# Patient Record
Sex: FEMALE | Race: WHITE | NOT HISPANIC OR LATINO | Employment: OTHER | URBAN - METROPOLITAN AREA
[De-identification: names, ages, dates, MRNs, and addresses within clinical notes are randomized per-mention and may not be internally consistent; named-entity substitution may affect disease eponyms.]

---

## 2017-01-16 ENCOUNTER — OFFICE VISIT (OUTPATIENT)
Dept: INTERNAL MEDICINE | Facility: CLINIC | Age: 62
End: 2017-01-16
Payer: COMMERCIAL

## 2017-01-16 VITALS
HEIGHT: 64 IN | OXYGEN SATURATION: 98 % | BODY MASS INDEX: 36.02 KG/M2 | DIASTOLIC BLOOD PRESSURE: 104 MMHG | SYSTOLIC BLOOD PRESSURE: 150 MMHG | HEART RATE: 50 BPM | RESPIRATION RATE: 20 BRPM | WEIGHT: 211 LBS

## 2017-01-16 DIAGNOSIS — E66.01 SEVERE OBESITY (BMI 35.0-35.9 WITH COMORBIDITY): ICD-10-CM

## 2017-01-16 DIAGNOSIS — I10 BENIGN ESSENTIAL HTN: Primary | ICD-10-CM

## 2017-01-16 PROCEDURE — 1159F MED LIST DOCD IN RCRD: CPT | Mod: S$GLB,,, | Performed by: NURSE PRACTITIONER

## 2017-01-16 PROCEDURE — 99204 OFFICE O/P NEW MOD 45 MIN: CPT | Mod: S$GLB,,, | Performed by: NURSE PRACTITIONER

## 2017-01-16 PROCEDURE — 3080F DIAST BP >= 90 MM HG: CPT | Mod: S$GLB,,, | Performed by: NURSE PRACTITIONER

## 2017-01-16 PROCEDURE — 99999 PR PBB SHADOW E&M-NEW PATIENT-LVL III: CPT | Mod: PBBFAC,,, | Performed by: NURSE PRACTITIONER

## 2017-01-16 PROCEDURE — 3077F SYST BP >= 140 MM HG: CPT | Mod: S$GLB,,, | Performed by: NURSE PRACTITIONER

## 2017-01-16 RX ORDER — RAMIPRIL 10 MG/1
10 CAPSULE ORAL 2 TIMES DAILY
Qty: 60 CAPSULE | Refills: 2 | Status: SHIPPED | OUTPATIENT
Start: 2017-01-16 | End: 2017-01-19

## 2017-01-16 RX ORDER — HYDROCHLOROTHIAZIDE 25 MG/1
TABLET ORAL
COMMUNITY
Start: 2017-01-13 | End: 2017-01-19

## 2017-01-16 RX ORDER — RAMIPRIL 10 MG/1
CAPSULE ORAL
COMMUNITY
Start: 2017-01-13 | End: 2017-01-16

## 2017-01-16 RX ORDER — HYDROCODONE BITARTRATE AND ACETAMINOPHEN 5; 325 MG/1; MG/1
TABLET ORAL
COMMUNITY
Start: 2017-01-13 | End: 2017-01-23

## 2017-01-16 NOTE — PROGRESS NOTES
Subjective:       Patient ID: Susan Pinto is a 61 y.o. female.    Chief Complaint: Follow-up (X ER visit on friday due to fall ) and Hypertension    Patient is known, to me and presents with   Chief Complaint   Patient presents with    Follow-up     X ER visit on friday due to fall     Hypertension   .  Denies chest pain and shortness of breath.  Patient presents to be established to clinic. Was placed on ramipril and HCTZ. She is only taking ramipril 10 mg at this time. She fell over the weekend at a retreat and was sent to ER. She had work up done and showed that she had elevated pressure. Patient states that she feels ok but is concerned about her pressure. Had EKG as well. Has some bruising.  She had blood tests too and would like to wait on other tests and wants to get blood pressure stable  HPI  Review of Systems   Constitutional: Negative for activity change, appetite change, fatigue, fever and unexpected weight change.   HENT: Negative for congestion, ear discharge, ear pain, hearing loss, postnasal drip and tinnitus.    Eyes: Negative for photophobia, pain and visual disturbance.   Respiratory: Negative for cough, shortness of breath, wheezing and stridor.    Cardiovascular: Negative for chest pain, palpitations and leg swelling.   Gastrointestinal: Negative for abdominal distention.   Genitourinary: Negative for difficulty urinating, dysuria, frequency, hematuria and urgency.   Musculoskeletal: Negative for arthralgias, back pain, gait problem, joint swelling and neck pain.   Skin: Positive for color change.   Neurological: Negative for dizziness, seizures, syncope, weakness, light-headedness, numbness and headaches.   Hematological: Negative for adenopathy. Does not bruise/bleed easily.   Psychiatric/Behavioral: Negative for behavioral problems, confusion and hallucinations.       Objective:      Physical Exam   Constitutional: She is oriented to person, place, and time. She appears well-developed  "and well-nourished. No distress.   HENT:   Head: Normocephalic and atraumatic.   Right Ear: External ear normal.   Left Ear: External ear normal.   Mouth/Throat: Oropharynx is clear and moist. No oropharyngeal exudate.   Eyes: Conjunctivae and EOM are normal. Pupils are equal, round, and reactive to light. Right eye exhibits no discharge. Left eye exhibits no discharge.   Neck: Normal range of motion. Neck supple. No JVD present. No thyromegaly present.   Cardiovascular: Normal rate, regular rhythm, normal heart sounds and intact distal pulses.  Exam reveals no gallop and no friction rub.    No murmur heard.  Pulmonary/Chest: Effort normal and breath sounds normal. No stridor. No respiratory distress. She has no wheezes. She has no rales. She exhibits no tenderness.   Abdominal: Soft. Bowel sounds are normal. She exhibits no distension and no mass. There is no tenderness. There is no rebound.   Musculoskeletal: Normal range of motion. She exhibits no edema or tenderness.   Lymphadenopathy:     She has no cervical adenopathy.   Neurological: She is alert and oriented to person, place, and time. She has normal reflexes. She displays normal reflexes. No cranial nerve deficit. She exhibits normal muscle tone. Coordination normal.   Skin: Skin is warm and dry. No rash noted. No erythema. No pallor.   Bruising to left forearm and left deltoid region   Psychiatric: She has a normal mood and affect. Her behavior is normal. Judgment and thought content normal.       Assessment:       1. Benign essential HTN    2. Severe obesity (BMI 35.0-35.9 with comorbidity)        Plan:   Susan was seen today for follow-up and hypertension.    Diagnoses and all orders for this visit:    Benign essential HTN  -     ramipril (ALTACE) 10 MG capsule; Take 1 capsule (10 mg total) by mouth 2 (two) times daily.    Severe obesity (BMI 35.0-35.9 with comorbidity)  "This note will not be shared with the patient."  Refills on meds.  Medication " compliance was discussed with the patient.   Medication side effects were discussed.  The patient was instructed on using exercise frequently to reduce blood pressure.  Thirty to forty-five minutes of brisk walking three to four times a week is often helpful to lower your blood pressure.  Monitor blood pressures at home and to record the values in a log.  The patient was instructed to monitor weight closely and to try to keep it as close to ideal body weight as possible.  Reduce salt intake to less than 2 grams per day.  Do not add salt to food at the table.  Reduce or get rid of salt used in cooking.  Limit processed and fast foods.  Read package labels for amount of salt (soduim) in foods.  Losing weight, even just 10 pounds, of can decrease blood pressure.  Place ice on hematomas to left arm  Will take ramipril bid and take hctz 25 mg not half  RTC as scheduled

## 2017-01-16 NOTE — MR AVS SNAPSHOT
[facility]  [address] [place] [id number]  Phone: [phone]  Fax: [phone]                  Susan Pinto   2017 9:30 AM   Office Visit    Description:  Female : 1955   Provider:  Court Champion NP   Department:  [facility]           Reason for Visit     Follow-up     Hypertension           Diagnoses this Visit        Comments    Benign essential HTN    -  Primary     Severe obesity (BMI 35.0-35.9 with comorbidity)                To Do List           Goals (5 Years of Data)     None      Follow-Up and Disposition     Return in about 1 week (around 2017).       These Medications        Disp Refills Start End    ramipril (ALTACE) 10 MG capsule 60 capsule 2 2017    Take 1 capsule (10 mg total) by mouth 2 (two) times daily. - Oral    Pharmacy: [address] #: [phone]         [facility]     [facility] On Call Nurse Care Line -  Assistance  Registered nurses in the [facility] On Call Center provide clinical advisement, health education, appointment booking, and other advisory services.  Call for this free service at [phone].             Medications           Message regarding Medications     Verify the changes and/or additions to your medication regime listed below are the same as discussed with your clinician today.  If any of these changes or additions are incorrect, please notify your healthcare provider.        START taking these NEW medications        Refills    ramipril (ALTACE) 10 MG capsule 2    Sig: Take 1 capsule (10 mg total) by mouth 2 (two) times daily.    Class: Normal    Route: Oral           Verify that the below list of medications is an accurate representation of the medications you are currently taking.  If none reported, the list may be blank. If incorrect, please contact your healthcare provider. Carry this list with you in case of  "emergency.           Current Medications     hydrochlorothiazide (HYDRODIURIL) 25 MG tablet     hydrocodone-acetaminophen 5-325mg (NORCO) 5-325 mg per tablet     ramipril (ALTACE) 10 MG capsule Take 1 capsule (10 mg total) by mouth 2 (two) times daily.           Clinical Reference Information           Vital Signs - Last Recorded  Most recent update: 1/16/2017  9:49 AM by Stephanie Dhaliwal MA    BP Pulse Resp Ht Wt SpO2    (!) 150/104 (!) 50 20 5' 4" (1.626 m) 95.7 kg (211 lb) 98%    BMI                36.22 kg/m2          Blood Pressure          Most Recent Value    BP  (!)  150/104      Allergies as of 1/16/2017     No Known Allergies      Immunizations Administered on Date of Encounter - 1/16/2017     None      Instructions      Risk Factors for Heart Disease  A risk factor is something that increases your chance of having heart disease. Heart disease (also called coronary artery disease) involves damage to the heart arteries. These blood vessels need to work well to provide the oxygen your heart needs to pump blood to the rest of your body. Things like smoking or high cholesterol levels can damage arteries. You cant control some risk factors, such as age and a family history of heart disease. But there are many things you can control to reduce your risk for heart disease.    Unhealthy cholesterol levels  Cholesterol is a fat-like substance in your blood. It can build up along the artery walls. This is called plaque. Over time, plaque narrows the arteries and reduces blood flow to your heart or brain. If a blood clot forms or a piece of the plaque breaks off, it can completely block the artery and cause a heart attack or stroke. Your risk of heart disease goes up if you have high levels of LDL ("bad") cholesterol or triglycerides (another fatty substance that can build up). Youre also at risk if you have low HDL cholesterol ("good") cholesterol. HDL helps clear the bad cholesterol away. You're at risk if you " have:  HDL cholesterol 50 mg/dL or lower; LDL cholesterol 100 mg/dL; or triglycerides of 150 mg/dL or higher.  Smoking  This is the most important risk factor you can change. Smoking causes inflammation and damages the smooth muscle that lines the arteries making them less flexible. It also raises your blood pressure, causing further damage to the artery lining. Smoking also increases your risk that your blood may clot, block an artery, and cause a heart attack or stroke. Smoking also damages your lungs, which affects the delivery of oxygen to the body. If you smoke, you are 2 to 4 times more likely to develop coronary artery disease. If you smoke, it's never too late to help your heart. Ask your doctor about nicotine replacement products and smoking cessation support.  High blood pressure  High blood pressure occurs when blood pushes too hard against artery walls. This causes damage to the artery walls and the formation of scar tissue as it heals. This makes the arteries stiff and weak. Plaque sticks to the scarred tissue narrowing and hardening the arteries. High blood pressure also causes your heart to work harder to get blood out to the body. High blood pressure raises your risk of heart attack, also known as acute myocardial infarction, or AMI, and especially stroke. The brain tissue is especially sensitive to high blood pressure damage. You're at risk if your blood pressure is 120/80 or higher.  Negative emotions  Chronic stress, pent-up anger, and other negative emotions have been linked to heart disease. This occurs because stress increases the levels of a hormone that increase the demand on your heart. Over time, these emotions could raise your heart disease risk.  Metabolic syndrome  This is caused by a combination of certain risk factors. It puts you at extra high risk of heart disease, stroke, and diabetes. You have metabolic syndrome if you have 3 or more of the following: low HDL cholesterol; high  triglycerides; high blood pressure; high blood sugar; extra weight around the waist.  Diabetes  Diabetes occurs when you have high levels of sugar (glucose) in your blood. This can damage arteries if not kept under control. Having diabetes also makes you more likely to have a silent heart attack--one without any symptoms.  Excess weight  Excess weight makes other risk factors, such as diabetes, more likely. Excess weight around the waist or stomach increases your heart disease risk the most.  Lack of physical activity  When youre not active, youre more likely to develop diabetes, high blood pressure, high cholesterol levels, and excess weight.     Most people with heart disease have more than one risk factor.   © 0433-1680 [employer]. [address] [place] [address], [place] [address]. All rights reserved. This information is not intended as a substitute for professional medical care. Always follow your healthcare professional's instructions.        Women and Heart Disease: Understanding the Risks  Risk factors are habits and conditions that make heart disease more likely. The more you have, the higher your chances of heart attack, also known as acute myocardial infarction, or AMI, and other problems. You can manage most risk factors to help make your heart healthier. Below are factors that increase your risk for having heart disease.    Smoking  This is the most important risk factor you can change. Smoking causes inflammation and damages the smooth muscle that lines the arteries making them less flexible. It also raises your blood pressure, causing further damage to the artery lining. Smoking also increased your risk that your blood may clot, block an artery, and lead to a heart attack or stroke. Smoking also damages your lungs, which can affect the delivery of oxygen to the body. Research shows that smoking makes women up to 6 times more likely to have a heart attack. It's also important to avoid  "secondhand smoke (smoke from other peoples tobacco products). If you smoke, it's never too late to improve your heart. Ask your doctor about nicotine replacement products and smoking cessation counseling.  Diabetes  Diabetes causes high blood sugar, which can damage blood vessels if not kept under control. Having diabetes also makes you more likely to have a silent heart attack--one without any symptoms. This occurs because long periods of high sugar levels in your blood eventually degrade nerve conduction which reduces your sensation. This translates to decreased chest pain than would be felt in a person without diabetes during a heart attack. Youre at risk if your blood sugar level is above 100 mg/dL.  High cholesterol  Cholesterol is a fat-like substance in your blood. It can build up along the artery walls. This is called plaque. Over time, plaque narrows the arteries and reduces blood flow to your heart or brain. If a blood clot forms or a piece of the plaque breaks off, it can completely block the artery and cause a heart attack or stroke. Your risk of heart disease goes up if you have high levels of LDL ("bad") cholesterol or triglycerides (another fatty substance that can build up). Youre also at risk if you have low HDL cholesterol ("good") cholesterol. HDL helps clear the bad cholesterol away. Youre at risk if you have:  HDL cholesterol 50 mg/dL or lower; LDL cholesterol 100 mg/dL or higher; triglycerides of 150 mg/dL or higher.  High blood pressure  High blood pressure occurs when blood pushes too hard against artery walls. This causes damage to the artery walls and then the formation of scar tissue as it heals. This makes the arteries stiff and weak. Plaque sticks to the scarred tissue narrowing and hardening the arteries. High blood pressure also causes your heart to work harder to get blood out to the body. High blood pressure raises your risk of heart attack, also known as acute myocardial " infarction, or AMI, and especially stroke. The brain tissue is especially sensitive to high blood pressure damage. Youre at risk if your blood pressure is 120/80 or higher.  Excess weight  Excess weight makes your heart work harder. This raises your risk of a heart attack. Being overweight also puts you at risk of developing diabetes and high blood pressure. Excess weight around the waist or stomach increases your risk the most. Being obese puts you at risk for developing heart disease.  Lack of exercise  Without regular exercise, youre more likely to develop other risk factors, such as being overweight and developing diabetes. High blood pressure and unhealthy lipid levels are also more likely. Exercise promotes good blood flow and ensures your heart is able to meet the demands placed on it.  Negative emotions  Emotions such as stress and pent-up anger have been linked to heart disease. Over time, these emotions could raise your heart disease risk. If you have heart disease, emotion such as anxiety and depression can make it worse. Managing these emotions is important as they have been shown to reduce hormones that increase stress on the heart in the long run.  Metabolic syndrome  This is caused by a combination of certain risk factors. It puts you at extra high risk of heart disease, stroke, and diabetes. You have metabolic syndrome if you have 3 or more of the following: low HDL cholesterol; high triglycerides; high blood pressure; high blood sugar; extra weight around the waist.  Risks you cant control  A few risk factors cant be changed. But they still raise your heart disease risk.  · Family history. If your mother or sister had heart trouble before age 65 or your father or brother before age 55, youre at higher risk of having a heart attack.  · Age. The older you are, the higher your heart disease risk.     For more information  · www.smokefree.gov/aovm-pl-zz-expert  · www.women.smokefree.gov  · National  Cancer [place] Smoking Quitline: 877-44U-QUIT (482-315-4549)      © 3173-2793 [employer]. [address] [place] [address], [place] [address]. All rights reserved. This information is not intended as a substitute for professional medical care. Always follow your healthcare professional's instructions.

## 2017-01-16 NOTE — PATIENT INSTRUCTIONS
"  Risk Factors for Heart Disease  A risk factor is something that increases your chance of having heart disease. Heart disease (also called coronary artery disease) involves damage to the heart arteries. These blood vessels need to work well to provide the oxygen your heart needs to pump blood to the rest of your body. Things like smoking or high cholesterol levels can damage arteries. You cant control some risk factors, such as age and a family history of heart disease. But there are many things you can control to reduce your risk for heart disease.    Unhealthy cholesterol levels  Cholesterol is a fat-like substance in your blood. It can build up along the artery walls. This is called plaque. Over time, plaque narrows the arteries and reduces blood flow to your heart or brain. If a blood clot forms or a piece of the plaque breaks off, it can completely block the artery and cause a heart attack or stroke. Your risk of heart disease goes up if you have high levels of LDL ("bad") cholesterol or triglycerides (another fatty substance that can build up). Youre also at risk if you have low HDL cholesterol ("good") cholesterol. HDL helps clear the bad cholesterol away. You're at risk if you have:  HDL cholesterol 50 mg/dL or lower; LDL cholesterol 100 mg/dL; or triglycerides of 150 mg/dL or higher.  Smoking  This is the most important risk factor you can change. Smoking causes inflammation and damages the smooth muscle that lines the arteries making them less flexible. It also raises your blood pressure, causing further damage to the artery lining. Smoking also increases your risk that your blood may clot, block an artery, and cause a heart attack or stroke. Smoking also damages your lungs, which affects the delivery of oxygen to the body. If you smoke, you are 2 to 4 times more likely to develop coronary artery disease. If you smoke, it's never too late to help your heart. Ask your doctor about nicotine replacement " products and smoking cessation support.  High blood pressure  High blood pressure occurs when blood pushes too hard against artery walls. This causes damage to the artery walls and the formation of scar tissue as it heals. This makes the arteries stiff and weak. Plaque sticks to the scarred tissue narrowing and hardening the arteries. High blood pressure also causes your heart to work harder to get blood out to the body. High blood pressure raises your risk of heart attack, also known as acute myocardial infarction, or AMI, and especially stroke. The brain tissue is especially sensitive to high blood pressure damage. You're at risk if your blood pressure is 120/80 or higher.  Negative emotions  Chronic stress, pent-up anger, and other negative emotions have been linked to heart disease. This occurs because stress increases the levels of a hormone that increase the demand on your heart. Over time, these emotions could raise your heart disease risk.  Metabolic syndrome  This is caused by a combination of certain risk factors. It puts you at extra high risk of heart disease, stroke, and diabetes. You have metabolic syndrome if you have 3 or more of the following: low HDL cholesterol; high triglycerides; high blood pressure; high blood sugar; extra weight around the waist.  Diabetes  Diabetes occurs when you have high levels of sugar (glucose) in your blood. This can damage arteries if not kept under control. Having diabetes also makes you more likely to have a silent heart attack--one without any symptoms.  Excess weight  Excess weight makes other risk factors, such as diabetes, more likely. Excess weight around the waist or stomach increases your heart disease risk the most.  Lack of physical activity  When youre not active, youre more likely to develop diabetes, high blood pressure, high cholesterol levels, and excess weight.     Most people with heart disease have more than one risk factor.   © 9099-3183 The  "[employer]. [address] [place] [address], [place] [address]. All rights reserved. This information is not intended as a substitute for professional medical care. Always follow your healthcare professional's instructions.        Women and Heart Disease: Understanding the Risks  Risk factors are habits and conditions that make heart disease more likely. The more you have, the higher your chances of heart attack, also known as acute myocardial infarction, or AMI, and other problems. You can manage most risk factors to help make your heart healthier. Below are factors that increase your risk for having heart disease.    Smoking  This is the most important risk factor you can change. Smoking causes inflammation and damages the smooth muscle that lines the arteries making them less flexible. It also raises your blood pressure, causing further damage to the artery lining. Smoking also increased your risk that your blood may clot, block an artery, and lead to a heart attack or stroke. Smoking also damages your lungs, which can affect the delivery of oxygen to the body. Research shows that smoking makes women up to 6 times more likely to have a heart attack. It's also important to avoid secondhand smoke (smoke from other peoples tobacco products). If you smoke, it's never too late to improve your heart. Ask your doctor about nicotine replacement products and smoking cessation counseling.  Diabetes  Diabetes causes high blood sugar, which can damage blood vessels if not kept under control. Having diabetes also makes you more likely to have a silent heart attack--one without any symptoms. This occurs because long periods of high sugar levels in your blood eventually degrade nerve conduction which reduces your sensation. This translates to decreased chest pain than would be felt in a person without diabetes during a heart attack. Youre at risk if your blood sugar level is above 100 mg/dL.  High cholesterol  Cholesterol  "is a fat-like substance in your blood. It can build up along the artery walls. This is called plaque. Over time, plaque narrows the arteries and reduces blood flow to your heart or brain. If a blood clot forms or a piece of the plaque breaks off, it can completely block the artery and cause a heart attack or stroke. Your risk of heart disease goes up if you have high levels of LDL ("bad") cholesterol or triglycerides (another fatty substance that can build up). Youre also at risk if you have low HDL cholesterol ("good") cholesterol. HDL helps clear the bad cholesterol away. Youre at risk if you have:  HDL cholesterol 50 mg/dL or lower; LDL cholesterol 100 mg/dL or higher; triglycerides of 150 mg/dL or higher.  High blood pressure  High blood pressure occurs when blood pushes too hard against artery walls. This causes damage to the artery walls and then the formation of scar tissue as it heals. This makes the arteries stiff and weak. Plaque sticks to the scarred tissue narrowing and hardening the arteries. High blood pressure also causes your heart to work harder to get blood out to the body. High blood pressure raises your risk of heart attack, also known as acute myocardial infarction, or AMI, and especially stroke. The brain tissue is especially sensitive to high blood pressure damage. Youre at risk if your blood pressure is 120/80 or higher.  Excess weight  Excess weight makes your heart work harder. This raises your risk of a heart attack. Being overweight also puts you at risk of developing diabetes and high blood pressure. Excess weight around the waist or stomach increases your risk the most. Being obese puts you at risk for developing heart disease.  Lack of exercise  Without regular exercise, youre more likely to develop other risk factors, such as being overweight and developing diabetes. High blood pressure and unhealthy lipid levels are also more likely. Exercise promotes good blood flow and ensures " your heart is able to meet the demands placed on it.  Negative emotions  Emotions such as stress and pent-up anger have been linked to heart disease. Over time, these emotions could raise your heart disease risk. If you have heart disease, emotion such as anxiety and depression can make it worse. Managing these emotions is important as they have been shown to reduce hormones that increase stress on the heart in the long run.  Metabolic syndrome  This is caused by a combination of certain risk factors. It puts you at extra high risk of heart disease, stroke, and diabetes. You have metabolic syndrome if you have 3 or more of the following: low HDL cholesterol; high triglycerides; high blood pressure; high blood sugar; extra weight around the waist.  Risks you cant control  A few risk factors cant be changed. But they still raise your heart disease risk.  · Family history. If your mother or sister had heart trouble before age 65 or your father or brother before age 55, youre at higher risk of having a heart attack.  · Age. The older you are, the higher your heart disease risk.     For more information  · www.smokefree.gov/zoem-ih-db-expert  · www.women.smokefree.gov  · National [employer] [place] Smoking Quitline: 877-44U-QUIT (488-691-6798)      © 2152-5156 [employer]. [address] [place] [address], [place] [address]. All rights reserved. This information is not intended as a substitute for professional medical care. Always follow your healthcare professional's instructions.

## 2017-01-19 ENCOUNTER — TELEPHONE (OUTPATIENT)
Dept: FAMILY MEDICINE | Facility: CLINIC | Age: 62
End: 2017-01-19

## 2017-01-19 DIAGNOSIS — I10 BENIGN ESSENTIAL HTN: Primary | ICD-10-CM

## 2017-01-19 RX ORDER — CLONIDINE HYDROCHLORIDE 0.1 MG/1
TABLET ORAL
Qty: 60 TABLET | Refills: 2 | Status: SHIPPED | OUTPATIENT
Start: 2017-01-19 | End: 2018-02-15 | Stop reason: SDUPTHER

## 2017-01-19 RX ORDER — LISINOPRIL 40 MG/1
40 TABLET ORAL DAILY
Qty: 30 TABLET | Refills: 2 | Status: SHIPPED | OUTPATIENT
Start: 2017-01-19 | End: 2017-04-13 | Stop reason: SDUPTHER

## 2017-01-19 NOTE — TELEPHONE ENCOUNTER
Patient was seen for high blood pressure and put on medication. She had a fall on Friday and was given pain medication but does not want to take. States she is having left arm pain and would like to know what she can take over the counter for pain. States pain kept her up last night but would like to take over the counter meds instead of narcotic. States her blood pressure is still elevated. Last night blood pressure reading was 183/112. States it is usually lower in the morning.  Advise    Phone: [phone]

## 2017-01-23 ENCOUNTER — HOSPITAL ENCOUNTER (OUTPATIENT)
Dept: RADIOLOGY | Facility: HOSPITAL | Age: 62
Discharge: HOME | End: 2017-01-23
Attending: NURSE PRACTITIONER
Payer: COMMERCIAL

## 2017-01-23 ENCOUNTER — OFFICE VISIT (OUTPATIENT)
Dept: INTERNAL MEDICINE | Facility: CLINIC | Age: 62
End: 2017-01-23
Payer: COMMERCIAL

## 2017-01-23 VITALS
HEART RATE: 82 BPM | RESPIRATION RATE: 20 BRPM | HEIGHT: 64 IN | DIASTOLIC BLOOD PRESSURE: 88 MMHG | SYSTOLIC BLOOD PRESSURE: 132 MMHG | WEIGHT: 211 LBS | BODY MASS INDEX: 36.02 KG/M2 | OXYGEN SATURATION: 98 %

## 2017-01-23 DIAGNOSIS — T14.90XA INJURY: ICD-10-CM

## 2017-01-23 DIAGNOSIS — T14.8XXA HEMATOMA: ICD-10-CM

## 2017-01-23 DIAGNOSIS — E66.01 SEVERE OBESITY (BMI 35.0-39.9): ICD-10-CM
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
## 2017-01-23 DIAGNOSIS — I10 BENIGN ESSENTIAL HTN: ICD-10-CM

## 2017-01-23 PROCEDURE — 73090 X-RAY EXAM OF FOREARM: CPT | Mod: TC,LT

## 2017-01-23 PROCEDURE — 73090 X-RAY EXAM OF FOREARM: CPT | Mod: 26,LT,, | Performed by: RADIOLOGY

## 2017-01-23 PROCEDURE — 99999 PR PBB SHADOW E&M-EST. PATIENT-LVL III: CPT | Mod: PBBFAC,,, | Performed by: NURSE PRACTITIONER

## 2017-01-23 PROCEDURE — 93971 EXTREMITY STUDY: CPT | Mod: TC

## 2017-01-23 PROCEDURE — 93971 EXTREMITY STUDY: CPT | Mod: 26,,, | Performed by: RADIOLOGY

## 2017-01-23 PROCEDURE — 99214 OFFICE O/P EST MOD 30 MIN: CPT | Mod: S$GLB,,, | Performed by: NURSE PRACTITIONER

## 2017-01-23 PROCEDURE — 1159F MED LIST DOCD IN RCRD: CPT | Mod: S$GLB,,, | Performed by: NURSE PRACTITIONER

## 2017-01-23 PROCEDURE — 3079F DIAST BP 80-89 MM HG: CPT | Mod: S$GLB,,, | Performed by: NURSE PRACTITIONER

## 2017-01-23 PROCEDURE — 3075F SYST BP GE 130 - 139MM HG: CPT | Mod: S$GLB,,, | Performed by: NURSE PRACTITIONER

## 2017-01-23 NOTE — MR AVS SNAPSHOT
[facility]  [address] [place] [address]  Phone: [phone]  Fax: [phone]                  Susan Pinto   2017 9:15 AM   Office Visit    Description:  Female : 1955   Provider:  Court Champion NP   Department:  [facility]           Reason for Visit     Follow-up           Diagnoses this Visit        Comments    Fall, subsequent encounter    -  Primary     Injury         Hematoma         Benign essential HTN         Severe obesity (BMI 35.0-39.9)                To Do List           Future Appointments        Provider Department Dept Phone    2017 1:30 PM [facility] [phone]      Goals (5 Years of Data)     None      Follow-Up and Disposition     Return in about 4 weeks (around 2017).      [facility]     [facility] -  Assistance  Registered nurses in the [facility] provide clinical advisement, health education, appointment booking, and other advisory services.  Call for this free service at [phone].             Medications           Message regarding Medications     Verify the changes and/or additions to your medication regime listed below are the same as discussed with your clinician today.  If any of these changes or additions are incorrect, please notify your healthcare provider.        STOP taking these medications     hydrocodone-acetaminophen 5-325mg (NORCO) 5-325 mg per tablet            Verify that the below list of medications is an accurate representation of the medications you are currently taking.  If none reported, the list may be blank. If incorrect, please contact your healthcare provider. Carry this list with you in case of emergency.           Current Medications     cloNIDine (CATAPRES) 0.1 MG tablet Take one as needed every 12 hours if blood pressure exceeds 150/100    lisinopril (PRINIVIL,ZESTRIL) 40 MG tablet Take 1 tablet  "(40 mg total) by mouth once daily.           Clinical Reference Information           Vital Signs - Last Recorded  Most recent update: 1/23/2017  9:29 AM by Stephanie Dhaliwal MA    BP Pulse Resp Ht Wt SpO2    132/88 82 20 5' 4" (1.626 m) 95.7 kg (211 lb) 98%    BMI                36.22 kg/m2          Blood Pressure          Most Recent Value    BP  132/88      Allergies as of 1/23/2017     No Known Allergies      Immunizations Administered on Date of Encounter - 1/23/2017     None      Orders Placed During Today's Visit     Future Labs/Procedures Expected by Expires    US Upper Extremity Veins Left  1/23/2017 1/23/2018    X-Ray Radius Ulna Bilateral AP And Lateral  1/23/2017 1/23/2018      Instructions      Facts About Dietary Fat     Olive oil is a good source of unsaturated fat.     Eating less saturated and trans fat is one of the best things you can do for your heart. Start by finding out which fats are better to use. Then always try to use as little "bad" fat as you can.  Why eat less fat?  · Cutting down on the fat you eat can lower your blood cholesterol levels. This may help prevent clogged arteries from buildup of plaque.  · A low-fat diet can help you lose excess weight. Doing so can lower your blood pressure and reduce your chances of getting diabetes.  · A low-fat diet reduces your risk for stroke and for some cancers.  Unsaturated fat is most healthy  · When you must add fat, use unsaturated fat.  · Unsaturated fats come from plants. They include olive, canola, peanut, corn, avocado, safflower, and sunflower oils.  · Liquid (squeezable) margarine is also mostly unsaturated fat.  · In moderate amounts, unsaturated fat can even be good for your heart.  Saturated fat is less healthy  · Avoid eating saturated fat because it raises your blood cholesterol levels.  · Most saturated fat comes from animals. Foods such as butter, lard, cheese, cream, whole milk, and fatty cuts of meat are high in saturated " fat.  · Some oils, such as palm and coconut oils, are also saturated fats.  Trans fat is least healthy  · Also avoid trans fat whenever possible. Even if it's not listed on the food label, look for it in the ingredients in the form of hydrogenated or partially hydrogenated oils.  · This is found in snack foods, shortening, french fries, and stick margarines.  Add flavor without fat  · Sprinkle herbs on fish, chicken, and meat, and in soups.  · Try herbs, lemon juice, or flavored vinegar on vegetables.  · Add chopped onions, garlic, and peppers to flavor beans and rice.   © 2565-2991 [employer]. [address] [place] [address], [place] [address]. All rights reserved. This information is not intended as a substitute for professional medical care. Always follow your healthcare professional's instructions.        Eating Heart-Healthy Foods  Eating has a big impact on your heart health. In fact, eating healthier can improve several of your heart risks at once. For instance, it helps you manage weight, cholesterol, and blood pressure. Here are ideas to help you make heart-healthy changes without giving up all the foods and flavors you love.  Getting started  · Talk with your health care provider about eating plans, such as the DASH or Mediterranean diet. You may also be referred to a dietitian.  · Change a few things at a time. Give yourself time to get used to a few eating changes before adding more.  · Work to create a tasty, healthy eating plan that you can stick to for the rest of your life.    Goals for healthy eating  Below are some tips to improve your eating habits:  · Limit saturated fats and trans fats. Saturated fats raise your levels of cholesterol, so keep these fats to a minimum. They are found in foods such as fatty meats, whole milk, cheese, and palm and coconut oils. Avoid trans fats because they lower good cholesterol as well as raise bad cholesterol. Trans fats are most often found in processed  foods.  · Reduce sodium (salt) intake. Eating too much salt may increase your blood pressure. Limit your sodium intake to 2,300 milligrams (mg) per day, or less if your health care provider recommends it. Dining out less often and eating fewer processed foods are two great ways to decrease the amount of salt you consume.  · Managing calories. A calorie is a unit of energy. Your body burns calories for fuel, but if you eat more calories than your body burns, the extras are stored as fat. Your health care provider can help you create a diet plan to manage your calories. This will likely include eating healthier foods as well as exercising regularly. To help you track your progress, keep a diary to record what you eat and how often you exercise.  Choose the right foods  Aim to make these foods staples of your diet. If you have diabetes, you may have different recommendations than what is listed here:  · Fruits and vegetable provide plenty of nutrients without a lot of calories. At meals, fill half your plate with these foods. Split the other half of your plate between whole grains and lean protein.  · Whole grains are high in fiber and rich in vitamins and nutrients. Good choices include whole-wheat bread, pasta, and brown rice.  · Lean proteins give you nutrition with less fat. Good choices include fish, skinless chicken, and beans.  · Low-fat or nonfat dairy provides nutrients without a lot of fat. Try low-fat or nonfat milk, cheese, or yogurt.  · Healthy fats can be good for you in small amounts. These are unsaturated fats, such as olive oil, nuts, and fish. Try to have at least 2 servings per week of fatty fish such as salmon, sardines, mackerel, rainbow trout, and albacore tuna. These contain omega-3 fatty acids, which are good for your heart. Flaxseed is another source of a heart-healthy fat.  More on heart healthy eating    Read food labels  Healthy eating starts at the grocery store. Be sure to pay attention to  food labels on packaged foods. Look for products that are high in fiber and protein, and low in saturated fat, cholesterol, and sodium. Avoid products that contain trans fat. And pay close attention to serving size. For instance, if you plan to eat two servings, double all the numbers on the label.  Prepare food right  A key part of healthy cooking is cutting down on added fat and salt. Look on the internet for lower-fat, lower-sodium recipes. Also, try these tips:  · Remove fat from meat and skin from poultry before cooking.  · Skim fat from the surface of soups and sauces.  · Broil, boil, bake, steam, grill, and microwave food without added fats.  · Choose ingredients that spice up your food without adding calories, fat, or sodium. Try these items: horseradish, hot sauce, lemon, mustard, nonfat salad dressings, and vinegar. For salt-free herbs and spices, try basil, cilantro, cinnamon, pepper, and rosemary.  © 4721-4426 [employer]. [address] [place] [address], [place] [address]. All rights reserved. This information is not intended as a substitute for professional medical care. Always follow your healthcare professional's instructions.

## 2017-01-23 NOTE — PROGRESS NOTES
Subjective:       Patient ID: Susan Pinto is a 61 y.o. female.    Chief Complaint: Follow-up (x 1 week; BP check )    Patient is known, to me and presents with   Chief Complaint   Patient presents with    Follow-up     x 1 week; BP check    .  Denies chest pain and shortness of breath.  Patient presents with blood pressure check up and is much better. Her left arm has more swelling and pain especially at night.    HPI  Review of Systems   Constitutional: Negative for activity change, appetite change, fatigue, fever and unexpected weight change.   HENT: Negative for congestion, ear discharge, ear pain, hearing loss, postnasal drip and tinnitus.    Eyes: Negative for photophobia, pain and visual disturbance.   Respiratory: Negative for cough, shortness of breath, wheezing and stridor.    Cardiovascular: Negative for chest pain, palpitations and leg swelling.   Gastrointestinal: Negative for abdominal distention.   Genitourinary: Negative for difficulty urinating, dysuria, frequency, hematuria and urgency.   Musculoskeletal: Negative for arthralgias, back pain, gait problem, joint swelling and neck pain.   Skin: Positive for color change.   Neurological: Negative for dizziness, seizures, syncope, weakness, light-headedness, numbness and headaches.   Hematological: Negative for adenopathy. Does not bruise/bleed easily.   Psychiatric/Behavioral: Negative for behavioral problems, confusion, hallucinations, sleep disturbance and suicidal ideas. The patient is not nervous/anxious.        Objective:      Physical Exam   Constitutional: She is oriented to person, place, and time. She appears well-developed and well-nourished. No distress.   HENT:   Head: Normocephalic and atraumatic.   Right Ear: External ear normal.   Left Ear: External ear normal.   Mouth/Throat: Oropharynx is clear and moist. No oropharyngeal exudate.   Eyes: Conjunctivae and EOM are normal. Pupils are equal, round, and reactive to light. Right eye  "exhibits no discharge. Left eye exhibits no discharge.   Neck: Normal range of motion. Neck supple. No JVD present. No thyromegaly present.   Cardiovascular: Normal rate, regular rhythm, normal heart sounds and intact distal pulses.  Exam reveals no gallop and no friction rub.    No murmur heard.  Pulmonary/Chest: Effort normal and breath sounds normal. No stridor. No respiratory distress. She has no wheezes. She has no rales. She exhibits no tenderness.   Abdominal: Soft. Bowel sounds are normal. She exhibits no distension and no mass. There is no tenderness. There is no rebound.   Musculoskeletal: Normal range of motion. She exhibits no edema or tenderness.   Lymphadenopathy:     She has no cervical adenopathy.   Neurological: She is alert and oriented to person, place, and time. She has normal reflexes. She displays normal reflexes. No cranial nerve deficit. She exhibits normal muscle tone. Coordination normal.   Skin: Skin is warm and dry. Bruising noted. No rash noted. No erythema. No pallor.        Orange size hematoma to left forearm with large amount of bruising and tenderness noted No redness     Psychiatric: She has a normal mood and affect. Her behavior is normal. Judgment and thought content normal.       Assessment:       1. Fall, subsequent encounter    2. Injury    3. Hematoma    4. Benign essential HTN    5. Severe obesity (BMI 35.0-39.9)        Plan:   Susan was seen today for follow-up.    Diagnoses and all orders for this visit:    Fall, subsequent encounter  -     X-Ray Radius Ulna Bilateral AP And Lateral; Future    Injury  -     X-Ray Radius Ulna Bilateral AP And Lateral; Future    Hematoma  -     X-Ray Radius Ulna Bilateral AP And Lateral; Future  -     US Upper Extremity Veins Left; Future    Benign essential HTN    Severe obesity (BMI 35.0-39.9)    "This note will not be shared with the patient."  Will do ultrasound and x-ray to rule out fracture and blood clot  Continue with HTN " medications  Refills on meds.  Medication compliance was discussed with the patient.   Medication side effects were discussed.  The patient was instructed on using exercise frequently to reduce blood pressure.  Thirty to forty-five minutes of brisk walking three to four times a week is often helpful to lower your blood pressure.  Monitor blood pressures at home and to record the values in a log.  The patient was instructed to monitor weight closely and to try to keep it as close to ideal body weight as possible.  Reduce salt intake to less than 2 grams per day.  Do not add salt to food at the table.  Reduce or get rid of salt used in cooking.  Limit processed and fast foods.  Read package labels for amount of salt (soduim) in foods.  Losing weight, even just 10 pounds, of can decrease blood pressure.  RTC as scheduled

## 2017-01-23 NOTE — PATIENT INSTRUCTIONS
"  Facts About Dietary Fat     Olive oil is a good source of unsaturated fat.     Eating less saturated and trans fat is one of the best things you can do for your heart. Start by finding out which fats are better to use. Then always try to use as little "bad" fat as you can.  Why eat less fat?  · Cutting down on the fat you eat can lower your blood cholesterol levels. This may help prevent clogged arteries from buildup of plaque.  · A low-fat diet can help you lose excess weight. Doing so can lower your blood pressure and reduce your chances of getting diabetes.  · A low-fat diet reduces your risk for stroke and for some cancers.  Unsaturated fat is most healthy  · When you must add fat, use unsaturated fat.  · Unsaturated fats come from plants. They include olive, canola, peanut, corn, avocado, safflower, and sunflower oils.  · Liquid (squeezable) margarine is also mostly unsaturated fat.  · In moderate amounts, unsaturated fat can even be good for your heart.  Saturated fat is less healthy  · Avoid eating saturated fat because it raises your blood cholesterol levels.  · Most saturated fat comes from animals. Foods such as butter, lard, cheese, cream, whole milk, and fatty cuts of meat are high in saturated fat.  · Some oils, such as palm and coconut oils, are also saturated fats.  Trans fat is least healthy  · Also avoid trans fat whenever possible. Even if it's not listed on the food label, look for it in the ingredients in the form of hydrogenated or partially hydrogenated oils.  · This is found in snack foods, shortening, french fries, and stick margarines.  Add flavor without fat  · Sprinkle herbs on fish, chicken, and meat, and in soups.  · Try herbs, lemon juice, or flavored vinegar on vegetables.  · Add chopped onions, garlic, and peppers to flavor beans and rice.   © 9811-9881 [employer]. [address] [place] [address], [place] [address]. All rights reserved. This information is not intended as a " substitute for professional medical care. Always follow your healthcare professional's instructions.        Eating Heart-Healthy Foods  Eating has a big impact on your heart health. In fact, eating healthier can improve several of your heart risks at once. For instance, it helps you manage weight, cholesterol, and blood pressure. Here are ideas to help you make heart-healthy changes without giving up all the foods and flavors you love.  Getting started  · Talk with your health care provider about eating plans, such as the DASH or Mediterranean diet. You may also be referred to a dietitian.  · Change a few things at a time. Give yourself time to get used to a few eating changes before adding more.  · Work to create a tasty, healthy eating plan that you can stick to for the rest of your life.    Goals for healthy eating  Below are some tips to improve your eating habits:  · Limit saturated fats and trans fats. Saturated fats raise your levels of cholesterol, so keep these fats to a minimum. They are found in foods such as fatty meats, whole milk, cheese, and palm and coconut oils. Avoid trans fats because they lower good cholesterol as well as raise bad cholesterol. Trans fats are most often found in processed foods.  · Reduce sodium (salt) intake. Eating too much salt may increase your blood pressure. Limit your sodium intake to 2,300 milligrams (mg) per day, or less if your health care provider recommends it. Dining out less often and eating fewer processed foods are two great ways to decrease the amount of salt you consume.  · Managing calories. A calorie is a unit of energy. Your body burns calories for fuel, but if you eat more calories than your body burns, the extras are stored as fat. Your health care provider can help you create a diet plan to manage your calories. This will likely include eating healthier foods as well as exercising regularly. To help you track your progress, keep a diary to record what you  eat and how often you exercise.  Choose the right foods  Aim to make these foods staples of your diet. If you have diabetes, you may have different recommendations than what is listed here:  · Fruits and vegetable provide plenty of nutrients without a lot of calories. At meals, fill half your plate with these foods. Split the other half of your plate between whole grains and lean protein.  · Whole grains are high in fiber and rich in vitamins and nutrients. Good choices include whole-wheat bread, pasta, and brown rice.  · Lean proteins give you nutrition with less fat. Good choices include fish, skinless chicken, and beans.  · Low-fat or nonfat dairy provides nutrients without a lot of fat. Try low-fat or nonfat milk, cheese, or yogurt.  · Healthy fats can be good for you in small amounts. These are unsaturated fats, such as olive oil, nuts, and fish. Try to have at least 2 servings per week of fatty fish such as salmon, sardines, mackerel, rainbow trout, and albacore tuna. These contain omega-3 fatty acids, which are good for your heart. Flaxseed is another source of a heart-healthy fat.  More on heart healthy eating    Read food labels  Healthy eating starts at the grocery store. Be sure to pay attention to food labels on packaged foods. Look for products that are high in fiber and protein, and low in saturated fat, cholesterol, and sodium. Avoid products that contain trans fat. And pay close attention to serving size. For instance, if you plan to eat two servings, double all the numbers on the label.  Prepare food right  A key part of healthy cooking is cutting down on added fat and salt. Look on the internet for lower-fat, lower-sodium recipes. Also, try these tips:  · Remove fat from meat and skin from poultry before cooking.  · Skim fat from the surface of soups and sauces.  · Broil, boil, bake, steam, grill, and microwave food without added fats.  · Choose ingredients that spice up your food without adding  calories, fat, or sodium. Try these items: horseradish, hot sauce, lemon, mustard, nonfat salad dressings, and vinegar. For salt-free herbs and spices, try basil, cilantro, cinnamon, pepper, and rosemary.  © 1226-5240 [employer]. [address] [place] [address], [place] [address]. All rights reserved. This information is not intended as a substitute for professional medical care. Always follow your healthcare professional's instructions.

## 2017-02-23 ENCOUNTER — OFFICE VISIT (OUTPATIENT)
Dept: INTERNAL MEDICINE | Facility: CLINIC | Age: 62
End: 2017-02-23
Payer: COMMERCIAL

## 2017-02-23 VITALS
HEIGHT: 64 IN | RESPIRATION RATE: 20 BRPM | OXYGEN SATURATION: 97 % | WEIGHT: 210 LBS | SYSTOLIC BLOOD PRESSURE: 160 MMHG | BODY MASS INDEX: 35.85 KG/M2 | HEART RATE: 77 BPM | DIASTOLIC BLOOD PRESSURE: 98 MMHG

## 2017-02-23 DIAGNOSIS — I10 BENIGN ESSENTIAL HTN: Primary | ICD-10-CM

## 2017-02-23 DIAGNOSIS — Z13.220 SCREENING FOR LIPOID DISORDERS: ICD-10-CM

## 2017-02-23 DIAGNOSIS — E66.01 SEVERE OBESITY (BMI 35.0-35.9 WITH COMORBIDITY): ICD-10-CM

## 2017-02-23 DIAGNOSIS — Z13.29 SCREENING FOR THYROID DISORDER: ICD-10-CM

## 2017-02-23 DIAGNOSIS — G47.00 INSOMNIA, UNSPECIFIED TYPE: ICD-10-CM

## 2017-02-23 PROCEDURE — 1160F RVW MEDS BY RX/DR IN RCRD: CPT | Mod: S$GLB,,, | Performed by: NURSE PRACTITIONER

## 2017-02-23 PROCEDURE — 3077F SYST BP >= 140 MM HG: CPT | Mod: S$GLB,,, | Performed by: NURSE PRACTITIONER

## 2017-02-23 PROCEDURE — 3080F DIAST BP >= 90 MM HG: CPT | Mod: S$GLB,,, | Performed by: NURSE PRACTITIONER

## 2017-02-23 PROCEDURE — 99214 OFFICE O/P EST MOD 30 MIN: CPT | Mod: S$GLB,,, | Performed by: NURSE PRACTITIONER

## 2017-02-23 PROCEDURE — 99999 PR PBB SHADOW E&M-EST. PATIENT-LVL III: CPT | Mod: PBBFAC,,, | Performed by: NURSE PRACTITIONER

## 2017-02-23 RX ORDER — AMLODIPINE BESYLATE 5 MG/1
5 TABLET ORAL DAILY
Qty: 30 TABLET | Refills: 5 | Status: SHIPPED | OUTPATIENT
Start: 2017-02-23 | End: 2017-08-14 | Stop reason: SDUPTHER

## 2017-02-23 NOTE — PROGRESS NOTES
Subjective:       Patient ID: Susan Pinto is a 61 y.o. female.    Chief Complaint: Follow-up (x 1 month )    Patient is known, to me and presents with   Chief Complaint   Patient presents with    Follow-up     x 1 month    .  Denies chest pain and shortness of breath.  Patient presents with one month follow up for HTN. Her blood pressure has been elevated and has to take clonidine prn. Feeling ok but having issues with insomnia   HPI  Review of Systems   Constitutional: Negative for activity change, appetite change, fatigue, fever and unexpected weight change.   HENT: Negative for congestion, ear discharge, ear pain, hearing loss, postnasal drip and tinnitus.    Eyes: Negative for photophobia, pain and visual disturbance.   Respiratory: Negative for cough, shortness of breath, wheezing and stridor.    Cardiovascular: Negative for chest pain, palpitations and leg swelling.   Gastrointestinal: Negative for abdominal distention.   Genitourinary: Negative for difficulty urinating, dysuria, frequency, hematuria and urgency.   Musculoskeletal: Negative for arthralgias, back pain, gait problem, joint swelling and neck pain.   Skin: Negative.    Neurological: Negative for dizziness, seizures, syncope, weakness, light-headedness, numbness and headaches.   Hematological: Negative for adenopathy. Does not bruise/bleed easily.   Psychiatric/Behavioral: Positive for sleep disturbance. Negative for behavioral problems, confusion, hallucinations and suicidal ideas. The patient is not nervous/anxious.        Objective:      Physical Exam   Constitutional: She is oriented to person, place, and time. She appears well-developed and well-nourished. No distress.   HENT:   Head: Normocephalic and atraumatic.   Right Ear: External ear normal.   Left Ear: External ear normal.   Mouth/Throat: Oropharynx is clear and moist. No oropharyngeal exudate.   Eyes: Conjunctivae and EOM are normal. Pupils are equal, round, and reactive to  "light. Right eye exhibits no discharge. Left eye exhibits no discharge.   Neck: Normal range of motion. Neck supple. No JVD present. No thyromegaly present.   Cardiovascular: Normal rate, regular rhythm, normal heart sounds and intact distal pulses.  Exam reveals no gallop and no friction rub.    No murmur heard.  Pulmonary/Chest: Effort normal and breath sounds normal. No stridor. No respiratory distress. She has no wheezes. She has no rales. She exhibits no tenderness.   Abdominal: Soft. Bowel sounds are normal. She exhibits no distension and no mass. There is no tenderness. There is no rebound.   Musculoskeletal: Normal range of motion. She exhibits no edema or tenderness.   Lymphadenopathy:     She has no cervical adenopathy.   Neurological: She is alert and oriented to person, place, and time. She has normal reflexes. She displays normal reflexes. No cranial nerve deficit. She exhibits normal muscle tone. Coordination normal.   Skin: Skin is warm and dry. No rash noted. No erythema. No pallor.   Psychiatric: She has a normal mood and affect. Her behavior is normal. Judgment and thought content normal.       Assessment:       1. Benign essential HTN    2. Insomnia, unspecified type    3. Severe obesity (BMI 35.0-35.9 with comorbidity)    4. Screening for thyroid disorder    5. Screening for lipoid disorders        Plan:   Susan was seen today for follow-up.    Diagnoses and all orders for this visit:    Benign essential HTN  -     CBC auto differential; Future  -     Comprehensive metabolic panel; Future  -     amlodipine (NORVASC) 5 MG tablet; Take 1 tablet (5 mg total) by mouth once daily.    Insomnia, unspecified type    Severe obesity (BMI 35.0-35.9 with comorbidity)  -     CBC auto differential; Future  -     Comprehensive metabolic panel; Future    Screening for thyroid disorder  -     TSH; Future    Screening for lipoid disorders  -     Lipid panel; Future    "This note will not be shared with the " "patient."  Refills on meds.  Medication compliance was discussed with the patient.   Medication side effects were discussed.  The patient was instructed on using exercise frequently to reduce blood pressure.  Thirty to forty-five minutes of brisk walking three to four times a week is often helpful to lower your blood pressure.  Monitor blood pressures at home and to record the values in a log.  The patient was instructed to monitor weight closely and to try to keep it as close to ideal body weight as possible.  Reduce salt intake to less than 2 grams per day.  Do not add salt to food at the table.  Reduce or get rid of salt used in cooking.  Limit processed and fast foods.  Read package labels for amount of salt (soduim) in foods.  Losing weight, even just 10 pounds, of can decrease blood pressure.  Will add norvasc  Call me next week on blood pressure  Take asa 81 mg nightly   RTC as scheduled    "

## 2017-02-23 NOTE — PATIENT INSTRUCTIONS
"  Understanding Body Mass Index (BMI)  Body mass index (BMI) is a method of screening for a weight category using the ratio of your height to your weight. The BMI is a measure of overweight that is corrected for height. Knowing your BMI is a way to tell if you are at a healthy weight. The higher your BMI, the greater your risk for weight-related health problems.  What BMI means  · BMI below 18.5: Underweight  · BMI 18.5 to 24.9: Healthy weight or "ideal body weight"   · BMI 25 to 29.9: Overweight  · BMI 30 and over: Obese  · BMI 40 and over: Severe obesity   Online BMI Calculators  Find your BMI with an online BMI calculator tool, such as these from the CDC:  · BMI calculator for adults  · BMI calculator for children and teens   Using the BMI chart  To figure out your BMI, find your height and weight (or the numbers closest to them) on the table below. Follow each column of numbers to where your height and weight meet on the table. That is your BMI.    Date Last Reviewed: 7/1/2016 © 2000-2016 [employer]. [address] [place] [address]. All rights reserved. This information is not intended as a substitute for professional medical care. Always follow your healthcare professional's instructions.        Facts About Dietary Fat     Olive oil is a good source of unsaturated fat.     Eating less saturated and trans fat is one of the best things you can do for your heart. Start by finding out which fats are better to use. Then always try to use as little "bad" fat as you can.  Why eat less fat?  · Cutting down on the fat you eat can lower your blood cholesterol levels. This may help prevent clogged arteries from buildup of plaque.  · A low-fat diet can help you lose excess weight. Doing so can lower your blood pressure and reduce your chances of getting diabetes.  · A low-fat diet reduces your risk for stroke and for some cancers.  Unsaturated fat is most healthy  · When you must add fat, use " unsaturated fat.  · Unsaturated fats come from plants. They include olive, canola, peanut, corn, avocado, safflower, and sunflower oils.  · Liquid (squeezable) margarine is also mostly unsaturated fat.  · In moderate amounts, unsaturated fat can even be good for your heart.  Saturated fat is less healthy  · Avoid eating saturated fat because it raises your blood cholesterol levels.  · Most saturated fat comes from animals. Foods such as butter, lard, cheese, cream, whole milk, and fatty cuts of meat are high in saturated fat.  · Some oils, such as palm and coconut oils, are also saturated fats.  Trans fat is least healthy  · Also avoid trans fat whenever possible. Even if it's not listed on the food label, look for it in the ingredients in the form of hydrogenated or partially hydrogenated oils.  · This is found in snack foods, shortening, french fries, and stick margarines.  Add flavor without fat  · Sprinkle herbs on fish, chicken, and meat, and in soups.  · Try herbs, lemon juice, or flavored vinegar on vegetables.  · Add chopped onions, garlic, and peppers to flavor beans and rice.   Date Last Reviewed: 5/11/2015  © 9986-0262 [employer]. [address] [place] [address], [place] [address]. All rights reserved. This information is not intended as a substitute for professional medical care. Always follow your healthcare professional's instructions.        Aerobic Exercise for a Healthy Heart  Exercise is a lot more than an energy booster and a stress reliever. It also strengthens your heart muscle, lowers your blood pressure and cholesterol, and burns calories. It can also improve your resting muscle tone, and your mood.     Remember, some activity is better than none.    Choose an aerobic activity  Choose an activity that makes your heart and lungs work harder than they do when you rest or walk normally. This aerobic exercise can improve the way your heart and other muscles use oxygen. Make it fun by  exercising with a friend and choosing an activity you enjoy. Here are some ideas:  · Walking  · Swimming  · Bicycling  · Stair climbing  · Dancing  · Jogging  · Gardening  Exercise regularly  If you havent been exercising regularly,  get your doctors OK first. Then start slowly.  Here are some tips:  · Begin exercising 3 times a week for 5 to 10 minutes at a time.  · When you feel comfortable, add a few minutes each session.  · Slowly build up to exercising 3 to 4 times each week. Each session should last for 40 minutes, on average, and involve moderate- to vigorous-intensity physical activity.  · If you have been given nitroglycerin, be sure to carry it when you exercise.  · If you get chest pain (angina) when youre exercising, stop what youre doing, take your nitroglycerin, and call your doctor.  Date Last Reviewed: 6/2/2016 © 2000-2016 [employer]. [address] [place] [address]. All rights reserved. This information is not intended as a substitute for professional medical care. Always follow your healthcare professional's instructions.        What is Insomnia?  Do you have trouble falling asleep? Do you wake up often during the night? Or, maybe you wake up too early in the morning. You may be suffering from insomnia. Talk to your health care provider if it lasts longer than a few weeks and you feel tired most of the time.    What causes insomnia?  Some common causes of insomnia are:  · Medical problems such as pain, depression, medication side effects, or trouble breathing  · Circadian rhythm disorder, a shift in the bodys normal 24-hour activity cycle  · Lifestyle factors such as a changing sleep schedule, lack of exercise, or too much caffeine  · Sleep settings such as a poor mattress, noise, or a room thats too hot or too cold  · Stress such as problems at work, money worries, or family events  Talk to your health care provider  Describe your sleeping problems to your health  care provider. Try to keep a daily sleep diary for a couple weeks. Write down the time you go to bed, the time you wake up, and anything that seems to affect your sleep. Your health care provider can work with you to develop a treatment plan. You may need to learn good sleeping habits and make some lifestyle changes. If you have any medical problems, these may need to be treated first.  Date Last Reviewed: 7/18/2015  © 5354-5005 [employer]. [address] [place] [address], [place] [address]. All rights reserved. This information is not intended as a substitute for professional medical care. Always follow your healthcare professional's instructions.

## 2017-02-23 NOTE — MR AVS SNAPSHOT
[facility]  [address] [address]  Phone: [phone]  Fax: [phone]                  Susan Pinto   2017 8:30 AM   Office Visit    Description:  Female : 1955   Provider:  Court Champion NP   Department:  [facility]           Reason for Visit     Follow-up           Diagnoses this Visit        Comments    Benign essential HTN    -  Primary     Insomnia, unspecified type         Severe obesity (BMI 35.0-35.9 with comorbidity)         Screening for thyroid disorder         Screening for lipoid disorders                To Do List           Goals (5 Years of Data)     None      Follow-Up and Disposition     Return in about 3 months (around 2017).       These Medications        Disp Refills Start End    amlodipine (NORVASC) 5 MG tablet 30 tablet 5 2017    Take 1 tablet (5 mg total) by mouth once daily. - Oral    Pharmacy: [address] #: [phone]         [facility]     [facility] On Call Nurse Care Line -  Assistance  Registered nurses [facility] provide clinical advisement, health education, appointment booking, and other advisory services.  Call for this free service at [phone].             Medications           Message regarding Medications     Verify the changes and/or additions to your medication regime listed below are the same as discussed with your clinician today.  If any of these changes or additions are incorrect, please notify your healthcare provider.        START taking these NEW medications        Refills    amlodipine (NORVASC) 5 MG tablet 5    Sig: Take 1 tablet (5 mg total) by mouth once daily.    Class: Normal    Route: Oral           Verify that the below list of medications is an accurate representation of the medications you are currently taking.  If none reported, the list may be blank. If incorrect,  "please contact your healthcare provider. Carry this list with you in case of emergency.           Current Medications     cloNIDine (CATAPRES) 0.1 MG tablet Take one as needed every 12 hours if blood pressure exceeds 150/100    lisinopril (PRINIVIL,ZESTRIL) 40 MG tablet Take 1 tablet (40 mg total) by mouth once daily.    amlodipine (NORVASC) 5 MG tablet Take 1 tablet (5 mg total) by mouth once daily.           Clinical Reference Information           Your Vitals Were     BP Pulse Resp Height Weight SpO2    160/98 77 20 5' 4" (1.626 m) 95.3 kg (210 lb) 97%    BMI                36.05 kg/m2          Blood Pressure          Most Recent Value    BP  (!)  160/98      Allergies as of 2/23/2017     No Known Allergies      Immunizations Administered on Date of Encounter - 2/23/2017     None      Orders Placed During Today's Visit     Future Labs/Procedures Expected by Expires    CBC auto differential  2/27/2017 4/24/2018    Comprehensive metabolic panel  2/27/2017 4/24/2018    Lipid panel  2/27/2017 4/24/2018    TSH  2/27/2017 4/24/2018      Instructions      Understanding Body Mass Index (BMI)  Body mass index (BMI) is a method of screening for a weight category using the ratio of your height to your weight. The BMI is a measure of overweight that is corrected for height. Knowing your BMI is a way to tell if you are at a healthy weight. The higher your BMI, the greater your risk for weight-related health problems.  What BMI means  · BMI below 18.5: Underweight  · BMI 18.5 to 24.9: Healthy weight or "ideal body weight"   · BMI 25 to 29.9: Overweight  · BMI 30 and over: Obese  · BMI 40 and over: Severe obesity   Online BMI Calculators  Find your BMI with an online BMI calculator tool, such as these from the CDC:  · BMI calculator for adults  · BMI calculator for children and teens   Using the BMI chart  To figure out your BMI, find your height and weight (or the numbers closest to them) on the table below. Follow each column " "of numbers to where your height and weight meet on the table. That is your BMI.    Date Last Reviewed: 7/1/2016 © 2000-2016 [employer]. [address] [place] [address], [place] [address]. All rights reserved. This information is not intended as a substitute for professional medical care. Always follow your healthcare professional's instructions.        Facts About Dietary Fat     Olive oil is a good source of unsaturated fat.     Eating less saturated and trans fat is one of the best things you can do for your heart. Start by finding out which fats are better to use. Then always try to use as little "bad" fat as you can.  Why eat less fat?  · Cutting down on the fat you eat can lower your blood cholesterol levels. This may help prevent clogged arteries from buildup of plaque.  · A low-fat diet can help you lose excess weight. Doing so can lower your blood pressure and reduce your chances of getting diabetes.  · A low-fat diet reduces your risk for stroke and for some cancers.  Unsaturated fat is most healthy  · When you must add fat, use unsaturated fat.  · Unsaturated fats come from plants. They include olive, canola, peanut, corn, avocado, safflower, and sunflower oils.  · Liquid (squeezable) margarine is also mostly unsaturated fat.  · In moderate amounts, unsaturated fat can even be good for your heart.  Saturated fat is less healthy  · Avoid eating saturated fat because it raises your blood cholesterol levels.  · Most saturated fat comes from animals. Foods such as butter, lard, cheese, cream, whole milk, and fatty cuts of meat are high in saturated fat.  · Some oils, such as palm and coconut oils, are also saturated fats.  Trans fat is least healthy  · Also avoid trans fat whenever possible. Even if it's not listed on the food label, look for it in the ingredients in the form of hydrogenated or partially hydrogenated oils.  · This is found in snack foods, shortening, french fries, and stick " margarines.  Add flavor without fat  · Sprinkle herbs on fish, chicken, and meat, and in soups.  · Try herbs, lemon juice, or flavored vinegar on vegetables.  · Add chopped onions, garlic, and peppers to flavor beans and rice.   Date Last Reviewed: 5/11/2015  © 6684-4676 [employer]. [address] [place] [address], [place] [address]. All rights reserved. This information is not intended as a substitute for professional medical care. Always follow your healthcare professional's instructions.        Aerobic Exercise for a Healthy Heart  Exercise is a lot more than an energy booster and a stress reliever. It also strengthens your heart muscle, lowers your blood pressure and cholesterol, and burns calories. It can also improve your resting muscle tone, and your mood.     Remember, some activity is better than none.    Choose an aerobic activity  Choose an activity that makes your heart and lungs work harder than they do when you rest or walk normally. This aerobic exercise can improve the way your heart and other muscles use oxygen. Make it fun by exercising with a friend and choosing an activity you enjoy. Here are some ideas:  · Walking  · Swimming  · Bicycling  · Stair climbing  · Dancing  · Jogging  · Gardening  Exercise regularly  If you havent been exercising regularly,  get your doctors OK first. Then start slowly.  Here are some tips:  · Begin exercising 3 times a week for 5 to 10 minutes at a time.  · When you feel comfortable, add a few minutes each session.  · Slowly build up to exercising 3 to 4 times each week. Each session should last for 40 minutes, on average, and involve moderate- to vigorous-intensity physical activity.  · If you have been given nitroglycerin, be sure to carry it when you exercise.  · If you get chest pain (angina) when youre exercising, stop what youre doing, take your nitroglycerin, and call your doctor.  Date Last Reviewed: 6/2/2016  © 0799-5946 [employer]  [address] [place] [address]. All rights reserved. This information is not intended as a substitute for professional medical care. Always follow your healthcare professional's instructions.        What is Insomnia?  Do you have trouble falling asleep? Do you wake up often during the night? Or, maybe you wake up too early in the morning. You may be suffering from insomnia. Talk to your health care provider if it lasts longer than a few weeks and you feel tired most of the time.    What causes insomnia?  Some common causes of insomnia are:  · Medical problems such as pain, depression, medication side effects, or trouble breathing  · Circadian rhythm disorder, a shift in the bodys normal 24-hour activity cycle  · Lifestyle factors such as a changing sleep schedule, lack of exercise, or too much caffeine  · Sleep settings such as a poor mattress, noise, or a room thats too hot or too cold  · Stress such as problems at work, money worries, or family events  Talk to your health care provider  Describe your sleeping problems to your health care provider. Try to keep a daily sleep diary for a couple weeks. Write down the time you go to bed, the time you wake up, and anything that seems to affect your sleep. Your health care provider can work with you to develop a treatment plan. You may need to learn good sleeping habits and make some lifestyle changes. If you have any medical problems, these may need to be treated first.  Date Last Reviewed: 7/18/2015  © 7561-0988 [employer]. [address] [place] [address]. All rights reserved. This information is not intended as a substitute for professional medical care. Always follow your healthcare professional's instructions.             Language Assistance Services     ATTENTION: Language assistance services are available, free of charge. Please call [phone].      ATENCIÓN: Si habla español, tiene a botello disposición servicios gratuitos de  asistencia lingüística. Rosa rod [phone].     HOLLY Ý: N?u b?n nói Ti?ng Vi?t, có các d?ch v? h? tr? ngôn ng? mi?n phí dành cho b?n. G?i s? [phone].         [facility] complies with applicable Federal civil rights laws and does not discriminate on the basis of race, color, national origin, age, disability, or sex.

## 2017-02-27 ENCOUNTER — CLINICAL SUPPORT (OUTPATIENT)
Dept: INTERNAL MEDICINE | Facility: CLINIC | Age: 62
End: 2017-02-27
Payer: COMMERCIAL

## 2017-02-27 DIAGNOSIS — Z13.220 SCREENING FOR LIPOID DISORDERS: ICD-10-CM

## 2017-02-27 DIAGNOSIS — I10 BENIGN ESSENTIAL HTN: ICD-10-CM

## 2017-02-27 DIAGNOSIS — E66.01 SEVERE OBESITY (BMI 35.0-35.9 WITH COMORBIDITY): ICD-10-CM

## 2017-02-27 DIAGNOSIS — Z13.29 SCREENING FOR THYROID DISORDER: ICD-10-CM

## 2017-02-27 LAB
ALBUMIN SERPL BCP-MCNC: 4.3 G/DL
ALP SERPL-CCNC: 57 U/L
ALT SERPL W/O P-5'-P-CCNC: 17 U/L
ANION GAP SERPL CALC-SCNC: 8 MMOL/L
AST SERPL-CCNC: 19 U/L
BASOPHILS # BLD AUTO: 0.04 K/UL
BASOPHILS NFR BLD: 0.5 %
BILIRUB SERPL-MCNC: 0.6 MG/DL
BUN SERPL-MCNC: 15 MG/DL
CALCIUM SERPL-MCNC: 10.4 MG/DL
CHLORIDE SERPL-SCNC: 106 MMOL/L
CHOLEST/HDLC SERPL: 3.9 {RATIO}
CO2 SERPL-SCNC: 24 MMOL/L
CREAT SERPL-MCNC: 1 MG/DL
DIFFERENTIAL METHOD: NORMAL
EOSINOPHIL # BLD AUTO: 0.2 K/UL
EOSINOPHIL NFR BLD: 2.4 %
ERYTHROCYTE [DISTWIDTH] IN BLOOD BY AUTOMATED COUNT: 13.8 %
EST. GFR  (AFRICAN AMERICAN): >60 ML/MIN/1.73 M^2
EST. GFR  (NON AFRICAN AMERICAN): >60 ML/MIN/1.73 M^2
GLUCOSE SERPL-MCNC: 94 MG/DL
HCT VFR BLD AUTO: 47.5 %
HDL/CHOLESTEROL RATIO: 25.4 %
HDLC SERPL-MCNC: 248 MG/DL
HDLC SERPL-MCNC: 63 MG/DL
HGB BLD-MCNC: 15.7 G/DL
LDLC SERPL CALC-MCNC: 159.4 MG/DL
LYMPHOCYTES # BLD AUTO: 3.5 K/UL
LYMPHOCYTES NFR BLD: 45.9 %
MCH RBC QN AUTO: 29.7 PG
MCHC RBC AUTO-ENTMCNC: 33.1 %
MCV RBC AUTO: 90 FL
MONOCYTES # BLD AUTO: 0.6 K/UL
MONOCYTES NFR BLD: 7.3 %
NEUTROPHILS # BLD AUTO: 3.3 K/UL
NEUTROPHILS NFR BLD: 43.8 %
NONHDLC SERPL-MCNC: 185 MG/DL
PLATELET # BLD AUTO: 160 K/UL
PMV BLD AUTO: 12.9 FL
POTASSIUM SERPL-SCNC: 4.2 MMOL/L
PROT SERPL-MCNC: 8.5 G/DL
RBC # BLD AUTO: 5.28 M/UL
SODIUM SERPL-SCNC: 138 MMOL/L
TRIGL SERPL-MCNC: 128 MG/DL
TSH SERPL DL<=0.005 MIU/L-ACNC: 2.3 UIU/ML
WBC # BLD AUTO: 7.56 K/UL

## 2017-02-27 PROCEDURE — 36415 COLL VENOUS BLD VENIPUNCTURE: CPT | Mod: S$GLB,,, | Performed by: NURSE PRACTITIONER

## 2017-02-27 PROCEDURE — 84443 ASSAY THYROID STIM HORMONE: CPT

## 2017-02-27 PROCEDURE — 80061 LIPID PANEL: CPT

## 2017-02-27 PROCEDURE — 85025 COMPLETE CBC W/AUTO DIFF WBC: CPT

## 2017-02-27 PROCEDURE — 80053 COMPREHEN METABOLIC PANEL: CPT

## 2017-04-13 DIAGNOSIS — I10 BENIGN ESSENTIAL HTN: ICD-10-CM

## 2017-04-13 RX ORDER — LISINOPRIL 40 MG/1
40 TABLET ORAL DAILY
Qty: 30 TABLET | Refills: 2 | Status: SHIPPED | OUTPATIENT
Start: 2017-04-13 | End: 2017-07-12 | Stop reason: SDUPTHER

## 2017-04-13 NOTE — TELEPHONE ENCOUNTER
----- Message from [place] sent at 2017  2:56 PM CDT -----  Contact: self  Susan Pinto  MRN: [id number]  : 1955  PCP: Court Champion  Home Phone      [phone]  Work Phone      Not on file.  Mobile          [phone]      MESSAGE: needs refill on lisinopril    [facility]    Phone: [phone]

## 2017-05-23 ENCOUNTER — OFFICE VISIT (OUTPATIENT)
Dept: INTERNAL MEDICINE | Facility: CLINIC | Age: 62
End: 2017-05-23
Payer: COMMERCIAL

## 2017-05-23 VITALS
HEART RATE: 89 BPM | OXYGEN SATURATION: 96 % | WEIGHT: 206 LBS | BODY MASS INDEX: 35.17 KG/M2 | HEIGHT: 64 IN | SYSTOLIC BLOOD PRESSURE: 120 MMHG | RESPIRATION RATE: 20 BRPM | DIASTOLIC BLOOD PRESSURE: 88 MMHG

## 2017-05-23 DIAGNOSIS — G47.00 INSOMNIA, UNSPECIFIED TYPE: ICD-10-CM

## 2017-05-23 DIAGNOSIS — E66.01 SEVERE OBESITY (BMI 35.0-35.9 WITH COMORBIDITY): ICD-10-CM

## 2017-05-23 DIAGNOSIS — E78.5 HYPERLIPIDEMIA LDL GOAL <70: ICD-10-CM

## 2017-05-23 DIAGNOSIS — I10 BENIGN ESSENTIAL HTN: Primary | ICD-10-CM

## 2017-05-23 PROCEDURE — 3074F SYST BP LT 130 MM HG: CPT | Mod: S$GLB,,, | Performed by: NURSE PRACTITIONER

## 2017-05-23 PROCEDURE — 1160F RVW MEDS BY RX/DR IN RCRD: CPT | Mod: S$GLB,,, | Performed by: NURSE PRACTITIONER

## 2017-05-23 PROCEDURE — 99999 PR PBB SHADOW E&M-EST. PATIENT-LVL III: CPT | Mod: PBBFAC,,, | Performed by: NURSE PRACTITIONER

## 2017-05-23 PROCEDURE — 3079F DIAST BP 80-89 MM HG: CPT | Mod: S$GLB,,, | Performed by: NURSE PRACTITIONER

## 2017-05-23 PROCEDURE — 99214 OFFICE O/P EST MOD 30 MIN: CPT | Mod: S$GLB,,, | Performed by: NURSE PRACTITIONER

## 2017-05-23 NOTE — PROGRESS NOTES
Subjective:       Patient ID: Susan Pinto is a 61 y.o. female.    Chief Complaint: Follow-up (x 3 month check up)    Patient is known, to me and presents with   Chief Complaint   Patient presents with    Follow-up     x 3 month check up   .  Denies chest pain and shortness of breath.  Patient presents with three month follow up for HTN. She reports that her pressure is improved and feels well. Patient declines screenings.   HPI  Review of Systems   Constitutional: Negative for activity change, appetite change, fatigue, fever and unexpected weight change.   HENT: Negative for congestion, ear discharge, ear pain, hearing loss, postnasal drip and tinnitus.    Eyes: Negative for photophobia, pain and visual disturbance.   Respiratory: Negative for cough, shortness of breath, wheezing and stridor.    Cardiovascular: Negative for chest pain, palpitations and leg swelling.   Gastrointestinal: Negative for abdominal distention.   Genitourinary: Negative for difficulty urinating, dysuria, frequency, hematuria and urgency.   Musculoskeletal: Negative for arthralgias, back pain, gait problem, joint swelling and neck pain.   Skin: Negative.    Neurological: Negative for dizziness, seizures, syncope, weakness, light-headedness, numbness and headaches.   Hematological: Negative for adenopathy. Does not bruise/bleed easily.   Psychiatric/Behavioral: Negative for behavioral problems, confusion, hallucinations, sleep disturbance and suicidal ideas. The patient is not nervous/anxious.        Objective:      Physical Exam   Constitutional: She is oriented to person, place, and time. She appears well-developed and well-nourished. No distress.   HENT:   Head: Normocephalic and atraumatic.   Right Ear: External ear normal.   Left Ear: External ear normal.   Mouth/Throat: Oropharynx is clear and moist. No oropharyngeal exudate.   Eyes: Conjunctivae and EOM are normal. Pupils are equal, round, and reactive to light. Right eye  "exhibits no discharge. Left eye exhibits no discharge.   Neck: Normal range of motion. Neck supple. No JVD present. No thyromegaly present.   Cardiovascular: Normal rate, regular rhythm, normal heart sounds and intact distal pulses.  Exam reveals no gallop and no friction rub.    No murmur heard.  Pulmonary/Chest: Effort normal and breath sounds normal. No stridor. No respiratory distress. She has no wheezes. She has no rales. She exhibits no tenderness.   Abdominal: Soft. Bowel sounds are normal. She exhibits no distension and no mass. There is no tenderness. There is no rebound.   Musculoskeletal: Normal range of motion. She exhibits no edema or tenderness.   Lymphadenopathy:     She has no cervical adenopathy.   Neurological: She is alert and oriented to person, place, and time. She has normal reflexes. She displays normal reflexes. No cranial nerve deficit. She exhibits normal muscle tone. Coordination normal.   Skin: Skin is warm and dry. No rash noted. No erythema. No pallor.   Psychiatric: She has a normal mood and affect. Her behavior is normal. Judgment and thought content normal.       Assessment:       1. Benign essential HTN    2. Hyperlipidemia LDL goal <70    3. Insomnia, unspecified type    4. Severe obesity (BMI 35.0-35.9 with comorbidity)        Plan:   Susan was seen today for follow-up.    Diagnoses and all orders for this visit:    Benign essential HTN  -     CBC auto differential; Future  -     Comprehensive metabolic panel; Future    Hyperlipidemia LDL goal <70  -     CBC auto differential; Future  -     Comprehensive metabolic panel; Future  -     TSH; Future  -     Lipid panel; Future    Insomnia, unspecified type  -     CBC auto differential; Future  -     Comprehensive metabolic panel; Future    Severe obesity (BMI 35.0-35.9 with comorbidity)  -     CBC auto differential; Future  -     Comprehensive metabolic panel; Future    "This note will not be shared with the patient."  Refills on " meds.  Medication compliance was discussed with the patient.   Medication side effects were discussed.  The patient was instructed on using exercise frequently to reduce blood pressure.  Thirty to forty-five minutes of brisk walking three to four times a week is often helpful to lower your blood pressure.  Monitor blood pressures at home and to record the values in a log.  The patient was instructed to monitor weight closely and to try to keep it as close to ideal body weight as possible.  Reduce salt intake to less than 2 grams per day.  Do not add salt to food at the table.  Reduce or get rid of salt used in cooking.  Limit processed and fast foods.  Read package labels for amount of salt (soduim) in foods.  Losing weight, even just 10 pounds, of can decrease blood pressure.  RTC as scheduled

## 2017-05-23 NOTE — PATIENT INSTRUCTIONS

## 2017-07-12 DIAGNOSIS — I10 BENIGN ESSENTIAL HTN: ICD-10-CM

## 2017-07-12 RX ORDER — LISINOPRIL 40 MG/1
TABLET ORAL
Qty: 30 TABLET | Refills: 1 | Status: SHIPPED | OUTPATIENT
Start: 2017-07-12 | End: 2017-08-14 | Stop reason: SDUPTHER

## 2017-08-14 DIAGNOSIS — I10 BENIGN ESSENTIAL HTN: ICD-10-CM

## 2017-08-14 RX ORDER — LISINOPRIL 40 MG/1
40 TABLET ORAL DAILY
Qty: 30 TABLET | Refills: 5 | Status: SHIPPED | OUTPATIENT
Start: 2017-08-14 | End: 2018-02-15 | Stop reason: SDUPTHER

## 2017-08-14 RX ORDER — AMLODIPINE BESYLATE 5 MG/1
5 TABLET ORAL DAILY
Qty: 30 TABLET | Refills: 5 | Status: SHIPPED | OUTPATIENT
Start: 2017-08-14 | End: 2018-02-15 | Stop reason: SDUPTHER

## 2017-08-14 NOTE — TELEPHONE ENCOUNTER
----- Message from Alysha Fragoso MA sent at 2017  2:32 PM CDT -----  Contact: Patient  Susan Pinto  MRN: [id number]  : 1955  PCP: Court Champion  Home Phone      [phone]  Work Phone      Not on file.  Mobile          [phone]      MESSAGE: Patient needs refills on Lisinopril and Amlodipine.  Send to [facility]

## 2017-09-29 DIAGNOSIS — Z12.11 COLON CANCER SCREENING: ICD-10-CM

## 2017-11-20 ENCOUNTER — CLINICAL SUPPORT (OUTPATIENT)
Dept: INTERNAL MEDICINE | Facility: CLINIC | Age: 62
End: 2017-11-20
Payer: COMMERCIAL

## 2017-11-20 DIAGNOSIS — E78.5 HYPERLIPIDEMIA LDL GOAL <70: ICD-10-CM

## 2017-11-20 DIAGNOSIS — G47.00 INSOMNIA, UNSPECIFIED TYPE: ICD-10-CM

## 2017-11-20 DIAGNOSIS — I10 BENIGN ESSENTIAL HTN: ICD-10-CM

## 2017-11-20 DIAGNOSIS — E66.01 SEVERE OBESITY (BMI 35.0-35.9 WITH COMORBIDITY): ICD-10-CM

## 2017-11-20 LAB
ALBUMIN SERPL BCP-MCNC: 3.8 G/DL
ALP SERPL-CCNC: 54 U/L
ALT SERPL W/O P-5'-P-CCNC: 13 U/L
ANION GAP SERPL CALC-SCNC: 9 MMOL/L
AST SERPL-CCNC: 20 U/L
BASOPHILS # BLD AUTO: 0.08 K/UL
BASOPHILS NFR BLD: 0.9 %
BILIRUB SERPL-MCNC: 0.6 MG/DL
BUN SERPL-MCNC: 13 MG/DL
CALCIUM SERPL-MCNC: 10.1 MG/DL
CHLORIDE SERPL-SCNC: 107 MMOL/L
CHOLEST SERPL-MCNC: 203 MG/DL
CHOLEST/HDLC SERPL: 3.4 {RATIO}
CO2 SERPL-SCNC: 23 MMOL/L
CREAT SERPL-MCNC: 0.8 MG/DL
DIFFERENTIAL METHOD: NORMAL
EOSINOPHIL # BLD AUTO: 0.2 K/UL
EOSINOPHIL NFR BLD: 2 %
ERYTHROCYTE [DISTWIDTH] IN BLOOD BY AUTOMATED COUNT: 13 %
EST. GFR  (AFRICAN AMERICAN): >60 ML/MIN/1.73 M^2
EST. GFR  (NON AFRICAN AMERICAN): >60 ML/MIN/1.73 M^2
GLUCOSE SERPL-MCNC: 93 MG/DL
HCT VFR BLD AUTO: 41.6 %
HDLC SERPL-MCNC: 59 MG/DL
HDLC SERPL: 29.1 %
HGB BLD-MCNC: 13.6 G/DL
IMM GRANULOCYTES # BLD AUTO: 0.02 K/UL
IMM GRANULOCYTES NFR BLD AUTO: 0.2 %
LDLC SERPL CALC-MCNC: 126.8 MG/DL
LYMPHOCYTES # BLD AUTO: 3.6 K/UL
LYMPHOCYTES NFR BLD: 39.1 %
MCH RBC QN AUTO: 28.6 PG
MCHC RBC AUTO-ENTMCNC: 32.7 G/DL
MCV RBC AUTO: 88 FL
MONOCYTES # BLD AUTO: 0.7 K/UL
MONOCYTES NFR BLD: 7.9 %
NEUTROPHILS # BLD AUTO: 4.6 K/UL
NEUTROPHILS NFR BLD: 49.9 %
NONHDLC SERPL-MCNC: 144 MG/DL
NRBC BLD-RTO: 0 /100 WBC
PLATELET # BLD AUTO: 174 K/UL
PMV BLD AUTO: 12.3 FL
POTASSIUM SERPL-SCNC: 3.9 MMOL/L
PROT SERPL-MCNC: 7.7 G/DL
RBC # BLD AUTO: 4.75 M/UL
SODIUM SERPL-SCNC: 139 MMOL/L
TRIGL SERPL-MCNC: 86 MG/DL
TSH SERPL DL<=0.005 MIU/L-ACNC: 3.26 UIU/ML
WBC # BLD AUTO: 9.19 K/UL

## 2017-11-20 PROCEDURE — 85025 COMPLETE CBC W/AUTO DIFF WBC: CPT

## 2017-11-20 PROCEDURE — 99999 PR PBB SHADOW E&M-EST. PATIENT-LVL I: CPT | Mod: PBBFAC,,,

## 2017-11-20 PROCEDURE — 80061 LIPID PANEL: CPT

## 2017-11-20 PROCEDURE — 80053 COMPREHEN METABOLIC PANEL: CPT

## 2017-11-20 PROCEDURE — 36415 COLL VENOUS BLD VENIPUNCTURE: CPT | Mod: S$GLB,,, | Performed by: NURSE PRACTITIONER

## 2017-11-20 PROCEDURE — 84443 ASSAY THYROID STIM HORMONE: CPT

## 2017-12-01 ENCOUNTER — OFFICE VISIT (OUTPATIENT)
Dept: INTERNAL MEDICINE | Facility: CLINIC | Age: 62
End: 2017-12-01
Payer: COMMERCIAL

## 2017-12-01 VITALS
DIASTOLIC BLOOD PRESSURE: 84 MMHG | RESPIRATION RATE: 18 BRPM | WEIGHT: 207 LBS | BODY MASS INDEX: 36.68 KG/M2 | OXYGEN SATURATION: 98 % | HEIGHT: 63 IN | SYSTOLIC BLOOD PRESSURE: 122 MMHG | HEART RATE: 74 BPM

## 2017-12-01 DIAGNOSIS — E78.5 HYPERLIPIDEMIA LDL GOAL <70: ICD-10-CM

## 2017-12-01 DIAGNOSIS — G47.00 INSOMNIA, UNSPECIFIED TYPE: ICD-10-CM

## 2017-12-01 DIAGNOSIS — E66.01 SEVERE OBESITY (BMI 35.0-35.9 WITH COMORBIDITY): ICD-10-CM

## 2017-12-01 DIAGNOSIS — Z23 NEED FOR VACCINATION: ICD-10-CM

## 2017-12-01 DIAGNOSIS — I10 BENIGN ESSENTIAL HTN: Primary | ICD-10-CM

## 2017-12-01 PROCEDURE — 99999 PR PBB SHADOW E&M-EST. PATIENT-LVL III: CPT | Mod: PBBFAC,,, | Performed by: NURSE PRACTITIONER

## 2017-12-01 PROCEDURE — 99214 OFFICE O/P EST MOD 30 MIN: CPT | Mod: S$GLB,,, | Performed by: NURSE PRACTITIONER

## 2017-12-01 NOTE — PROGRESS NOTES
Subjective:       Patient ID: Susan Pinto is a 62 y.o. female.    Chief Complaint: Follow-up (x 6 months - results)    Patient is known, to me and presents with   Chief Complaint   Patient presents with    Follow-up     x 6 months - results   .  Denies chest pain and shortness of breath.  Patient presents with check up and results. Still declines all screenings  HPI  Review of Systems   Constitutional: Negative for activity change, appetite change, fatigue, fever and unexpected weight change.   HENT: Negative for congestion, ear discharge, ear pain, hearing loss, postnasal drip and tinnitus.    Eyes: Negative for photophobia, pain and visual disturbance.   Respiratory: Negative for cough, shortness of breath, wheezing and stridor.    Cardiovascular: Negative for chest pain, palpitations and leg swelling.   Gastrointestinal: Negative for abdominal distention.   Genitourinary: Negative for difficulty urinating, dysuria, frequency, hematuria and urgency.   Musculoskeletal: Negative for arthralgias, back pain, gait problem, joint swelling and neck pain.   Skin: Negative.    Neurological: Negative for dizziness, seizures, syncope, weakness, light-headedness, numbness and headaches.   Hematological: Negative for adenopathy. Does not bruise/bleed easily.   Psychiatric/Behavioral: Negative for behavioral problems, confusion, hallucinations, sleep disturbance and suicidal ideas. The patient is not nervous/anxious.        Objective:      Physical Exam   Constitutional: She is oriented to person, place, and time. She appears well-developed and well-nourished. No distress.   HENT:   Head: Normocephalic and atraumatic.   Right Ear: External ear normal.   Left Ear: External ear normal.   Mouth/Throat: Oropharynx is clear and moist. No oropharyngeal exudate.   Eyes: Conjunctivae and EOM are normal. Pupils are equal, round, and reactive to light. Right eye exhibits no discharge. Left eye exhibits no discharge.   Neck: Normal  range of motion. Neck supple. No JVD present. No thyromegaly present.   Cardiovascular: Normal rate, regular rhythm, normal heart sounds and intact distal pulses.  Exam reveals no gallop and no friction rub.    No murmur heard.  Pulmonary/Chest: Effort normal and breath sounds normal. No stridor. No respiratory distress. She has no wheezes. She has no rales. She exhibits no tenderness.   Abdominal: Soft. Bowel sounds are normal. She exhibits no distension and no mass. There is no tenderness. There is no rebound.   Musculoskeletal: Normal range of motion. She exhibits no edema or tenderness.   Lymphadenopathy:     She has no cervical adenopathy.   Neurological: She is alert and oriented to person, place, and time. She has normal reflexes. She displays normal reflexes. No cranial nerve deficit. She exhibits normal muscle tone. Coordination normal.   Skin: Skin is warm and dry. Capillary refill takes less than 2 seconds. No rash noted. No erythema. No pallor.   Psychiatric: She has a normal mood and affect. Her behavior is normal. Judgment and thought content normal.       Assessment:       1. Benign essential HTN    2. Hyperlipidemia LDL goal <70    3. Insomnia, unspecified type    4. Severe obesity (BMI 35.0-35.9 with comorbidity)    5. Need for vaccination        Plan:   Susan was seen today for follow-up.    Diagnoses and all orders for this visit:    Benign essential HTN  -     CBC auto differential; Future  -     Comprehensive metabolic panel; Future  -     Microalbumin/creatinine urine ratio; Future    Hyperlipidemia LDL goal <70  -     CBC auto differential; Future  -     Comprehensive metabolic panel; Future  -     TSH; Future  -     Lipid panel; Future    Insomnia, unspecified type  -     CBC auto differential; Future  -     Comprehensive metabolic panel; Future    Severe obesity (BMI 35.0-35.9 with comorbidity)  -     CBC auto differential; Future  -     Comprehensive metabolic panel; Future    Need for  "vaccination  -     zoster vaccine live, PF, (ZOSTAVAX, PF,) 19,400 unit/0.65 mL injection; Inject 19,400 Units into the skin once.    "This note will not be shared with the patient."  Lab drawn today CBC, CMP, TSH, FLP  Limit the cholesterol in your diet to less than 300 mg per day.  Fats should contribute no more than 20 to 35% of your daily calories.  Less than 7 to 10% of your calories should come from saturated fat.  Avoid saturated fat products e.g., butter, some oils, meat, and poultry fat contain a lot of saturated fat.  Check food labels for fat and cholesterol content. Choose the foods with less fat per serving.  Limit the amount of butter and margarine you eat.  Use salad dressings and margarine made with polyunsaturated and monunsaturated fats.  Use egg whites or egg substitutes rather than whole eggs.  Replace whole-milk dairy products with nonfat or low-fat mild, cheese, spreads, and yogurt.  Eat skinless chicken, turkey, fish, and meatless entrees more often than red meat.  Choose lean cuts of meat and trim off all visible fat. Keep portion sizes moderate.  Avoid fatty desserts such as ice cream, cream-filled cakes, and cheesecakes. Choose fresh fruits, nonfat frozen yogurt, Popsicles, etc.  Reduce the amount of fried foods, vending machine food, and fast food you eat.  Eat fruits and vegetables (especially fresh fruits and leafy vegetables), beans, and whole grains daily. The fiber in these foods helps lower cholesterol.  Look for low-fat or nonfat varieties of the foods you like to eat or look for substitutes.  You may need to exercise 60 minutes a day to prevent weight gain and 90 minutes a day to lose weight.  RTC in six months.  "

## 2017-12-01 NOTE — PATIENT INSTRUCTIONS
"  Understanding Body Mass Index (BMI)  Body mass index (BMI) is a method of screening for a weight category using the ratio of your height to your weight. The BMI is a measure of overweight that is corrected for height. Knowing your BMI is a way to tell if you are at a healthy weight. The higher your BMI, the greater your risk for weight-related health problems.  What BMI means  · BMI below 18.5: Underweight  · BMI 18.5 to 24.9: Healthy weight or "ideal body weight"   · BMI 25 to 29.9: Overweight  · BMI 30 and over: Obese  · BMI 40 and over: Severe obesity   Online BMI Calculators  Find your BMI with an online BMI calculator tool, such as these from the CDC:  · BMI calculator for adults  · BMI calculator for children and teens   Using the BMI chart  To figure out your BMI, find your height and weight (or the numbers closest to them) on the table below. Follow each column of numbers to where your height and weight meet on the table. That is your BMI.    Date Last Reviewed: 7/1/2016  © 4184-1707 [employer]. [address] [place] [address], [place] [address]. All rights reserved. This information is not intended as a substitute for professional medical care. Always follow your healthcare professional's instructions.        "

## 2018-02-15 DIAGNOSIS — I10 BENIGN ESSENTIAL HTN: ICD-10-CM

## 2018-02-15 RX ORDER — AMLODIPINE BESYLATE 5 MG/1
5 TABLET ORAL DAILY
Qty: 30 TABLET | Refills: 5 | Status: SHIPPED | OUTPATIENT
Start: 2018-02-15 | End: 2018-08-27 | Stop reason: SDUPTHER

## 2018-02-15 RX ORDER — LISINOPRIL 40 MG/1
40 TABLET ORAL DAILY
Qty: 30 TABLET | Refills: 5 | Status: SHIPPED | OUTPATIENT
Start: 2018-02-15 | End: 2018-08-27 | Stop reason: SDUPTHER

## 2018-02-15 RX ORDER — CLONIDINE HYDROCHLORIDE 0.1 MG/1
TABLET ORAL
Qty: 60 TABLET | Refills: 2 | Status: SHIPPED | OUTPATIENT
Start: 2018-02-15 | End: 2021-04-15 | Stop reason: SDUPTHER

## 2018-05-25 ENCOUNTER — TELEPHONE (OUTPATIENT)
Dept: INTERNAL MEDICINE | Facility: CLINIC | Age: 63
End: 2018-05-25

## 2018-05-25 DIAGNOSIS — Z00.00 ROUTINE HEALTH MAINTENANCE: Primary | ICD-10-CM

## 2018-05-25 NOTE — TELEPHONE ENCOUNTER
----- Message from Alysha Fragoso MA sent at 5/25/2018  2:44 PM CDT -----  Contact: Patient  Patient is scheduled for bloodwork next week, She says every time she has bloodwork she gets charged $300. Please have Court cancel these orders and put them back in as screening

## 2018-06-01 ENCOUNTER — CLINICAL SUPPORT (OUTPATIENT)
Dept: INTERNAL MEDICINE | Facility: CLINIC | Age: 63
End: 2018-06-01
Payer: COMMERCIAL

## 2018-06-01 DIAGNOSIS — Z00.00 ROUTINE HEALTH MAINTENANCE: ICD-10-CM

## 2018-06-01 LAB
ALBUMIN SERPL BCP-MCNC: 4.1 G/DL
ALP SERPL-CCNC: 54 U/L
ALT SERPL W/O P-5'-P-CCNC: 15 U/L
ANION GAP SERPL CALC-SCNC: 10 MMOL/L
AST SERPL-CCNC: 15 U/L
BASOPHILS # BLD AUTO: 0.06 K/UL
BASOPHILS NFR BLD: 0.7 %
BILIRUB SERPL-MCNC: 0.4 MG/DL
BUN SERPL-MCNC: 15 MG/DL
CALCIUM SERPL-MCNC: 10.6 MG/DL
CHLORIDE SERPL-SCNC: 105 MMOL/L
CHOLEST SERPL-MCNC: 228 MG/DL
CHOLEST/HDLC SERPL: 3.7 {RATIO}
CO2 SERPL-SCNC: 24 MMOL/L
CREAT SERPL-MCNC: 1 MG/DL
CREAT UR-MCNC: 104 MG/DL
DIFFERENTIAL METHOD: NORMAL
EOSINOPHIL # BLD AUTO: 0.1 K/UL
EOSINOPHIL NFR BLD: 1.7 %
ERYTHROCYTE [DISTWIDTH] IN BLOOD BY AUTOMATED COUNT: 13.2 %
EST. GFR  (AFRICAN AMERICAN): >60 ML/MIN/1.73 M^2
EST. GFR  (NON AFRICAN AMERICAN): 59.7 ML/MIN/1.73 M^2
GLUCOSE SERPL-MCNC: 93 MG/DL
HCT VFR BLD AUTO: 46.1 %
HDLC SERPL-MCNC: 62 MG/DL
HDLC SERPL: 27.2 %
HGB BLD-MCNC: 14.8 G/DL
IMM GRANULOCYTES # BLD AUTO: 0.04 K/UL
IMM GRANULOCYTES NFR BLD AUTO: 0.5 %
LDLC SERPL CALC-MCNC: 148.4 MG/DL
LYMPHOCYTES # BLD AUTO: 3.4 K/UL
LYMPHOCYTES NFR BLD: 42 %
MCH RBC QN AUTO: 29.4 PG
MCHC RBC AUTO-ENTMCNC: 32.1 G/DL
MCV RBC AUTO: 92 FL
MICROALBUMIN UR DL<=1MG/L-MCNC: 5 UG/ML
MICROALBUMIN/CREATININE RATIO: 4.8 UG/MG
MONOCYTES # BLD AUTO: 0.7 K/UL
MONOCYTES NFR BLD: 8.2 %
NEUTROPHILS # BLD AUTO: 3.9 K/UL
NEUTROPHILS NFR BLD: 46.9 %
NONHDLC SERPL-MCNC: 166 MG/DL
NRBC BLD-RTO: 0 /100 WBC
PLATELET # BLD AUTO: 171 K/UL
PMV BLD AUTO: 12.7 FL
POTASSIUM SERPL-SCNC: 4.3 MMOL/L
PROT SERPL-MCNC: 8.3 G/DL
RBC # BLD AUTO: 5.04 M/UL
SODIUM SERPL-SCNC: 139 MMOL/L
TRIGL SERPL-MCNC: 88 MG/DL
TSH SERPL DL<=0.005 MIU/L-ACNC: 3.04 UIU/ML
WBC # BLD AUTO: 8.2 K/UL

## 2018-06-01 PROCEDURE — 80061 LIPID PANEL: CPT

## 2018-06-01 PROCEDURE — 82043 UR ALBUMIN QUANTITATIVE: CPT

## 2018-06-01 PROCEDURE — 80053 COMPREHEN METABOLIC PANEL: CPT

## 2018-06-01 PROCEDURE — 85025 COMPLETE CBC W/AUTO DIFF WBC: CPT

## 2018-06-01 PROCEDURE — 84443 ASSAY THYROID STIM HORMONE: CPT

## 2018-06-15 ENCOUNTER — OFFICE VISIT (OUTPATIENT)
Dept: INTERNAL MEDICINE | Facility: CLINIC | Age: 63
End: 2018-06-15
Payer: COMMERCIAL

## 2018-06-15 VITALS
DIASTOLIC BLOOD PRESSURE: 84 MMHG | RESPIRATION RATE: 18 BRPM | HEIGHT: 64 IN | SYSTOLIC BLOOD PRESSURE: 126 MMHG | OXYGEN SATURATION: 97 % | HEART RATE: 81 BPM | WEIGHT: 209 LBS | BODY MASS INDEX: 35.68 KG/M2

## 2018-06-15 DIAGNOSIS — E66.01 SEVERE OBESITY (BMI 35.0-35.9 WITH COMORBIDITY): ICD-10-CM

## 2018-06-15 DIAGNOSIS — G47.00 INSOMNIA, UNSPECIFIED TYPE: ICD-10-CM

## 2018-06-15 DIAGNOSIS — E78.5 HYPERLIPIDEMIA LDL GOAL <70: ICD-10-CM

## 2018-06-15 DIAGNOSIS — I10 BENIGN ESSENTIAL HTN: Primary | ICD-10-CM

## 2018-06-15 PROCEDURE — 99999 PR PBB SHADOW E&M-EST. PATIENT-LVL IV: CPT | Mod: PBBFAC,,, | Performed by: NURSE PRACTITIONER

## 2018-06-15 PROCEDURE — 99214 OFFICE O/P EST MOD 30 MIN: CPT | Mod: S$GLB,,, | Performed by: NURSE PRACTITIONER

## 2018-06-15 RX ORDER — AMOXICILLIN 500 MG
CAPSULE ORAL DAILY
COMMUNITY
End: 2023-07-25

## 2018-06-15 RX ORDER — ASPIRIN 81 MG/1
81 TABLET ORAL DAILY
Status: ON HOLD | COMMUNITY
End: 2023-05-17 | Stop reason: HOSPADM

## 2018-06-15 RX ORDER — ATORVASTATIN CALCIUM 20 MG/1
20 TABLET, FILM COATED ORAL NIGHTLY
Qty: 30 TABLET | Refills: 5 | Status: SHIPPED | OUTPATIENT
Start: 2018-06-15 | End: 2018-12-10 | Stop reason: SDUPTHER

## 2018-06-15 NOTE — PATIENT INSTRUCTIONS
High Cholesterol: Assessing Your Risk    Have you been told that your cholesterol is too high? If so, you could be heading for a heart attack, also known as acute myocardial infarction (AMI), or stroke. This is especially true if you have other risk factors for heart disease. Get smart about cholesterol and your heart disease risk. This sheet can help you understand your heart disease risk and how your cholesterol level affects it. Talk to your healthcare provider about how to get started controlling your cholesterol.  Why is high cholesterol a problem?  Blood cholesterol is a fatty substance. The body uses it to make membranes in cells and for hormone production. It travels through the bloodstream and is used by the tissues for normal function. When blood cholesterol is high, it forms plaque and causes inflammation. The plaque builds up in the walls of arteries (blood vessels that carry blood from the heart to the body). This narrows the opening for blood flow. Over time, the heart may not get enough oxygen. This can lead to coronary artery disease, heart attack, or stroke.  3 steps to assessing your risk  Step 1. Find your risk factors for heart disease and stroke  How your cholesterol numbers affect your heart health depends on other risk factors for heart attack and stroke. Check off each risk factor below that applies to you:  · Are you a man 45 years old or older or a woman 55 years old or older?  · Does your family have a history of heart problems before the age of 55 in male relatives or age 65 in female relatives? This includes heart attack, coronary heart disease, or atherosclerosis.  · Do you have high blood pressure? Do you take medicine to treat high blood pressure?  · Do you smoke?  · Do you have diabetes?  · Do you exercise very little or not very often? Recommendations are for 30 minutes of exercise at least 5 days a week. If you are not doing cardiovascular exercise as often as these  recommendations, it may not be enough and you may be at higher risk for elevated cholesterol and heart disease.  · Do you eat a diet that is high in saturated or trans fats, cholesterol, sugar, or alcohol? You may be at increased risk for heart disease if you do not eat enough fruits, vegetables, lean meats and eat sugars or drink alcohol sparingly.  · Have you been told you have high cholesterol? Do you take medicine to control your cholesterol?  Step 2. Test your cholesterol  Have your cholesterol tested every 5 years after the age of 20 and more often if you have risk factors. Cholesterol testing most often needs no preparation. Sometimes you may be asked to fast (not eat) before your test. A blood sample is taken and sent to a lab. There, the amount of cholesterol and triglyceride in your blood is measured. There are 2 types of cholesterol in the sample. The first is HDL (good cholesterol). The second is LDL (bad cholesterol). Cholesterol test results are most often shown as the total of HDL and LDL cholesterol numbers. You may also be told the separate HDL and LDL cholesterol results.  Fill in your numbers below.  HDL cholesterol:                           LDL cholesterol:                         Total cholesterol:                         Triglyceride:                           Step 3. Discuss the results with your healthcare provider   If your cholesterol levels are higher than normal, your healthcare provider will help you with steps to take to lower your levels. Steps may include lifestyle changes like diet, physical activity, and quitting smoking, and medicine to lower bad cholesterol levels.  If you have high cholesterol, you may need your cholesterol level tested more often to make sure your medicine and lifestyle changes are working to reduce your risks of having a heart attack or stroke.   Date Last Reviewed: 6/1/2016  © 1659-3077 [employer]. [address] [place] [address], [place] [address].  All rights reserved. This information is not intended as a substitute for professional medical care. Always follow your healthcare professional's instructions.

## 2018-06-15 NOTE — PROGRESS NOTES
Subjective:       Patient ID: Susan Pinto is a 62 y.o. female.    Chief Complaint: Follow-up (x 6 months- results)    Patient is known, to me and presents with   Chief Complaint   Patient presents with    Follow-up     x 6 months- results   .  Denies chest pain and shortness of breath.  Patient presents with check up and labs. She is doing well. Still declines screenings  HPI  Review of Systems   Constitutional: Negative for activity change, appetite change, fatigue, fever and unexpected weight change.   HENT: Negative for congestion, ear discharge, ear pain, hearing loss, postnasal drip and tinnitus.    Eyes: Negative for photophobia, pain and visual disturbance.   Respiratory: Negative for cough, shortness of breath, wheezing and stridor.    Cardiovascular: Negative for chest pain, palpitations and leg swelling.   Gastrointestinal: Negative for abdominal distention.   Genitourinary: Negative for difficulty urinating, dysuria, frequency, hematuria and urgency.   Musculoskeletal: Negative for arthralgias, back pain, gait problem, joint swelling and neck pain.   Skin: Negative.    Neurological: Negative for dizziness, seizures, syncope, weakness, light-headedness, numbness and headaches.   Hematological: Negative for adenopathy. Does not bruise/bleed easily.   Psychiatric/Behavioral: Negative for behavioral problems, confusion, hallucinations, sleep disturbance and suicidal ideas. The patient is not nervous/anxious.        Objective:      Physical Exam   Constitutional: She is oriented to person, place, and time. She appears well-developed and well-nourished. No distress.   HENT:   Head: Normocephalic and atraumatic.   Right Ear: External ear normal.   Left Ear: External ear normal.   Mouth/Throat: Oropharynx is clear and moist. No oropharyngeal exudate.   Eyes: Conjunctivae and EOM are normal. Pupils are equal, round, and reactive to light. Right eye exhibits no discharge. Left eye exhibits no discharge.    Neck: Normal range of motion. Neck supple. No JVD present. No thyromegaly present.   Cardiovascular: Normal rate, regular rhythm, normal heart sounds and intact distal pulses.  Exam reveals no gallop and no friction rub.    No murmur heard.  Pulmonary/Chest: Effort normal and breath sounds normal. No stridor. No respiratory distress. She has no wheezes. She has no rales. She exhibits no tenderness.   Abdominal: Soft. Bowel sounds are normal. She exhibits no distension and no mass. There is no tenderness. There is no rebound.   Musculoskeletal: Normal range of motion. She exhibits no edema or tenderness.   Lymphadenopathy:     She has no cervical adenopathy.   Neurological: She is alert and oriented to person, place, and time. She has normal reflexes. She displays normal reflexes. No cranial nerve deficit. She exhibits normal muscle tone. Coordination normal.   Skin: Skin is warm and dry. Capillary refill takes less than 2 seconds. No rash noted. No erythema. No pallor.   Psychiatric: She has a normal mood and affect. Her behavior is normal. Judgment and thought content normal.       Assessment:       1. Benign essential HTN    2. Hyperlipidemia LDL goal <70    3. Insomnia, unspecified type    4. Severe obesity (BMI 35.0-35.9 with comorbidity)        Plan:   Susan was seen today for follow-up.    Diagnoses and all orders for this visit:    Benign essential HTN  -     CBC auto differential; Future  -     Comprehensive metabolic panel; Future  -     Microalbumin/creatinine urine ratio; Future    Hyperlipidemia LDL goal <70  -     CBC auto differential; Future  -     Comprehensive metabolic panel; Future  -     TSH; Future  -     Lipid panel; Future  -     atorvastatin (LIPITOR) 20 MG tablet; Take 1 tablet (20 mg total) by mouth every evening.  -     Hepatic function panel; Future    Insomnia, unspecified type  -     CBC auto differential; Future  -     Comprehensive metabolic panel; Future    Severe obesity (BMI  "35.0-35.9 with comorbidity)  -     CBC auto differential; Future  -     Comprehensive metabolic panel; Future    "This note will not be shared with the patient."  Lab drawn today CBC, CMP, TSH, FLP  Limit the cholesterol in your diet to less than 300 mg per day.  Fats should contribute no more than 20 to 35% of your daily calories.  Less than 7 to 10% of your calories should come from saturated fat.  Avoid saturated fat products e.g., butter, some oils, meat, and poultry fat contain a lot of saturated fat.  Check food labels for fat and cholesterol content. Choose the foods with less fat per serving.  Limit the amount of butter and margarine you eat.  Use salad dressings and margarine made with polyunsaturated and monunsaturated fats.  Use egg whites or egg substitutes rather than whole eggs.  Replace whole-milk dairy products with nonfat or low-fat mild, cheese, spreads, and yogurt.  Eat skinless chicken, turkey, fish, and meatless entrees more often than red meat.  Choose lean cuts of meat and trim off all visible fat. Keep portion sizes moderate.  Avoid fatty desserts such as ice cream, cream-filled cakes, and cheesecakes. Choose fresh fruits, nonfat frozen yogurt, Popsicles, etc.  Reduce the amount of fried foods, vending machine food, and fast food you eat.  Eat fruits and vegetables (especially fresh fruits and leafy vegetables), beans, and whole grains daily. The fiber in these foods helps lower cholesterol.  Look for low-fat or nonfat varieties of the foods you like to eat or look for substitutes.  You may need to exercise 60 minutes a day to prevent weight gain and 90 minutes a day to lose weight.  RTC in six months.  "

## 2018-07-27 ENCOUNTER — CLINICAL SUPPORT (OUTPATIENT)
Dept: INTERNAL MEDICINE | Facility: CLINIC | Age: 63
End: 2018-07-27
Payer: COMMERCIAL

## 2018-07-27 DIAGNOSIS — E78.5 HYPERLIPIDEMIA LDL GOAL <70: ICD-10-CM

## 2018-07-27 LAB
ALBUMIN SERPL BCP-MCNC: 3.9 G/DL
ALP SERPL-CCNC: 63 U/L
ALT SERPL W/O P-5'-P-CCNC: 25 U/L
AST SERPL-CCNC: 23 U/L
BILIRUB DIRECT SERPL-MCNC: 0.3 MG/DL
BILIRUB SERPL-MCNC: 0.4 MG/DL
PROT SERPL-MCNC: 7.9 G/DL

## 2018-07-27 PROCEDURE — 80076 HEPATIC FUNCTION PANEL: CPT

## 2018-07-27 PROCEDURE — 36415 COLL VENOUS BLD VENIPUNCTURE: CPT | Mod: S$GLB,,, | Performed by: NURSE PRACTITIONER

## 2018-07-27 PROCEDURE — 99999 PR PBB SHADOW E&M-EST. PATIENT-LVL I: CPT | Mod: PBBFAC,,,

## 2018-08-27 ENCOUNTER — TELEPHONE (OUTPATIENT)
Dept: INTERNAL MEDICINE | Facility: CLINIC | Age: 63
End: 2018-08-27

## 2018-08-27 DIAGNOSIS — I10 BENIGN ESSENTIAL HTN: ICD-10-CM

## 2018-08-27 RX ORDER — LISINOPRIL 40 MG/1
40 TABLET ORAL DAILY
Qty: 30 TABLET | Refills: 5 | Status: SHIPPED | OUTPATIENT
Start: 2018-08-27 | End: 2019-02-25 | Stop reason: SDUPTHER

## 2018-08-27 RX ORDER — AMLODIPINE BESYLATE 5 MG/1
5 TABLET ORAL DAILY
Qty: 30 TABLET | Refills: 5 | Status: SHIPPED | OUTPATIENT
Start: 2018-08-27 | End: 2019-02-25 | Stop reason: SDUPTHER

## 2018-08-27 RX ORDER — AMLODIPINE BESYLATE 5 MG/1
TABLET ORAL
Qty: 30 TABLET | Refills: 5 | Status: CANCELLED | OUTPATIENT
Start: 2018-08-27

## 2018-10-12 DIAGNOSIS — Z12.11 COLON CANCER SCREENING: ICD-10-CM

## 2018-12-10 ENCOUNTER — TELEPHONE (OUTPATIENT)
Dept: INTERNAL MEDICINE | Facility: CLINIC | Age: 63
End: 2018-12-10

## 2018-12-10 DIAGNOSIS — E78.5 HYPERLIPIDEMIA LDL GOAL <70: ICD-10-CM

## 2018-12-10 RX ORDER — ATORVASTATIN CALCIUM 20 MG/1
20 TABLET, FILM COATED ORAL NIGHTLY
Qty: 30 TABLET | Refills: 5 | Status: SHIPPED | OUTPATIENT
Start: 2018-12-10 | End: 2019-04-04 | Stop reason: SDUPTHER

## 2018-12-10 NOTE — TELEPHONE ENCOUNTER
----- Message from Alysha Fragoso MA sent at 12/10/2018  2:32 PM CST -----  Contact: Patient  Patient needs new script for Atorvastatin.  Out of refills,.     Send to  [facility]

## 2019-02-01 DIAGNOSIS — Z12.39 BREAST CANCER SCREENING: ICD-10-CM

## 2019-02-22 ENCOUNTER — TELEPHONE (OUTPATIENT)
Dept: INTERNAL MEDICINE | Facility: CLINIC | Age: 64
End: 2019-02-22

## 2019-02-22 DIAGNOSIS — I10 BENIGN ESSENTIAL HTN: ICD-10-CM

## 2019-02-25 RX ORDER — LISINOPRIL 40 MG/1
40 TABLET ORAL DAILY
Qty: 30 TABLET | Refills: 5 | Status: SHIPPED | OUTPATIENT
Start: 2019-02-25 | End: 2019-04-04 | Stop reason: SDUPTHER

## 2019-02-25 RX ORDER — AMLODIPINE BESYLATE 5 MG/1
5 TABLET ORAL DAILY
Qty: 30 TABLET | Refills: 5 | Status: SHIPPED | OUTPATIENT
Start: 2019-02-25 | End: 2019-10-11 | Stop reason: SDUPTHER

## 2019-03-18 ENCOUNTER — CLINICAL SUPPORT (OUTPATIENT)
Dept: INTERNAL MEDICINE | Facility: CLINIC | Age: 64
End: 2019-03-18
Payer: MEDICARE

## 2019-03-18 DIAGNOSIS — I10 BENIGN ESSENTIAL HTN: ICD-10-CM

## 2019-03-18 DIAGNOSIS — E78.5 HYPERLIPIDEMIA LDL GOAL <70: ICD-10-CM

## 2019-03-18 DIAGNOSIS — E66.01 SEVERE OBESITY (BMI 35.0-35.9 WITH COMORBIDITY): ICD-10-CM

## 2019-03-18 DIAGNOSIS — G47.00 INSOMNIA, UNSPECIFIED TYPE: ICD-10-CM

## 2019-03-18 LAB
ALBUMIN SERPL BCP-MCNC: 4.1 G/DL
ALP SERPL-CCNC: 64 U/L
ALT SERPL W/O P-5'-P-CCNC: 23 U/L
ANION GAP SERPL CALC-SCNC: 11 MMOL/L
AST SERPL-CCNC: 22 U/L
BASOPHILS # BLD AUTO: 0.07 K/UL
BASOPHILS NFR BLD: 0.9 %
BILIRUB SERPL-MCNC: 0.7 MG/DL
BUN SERPL-MCNC: 13 MG/DL
CALCIUM SERPL-MCNC: 10.6 MG/DL
CHLORIDE SERPL-SCNC: 101 MMOL/L
CHOLEST SERPL-MCNC: 166 MG/DL
CHOLEST/HDLC SERPL: 2.7 {RATIO}
CO2 SERPL-SCNC: 23 MMOL/L
CREAT SERPL-MCNC: 1 MG/DL
DIFFERENTIAL METHOD: NORMAL
EOSINOPHIL # BLD AUTO: 0.2 K/UL
EOSINOPHIL NFR BLD: 2.7 %
ERYTHROCYTE [DISTWIDTH] IN BLOOD BY AUTOMATED COUNT: 13 %
EST. GFR  (AFRICAN AMERICAN): >60 ML/MIN/1.73 M^2
EST. GFR  (NON AFRICAN AMERICAN): 59.3 ML/MIN/1.73 M^2
GLUCOSE SERPL-MCNC: 95 MG/DL
HCT VFR BLD AUTO: 47.8 %
HDLC SERPL-MCNC: 61 MG/DL
HDLC SERPL: 36.7 %
HGB BLD-MCNC: 15.3 G/DL
IMM GRANULOCYTES # BLD AUTO: 0.03 K/UL
IMM GRANULOCYTES NFR BLD AUTO: 0.4 %
LDLC SERPL CALC-MCNC: 82.4 MG/DL
LYMPHOCYTES # BLD AUTO: 3.2 K/UL
LYMPHOCYTES NFR BLD: 39.8 %
MCH RBC QN AUTO: 29.3 PG
MCHC RBC AUTO-ENTMCNC: 32 G/DL
MCV RBC AUTO: 91 FL
MONOCYTES # BLD AUTO: 0.6 K/UL
MONOCYTES NFR BLD: 7.1 %
NEUTROPHILS # BLD AUTO: 3.9 K/UL
NEUTROPHILS NFR BLD: 49.1 %
NONHDLC SERPL-MCNC: 105 MG/DL
NRBC BLD-RTO: 0 /100 WBC
PLATELET # BLD AUTO: 211 K/UL
PMV BLD AUTO: 11.7 FL
POTASSIUM SERPL-SCNC: 4.2 MMOL/L
PROT SERPL-MCNC: 8.2 G/DL
RBC # BLD AUTO: 5.23 M/UL
SODIUM SERPL-SCNC: 135 MMOL/L
TRIGL SERPL-MCNC: 113 MG/DL
TSH SERPL DL<=0.005 MIU/L-ACNC: 3.12 UIU/ML
WBC # BLD AUTO: 7.91 K/UL

## 2019-03-18 PROCEDURE — 80053 COMPREHEN METABOLIC PANEL: CPT

## 2019-03-18 PROCEDURE — 80061 LIPID PANEL: CPT

## 2019-03-18 PROCEDURE — 36415 COLL VENOUS BLD VENIPUNCTURE: CPT | Mod: PBBFAC,PN

## 2019-03-18 PROCEDURE — 84443 ASSAY THYROID STIM HORMONE: CPT

## 2019-03-18 PROCEDURE — 36415 COLL VENOUS BLD VENIPUNCTURE: CPT

## 2019-03-18 PROCEDURE — 85025 COMPLETE CBC W/AUTO DIFF WBC: CPT

## 2019-03-25 ENCOUNTER — OFFICE VISIT (OUTPATIENT)
Dept: INTERNAL MEDICINE | Facility: CLINIC | Age: 64
End: 2019-03-25
Payer: MEDICARE

## 2019-03-25 VITALS
OXYGEN SATURATION: 98 % | SYSTOLIC BLOOD PRESSURE: 148 MMHG | HEART RATE: 78 BPM | BODY MASS INDEX: 36.74 KG/M2 | WEIGHT: 215.19 LBS | RESPIRATION RATE: 16 BRPM | DIASTOLIC BLOOD PRESSURE: 98 MMHG | HEIGHT: 64 IN

## 2019-03-25 DIAGNOSIS — G47.00 INSOMNIA, UNSPECIFIED TYPE: ICD-10-CM

## 2019-03-25 DIAGNOSIS — I10 BENIGN ESSENTIAL HTN: Primary | ICD-10-CM

## 2019-03-25 DIAGNOSIS — E78.5 HYPERLIPIDEMIA LDL GOAL <70: ICD-10-CM

## 2019-03-25 DIAGNOSIS — E66.01 SEVERE OBESITY (BMI 35.0-35.9 WITH COMORBIDITY): ICD-10-CM

## 2019-03-25 PROCEDURE — 99999 PR PBB SHADOW E&M-EST. PATIENT-LVL IV: CPT | Mod: PBBFAC,,, | Performed by: NURSE PRACTITIONER

## 2019-03-25 PROCEDURE — 99214 OFFICE O/P EST MOD 30 MIN: CPT | Mod: PBBFAC,PN | Performed by: NURSE PRACTITIONER

## 2019-03-25 PROCEDURE — 99214 PR OFFICE/OUTPT VISIT, EST, LEVL IV, 30-39 MIN: ICD-10-PCS | Mod: S$PBB,,, | Performed by: NURSE PRACTITIONER

## 2019-03-25 PROCEDURE — 99999 PR PBB SHADOW E&M-EST. PATIENT-LVL IV: ICD-10-PCS | Mod: PBBFAC,,, | Performed by: NURSE PRACTITIONER

## 2019-03-25 PROCEDURE — 99214 OFFICE O/P EST MOD 30 MIN: CPT | Mod: S$PBB,,, | Performed by: NURSE PRACTITIONER

## 2019-03-25 NOTE — PATIENT INSTRUCTIONS
"  Understanding Body Mass Index (BMI)  Body mass index (BMI) is a method of screening for a weight category using the ratio of your height to your weight. The BMI is a measure of overweight that is corrected for height. Knowing your BMI is a way to tell if you are at a healthy weight. The higher your BMI, the greater your risk for weight-related health problems.  What BMI means  · BMI below 18.5: Underweight  · BMI 18.5 to 24.9: Healthy weight or "ideal body weight"   · BMI 25 to 29.9: Overweight  · BMI 30 and over: Obese  · BMI 40 and over: Severe obesity   Online BMI Calculators  Find your BMI with an online BMI calculator tool, such as these from the CDC:  · BMI calculator for adults  · BMI calculator for children and teens   Using the BMI chart  To figure out your BMI, find your height and weight (or the numbers closest to them) on the table below. Follow each column of numbers to where your height and weight meet on the table. That is your BMI.    Date Last Reviewed: 7/1/2016  © 7461-7638 [employer]. [address] [place] [address], [place] [address]. All rights reserved. This information is not intended as a substitute for professional medical care. Always follow your healthcare professional's instructions.        "

## 2019-03-25 NOTE — PROGRESS NOTES
Subjective:       Patient ID: Susan Pinto is a 63 y.o. female.    Chief Complaint: Follow-up    Patient is known, to me and presents with   Chief Complaint   Patient presents with    Follow-up   .  Denies chest pain and shortness of breath.  Patient presents with check up and labs. Her pressure is normally normotensive but today she is a little anxious. Declines all screenings  HPI  Review of Systems   Constitutional: Negative for activity change, appetite change, fatigue, fever and unexpected weight change.   HENT: Negative for congestion, ear discharge, ear pain, hearing loss, postnasal drip and tinnitus.    Eyes: Negative for photophobia, pain and visual disturbance.   Respiratory: Negative for cough, shortness of breath, wheezing and stridor.    Cardiovascular: Negative for chest pain, palpitations and leg swelling.   Gastrointestinal: Negative for abdominal distention.   Genitourinary: Negative for difficulty urinating, dysuria, frequency, hematuria and urgency.   Musculoskeletal: Negative for arthralgias, back pain, gait problem, joint swelling and neck pain.   Skin: Negative.    Neurological: Negative for dizziness, seizures, syncope, weakness, light-headedness, numbness and headaches.   Hematological: Negative for adenopathy. Does not bruise/bleed easily.   Psychiatric/Behavioral: Negative for behavioral problems, confusion, dysphoric mood, hallucinations, sleep disturbance and suicidal ideas. The patient is not nervous/anxious.        Objective:      Physical Exam   Constitutional: She is oriented to person, place, and time. She appears well-developed and well-nourished. No distress.   HENT:   Head: Normocephalic and atraumatic.   Right Ear: External ear normal.   Left Ear: External ear normal.   Mouth/Throat: Oropharynx is clear and moist. No oropharyngeal exudate.   Eyes: Pupils are equal, round, and reactive to light. Conjunctivae and EOM are normal. Right eye exhibits no discharge. Left eye  exhibits no discharge.   Neck: Normal range of motion. Neck supple. No JVD present. No thyromegaly present.   Cardiovascular: Normal rate, regular rhythm, normal heart sounds and intact distal pulses. Exam reveals no gallop and no friction rub.   No murmur heard.  Pulmonary/Chest: Effort normal and breath sounds normal. No stridor. No respiratory distress. She has no wheezes. She has no rales. She exhibits no tenderness.   Abdominal: Soft. Bowel sounds are normal. She exhibits no distension and no mass. There is no tenderness. There is no rebound.   Musculoskeletal: Normal range of motion. She exhibits no edema or tenderness.   Lymphadenopathy:     She has no cervical adenopathy.   Neurological: She is alert and oriented to person, place, and time. She has normal reflexes. She displays normal reflexes. No cranial nerve deficit. She exhibits normal muscle tone. Coordination normal.   Skin: Skin is warm and dry. Capillary refill takes less than 2 seconds. No rash noted. No erythema. No pallor.   Psychiatric: She has a normal mood and affect. Her behavior is normal. Judgment and thought content normal.       Assessment:       1. Benign essential HTN    2. Hyperlipidemia LDL goal <70    3. Insomnia, unspecified type    4. Severe obesity (BMI 35.0-35.9 with comorbidity)        Plan:   Susan was seen today for follow-up.    Diagnoses and all orders for this visit:    Benign essential HTN  -     CBC auto differential; Future  -     Comprehensive metabolic panel; Future  -     Microalbumin/creatinine urine ratio; Future    Hyperlipidemia LDL goal <70  -     CBC auto differential; Future  -     Comprehensive metabolic panel; Future  -     TSH; Future  -     Lipid panel; Future    Insomnia, unspecified type  -     CBC auto differential; Future  -     Comprehensive metabolic panel; Future    Severe obesity (BMI 35.0-35.9 with comorbidity)  -     CBC auto differential; Future  -     Comprehensive metabolic panel;  "Future    "This note will not be shared with the patient."  Lab drawn today CBC, CMP, TSH, FLP  Limit the cholesterol in your diet to less than 300 mg per day.  Fats should contribute no more than 20 to 35% of your daily calories.  Less than 7 to 10% of your calories should come from saturated fat.  Avoid saturated fat products e.g., butter, some oils, meat, and poultry fat contain a lot of saturated fat.  Check food labels for fat and cholesterol content. Choose the foods with less fat per serving.  Limit the amount of butter and margarine you eat.  Use salad dressings and margarine made with polyunsaturated and monunsaturated fats.  Use egg whites or egg substitutes rather than whole eggs.  Replace whole-milk dairy products with nonfat or low-fat mild, cheese, spreads, and yogurt.  Eat skinless chicken, turkey, fish, and meatless entrees more often than red meat.  Choose lean cuts of meat and trim off all visible fat. Keep portion sizes moderate.  Avoid fatty desserts such as ice cream, cream-filled cakes, and cheesecakes. Choose fresh fruits, nonfat frozen yogurt, Popsicles, etc.  Reduce the amount of fried foods, vending machine food, and fast food you eat.  Eat fruits and vegetables (especially fresh fruits and leafy vegetables), beans, and whole grains daily. The fiber in these foods helps lower cholesterol.  Look for low-fat or nonfat varieties of the foods you like to eat or look for substitutes.  You may need to exercise 60 minutes a day to prevent weight gain and 90 minutes a day to lose weight.  RTC in six months.  "

## 2019-04-04 DIAGNOSIS — E78.5 HYPERLIPIDEMIA LDL GOAL <70: ICD-10-CM

## 2019-04-04 DIAGNOSIS — I10 BENIGN ESSENTIAL HTN: ICD-10-CM

## 2019-04-05 RX ORDER — ATORVASTATIN CALCIUM 20 MG/1
20 TABLET, FILM COATED ORAL NIGHTLY
Qty: 90 TABLET | Refills: 1 | Status: SHIPPED | OUTPATIENT
Start: 2019-04-05 | End: 2019-07-22 | Stop reason: SDUPTHER

## 2019-04-05 RX ORDER — LISINOPRIL 40 MG/1
40 TABLET ORAL DAILY
Qty: 90 TABLET | Refills: 1 | Status: SHIPPED | OUTPATIENT
Start: 2019-04-05 | End: 2019-08-01 | Stop reason: SDUPTHER

## 2019-05-21 ENCOUNTER — PATIENT OUTREACH (OUTPATIENT)
Dept: ADMINISTRATIVE | Facility: HOSPITAL | Age: 64
End: 2019-05-21

## 2019-07-22 DIAGNOSIS — E78.5 HYPERLIPIDEMIA LDL GOAL <70: ICD-10-CM

## 2019-07-22 RX ORDER — ATORVASTATIN CALCIUM 20 MG/1
20 TABLET, FILM COATED ORAL NIGHTLY
Qty: 90 TABLET | Refills: 1 | Status: SHIPPED | OUTPATIENT
Start: 2019-07-22 | End: 2019-11-14 | Stop reason: SDUPTHER

## 2019-07-22 NOTE — TELEPHONE ENCOUNTER
----- Message from Alysha Fragoso MA sent at 7/22/2019  9:48 AM CDT -----  Contact: Patient  Patient needs a new script for Atorvastatin.     Send to [facility]

## 2019-08-01 DIAGNOSIS — I10 BENIGN ESSENTIAL HTN: ICD-10-CM

## 2019-08-01 RX ORDER — LISINOPRIL 40 MG/1
40 TABLET ORAL DAILY
Qty: 90 TABLET | Refills: 1 | Status: SHIPPED | OUTPATIENT
Start: 2019-08-01 | End: 2020-02-03

## 2019-09-19 ENCOUNTER — PATIENT OUTREACH (OUTPATIENT)
Dept: ADMINISTRATIVE | Facility: HOSPITAL | Age: 64
End: 2019-09-19

## 2019-09-19 DIAGNOSIS — Z11.59 NEED FOR HEPATITIS C SCREENING TEST: Primary | ICD-10-CM

## 2019-09-19 DIAGNOSIS — M94.9 DISORDER OF BONE AND ARTICULAR CARTILAGE: ICD-10-CM

## 2019-09-19 DIAGNOSIS — M89.9 DISORDER OF BONE AND ARTICULAR CARTILAGE: ICD-10-CM

## 2019-09-26 ENCOUNTER — CLINICAL SUPPORT (OUTPATIENT)
Dept: INTERNAL MEDICINE | Facility: CLINIC | Age: 64
End: 2019-09-26
Payer: MEDICARE

## 2019-09-26 DIAGNOSIS — E66.01 SEVERE OBESITY (BMI 35.0-35.9 WITH COMORBIDITY): ICD-10-CM

## 2019-09-26 DIAGNOSIS — I10 BENIGN ESSENTIAL HTN: ICD-10-CM

## 2019-09-26 DIAGNOSIS — G47.00 INSOMNIA, UNSPECIFIED TYPE: ICD-10-CM

## 2019-09-26 DIAGNOSIS — E78.5 HYPERLIPIDEMIA LDL GOAL <70: ICD-10-CM

## 2019-09-26 LAB
ALBUMIN SERPL BCP-MCNC: 4 G/DL (ref 3.5–5.2)
ALBUMIN/CREAT UR: 12.6 UG/MG (ref 0–30)
ALP SERPL-CCNC: 68 U/L (ref 55–135)
ALT SERPL W/O P-5'-P-CCNC: 20 U/L (ref 10–44)
ANION GAP SERPL CALC-SCNC: 10 MMOL/L (ref 8–16)
AST SERPL-CCNC: 20 U/L (ref 10–40)
BASOPHILS # BLD AUTO: 0.07 K/UL (ref 0–0.2)
BASOPHILS NFR BLD: 0.8 % (ref 0–1.9)
BILIRUB SERPL-MCNC: 0.6 MG/DL (ref 0.1–1)
BUN SERPL-MCNC: 14 MG/DL (ref 8–23)
CALCIUM SERPL-MCNC: 10.5 MG/DL (ref 8.7–10.5)
CHLORIDE SERPL-SCNC: 103 MMOL/L (ref 95–110)
CHOLEST SERPL-MCNC: 166 MG/DL (ref 120–199)
CHOLEST/HDLC SERPL: 2.7 {RATIO} (ref 2–5)
CO2 SERPL-SCNC: 24 MMOL/L (ref 23–29)
CREAT SERPL-MCNC: 1.1 MG/DL (ref 0.5–1.4)
CREAT UR-MCNC: 198 MG/DL (ref 15–325)
DIFFERENTIAL METHOD: ABNORMAL
EOSINOPHIL # BLD AUTO: 0.2 K/UL (ref 0–0.5)
EOSINOPHIL NFR BLD: 2.4 % (ref 0–8)
ERYTHROCYTE [DISTWIDTH] IN BLOOD BY AUTOMATED COUNT: 13.2 % (ref 11.5–14.5)
EST. GFR  (AFRICAN AMERICAN): >60 ML/MIN/1.73 M^2
EST. GFR  (NON AFRICAN AMERICAN): 52.8 ML/MIN/1.73 M^2
GLUCOSE SERPL-MCNC: 96 MG/DL (ref 70–110)
HCT VFR BLD AUTO: 47.7 % (ref 37–48.5)
HDLC SERPL-MCNC: 62 MG/DL (ref 40–75)
HDLC SERPL: 37.3 % (ref 20–50)
HGB BLD-MCNC: 14.7 G/DL (ref 12–16)
IMM GRANULOCYTES # BLD AUTO: 0.02 K/UL (ref 0–0.04)
IMM GRANULOCYTES NFR BLD AUTO: 0.2 % (ref 0–0.5)
LDLC SERPL CALC-MCNC: 87 MG/DL (ref 63–159)
LYMPHOCYTES # BLD AUTO: 3.6 K/UL (ref 1–4.8)
LYMPHOCYTES NFR BLD: 42.6 % (ref 18–48)
MCH RBC QN AUTO: 28.9 PG (ref 27–31)
MCHC RBC AUTO-ENTMCNC: 30.8 G/DL (ref 32–36)
MCV RBC AUTO: 94 FL (ref 82–98)
MICROALBUMIN UR DL<=1MG/L-MCNC: 25 UG/ML
MONOCYTES # BLD AUTO: 0.6 K/UL (ref 0.3–1)
MONOCYTES NFR BLD: 6.9 % (ref 4–15)
NEUTROPHILS # BLD AUTO: 3.9 K/UL (ref 1.8–7.7)
NEUTROPHILS NFR BLD: 47.1 % (ref 38–73)
NONHDLC SERPL-MCNC: 104 MG/DL
NRBC BLD-RTO: 0 /100 WBC
PLATELET # BLD AUTO: 197 K/UL (ref 150–350)
PMV BLD AUTO: 12 FL (ref 9.2–12.9)
POTASSIUM SERPL-SCNC: 3.8 MMOL/L (ref 3.5–5.1)
PROT SERPL-MCNC: 8.3 G/DL (ref 6–8.4)
RBC # BLD AUTO: 5.08 M/UL (ref 4–5.4)
SODIUM SERPL-SCNC: 137 MMOL/L (ref 136–145)
TRIGL SERPL-MCNC: 85 MG/DL (ref 30–150)
TSH SERPL DL<=0.005 MIU/L-ACNC: 3.83 UIU/ML (ref 0.4–4)
WBC # BLD AUTO: 8.36 K/UL (ref 3.9–12.7)

## 2019-09-26 PROCEDURE — 82043 UR ALBUMIN QUANTITATIVE: CPT

## 2019-09-26 PROCEDURE — 84443 ASSAY THYROID STIM HORMONE: CPT

## 2019-09-26 PROCEDURE — 36415 COLL VENOUS BLD VENIPUNCTURE: CPT | Mod: PBBFAC,PN

## 2019-09-26 PROCEDURE — 36415 COLL VENOUS BLD VENIPUNCTURE: CPT

## 2019-09-26 PROCEDURE — 85025 COMPLETE CBC W/AUTO DIFF WBC: CPT

## 2019-09-26 PROCEDURE — 80053 COMPREHEN METABOLIC PANEL: CPT

## 2019-09-26 PROCEDURE — 80061 LIPID PANEL: CPT

## 2019-10-10 ENCOUNTER — OFFICE VISIT (OUTPATIENT)
Dept: INTERNAL MEDICINE | Facility: CLINIC | Age: 64
End: 2019-10-10
Payer: MEDICARE

## 2019-10-10 VITALS
BODY MASS INDEX: 36.02 KG/M2 | SYSTOLIC BLOOD PRESSURE: 136 MMHG | RESPIRATION RATE: 20 BRPM | HEIGHT: 64 IN | OXYGEN SATURATION: 97 % | WEIGHT: 211 LBS | HEART RATE: 74 BPM | DIASTOLIC BLOOD PRESSURE: 80 MMHG

## 2019-10-10 DIAGNOSIS — E66.01 SEVERE OBESITY (BMI 35.0-35.9 WITH COMORBIDITY): ICD-10-CM

## 2019-10-10 DIAGNOSIS — I10 BENIGN ESSENTIAL HTN: Primary | ICD-10-CM

## 2019-10-10 DIAGNOSIS — E78.5 HYPERLIPIDEMIA LDL GOAL <70: ICD-10-CM

## 2019-10-10 DIAGNOSIS — Z23 NEEDS FLU SHOT: ICD-10-CM

## 2019-10-10 DIAGNOSIS — G47.00 INSOMNIA, UNSPECIFIED TYPE: ICD-10-CM

## 2019-10-10 PROCEDURE — 99999 PR PBB SHADOW E&M-EST. PATIENT-LVL III: CPT | Mod: PBBFAC,,, | Performed by: NURSE PRACTITIONER

## 2019-10-10 PROCEDURE — 99213 OFFICE O/P EST LOW 20 MIN: CPT | Mod: PBBFAC,PN,25 | Performed by: NURSE PRACTITIONER

## 2019-10-10 PROCEDURE — 90662 IIV NO PRSV INCREASED AG IM: CPT | Mod: PBBFAC,PN

## 2019-10-10 PROCEDURE — 99214 OFFICE O/P EST MOD 30 MIN: CPT | Mod: S$PBB,,, | Performed by: NURSE PRACTITIONER

## 2019-10-10 PROCEDURE — 99214 PR OFFICE/OUTPT VISIT, EST, LEVL IV, 30-39 MIN: ICD-10-PCS | Mod: S$PBB,,, | Performed by: NURSE PRACTITIONER

## 2019-10-10 PROCEDURE — 99999 PR PBB SHADOW E&M-EST. PATIENT-LVL III: ICD-10-PCS | Mod: PBBFAC,,, | Performed by: NURSE PRACTITIONER

## 2019-10-10 NOTE — PATIENT INSTRUCTIONS

## 2019-10-10 NOTE — PROGRESS NOTES
Subjective:       Patient ID: Susan Pinto is a 63 y.o. female.    Chief Complaint: Follow-up (6 month check up)    Patient is known, to me and presents with   Chief Complaint   Patient presents with    Follow-up     6 month check up   .  Denies chest pain and shortness of breath.  Patient presents with check up and labs. Still declines all screenings. Will do flu shot  HPI  Review of Systems   Constitutional: Negative for activity change, appetite change, fatigue, fever and unexpected weight change.   HENT: Negative for congestion, ear discharge, ear pain, hearing loss, postnasal drip and tinnitus.    Eyes: Negative for photophobia, pain and visual disturbance.   Respiratory: Negative for cough, shortness of breath, wheezing and stridor.    Cardiovascular: Negative for chest pain, palpitations and leg swelling.   Gastrointestinal: Negative for abdominal distention.   Genitourinary: Negative for difficulty urinating, dysuria, frequency, hematuria and urgency.   Musculoskeletal: Negative for arthralgias, back pain, gait problem, joint swelling and neck pain.   Skin: Negative.    Neurological: Negative for dizziness, seizures, syncope, weakness, light-headedness, numbness and headaches.   Hematological: Negative for adenopathy. Does not bruise/bleed easily.   Psychiatric/Behavioral: Negative for behavioral problems, confusion, dysphoric mood, hallucinations, sleep disturbance and suicidal ideas. The patient is not nervous/anxious.        Objective:      Physical Exam   Constitutional: She is oriented to person, place, and time. She appears well-developed and well-nourished. No distress.   HENT:   Head: Normocephalic and atraumatic.   Right Ear: External ear normal.   Left Ear: External ear normal.   Mouth/Throat: Oropharynx is clear and moist. No oropharyngeal exudate.   Eyes: Pupils are equal, round, and reactive to light. Conjunctivae and EOM are normal. Right eye exhibits no discharge. Left eye exhibits no  discharge.   Neck: Normal range of motion. Neck supple. No JVD present. No thyromegaly present.   Cardiovascular: Normal rate, regular rhythm, normal heart sounds and intact distal pulses. Exam reveals no gallop and no friction rub.   No murmur heard.  Pulmonary/Chest: Effort normal and breath sounds normal. No stridor. No respiratory distress. She has no wheezes. She has no rales. She exhibits no tenderness.   Abdominal: Soft. Bowel sounds are normal. She exhibits no distension and no mass. There is no tenderness. There is no rebound.   Musculoskeletal: Normal range of motion. She exhibits no edema or tenderness.   Lymphadenopathy:     She has no cervical adenopathy.   Neurological: She is alert and oriented to person, place, and time. She has normal reflexes. She displays normal reflexes. No cranial nerve deficit. She exhibits normal muscle tone. Coordination normal.   Skin: Skin is warm and dry. Capillary refill takes less than 2 seconds. No rash noted. No erythema. No pallor.   Psychiatric: She has a normal mood and affect. Her behavior is normal. Judgment and thought content normal.       Assessment:       1. Benign essential HTN    2. Hyperlipidemia LDL goal <70    3. Insomnia, unspecified type    4. Severe obesity (BMI 35.0-35.9 with comorbidity)    5. Needs flu shot        Plan:   Susan was seen today for follow-up.    Diagnoses and all orders for this visit:    Benign essential HTN  -     CBC auto differential; Future  -     Comprehensive metabolic panel; Future  -     Microalbumin/creatinine urine ratio; Future    Hyperlipidemia LDL goal <70  -     CBC auto differential; Future  -     Comprehensive metabolic panel; Future  -     TSH; Future  -     Lipid panel; Future    Insomnia, unspecified type  -     CBC auto differential; Future  -     Comprehensive metabolic panel; Future    Severe obesity (BMI 35.0-35.9 with comorbidity)  -     CBC auto differential; Future  -     Comprehensive metabolic panel;  "Future    Needs flu shot  -     Influenza - High Dose (65+) (PF) (IM)    "This note will not be shared with the patient."  Lab drawn today CBC, CMP, TSH, FLP  Limit the cholesterol in your diet to less than 300 mg per day.  Fats should contribute no more than 20 to 35% of your daily calories.  Less than 7 to 10% of your calories should come from saturated fat.  Avoid saturated fat products e.g., butter, some oils, meat, and poultry fat contain a lot of saturated fat.  Check food labels for fat and cholesterol content. Choose the foods with less fat per serving.  Limit the amount of butter and margarine you eat.  Use salad dressings and margarine made with polyunsaturated and monunsaturated fats.  Use egg whites or egg substitutes rather than whole eggs.  Replace whole-milk dairy products with nonfat or low-fat mild, cheese, spreads, and yogurt.  Eat skinless chicken, turkey, fish, and meatless entrees more often than red meat.  Choose lean cuts of meat and trim off all visible fat. Keep portion sizes moderate.  Avoid fatty desserts such as ice cream, cream-filled cakes, and cheesecakes. Choose fresh fruits, nonfat frozen yogurt, Popsicles, etc.  Reduce the amount of fried foods, vending machine food, and fast food you eat.  Eat fruits and vegetables (especially fresh fruits and leafy vegetables), beans, and whole grains daily. The fiber in these foods helps lower cholesterol.  Look for low-fat or nonfat varieties of the foods you like to eat or look for substitutes.  You may need to exercise 60 minutes a day to prevent weight gain and 90 minutes a day to lose weight.  RTC in six months.  "

## 2019-10-11 DIAGNOSIS — I10 BENIGN ESSENTIAL HTN: ICD-10-CM

## 2019-10-11 RX ORDER — AMLODIPINE BESYLATE 5 MG/1
5 TABLET ORAL DAILY
Qty: 90 TABLET | Refills: 1 | Status: SHIPPED | OUTPATIENT
Start: 2019-10-11 | End: 2020-05-04

## 2019-10-11 NOTE — TELEPHONE ENCOUNTER
----- Message from Alysha Fragoso MA sent at 10/11/2019 10:35 AM CDT -----  Contact: Patient  Patient is out of refills for Amlodipine,  Send 90 day script to [facility]

## 2019-10-30 ENCOUNTER — PATIENT OUTREACH (OUTPATIENT)
Dept: ADMINISTRATIVE | Facility: HOSPITAL | Age: 64
End: 2019-10-30

## 2019-11-14 DIAGNOSIS — E78.5 HYPERLIPIDEMIA LDL GOAL <70: ICD-10-CM

## 2019-11-14 RX ORDER — ATORVASTATIN CALCIUM 20 MG/1
20 TABLET, FILM COATED ORAL NIGHTLY
Qty: 90 TABLET | Refills: 0 | Status: SHIPPED | OUTPATIENT
Start: 2019-11-14 | End: 2020-08-03

## 2020-02-03 DIAGNOSIS — I10 BENIGN ESSENTIAL HTN: ICD-10-CM

## 2020-02-03 RX ORDER — LISINOPRIL 40 MG/1
40 TABLET ORAL DAILY
Qty: 90 TABLET | Refills: 0 | Status: SHIPPED | OUTPATIENT
Start: 2020-02-03 | End: 2020-02-27 | Stop reason: SDUPTHER

## 2020-02-20 ENCOUNTER — PATIENT OUTREACH (OUTPATIENT)
Dept: ADMINISTRATIVE | Facility: HOSPITAL | Age: 65
End: 2020-02-20

## 2020-02-27 DIAGNOSIS — I10 BENIGN ESSENTIAL HTN: ICD-10-CM

## 2020-02-27 NOTE — TELEPHONE ENCOUNTER
----- Message from Idalmis Mccabe sent at 2020 10:38 AM CST -----  Contact: Self  Susan Pinto  MRN: [id number]  : 1955  PCP: Court Champion  Home Phone      [phone]  Work Phone      Not on file.  Mobile          [phone]      MESSAGE:   Refill  lisinopril (PRINIVIL,ZESTRIL) 40 MG tablet    Pharmacy: [facility] - [place] [address]    Phone: [phone]

## 2020-02-28 RX ORDER — LISINOPRIL 40 MG/1
40 TABLET ORAL DAILY
Qty: 90 TABLET | Refills: 0 | Status: SHIPPED | OUTPATIENT
Start: 2020-02-28 | End: 2020-06-01

## 2020-05-04 DIAGNOSIS — I10 BENIGN ESSENTIAL HTN: ICD-10-CM

## 2020-05-04 RX ORDER — AMLODIPINE BESYLATE 5 MG/1
5 TABLET ORAL DAILY
Qty: 90 TABLET | Refills: 0 | Status: SHIPPED | OUTPATIENT
Start: 2020-05-04 | End: 2020-08-03

## 2020-06-01 DIAGNOSIS — I10 BENIGN ESSENTIAL HTN: ICD-10-CM

## 2020-06-01 RX ORDER — LISINOPRIL 40 MG/1
40 TABLET ORAL DAILY
Qty: 90 TABLET | Refills: 0 | Status: SHIPPED | OUTPATIENT
Start: 2020-06-01 | End: 2020-08-28

## 2020-08-03 DIAGNOSIS — I10 BENIGN ESSENTIAL HTN: ICD-10-CM

## 2020-08-03 DIAGNOSIS — E78.5 HYPERLIPIDEMIA LDL GOAL <70: ICD-10-CM

## 2020-08-03 RX ORDER — ATORVASTATIN CALCIUM 20 MG/1
20 TABLET, FILM COATED ORAL NIGHTLY
Qty: 90 TABLET | Refills: 0 | Status: SHIPPED | OUTPATIENT
Start: 2020-08-03 | End: 2021-04-15 | Stop reason: SDUPTHER

## 2020-08-03 RX ORDER — AMLODIPINE BESYLATE 5 MG/1
5 TABLET ORAL DAILY
Qty: 90 TABLET | Refills: 0 | Status: SHIPPED | OUTPATIENT
Start: 2020-08-03 | End: 2020-11-16

## 2020-08-28 DIAGNOSIS — I10 BENIGN ESSENTIAL HTN: ICD-10-CM

## 2020-08-28 RX ORDER — LISINOPRIL 40 MG/1
40 TABLET ORAL DAILY
Qty: 90 TABLET | Refills: 0 | Status: SHIPPED | OUTPATIENT
Start: 2020-08-28 | End: 2021-02-22

## 2020-10-02 DIAGNOSIS — Z12.11 COLON CANCER SCREENING: ICD-10-CM

## 2020-11-03 ENCOUNTER — TELEPHONE (OUTPATIENT)
Dept: ADMINISTRATIVE | Facility: HOSPITAL | Age: 65
End: 2020-11-03

## 2020-11-16 DIAGNOSIS — I10 BENIGN ESSENTIAL HTN: ICD-10-CM

## 2020-11-16 RX ORDER — AMLODIPINE BESYLATE 5 MG/1
5 TABLET ORAL DAILY
Qty: 90 TABLET | Refills: 0 | Status: SHIPPED | OUTPATIENT
Start: 2020-11-16 | End: 2021-04-15 | Stop reason: SDUPTHER

## 2021-02-22 DIAGNOSIS — I10 BENIGN ESSENTIAL HTN: ICD-10-CM

## 2021-02-22 RX ORDER — LISINOPRIL 40 MG/1
40 TABLET ORAL DAILY
Qty: 90 TABLET | Refills: 0 | Status: SHIPPED | OUTPATIENT
Start: 2021-02-22 | End: 2021-04-15 | Stop reason: SDUPTHER

## 2021-04-01 ENCOUNTER — PATIENT OUTREACH (OUTPATIENT)
Dept: ADMINISTRATIVE | Facility: HOSPITAL | Age: 66
End: 2021-04-01

## 2021-04-09 ENCOUNTER — CLINICAL SUPPORT (OUTPATIENT)
Dept: INTERNAL MEDICINE | Facility: CLINIC | Age: 66
End: 2021-04-09
Payer: MEDICARE

## 2021-04-09 DIAGNOSIS — I10 BENIGN ESSENTIAL HTN: Primary | ICD-10-CM

## 2021-04-09 DIAGNOSIS — E78.5 HYPERLIPIDEMIA LDL GOAL <70: ICD-10-CM

## 2021-04-09 LAB
ALBUMIN SERPL BCP-MCNC: 4.1 G/DL (ref 3.5–5.2)
ALBUMIN/CREAT UR: 16 UG/MG (ref 0–30)
ALP SERPL-CCNC: 67 U/L (ref 55–135)
ALT SERPL W/O P-5'-P-CCNC: 16 U/L (ref 10–44)
ANION GAP SERPL CALC-SCNC: 11 MMOL/L (ref 8–16)
AST SERPL-CCNC: 20 U/L (ref 10–40)
BASOPHILS # BLD AUTO: 0.06 K/UL (ref 0–0.2)
BASOPHILS NFR BLD: 0.7 % (ref 0–1.9)
BILIRUB SERPL-MCNC: 0.7 MG/DL (ref 0.1–1)
BUN SERPL-MCNC: 21 MG/DL (ref 8–23)
CALCIUM SERPL-MCNC: 10.5 MG/DL (ref 8.7–10.5)
CHLORIDE SERPL-SCNC: 103 MMOL/L (ref 95–110)
CHOLEST SERPL-MCNC: 166 MG/DL (ref 120–199)
CHOLEST/HDLC SERPL: 2.6 {RATIO} (ref 2–5)
CO2 SERPL-SCNC: 23 MMOL/L (ref 23–29)
CREAT SERPL-MCNC: 1 MG/DL (ref 0.5–1.4)
CREAT UR-MCNC: 131 MG/DL (ref 15–325)
DIFFERENTIAL METHOD: NORMAL
EOSINOPHIL # BLD AUTO: 0.1 K/UL (ref 0–0.5)
EOSINOPHIL NFR BLD: 1.6 % (ref 0–8)
ERYTHROCYTE [DISTWIDTH] IN BLOOD BY AUTOMATED COUNT: 13.1 % (ref 11.5–14.5)
EST. GFR  (AFRICAN AMERICAN): >60 ML/MIN/1.73 M^2
EST. GFR  (NON AFRICAN AMERICAN): 58.4 ML/MIN/1.73 M^2
GLUCOSE SERPL-MCNC: 94 MG/DL (ref 70–110)
HCT VFR BLD AUTO: 44.9 % (ref 37–48.5)
HDLC SERPL-MCNC: 64 MG/DL (ref 40–75)
HDLC SERPL: 38.6 % (ref 20–50)
HGB BLD-MCNC: 14.4 G/DL (ref 12–16)
IMM GRANULOCYTES # BLD AUTO: 0.03 K/UL (ref 0–0.04)
IMM GRANULOCYTES NFR BLD AUTO: 0.4 % (ref 0–0.5)
LDLC SERPL CALC-MCNC: 88.8 MG/DL (ref 63–159)
LYMPHOCYTES # BLD AUTO: 3.2 K/UL (ref 1–4.8)
LYMPHOCYTES NFR BLD: 38.5 % (ref 18–48)
MCH RBC QN AUTO: 29.5 PG (ref 27–31)
MCHC RBC AUTO-ENTMCNC: 32.1 G/DL (ref 32–36)
MCV RBC AUTO: 92 FL (ref 82–98)
MICROALBUMIN UR DL<=1MG/L-MCNC: 21 UG/ML
MONOCYTES # BLD AUTO: 0.8 K/UL (ref 0.3–1)
MONOCYTES NFR BLD: 9.1 % (ref 4–15)
NEUTROPHILS # BLD AUTO: 4.1 K/UL (ref 1.8–7.7)
NEUTROPHILS NFR BLD: 49.7 % (ref 38–73)
NONHDLC SERPL-MCNC: 102 MG/DL
NRBC BLD-RTO: 0 /100 WBC
PLATELET # BLD AUTO: 213 K/UL (ref 150–450)
PMV BLD AUTO: 11.5 FL (ref 9.2–12.9)
POTASSIUM SERPL-SCNC: 4.3 MMOL/L (ref 3.5–5.1)
PROT SERPL-MCNC: 8.4 G/DL (ref 6–8.4)
RBC # BLD AUTO: 4.88 M/UL (ref 4–5.4)
SODIUM SERPL-SCNC: 137 MMOL/L (ref 136–145)
TRIGL SERPL-MCNC: 66 MG/DL (ref 30–150)
TSH SERPL DL<=0.005 MIU/L-ACNC: 1.81 UIU/ML (ref 0.4–4)
WBC # BLD AUTO: 8.23 K/UL (ref 3.9–12.7)

## 2021-04-09 PROCEDURE — 80053 COMPREHEN METABOLIC PANEL: CPT | Performed by: NURSE PRACTITIONER

## 2021-04-09 PROCEDURE — 80061 LIPID PANEL: CPT | Performed by: NURSE PRACTITIONER

## 2021-04-09 PROCEDURE — 84443 ASSAY THYROID STIM HORMONE: CPT | Performed by: NURSE PRACTITIONER

## 2021-04-09 PROCEDURE — 85025 COMPLETE CBC W/AUTO DIFF WBC: CPT | Performed by: NURSE PRACTITIONER

## 2021-04-09 PROCEDURE — 36415 COLL VENOUS BLD VENIPUNCTURE: CPT | Performed by: NURSE PRACTITIONER

## 2021-04-09 PROCEDURE — 82043 UR ALBUMIN QUANTITATIVE: CPT | Performed by: NURSE PRACTITIONER

## 2021-04-09 PROCEDURE — 82570 ASSAY OF URINE CREATININE: CPT | Performed by: NURSE PRACTITIONER

## 2021-04-15 ENCOUNTER — OFFICE VISIT (OUTPATIENT)
Dept: INTERNAL MEDICINE | Facility: CLINIC | Age: 66
End: 2021-04-15
Payer: MEDICARE

## 2021-04-15 VITALS
SYSTOLIC BLOOD PRESSURE: 114 MMHG | DIASTOLIC BLOOD PRESSURE: 86 MMHG | HEIGHT: 64 IN | RESPIRATION RATE: 18 BRPM | BODY MASS INDEX: 32.78 KG/M2 | WEIGHT: 192 LBS | OXYGEN SATURATION: 97 % | HEART RATE: 78 BPM

## 2021-04-15 DIAGNOSIS — E78.5 HYPERLIPIDEMIA LDL GOAL <70: ICD-10-CM

## 2021-04-15 DIAGNOSIS — E66.9 OBESITY (BMI 30.0-34.9): ICD-10-CM

## 2021-04-15 DIAGNOSIS — N28.9 RENAL INSUFFICIENCY: ICD-10-CM

## 2021-04-15 DIAGNOSIS — I10 BENIGN ESSENTIAL HTN: Primary | ICD-10-CM

## 2021-04-15 DIAGNOSIS — G47.00 INSOMNIA, UNSPECIFIED TYPE: ICD-10-CM

## 2021-04-15 PROBLEM — E66.811 OBESITY (BMI 30.0-34.9): Status: ACTIVE | Noted: 2021-04-15

## 2021-04-15 PROCEDURE — 99999 PR PBB SHADOW E&M-EST. PATIENT-LVL III: ICD-10-PCS | Mod: PBBFAC,,, | Performed by: NURSE PRACTITIONER

## 2021-04-15 PROCEDURE — 99213 OFFICE O/P EST LOW 20 MIN: CPT | Mod: PBBFAC,PN | Performed by: NURSE PRACTITIONER

## 2021-04-15 PROCEDURE — 99214 PR OFFICE/OUTPT VISIT, EST, LEVL IV, 30-39 MIN: ICD-10-PCS | Mod: S$PBB,,, | Performed by: NURSE PRACTITIONER

## 2021-04-15 PROCEDURE — 99214 OFFICE O/P EST MOD 30 MIN: CPT | Mod: S$PBB,,, | Performed by: NURSE PRACTITIONER

## 2021-04-15 PROCEDURE — 99999 PR PBB SHADOW E&M-EST. PATIENT-LVL III: CPT | Mod: PBBFAC,,, | Performed by: NURSE PRACTITIONER

## 2021-04-15 RX ORDER — ATORVASTATIN CALCIUM 20 MG/1
20 TABLET, FILM COATED ORAL NIGHTLY
Qty: 90 TABLET | Refills: 3 | Status: ON HOLD | OUTPATIENT
Start: 2021-04-15 | End: 2023-05-17 | Stop reason: HOSPADM

## 2021-04-15 RX ORDER — LISINOPRIL 40 MG/1
40 TABLET ORAL DAILY
Qty: 90 TABLET | Refills: 3 | Status: ON HOLD | OUTPATIENT
Start: 2021-04-15 | End: 2023-05-17 | Stop reason: HOSPADM

## 2021-04-15 RX ORDER — CLONIDINE HYDROCHLORIDE 0.1 MG/1
TABLET ORAL
Qty: 60 TABLET | Refills: 2 | Status: ON HOLD | OUTPATIENT
Start: 2021-04-15 | End: 2023-05-17 | Stop reason: HOSPADM

## 2021-04-15 RX ORDER — AMLODIPINE BESYLATE 5 MG/1
5 TABLET ORAL DAILY
Qty: 90 TABLET | Refills: 3 | Status: ON HOLD | OUTPATIENT
Start: 2021-04-15 | End: 2023-05-17 | Stop reason: HOSPADM

## 2021-05-02 ENCOUNTER — PATIENT MESSAGE (OUTPATIENT)
Dept: ADMINISTRATIVE | Facility: HOSPITAL | Age: 66
End: 2021-05-02

## 2021-07-07 ENCOUNTER — PATIENT MESSAGE (OUTPATIENT)
Dept: ADMINISTRATIVE | Facility: HOSPITAL | Age: 66
End: 2021-07-07

## 2021-09-27 ENCOUNTER — TELEPHONE (OUTPATIENT)
Dept: INTERNAL MEDICINE | Facility: CLINIC | Age: 66
End: 2021-09-27

## 2021-09-28 RX ORDER — LORAZEPAM 0.5 MG/1
0.5 TABLET ORAL NIGHTLY PRN
Qty: 30 TABLET | Refills: 2 | Status: SHIPPED | OUTPATIENT
Start: 2021-09-28 | End: 2021-09-30 | Stop reason: SDUPTHER

## 2021-09-30 ENCOUNTER — TELEPHONE (OUTPATIENT)
Dept: INTERNAL MEDICINE | Facility: CLINIC | Age: 66
End: 2021-09-30

## 2021-09-30 RX ORDER — LORAZEPAM 0.5 MG/1
0.5 TABLET ORAL NIGHTLY PRN
Qty: 30 TABLET | Refills: 2 | Status: SHIPPED | OUTPATIENT
Start: 2021-09-30 | End: 2022-06-15 | Stop reason: SDUPTHER

## 2021-12-09 DIAGNOSIS — Z12.11 COLON CANCER SCREENING: ICD-10-CM

## 2022-03-30 DIAGNOSIS — Z12.31 ENCOUNTER FOR SCREENING MAMMOGRAM FOR MALIGNANT NEOPLASM OF BREAST: Primary | ICD-10-CM

## 2022-04-04 ENCOUNTER — PATIENT MESSAGE (OUTPATIENT)
Dept: ADMINISTRATIVE | Facility: HOSPITAL | Age: 67
End: 2022-04-04
Payer: MEDICARE

## 2022-06-24 ENCOUNTER — PATIENT OUTREACH (OUTPATIENT)
Dept: ADMINISTRATIVE | Facility: HOSPITAL | Age: 67
End: 2022-06-24
Payer: MEDICARE

## 2022-07-11 ENCOUNTER — PATIENT MESSAGE (OUTPATIENT)
Dept: ADMINISTRATIVE | Facility: HOSPITAL | Age: 67
End: 2022-07-11
Payer: MEDICARE

## 2022-07-13 DIAGNOSIS — I10 BENIGN ESSENTIAL HTN: ICD-10-CM

## 2022-08-03 ENCOUNTER — PATIENT OUTREACH (OUTPATIENT)
Dept: ADMINISTRATIVE | Facility: HOSPITAL | Age: 67
End: 2022-08-03
Payer: MEDICARE

## 2022-08-03 DIAGNOSIS — Z78.0 POST-MENOPAUSAL: Primary | ICD-10-CM

## 2022-08-03 NOTE — PROGRESS NOTES
Chart reviewed, immunization record updated.  No new results noted on [facility] or [facility] web site.  Care Everywhere unavailable.  Patient care coordination note updated.  LOV with PCP 4/15/2021.  DEXA order placed.  Left detailed message for patient to return call to discuss Colorectal Cancer Screening and scheduling routine PCP visit and MMG/DEXA.

## 2022-09-15 ENCOUNTER — PATIENT OUTREACH (OUTPATIENT)
Dept: ADMINISTRATIVE | Facility: HOSPITAL | Age: 67
End: 2022-09-15
Payer: MEDICARE

## 2022-11-07 ENCOUNTER — PATIENT OUTREACH (OUTPATIENT)
Dept: ADMINISTRATIVE | Facility: HOSPITAL | Age: 67
End: 2022-11-07
Payer: MEDICARE

## 2022-11-28 ENCOUNTER — PATIENT OUTREACH (OUTPATIENT)
Dept: ADMINISTRATIVE | Facility: HOSPITAL | Age: 67
End: 2022-11-28
Payer: MEDICARE

## 2022-11-28 DIAGNOSIS — Z12.11 COLON CANCER SCREENING: Primary | ICD-10-CM

## 2022-11-28 NOTE — PROGRESS NOTES
Chart reviewed, immunization record updated.  No new results noted on [facility] or [facility] web site.  Care Everywhere unavailable.  Patient care coordination note updated.  LOV with PCP 4/15/2021.  Fit Kit order placed.  Left detailed message for patient to return call to discuss Colorectal Cancer Screening and scheduling routine PCP visit, MMG and DEXA.

## 2022-12-06 ENCOUNTER — PATIENT OUTREACH (OUTPATIENT)
Dept: ADMINISTRATIVE | Facility: HOSPITAL | Age: 67
End: 2022-12-06
Payer: MEDICARE

## 2022-12-14 ENCOUNTER — PATIENT OUTREACH (OUTPATIENT)
Dept: ADMINISTRATIVE | Facility: HOSPITAL | Age: 67
End: 2022-12-14
Payer: MEDICARE

## 2023-01-09 ENCOUNTER — PATIENT MESSAGE (OUTPATIENT)
Dept: ADMINISTRATIVE | Facility: HOSPITAL | Age: 68
End: 2023-01-09
Payer: MEDICARE

## 2023-03-15 ENCOUNTER — PATIENT OUTREACH (OUTPATIENT)
Dept: ADMINISTRATIVE | Facility: HOSPITAL | Age: 68
End: 2023-03-15
Payer: MEDICARE

## 2023-03-15 NOTE — PROGRESS NOTES
Chart reviewed, immunization record updated.  No new results noted on [facility] or [facility] web site.  Care Everywhere updated.   Patient care coordination note updated.   LOV with PCP 04/15/2021.  Attempted to contact patient for Health screenings and PCP follow up appointment.  No answer, left voicemail for patient to return call to clinic.

## 2023-03-23 ENCOUNTER — PATIENT OUTREACH (OUTPATIENT)
Dept: ADMINISTRATIVE | Facility: HOSPITAL | Age: 68
End: 2023-03-23
Payer: MEDICARE

## 2023-03-23 NOTE — PROGRESS NOTES
Attempted to contact patient to obtain Remote Blood pressure reading.   No answer, left voicemail for patient to return call to clinic.

## 2023-04-03 ENCOUNTER — PATIENT MESSAGE (OUTPATIENT)
Dept: ADMINISTRATIVE | Facility: HOSPITAL | Age: 68
End: 2023-04-03
Payer: MEDICARE

## 2023-05-04 ENCOUNTER — PATIENT MESSAGE (OUTPATIENT)
Dept: ADMINISTRATIVE | Facility: HOSPITAL | Age: 68
End: 2023-05-04
Payer: MEDICARE

## 2023-05-05 ENCOUNTER — HOSPITAL ENCOUNTER (INPATIENT)
Facility: HOSPITAL | Age: 68
LOS: 12 days | Discharge: NURSING, REHAB OR LONG-TERM CARE | DRG: 004 | End: 2023-05-17
Attending: PSYCHIATRY & NEUROLOGY | Admitting: PSYCHIATRY & NEUROLOGY
Payer: MEDICARE

## 2023-05-05 ENCOUNTER — HOSPITAL ENCOUNTER (EMERGENCY)
Facility: HOSPITAL | Age: 68
Discharge: ANOTHER HOSPITAL | End: 2023-05-05
Attending: STUDENT IN AN ORGANIZED HEALTH CARE EDUCATION/TRAINING PROGRAM
Payer: MEDICARE

## 2023-05-05 ENCOUNTER — TELEMEDICINE (OUTPATIENT)
Dept: NEUROLOGY | Facility: HOSPITAL | Age: 68
End: 2023-05-05
Payer: MEDICARE

## 2023-05-05 VITALS
TEMPERATURE: 98 F | WEIGHT: 235.88 LBS | HEART RATE: 101 BPM | BODY MASS INDEX: 40.27 KG/M2 | RESPIRATION RATE: 16 BRPM | HEIGHT: 64 IN | OXYGEN SATURATION: 97 % | SYSTOLIC BLOOD PRESSURE: 169 MMHG | DIASTOLIC BLOOD PRESSURE: 89 MMHG

## 2023-05-05 DIAGNOSIS — R07.9 CHEST PAIN: ICD-10-CM

## 2023-05-05 DIAGNOSIS — I61.3 LEFT-SIDED NONTRAUMATIC INTRACEREBRAL HEMORRHAGE OF BRAINSTEM: Primary | ICD-10-CM

## 2023-05-05 DIAGNOSIS — R79.89 ELEVATED TROPONIN: ICD-10-CM

## 2023-05-05 DIAGNOSIS — R00.0 TACHYCARDIA: ICD-10-CM

## 2023-05-05 DIAGNOSIS — I61.9 ICH (INTRACEREBRAL HEMORRHAGE): ICD-10-CM

## 2023-05-05 PROBLEM — I10 HYPERTENSION: Status: ACTIVE | Noted: 2023-05-05

## 2023-05-05 PROBLEM — G93.6 VASOGENIC CEREBRAL EDEMA: Status: ACTIVE | Noted: 2023-05-05

## 2023-05-05 PROBLEM — I48.91 A-FIB: Status: ACTIVE | Noted: 2023-05-05

## 2023-05-05 LAB
ABO + RH BLD: NORMAL
ALBUMIN SERPL BCP-MCNC: 4.5 G/DL (ref 3.5–5.2)
ALP SERPL-CCNC: 66 U/L (ref 55–135)
ALT SERPL W/O P-5'-P-CCNC: 15 U/L (ref 10–44)
AMPHET+METHAMPHET UR QL: NEGATIVE
ANION GAP SERPL CALC-SCNC: 17 MMOL/L (ref 8–16)
APAP SERPL-MCNC: <3 UG/ML (ref 10–20)
APTT PPP: 29.3 SEC (ref 21–32)
APTT PPP: 34 SEC (ref 21–32)
AST SERPL-CCNC: 17 U/L (ref 10–40)
BACTERIA #/AREA URNS HPF: ABNORMAL /HPF
BARBITURATES UR QL SCN>200 NG/ML: NEGATIVE
BASOPHILS # BLD AUTO: 0.03 K/UL (ref 0–0.2)
BASOPHILS NFR BLD: 0.2 % (ref 0–1.9)
BENZODIAZ UR QL SCN>200 NG/ML: NEGATIVE
BILIRUB SERPL-MCNC: 0.5 MG/DL (ref 0.1–1)
BILIRUB UR QL STRIP: NEGATIVE
BLD GP AB SCN CELLS X3 SERPL QL: NORMAL
BNP SERPL-MCNC: 312 PG/ML (ref 0–99)
BUN SERPL-MCNC: 11 MG/DL (ref 8–23)
BZE UR QL SCN: NEGATIVE
CALCIUM SERPL-MCNC: 10.1 MG/DL (ref 8.7–10.5)
CANNABINOIDS UR QL SCN: NEGATIVE
CHLORIDE SERPL-SCNC: 99 MMOL/L (ref 95–110)
CHOLEST SERPL-MCNC: 257 MG/DL (ref 120–199)
CHOLEST SERPL-MCNC: 257 MG/DL (ref 120–199)
CHOLEST/HDLC SERPL: 3.5 {RATIO} (ref 2–5)
CHOLEST/HDLC SERPL: 3.5 {RATIO} (ref 2–5)
CLARITY UR: ABNORMAL
CO2 SERPL-SCNC: 19 MMOL/L (ref 23–29)
COLOR UR: YELLOW
CREAT SERPL-MCNC: 0.8 MG/DL (ref 0.5–1.4)
CREAT UR-MCNC: 53.2 MG/DL (ref 15–325)
D DIMER PPP IA.FEU-MCNC: 0.59 MG/L FEU
DIFFERENTIAL METHOD: ABNORMAL
EOSINOPHIL # BLD AUTO: 0 K/UL (ref 0–0.5)
EOSINOPHIL NFR BLD: 0 % (ref 0–8)
ERYTHROCYTE [DISTWIDTH] IN BLOOD BY AUTOMATED COUNT: 13 % (ref 11.5–14.5)
EST. GFR  (NO RACE VARIABLE): >60 ML/MIN/1.73 M^2
ESTIMATED AVG GLUCOSE: 103 MG/DL (ref 68–131)
ESTIMATED AVG GLUCOSE: 103 MG/DL (ref 68–131)
GLUCOSE SERPL-MCNC: 180 MG/DL (ref 70–110)
GLUCOSE UR QL STRIP: ABNORMAL
HBA1C MFR BLD: 5.2 % (ref 4–5.6)
HBA1C MFR BLD: 5.2 % (ref 4–5.6)
HCT VFR BLD AUTO: 50 % (ref 37–48.5)
HDLC SERPL-MCNC: 74 MG/DL (ref 40–75)
HDLC SERPL-MCNC: 74 MG/DL (ref 40–75)
HDLC SERPL: 28.8 % (ref 20–50)
HDLC SERPL: 28.8 % (ref 20–50)
HGB BLD-MCNC: 16.6 G/DL (ref 12–16)
HGB UR QL STRIP: ABNORMAL
HYALINE CASTS #/AREA URNS LPF: 0 /LPF
IMM GRANULOCYTES # BLD AUTO: 0.16 K/UL (ref 0–0.04)
IMM GRANULOCYTES NFR BLD AUTO: 1 % (ref 0–0.5)
INR PPP: 1 (ref 0.8–1.2)
KETONES UR QL STRIP: ABNORMAL
LDLC SERPL CALC-MCNC: ABNORMAL MG/DL (ref 63–159)
LDLC SERPL CALC-MCNC: ABNORMAL MG/DL (ref 63–159)
LEUKOCYTE ESTERASE UR QL STRIP: NEGATIVE
LYMPHOCYTES # BLD AUTO: 1.2 K/UL (ref 1–4.8)
LYMPHOCYTES NFR BLD: 7.4 % (ref 18–48)
MCH RBC QN AUTO: 29.3 PG (ref 27–31)
MCHC RBC AUTO-ENTMCNC: 33.2 G/DL (ref 32–36)
MCV RBC AUTO: 88 FL (ref 82–98)
METHADONE UR QL SCN>300 NG/ML: NEGATIVE
MICROSCOPIC COMMENT: ABNORMAL
MONOCYTES # BLD AUTO: 0.8 K/UL (ref 0.3–1)
MONOCYTES NFR BLD: 4.7 % (ref 4–15)
NEUTROPHILS # BLD AUTO: 14.6 K/UL (ref 1.8–7.7)
NEUTROPHILS NFR BLD: 86.7 % (ref 38–73)
NITRITE UR QL STRIP: NEGATIVE
NONHDLC SERPL-MCNC: 183 MG/DL
NONHDLC SERPL-MCNC: 183 MG/DL
NRBC BLD-RTO: 0 /100 WBC
OPIATES UR QL SCN: NEGATIVE
PCP UR QL SCN>25 NG/ML: NEGATIVE
PH UR STRIP: 6 [PH] (ref 5–8)
PLATELET # BLD AUTO: 230 K/UL (ref 150–450)
PMV BLD AUTO: 11.4 FL (ref 9.2–12.9)
POCT GLUCOSE: 162 MG/DL (ref 70–110)
POCT GLUCOSE: 163 MG/DL (ref 70–110)
POTASSIUM SERPL-SCNC: 4.1 MMOL/L (ref 3.5–5.1)
PROT SERPL-MCNC: 9 G/DL (ref 6–8.4)
PROT UR QL STRIP: ABNORMAL
PROTHROMBIN TIME: 11 SEC (ref 9–12.5)
RBC # BLD AUTO: 5.67 M/UL (ref 4–5.4)
RBC #/AREA URNS HPF: 35 /HPF (ref 0–4)
SALICYLATES SERPL-MCNC: <5 MG/DL (ref 15–30)
SODIUM SERPL-SCNC: 135 MMOL/L (ref 136–145)
SP GR UR STRIP: >=1.03 (ref 1–1.03)
SPECIMEN OUTDATE: NORMAL
TOXICOLOGY INFORMATION: NORMAL
TRIGL SERPL-MCNC: 929 MG/DL (ref 30–150)
TRIGL SERPL-MCNC: 929 MG/DL (ref 30–150)
TROPONIN I SERPL DL<=0.01 NG/ML-MCNC: 0.01 NG/ML (ref 0–0.03)
TSH SERPL DL<=0.005 MIU/L-ACNC: 0.87 UIU/ML (ref 0.4–4)
TSH SERPL DL<=0.005 MIU/L-ACNC: 0.95 UIU/ML (ref 0.4–4)
URN SPEC COLLECT METH UR: ABNORMAL
UROBILINOGEN UR STRIP-ACNC: NEGATIVE EU/DL
WBC # BLD AUTO: 16.77 K/UL (ref 3.9–12.7)
WBC #/AREA URNS HPF: 22 /HPF (ref 0–5)

## 2023-05-05 PROCEDURE — 80179 DRUG ASSAY SALICYLATE: CPT | Performed by: STUDENT IN AN ORGANIZED HEALTH CARE EDUCATION/TRAINING PROGRAM

## 2023-05-05 PROCEDURE — 87077 CULTURE AEROBIC IDENTIFY: CPT | Performed by: STUDENT IN AN ORGANIZED HEALTH CARE EDUCATION/TRAINING PROGRAM

## 2023-05-05 PROCEDURE — 87086 URINE CULTURE/COLONY COUNT: CPT | Performed by: STUDENT IN AN ORGANIZED HEALTH CARE EDUCATION/TRAINING PROGRAM

## 2023-05-05 PROCEDURE — 27200966 HC CLOSED SUCTION SYSTEM

## 2023-05-05 PROCEDURE — 85379 FIBRIN DEGRADATION QUANT: CPT | Performed by: STUDENT IN AN ORGANIZED HEALTH CARE EDUCATION/TRAINING PROGRAM

## 2023-05-05 PROCEDURE — 99291 PR CRITICAL CARE, E/M 30-74 MINUTES: ICD-10-PCS | Mod: GC,,, | Performed by: PSYCHIATRY & NEUROLOGY

## 2023-05-05 PROCEDURE — 93010 EKG 12-LEAD: ICD-10-PCS | Mod: ,,, | Performed by: INTERNAL MEDICINE

## 2023-05-05 PROCEDURE — 94002 VENT MGMT INPAT INIT DAY: CPT

## 2023-05-05 PROCEDURE — 96365 THER/PROPH/DIAG IV INF INIT: CPT

## 2023-05-05 PROCEDURE — 85025 COMPLETE CBC W/AUTO DIFF WBC: CPT | Performed by: STUDENT IN AN ORGANIZED HEALTH CARE EDUCATION/TRAINING PROGRAM

## 2023-05-05 PROCEDURE — 99223 PR INITIAL HOSPITAL CARE,LEVL III: ICD-10-PCS | Mod: ,,, | Performed by: PSYCHIATRY & NEUROLOGY

## 2023-05-05 PROCEDURE — 25000003 PHARM REV CODE 250: Performed by: PSYCHIATRY & NEUROLOGY

## 2023-05-05 PROCEDURE — 31500 INSERT EMERGENCY AIRWAY: CPT

## 2023-05-05 PROCEDURE — 96367 TX/PROPH/DG ADDL SEQ IV INF: CPT | Mod: 59

## 2023-05-05 PROCEDURE — 63600175 PHARM REV CODE 636 W HCPCS: Performed by: PSYCHIATRY & NEUROLOGY

## 2023-05-05 PROCEDURE — 86900 BLOOD TYPING SEROLOGIC ABO: CPT | Performed by: PSYCHIATRY & NEUROLOGY

## 2023-05-05 PROCEDURE — 84443 ASSAY THYROID STIM HORMONE: CPT | Performed by: STUDENT IN AN ORGANIZED HEALTH CARE EDUCATION/TRAINING PROGRAM

## 2023-05-05 PROCEDURE — 20000000 HC ICU ROOM

## 2023-05-05 PROCEDURE — 83036 HEMOGLOBIN GLYCOSYLATED A1C: CPT | Performed by: PSYCHIATRY & NEUROLOGY

## 2023-05-05 PROCEDURE — 80307 DRUG TEST PRSMV CHEM ANLYZR: CPT | Performed by: STUDENT IN AN ORGANIZED HEALTH CARE EDUCATION/TRAINING PROGRAM

## 2023-05-05 PROCEDURE — 99291 CRITICAL CARE FIRST HOUR: CPT | Mod: GC,,, | Performed by: PSYCHIATRY & NEUROLOGY

## 2023-05-05 PROCEDURE — 99900026 HC AIRWAY MAINTENANCE (STAT)

## 2023-05-05 PROCEDURE — 25000003 PHARM REV CODE 250: Performed by: STUDENT IN AN ORGANIZED HEALTH CARE EDUCATION/TRAINING PROGRAM

## 2023-05-05 PROCEDURE — 84443 ASSAY THYROID STIM HORMONE: CPT | Mod: 91 | Performed by: PSYCHIATRY & NEUROLOGY

## 2023-05-05 PROCEDURE — 81000 URINALYSIS NONAUTO W/SCOPE: CPT | Mod: 59 | Performed by: STUDENT IN AN ORGANIZED HEALTH CARE EDUCATION/TRAINING PROGRAM

## 2023-05-05 PROCEDURE — 87186 SC STD MICRODIL/AGAR DIL: CPT | Performed by: STUDENT IN AN ORGANIZED HEALTH CARE EDUCATION/TRAINING PROGRAM

## 2023-05-05 PROCEDURE — 94761 N-INVAS EAR/PLS OXIMETRY MLT: CPT

## 2023-05-05 PROCEDURE — 36415 COLL VENOUS BLD VENIPUNCTURE: CPT | Performed by: PSYCHIATRY & NEUROLOGY

## 2023-05-05 PROCEDURE — 80143 DRUG ASSAY ACETAMINOPHEN: CPT | Performed by: STUDENT IN AN ORGANIZED HEALTH CARE EDUCATION/TRAINING PROGRAM

## 2023-05-05 PROCEDURE — 25500020 PHARM REV CODE 255: Performed by: PSYCHIATRY & NEUROLOGY

## 2023-05-05 PROCEDURE — 27000221 HC OXYGEN, UP TO 24 HOURS

## 2023-05-05 PROCEDURE — 84484 ASSAY OF TROPONIN QUANT: CPT | Performed by: STUDENT IN AN ORGANIZED HEALTH CARE EDUCATION/TRAINING PROGRAM

## 2023-05-05 PROCEDURE — 96368 THER/DIAG CONCURRENT INF: CPT | Mod: 59

## 2023-05-05 PROCEDURE — 80053 COMPREHEN METABOLIC PANEL: CPT | Performed by: STUDENT IN AN ORGANIZED HEALTH CARE EDUCATION/TRAINING PROGRAM

## 2023-05-05 PROCEDURE — 85730 THROMBOPLASTIN TIME PARTIAL: CPT | Performed by: STUDENT IN AN ORGANIZED HEALTH CARE EDUCATION/TRAINING PROGRAM

## 2023-05-05 PROCEDURE — 80061 LIPID PANEL: CPT | Performed by: PSYCHIATRY & NEUROLOGY

## 2023-05-05 PROCEDURE — 99900035 HC TECH TIME PER 15 MIN (STAT)

## 2023-05-05 PROCEDURE — 85610 PROTHROMBIN TIME: CPT | Performed by: STUDENT IN AN ORGANIZED HEALTH CARE EDUCATION/TRAINING PROGRAM

## 2023-05-05 PROCEDURE — 51702 INSERT TEMP BLADDER CATH: CPT

## 2023-05-05 PROCEDURE — 83880 ASSAY OF NATRIURETIC PEPTIDE: CPT | Performed by: STUDENT IN AN ORGANIZED HEALTH CARE EDUCATION/TRAINING PROGRAM

## 2023-05-05 PROCEDURE — 87040 BLOOD CULTURE FOR BACTERIA: CPT | Performed by: STUDENT IN AN ORGANIZED HEALTH CARE EDUCATION/TRAINING PROGRAM

## 2023-05-05 PROCEDURE — 85730 THROMBOPLASTIN TIME PARTIAL: CPT | Mod: 91 | Performed by: PSYCHIATRY & NEUROLOGY

## 2023-05-05 PROCEDURE — 87088 URINE BACTERIA CULTURE: CPT | Performed by: STUDENT IN AN ORGANIZED HEALTH CARE EDUCATION/TRAINING PROGRAM

## 2023-05-05 PROCEDURE — 99285 EMERGENCY DEPT VISIT HI MDM: CPT | Mod: 25

## 2023-05-05 PROCEDURE — 99223 1ST HOSP IP/OBS HIGH 75: CPT | Mod: ,,, | Performed by: PSYCHIATRY & NEUROLOGY

## 2023-05-05 PROCEDURE — 82962 GLUCOSE BLOOD TEST: CPT

## 2023-05-05 PROCEDURE — 93005 ELECTROCARDIOGRAM TRACING: CPT | Mod: 59

## 2023-05-05 PROCEDURE — 93010 ELECTROCARDIOGRAM REPORT: CPT | Mod: ,,, | Performed by: INTERNAL MEDICINE

## 2023-05-05 PROCEDURE — 63600175 PHARM REV CODE 636 W HCPCS: Performed by: STUDENT IN AN ORGANIZED HEALTH CARE EDUCATION/TRAINING PROGRAM

## 2023-05-05 PROCEDURE — 96375 TX/PRO/DX INJ NEW DRUG ADDON: CPT | Mod: 59

## 2023-05-05 RX ORDER — ETOMIDATE 2 MG/ML
INJECTION INTRAVENOUS
Status: DISCONTINUED
Start: 2023-05-05 | End: 2023-05-05 | Stop reason: HOSPADM

## 2023-05-05 RX ORDER — NICARDIPINE HYDROCHLORIDE 0.2 MG/ML
0-15 INJECTION INTRAVENOUS CONTINUOUS
Status: DISCONTINUED | OUTPATIENT
Start: 2023-05-05 | End: 2023-05-07

## 2023-05-05 RX ORDER — SODIUM,POTASSIUM PHOSPHATES 280-250MG
2 POWDER IN PACKET (EA) ORAL
Status: DISCONTINUED | OUTPATIENT
Start: 2023-05-05 | End: 2023-05-17 | Stop reason: HOSPADM

## 2023-05-05 RX ORDER — ONDANSETRON 2 MG/ML
4 INJECTION INTRAMUSCULAR; INTRAVENOUS EVERY 8 HOURS PRN
Status: DISCONTINUED | OUTPATIENT
Start: 2023-05-05 | End: 2023-05-17 | Stop reason: HOSPADM

## 2023-05-05 RX ORDER — PROPOFOL 10 MG/ML
0-50 INJECTION, EMULSION INTRAVENOUS CONTINUOUS
Status: DISCONTINUED | OUTPATIENT
Start: 2023-05-05 | End: 2023-05-05

## 2023-05-05 RX ORDER — LABETALOL HCL 20 MG/4 ML
10 SYRINGE (ML) INTRAVENOUS EVERY 4 HOURS PRN
Status: DISCONTINUED | OUTPATIENT
Start: 2023-05-05 | End: 2023-05-17 | Stop reason: HOSPADM

## 2023-05-05 RX ORDER — FAMOTIDINE 20 MG/1
20 TABLET, FILM COATED ORAL 2 TIMES DAILY
Status: DISCONTINUED | OUTPATIENT
Start: 2023-05-05 | End: 2023-05-06

## 2023-05-05 RX ORDER — SODIUM CHLORIDE 0.9 % (FLUSH) 0.9 %
10 SYRINGE (ML) INJECTION
Status: DISCONTINUED | OUTPATIENT
Start: 2023-05-05 | End: 2023-05-17 | Stop reason: HOSPADM

## 2023-05-05 RX ORDER — ROCURONIUM BROMIDE 10 MG/ML
INJECTION, SOLUTION INTRAVENOUS
Status: DISCONTINUED
Start: 2023-05-05 | End: 2023-05-05 | Stop reason: HOSPADM

## 2023-05-05 RX ORDER — ETOMIDATE 2 MG/ML
30 INJECTION INTRAVENOUS
Status: COMPLETED | OUTPATIENT
Start: 2023-05-05 | End: 2023-05-05

## 2023-05-05 RX ORDER — FENTANYL CITRATE-0.9 % NACL/PF 10 MCG/ML
0-200 PLASTIC BAG, INJECTION (ML) INTRAVENOUS CONTINUOUS
Status: DISCONTINUED | OUTPATIENT
Start: 2023-05-05 | End: 2023-05-07

## 2023-05-05 RX ORDER — NICARDIPINE HYDROCHLORIDE 0.2 MG/ML
INJECTION INTRAVENOUS
Status: DISCONTINUED
Start: 2023-05-05 | End: 2023-05-05 | Stop reason: HOSPADM

## 2023-05-05 RX ORDER — PROPOFOL 10 MG/ML
INJECTION, EMULSION INTRAVENOUS
Status: DISCONTINUED
Start: 2023-05-05 | End: 2023-05-05 | Stop reason: HOSPADM

## 2023-05-05 RX ORDER — NICARDIPINE HYDROCHLORIDE 0.2 MG/ML
5 INJECTION INTRAVENOUS CONTINUOUS
Status: DISCONTINUED | OUTPATIENT
Start: 2023-05-05 | End: 2023-05-05 | Stop reason: HOSPADM

## 2023-05-05 RX ORDER — PROPOFOL 10 MG/ML
0-50 INJECTION, EMULSION INTRAVENOUS CONTINUOUS
Status: DISCONTINUED | OUTPATIENT
Start: 2023-05-05 | End: 2023-05-06

## 2023-05-05 RX ORDER — AMOXICILLIN 250 MG
1 CAPSULE ORAL 2 TIMES DAILY
Status: DISCONTINUED | OUTPATIENT
Start: 2023-05-05 | End: 2023-05-06

## 2023-05-05 RX ORDER — ROCURONIUM BROMIDE 10 MG/ML
100 INJECTION, SOLUTION INTRAVENOUS ONCE
Status: COMPLETED | OUTPATIENT
Start: 2023-05-05 | End: 2023-05-05

## 2023-05-05 RX ORDER — PROPOFOL 10 MG/ML
0-50 INJECTION, EMULSION INTRAVENOUS CONTINUOUS
Status: DISCONTINUED | OUTPATIENT
Start: 2023-05-05 | End: 2023-05-05 | Stop reason: HOSPADM

## 2023-05-05 RX ADMIN — LABETALOL HYDROCHLORIDE 10 MG: 5 INJECTION, SOLUTION INTRAVENOUS at 08:05

## 2023-05-05 RX ADMIN — SENNOSIDES AND DOCUSATE SODIUM 1 TABLET: 50; 8.6 TABLET ORAL at 09:05

## 2023-05-05 RX ADMIN — ETOMIDATE 30 MG: 2 INJECTION INTRAVENOUS at 02:05

## 2023-05-05 RX ADMIN — FAMOTIDINE 20 MG: 20 TABLET, FILM COATED ORAL at 09:05

## 2023-05-05 RX ADMIN — NICARDIPINE HYDROCHLORIDE 2.5 MG/HR: 0.2 INJECTION, SOLUTION INTRAVENOUS at 09:05

## 2023-05-05 RX ADMIN — PROPOFOL 5 MCG/KG/MIN: 10 INJECTION, EMULSION INTRAVENOUS at 02:05

## 2023-05-05 RX ADMIN — NICARDIPINE HYDROCHLORIDE 2.5 MG/HR: 0.2 INJECTION, SOLUTION INTRAVENOUS at 04:05

## 2023-05-05 RX ADMIN — PROPOFOL 10 MCG/KG/MIN: 10 INJECTION, EMULSION INTRAVENOUS at 04:05

## 2023-05-05 RX ADMIN — PROPOFOL 20 MCG/KG/MIN: 10 INJECTION, EMULSION INTRAVENOUS at 06:05

## 2023-05-05 RX ADMIN — IOHEXOL 100 ML: 350 INJECTION, SOLUTION INTRAVENOUS at 06:05

## 2023-05-05 RX ADMIN — Medication 50 MCG/HR: at 05:05

## 2023-05-05 RX ADMIN — ROCURONIUM BROMIDE 100 MG: 50 INJECTION, SOLUTION INTRAVENOUS at 02:05

## 2023-05-05 NOTE — ASSESSMENT & PLAN NOTE
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     - Admit to NCC  - Neuro checks Q1  - SBP< 160  - Hold AC/AP  - CTA H&N  - Coags & CBC  - VN following  - PT/OT/SLP

## 2023-05-05 NOTE — H&P
[facility] Critical [facility]  Neurocritical Care  History & Physical    Admit Date: 5/5/2023  Service Date: 05/05/2023  Length of Stay: 0    Subjective:     Chief Complaint: ICH (intracerebral hemorrhage)    History of Present Illness: Susan Pinto is a 67-year-old female with Hx of HTN presenting with altered mental status, tachycardia, left-sided facial droop, admitted for medullary ICH.   Patient started having vomiting that began around 7:00 p.m last night. On EMS arrival was noted to be hypoxic sat's in 80's with AMS, was intubated for airway protection. In ED  on presentation at OSH, CTH was done and was found to have a cervicomedullary junction ICH measuring approximately about 1.3 x 1.3 x 1.2 cm, also had a.fib with RVR Hr in the 150's that responded to BB. Was transferred to [facility] for further care         No past medical history on file.  No past surgical history on file.   Current Facility-Administered Medications on File Prior to Encounter   Medication Dose Route Frequency Provider Last Rate Last Admin    [COMPLETED] etomidate injection 30 mg  30 mg Intravenous ED 1 Time Yared Luong MD   30 mg at 05/05/23 1425    [COMPLETED] rocuronium injection 100 mg  100 mg Intravenous Once Yared Luong MD   100 mg at 05/05/23 1428    [DISCONTINUED] niCARdipine 40 mg/200 mL (0.2 mg/mL) infusion  5 mg/hr Intravenous Continuous Yared Luong MD 50 mL/hr at 05/05/23 1451 10 mg/hr at 05/05/23 1451    [DISCONTINUED] propofol (DIPRIVAN) 10 mg/mL infusion  0-50 mcg/kg/min Intravenous Continuous Yared Luong MD 3.2 mL/hr at 05/05/23 1429 5 mcg/kg/min at 05/05/23 1429    [DISCONTINUED] propofol (DIPRIVAN) 10 mg/mL infusion  0-50 mcg/kg/min Intravenous Continuous Yared Luong MD         Current Outpatient Medications on File Prior to Encounter   Medication Sig Dispense Refill    amLODIPine (NORVASC) 5 MG tablet Take 1 tablet (5 mg total) by mouth once daily. 90 tablet 3    aspirin  (ECOTRIN) 81 MG EC tablet Take 81 mg by mouth once daily.      atorvastatin (LIPITOR) 20 MG tablet Take 1 tablet (20 mg total) by mouth every evening. 90 tablet 3    cloNIDine (CATAPRES) 0.1 MG tablet Take one as needed every 12 hours if blood pressure exceeds 150/100 60 tablet 2    fish oil-omega-3 fatty acids 300-1,000 mg capsule Take by mouth once daily.      lisinopriL (PRINIVIL,ZESTRIL) 40 MG tablet Take 1 tablet (40 mg total) by mouth once daily. 90 tablet 3    LORazepam (ATIVAN) 0.5 MG tablet TAKE 1 TABLET (=0.5MG) BY MOUTH  NIGHTLY AS NEEDED FOR ANXIETY 30 tablet 2      Allergies: Patient has no allergy information on record.    Family History   Problem Relation Age of Onset    Hypertension Mother      Social History     Tobacco Use    Smoking status: Never    Smokeless tobacco: Never   Substance Use Topics    Alcohol use: No    Drug use: No     Review of Systems   Unable to perform ROS: Intubated   Objective:     Vitals:    Temp: 97.8 °F (36.6 °C)  Pulse: 88  BP: 129/85  MAP (mmHg): 101  Resp: (!) 23  SpO2: (!) 94 %    Temp  Min: 97.8 °F (36.6 °C)  Max: 97.8 °F (36.6 °C)  Pulse  Min: 88  Max: 142  BP  Min: 129/85  Max: 250/128  MAP (mmHg)  Min: 101  Max: 182  Resp  Min: 16  Max: 40  SpO2  Min: 94 %  Max: 100 %  Oxygen Concentration (%)  Min: 100  Max: 100    No intake/output data recorded.            Physical Exam  --sedation: fentanyl and propofol  --GCS: [id number]  --Mental Status: sleepy, opens eyes briefly to loud voice then closes again, follows commands briskly   --CN II-XII grossly intact.   --Pupils 3-->2mm, PERRL.   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 Likely HTNive base on location and elevated BP () on presentation     - Admit to NCC  - Neuro checks Q1  - SBP< 160  - Hold AC/AP  - CTA H&N  - Coags & CBC  - VN following  - PT/OT/SLP    Cardiac/Vascular  A-fib  New onset at OSH, responded to BB    EKG  Monitor for now  Hold A/C          The patient is being Prophylaxed for:  Venous Thromboembolism with: Mechanical  Stress Ulcer with: H2B  Ventilator Pneumonia with: chlorhexidine oral care    Activity Orders            Turn patient starting at 05/05 1800    Elevate HOB starting at 05/05 1626    Diet NPO: NPO starting at 05/05 1626          Full Code    Tom Mcmullen MD  Neurocritical Care  [facility] - [facility] Critical [facility]

## 2023-05-05 NOTE — CONSULTS
[facility] - Neuro Critical Care  Vascular Neurology  Comprehensive Stroke Center  Consult Note    Inpatient consult to Vascular (Stroke) Neurology  Consult performed by: Cassi Green NP  Consult ordered by: Tom Mcmullen MD        Assessment/Plan:     Patient is a 67 y.o. year old female with:    * Nontraumatic intracerebral hemorrhage  67-year-old female with PMH of HTN  who is a transfer from OSH for an ICH. She presented to OSH ED  with AMS, tachycardia, left-sided facial droop. History obtained from  as patient is intubated. He reports patient started vomiting at 7:00 p.m last night with multiple episodes throughout the night and this morning. She was laying on the floor when she was vomiting and he got her up this morning and put her in the sofa and noticed her eyes rolling to the back. He called 911 and they assisted him through a neuro exam and that's when he noticed facial droop and slurred speech. On EMS arrival she was noted to be hypoxic sat's in 80's with AMS, was intubated for airway protection.  on presentation at OSH.  reports she stop taking all her medications few months ago because she doesn't believe in doctors. CTH was done and was found to have a cervicomedullary junction ICH measuring approximately about 1.3 x 1.3 x 1.2 cm. She was also noted to be on a.fib with RVR Hr in the 150's that responded to BB. Was transferred to [facility] [place] for further care. NIHSS 3, she is intubated but able to follow commands appropriately. CTA head and neck pending.       Antithrombotics for secondary stroke prevention: Antiplatelets: None: Intracerebral Hemorrhage      Aggressive risk factor modification: HTN     Rehab efforts: The patient has been evaluated by a stroke team provider and the therapy needs have been fully considered based off the presenting complaints and exam findings. The following therapy evaluations are needed: PT evaluate and treat, OT evaluate and treat, SLP evaluate and  treat, PM&R evaluate for appropriate placement    Diagnostics ordered/pending: CTA Head to assess vasculature , CTA Neck/Arch to assess vasculature, MRI head without contrast to assess brain parenchyma, TTE to assess cardiac function/status , Other: mri w wo    VTE prophylaxis: Mechanical prophylaxis: Place SCDs  None: Reason for No Pharmacological VTE Prophylaxis: History of systemic or intracranial bleeding    BP parameters: ICH: SBP <140        Vasogenic cerebral edema  Area of cerebral edema identified when reviewing brain imaging. We will continue to monitor with q4h neuro checks while on floor or more frequently while in neuro critical care, as any change in the patients clinical exam may signify expansion of the insult and/or the area of the edema. Such changes may require acute interventions to prevent loss of function and/or death.            Hypertension  Risk factor  <140    A-fib  New onset   No on AC  Hold AC in the setting of ICH        STROKE DOCUMENTATION          NIH Scale:  1a. Level of Consciousness: 1-->Not alert, but arousable by minor stimulation to obey, answer, or respond  1b. LOC Questions: 2-->Answers neither question correctly  1c. LOC Commands: 0-->Performs both tasks correctly  2. Best Gaze: 0-->Normal  3. Visual: 0-->No visual loss  4. Facial Palsy: 0-->Normal symmetrical movements  5a. Motor Arm, Left: 0-->No drift, limb holds 90 (or 45) degrees for full 10 secs  5b. Motor Arm, Right: 0-->No drift, limb holds 90 (or 45) degrees for full 10 secs  6a. Motor Leg, Left: 0-->No drift, leg holds 30 degree position for full 5 secs  6b. Motor Leg, Right: 0-->No drift, leg holds 30 degree position for full 5 secs  7. Limb Ataxia: 0-->Absent  8. Sensory: 0-->Normal, no sensory loss  9. Best Language: 0-->No aphasia, normal  10. Dysarthria: (UN) Intubated or other physical barrier  11. Extinction and Inattention (formerly Neglect): 0-->No abnormality  Total (NIH Stroke Scale): 3    Modified  [place] Score: 0  [place] Coma Scale:10   ABCD2 Score:    [id number] Score:   HAS -BLED Score:   ICH Score:2  Hunt & Trujillo Classification:       Thrombolysis Candidate? No, ICH    Delays to Thrombolysis?  Not Applicable    Interventional Revascularization Candidate?   Is the patient eligible for mechanical endovascular reperfusion (ORTIZ)?  No; at this time symptoms not suggestive of large vessel occlusion    Delays to Thrombectomy? No    Hemorrhagic change of an Ischemic Stroke: Does this patient have an ischemic stroke with hemorrhagic changes? No     Subjective:     History of Present Illness:  67-year-old female with PMH of HTN  who is a transfer from OSH for an ICH. She presented to OSH ED  with AMS, tachycardia, left-sided facial droop. History obtained from  as patient is intubated. He reports patient started vomiting at 7:00 p.m last night with multiple episodes throughout the night and this morning. She was laying on the floor when she was vomiting and he got her up this morning and put her in the sofa and noticed her eyes rolling to the back. He called 911 and they assisted him through a neuro exam and that's when he noticed facial droop and slurred speech. On EMS arrival she was noted to be hypoxic sat's in 80's with AMS, was intubated for airway protection.  on presentation at OSH.  reports she stop taking all her medications few months ago because she doesn't believe in doctors. CTH was done and was found to have a cervicomedullary junction ICH measuring approximately about 1.3 x 1.3 x 1.2 cm. She was also noted to be on a.fib with RVR Hr in the 150's that responded to BB. [place] [facility] [place]. NIHSS 3, she is intubated but able to follow commands appropriately. CTA head and neck pending.            past medical history HTN  No known past surgical history.  Family History   Problem Relation Age of Onset    Hypertension Mother      Social History     Tobacco Use     Smoking status: Never    Smokeless tobacco: Never   Substance Use Topics    Alcohol use: No    Drug use: No     Review of patient's allergies indicates:  Not on File    Medications: I have reviewed the current medication administration record.    Medications Prior to Admission   Medication Sig Dispense Refill Last Dose    amLODIPine (NORVASC) 5 MG tablet Take 1 tablet (5 mg total) by mouth once daily. 90 tablet 3     aspirin (ECOTRIN) 81 MG EC tablet Take 81 mg by mouth once daily.       atorvastatin (LIPITOR) 20 MG tablet Take 1 tablet (20 mg total) by mouth every evening. 90 tablet 3     cloNIDine (CATAPRES) 0.1 MG tablet Take one as needed every 12 hours if blood pressure exceeds 150/100 60 tablet 2     fish oil-omega-3 fatty acids 300-1,000 mg capsule Take by mouth once daily.       lisinopriL (PRINIVIL,ZESTRIL) 40 MG tablet Take 1 tablet (40 mg total) by mouth once daily. 90 tablet 3     LORazepam (ATIVAN) 0.5 MG tablet TAKE 1 TABLET (=0.5MG) BY MOUTH  NIGHTLY AS NEEDED FOR ANXIETY 30 tablet 2        Review of Systems   Unable to perform ROS: Acuity of condition   Objective:     Vital Signs (Most Recent):  Temp: 97.8 °F (36.6 °C) (05/05/23 1620)  Pulse: 75 (05/05/23 1821)  Resp: 18 (05/05/23 1821)  BP: (!) 148/78 (05/05/23 1821)  SpO2: 98 % (05/05/23 1821)    Vital Signs Range (Last 24H):  Temp:  [97.8 °F (36.6 °C)]   Pulse:  []   Resp:  [16-40]   BP: (129-250)/()   SpO2:  [94 %-100 %]        Physical Exam  Constitutional:       Interventions: She is intubated and restrained.   Eyes:      Extraocular Movements: Extraocular movements intact.   Cardiovascular:      Rate and Rhythm: Normal rate.   Pulmonary:      Effort: She is intubated.   Abdominal:      General: Abdomen is flat.   Musculoskeletal:         General: Normal range of motion.   Skin:     General: Skin is warm and dry.   Neurological:      Mental Status: She is lethargic.      Motor: No weakness.      Comments: intubated             Neurological Exam:   LOC: drowsy  Attention Span: Good   Language: Intubated  Articulation: Untestable due to intubation  Orientation: Untestable due to intubation  EOM (CN III, IV, VI): Full/intact  Motor: Arm left  Normal 5/5  Leg left  Normal 5/5  Arm right  Normal 5/5  Leg right Normal 5/5      Laboratory:  CMP:   Recent Labs   Lab 05/05/23  1403   CALCIUM 10.1   ALBUMIN 4.5   PROT 9.0*   *   K 4.1   CO2 19*   CL 99   BUN 11   CREATININE 0.8   ALKPHOS 66   ALT 15   AST 17   BILITOT 0.5     CBC:   Recent Labs   Lab 05/05/23  1403   WBC 16.77*   RBC 5.67*   HGB 16.6*   HCT 50.0*      MCV 88   MCH 29.3   MCHC 33.2     Lipid Panel:   Recent Labs   Lab 05/05/23  1640   CHOL 257*  257*   LDLCALC Invalid, Trig>400.0  Invalid, Trig>400.0   HDL 74  74   TRIG 929*  929*     Coagulation:   Recent Labs   Lab 05/05/23  1403   INR 1.0   APTT 34.0*     Hgb A1C:   Recent Labs   Lab 05/05/23  1640   HGBA1C 5.2  5.2     TSH:   Recent Labs   Lab 05/05/23  1640   TSH 0.866       Diagnostic Results:      Brain imaging:  [facility] 5/5/2023  Intra-axial hemorrhage at the cervicomedullary junction measuring approximately 1.3 x 1.3 x 1.2 cm in dimension.  A component of extra-axial hemorrhage is difficult to exclude.  There is surrounding edema.            Cassi Green NP  [facility]  Department of Vascular Neurology   [facility] - Neuro Critical Care

## 2023-05-05 NOTE — ED TRIAGE NOTES
To ED per [facility] with reports of vomiting since 7 pm last night, respiratory distress, AMS, breathing 50 x per minute, left facial droop, oxygen saturations 84% per EMS. Patient found to be in Atrial fibrillation on EMS arrival at a rate of 150 bpm per EMS. Zofran and Metoprolol 5 mg given.  mg/dl. Patient arrived on a non rebreather at 12 liters oxygen.

## 2023-05-05 NOTE — HPI
Susan Pinto is a 67-year-old female with Hx of HTN presenting with altered mental status, tachycardia, left-sided facial droop, admitted for medullary ICH.   Patient started having vomiting that began around 7:00 p.m last night. On EMS arrival was noted to be hypoxic sat's in 80's with AMS, was intubated for airway protection. In ED at OSH CTh was done and was found to have a cervicomedullary junction ICH measuring approximately about 1.3 x 1.3 x 1.2 cm, also had a.fib with RVR Hr in the 150's that responded to BB. Was transferred to [facility] for further care

## 2023-05-05 NOTE — HPI
67-year-old female with PMH of HTN  who is a transfer from OSH for an ICH. She presented to OSH ED  with AMS, tachycardia, left-sided facial droop. History obtained from  as patient is intubated. He reports patient started vomiting at 7:00 p.m last night with multiple episodes throughout the night and this morning. She was laying on the floor when she was vomiting and he got her up this morning and put her in the sofa and noticed her eyes rolling to the back. He called 911 and they assisted him through a neuro exam and that's when he noticed facial droop and slurred speech. On EMS arrival she was noted to be hypoxic sat's in 80's with AMS, was intubated for airway protection.  on presentation at OSH.  reports she stop taking all her medications few months ago because she doesn't believe in doctors. CTH was done and was found to have a cervicomedullary junction ICH measuring approximately about 1.3 x 1.3 x 1.2 cm. She was also noted to be on a.fib with RVR Hr in the 150's that responded to BB. [place] [facility] [place]. NIHSS 3, she is intubated but able to follow commands appropriately. CTA head and neck pending.

## 2023-05-05 NOTE — PLAN OF CARE
[facility] Care Plan    POC reviewed with Susan Pinto and her  at 1700. Pt's  verbalized understanding. Questions and concerns addressed. No acute events today. Pt progressing toward goals. Will continue to monitor. See below and flowsheets for full assessment and VS info.     -CT head neck completed  -ETT  -propofol gtt, fent gtt, cardene gtt        Is this a stroke patient? yes- Stroke booklet reviewed with patient and family, risk factors identified for patient and stroke booklet remains at bedside for ongoing education.     Neuro:  Surrency Coma Scale  Best Eye Response: 3-->(E3) to speech  Best Motor Response: 6-->(M6) obeys commands  Best Verbal Response: 1-->(V1) none  Naldo Coma Scale Score: 10  Assessment Qualifiers: patient intubated, no eye obstruction present        24 hr Temp:  [97.8 °F (36.6 °C)]     CV:   Rhythm: normal sinus rhythm  BP goals:   SBP < 160  MAP > 65    Resp:      Vent Mode: A/C  Set Rate: 16 BPM  Oxygen Concentration (%): 50  Vt Set: 450 mL  PEEP/CPAP: 10 cmH20    Plan: wean to extubate    GI/:     Diet/Nutrition Received: NPO  Last Bowel Movement:  (unknown)  Voiding Characteristics: external catheter    Intake/Output Summary (Last 24 hours) at 5/5/2023 1838  Last data filed at 5/5/2023 1822  Gross per 24 hour   Intake 68.46 ml   Output --   Net 68.46 ml          Labs/Accuchecks:  Recent Labs   Lab 05/05/23  1403   WBC 16.77*   RBC 5.67*   HGB 16.6*   HCT 50.0*         Recent Labs   Lab 05/05/23  1403   *   K 4.1   CO2 19*   CL 99   BUN 11   CREATININE 0.8   ALKPHOS 66   ALT 15   AST 17   BILITOT 0.5      Recent Labs   Lab 05/05/23  1403   INR 1.0   APTT 34.0*      Recent Labs   Lab 05/05/23  1403   TROPONINI 0.014       Electrolytes: N/A - electrolytes WDL  Accuchecks: none    Gtts:   fentanyl 150 mcg/hr (05/05/23 1822)    niCARdipine Stopped (05/05/23 1751)    propofoL 20 mcg/kg/min (05/05/23 1822)       LDA/Wounds:  Lines/Drains/Airways       Drain   Duration                  NG/OG Tube 05/05/23 1445 orogastric 18 Fr. Center mouth <1 day    Female External Urinary Catheter 05/05/23 1718 <1 day              Airway  Duration                  Airway - Non-Surgical 05/05/23 1430 Endotracheal Tube <1 day              Peripheral Intravenous Line  Duration                  Peripheral IV - Single Lumen 05/05/23 1716 18 G Left Upper Arm <1 day         Peripheral IV - Single Lumen 05/05/23 18 G Left Antecubital <1 day                  Wounds: No  Wound care consulted: No

## 2023-05-05 NOTE — CARE UPDATE
Pt arrived to unit via flight care intubated and on ventilator. Placed pt on bedside vent at documented settings. ET tube is secured and patent. Will continue to monitor.

## 2023-05-05 NOTE — ASSESSMENT & PLAN NOTE
67-year-old female with PMH of HTN  who is a transfer from OSH for an ICH. She presented to OSH ED  with AMS, tachycardia, left-sided facial droop. History obtained from  as patient is intubated. He reports patient started vomiting at 7:00 p.m last night with multiple episodes throughout the night and this morning. She was laying on the floor when she was vomiting and he got her up this morning and put her in the sofa and noticed her eyes rolling to the back. He called 911 and they assisted him through a neuro exam and that's when he noticed facial droop and slurred speech. On EMS arrival she was noted to be hypoxic sat's in 80's with AMS, was intubated for airway protection.  on presentation at OSH.  reports she stop taking all her medications few months ago because she doesn't believe in doctors. CTH was done and was found to have a cervicomedullary junction ICH measuring approximately about 1.3 x 1.3 x 1.2 cm. She was also noted to be on a.fib with RVR Hr in the 150's that responded to BB. [place] [facility] [place]. NIHSS 3, she is intubated but able to follow commands appropriately. CTA head and neck pending.       Antithrombotics for secondary stroke prevention: Antiplatelets: None: Intracerebral Hemorrhage      Aggressive risk factor modification: HTN     Rehab efforts: The patient has been evaluated by a stroke team provider and the therapy needs have been fully considered based off the presenting complaints and exam findings. The following therapy evaluations are needed: PT evaluate and treat, OT evaluate and treat, SLP evaluate and treat, PM&R evaluate for appropriate placement    Diagnostics ordered/pending: CTA Head to assess vasculature , CTA Neck/Arch to assess vasculature, MRI head without contrast to assess brain parenchyma, TTE to assess cardiac function/status , Other: mri w wo    VTE prophylaxis: Mechanical prophylaxis: Place SCDs  None: Reason for No  Pharmacological VTE Prophylaxis: History of systemic or intracranial bleeding    BP parameters: ICH: SBP <140

## 2023-05-05 NOTE — CARE UPDATE
Patient arrived to [facility] from [facility] by [facility]    Report received from: OSAlysha MENDEZ RN    Type of stroke/diagnosis: ICH    Current symptoms: intubated, w/d    Skin assessment done: Y  Wounds noted: none    *If wounds noted, was Wound Care consulted? Y/N    Tish Completed? N - pt intubated    Patient Belongings on Admit: none    NCC notified: MD Kemi

## 2023-05-05 NOTE — PROVIDER PROGRESS NOTES - EMERGENCY DEPT.
Encounter Date: 5/5/2023    ED Physician Progress Notes        Intubation    Date/Time: 5/5/2023 2:32 PM  Location procedure was performed: [facility] EMERGENCY DEPARTMENT  Performed by: Yared Luong MD  Authorized by: Yared Luong MD   Indications: airway protection  Intubation method: fiberoptic oral  Patient status: paralyzed (RSI)  Preoxygenation: nonrebreather mask and mask  Sedatives: etomidate  Paralytic: rocuronium  Laryngoscope size: Glide 3  Tube size: 7.5 mm  Tube type: cuffed  Number of attempts: 2  Ventilation between attempts: BVM  Cricoid pressure: yes  Post-procedure assessment: chest rise and CO2 detector  Breath sounds: clear  Cuff inflated: yes  ETT to lip: 23 cm  Tube secured with: ETT heath  Chest x-ray interpreted by me.  Chest x-ray findings: endotracheal tube in appropriate position  Patient tolerance: Patient tolerated the procedure well with no immediate complications

## 2023-05-05 NOTE — SUBJECTIVE & OBJECTIVE
past medical history HTN  No known past surgical history.  Family History   Problem Relation Age of Onset    Hypertension Mother      Social History     Tobacco Use    Smoking status: Never    Smokeless tobacco: Never   Substance Use Topics    Alcohol use: No    Drug use: No     Review of patient's allergies indicates:  Not on File    Medications: I have reviewed the current medication administration record.    Medications Prior to Admission   Medication Sig Dispense Refill Last Dose    amLODIPine (NORVASC) 5 MG tablet Take 1 tablet (5 mg total) by mouth once daily. 90 tablet 3     aspirin (ECOTRIN) 81 MG EC tablet Take 81 mg by mouth once daily.       atorvastatin (LIPITOR) 20 MG tablet Take 1 tablet (20 mg total) by mouth every evening. 90 tablet 3     cloNIDine (CATAPRES) 0.1 MG tablet Take one as needed every 12 hours if blood pressure exceeds 150/100 60 tablet 2     fish oil-omega-3 fatty acids 300-1,000 mg capsule Take by mouth once daily.       lisinopriL (PRINIVIL,ZESTRIL) 40 MG tablet Take 1 tablet (40 mg total) by mouth once daily. 90 tablet 3     LORazepam (ATIVAN) 0.5 MG tablet TAKE 1 TABLET (=0.5MG) BY MOUTH  NIGHTLY AS NEEDED FOR ANXIETY 30 tablet 2        Review of Systems   Unable to perform ROS: Acuity of condition   Objective:     Vital Signs (Most Recent):  Temp: 97.8 °F (36.6 °C) (05/05/23 1620)  Pulse: 75 (05/05/23 1821)  Resp: 18 (05/05/23 1821)  BP: (!) 148/78 (05/05/23 1821)  SpO2: 98 % (05/05/23 1821)    Vital Signs Range (Last 24H):  Temp:  [97.8 °F (36.6 °C)]   Pulse:  []   Resp:  [16-40]   BP: (129-250)/()   SpO2:  [94 %-100 %]        Physical Exam  Constitutional:       Interventions: She is intubated and restrained.   Eyes:      Extraocular Movements: Extraocular movements intact.   Cardiovascular:      Rate and Rhythm: Normal rate.   Pulmonary:      Effort: She is intubated.   Abdominal:      General: Abdomen is flat.   Musculoskeletal:         General: Normal range of  motion.   Skin:     General: Skin is warm and dry.   Neurological:      Mental Status: She is lethargic.      Motor: No weakness.      Comments: intubated            Neurological Exam:   LOC: drowsy  Attention Span: Good   Language: Intubated  Articulation: Untestable due to intubation  Orientation: Untestable due to intubation  EOM (CN III, IV, VI): Full/intact  Motor: Arm left  Normal 5/5  Leg left  Normal 5/5  Arm right  Normal 5/5  Leg right Normal 5/5      Laboratory:  CMP:   Recent Labs   Lab 05/05/23  1403   CALCIUM 10.1   ALBUMIN 4.5   PROT 9.0*   *   K 4.1   CO2 19*   CL 99   BUN 11   CREATININE 0.8   ALKPHOS 66   ALT 15   AST 17   BILITOT 0.5     CBC:   Recent Labs   Lab 05/05/23  1403   WBC 16.77*   RBC 5.67*   HGB 16.6*   HCT 50.0*      MCV 88   MCH 29.3   MCHC 33.2     Lipid Panel:   Recent Labs   Lab 05/05/23  1640   CHOL 257*  257*   LDLCALC Invalid, Trig>400.0  Invalid, Trig>400.0   HDL 74  74   TRIG 929*  929*     Coagulation:   Recent Labs   Lab 05/05/23  1403   INR 1.0   APTT 34.0*     Hgb A1C:   Recent Labs   Lab 05/05/23  1640   HGBA1C 5.2  5.2     TSH:   Recent Labs   Lab 05/05/23  1640   TSH 0.866       Diagnostic Results:      Brain imaging:  [facility] 5/5/2023  Intra-axial hemorrhage at the cervicomedullary junction measuring approximately 1.3 x 1.3 x 1.2 cm in dimension.  A component of extra-axial hemorrhage is difficult to exclude.  There is surrounding edema.

## 2023-05-05 NOTE — ED PROVIDER NOTES
Encounter Date: 5/5/2023       History     Chief Complaint   Patient presents with    Altered Mental Status    Emesis    Respiratory Distress     67-year-old female with history of hypertension presenting with altered mental status, tachycardia, left-sided facial droop that began 7:00 p.m. last night.  Patient unable to give any clear history.  EMS state the patient was hypoxic on arrival to low 80s, improved to mid 90s on non-rebreather.  Patient unable to give further information.  Blood glucose normal by EMS.  Patient was given 5 mg of metoprolol by EMS, rate improved from 150-120.  No history of AFib.    Review of patient's allergies indicates:  No Known Allergies  History reviewed. No pertinent past medical history.  History reviewed. No pertinent surgical history.  Family History   Problem Relation Age of Onset    Hypertension Mother      Social History     Tobacco Use    Smoking status: Never    Smokeless tobacco: Never   Substance Use Topics    Alcohol use: No    Drug use: No     Review of Systems    Physical Exam     Initial Vitals   BP Pulse Resp Temp SpO2   05/05/23 1402 05/05/23 1338 05/05/23 1338 05/05/23 1338 05/05/23 1338   (!) 215/150 (!) 112 (!) 32 97.8 °F (36.6 °C) 95 %      MAP       --                Physical Exam    Nursing note and vitals reviewed.  Constitutional: She appears well-developed. She is not diaphoretic. No distress.   HENT:   Head: Normocephalic.   Eyes: Pupils are equal, round, and reactive to light.   Neck:   Normal range of motion.  Cardiovascular:            No murmur heard.  Tachycardic to 120   Pulmonary/Chest: No respiratory distress. She has no wheezes. She has no rales.   Abdominal: Abdomen is soft. She exhibits no distension. There is no abdominal tenderness. There is no rebound.   Musculoskeletal:         General: No edema.      Cervical back: Normal range of motion.     Neurological: She is alert.   5/5 strength bilateral upper and lower extremities.  Patient following  commands.  Left-sided facial droop is present.  Negative pronator drift   Skin: Skin is warm.   Psychiatric: She has a normal mood and affect.       ED Course   Procedures  Labs Reviewed   CULTURE, URINE - Abnormal; Notable for the following components:       Result Value    Urine Culture, Routine   (*)     Value: GRAM NEGATIVE JOHANNE  >100,000 cfu/ml  Identification and susceptibility pending      All other components within normal limits    Narrative:     Specimen Source->Urine   CBC W/ AUTO DIFFERENTIAL - Abnormal; Notable for the following components:    WBC 16.77 (*)     RBC 5.67 (*)     Hemoglobin 16.6 (*)     Hematocrit 50.0 (*)     Immature Granulocytes 1.0 (*)     Gran # (ANC) 14.6 (*)     Immature Grans (Abs) 0.16 (*)     Gran % 86.7 (*)     Lymph % 7.4 (*)     All other components within normal limits   COMPREHENSIVE METABOLIC PANEL - Abnormal; Notable for the following components:    Sodium 135 (*)     CO2 19 (*)     Glucose 180 (*)     Total Protein 9.0 (*)     Anion Gap 17 (*)     All other components within normal limits   URINALYSIS, REFLEX TO URINE CULTURE - Abnormal; Notable for the following components:    Appearance, UA Cloudy (*)     Specific Gravity, UA >=1.030 (*)     Protein, UA 3+ (*)     Glucose, UA Trace (*)     Ketones, UA 1+ (*)     Occult Blood UA 2+ (*)     All other components within normal limits    Narrative:     Specimen Source->Urine   APTT - Abnormal; Notable for the following components:    aPTT 34.0 (*)     All other components within normal limits   B-TYPE NATRIURETIC PEPTIDE - Abnormal; Notable for the following components:     (*)     All other components within normal limits   ACETAMINOPHEN LEVEL - Abnormal; Notable for the following components:    Acetaminophen (Tylenol), Serum <3.0 (*)     All other components within normal limits   SALICYLATE LEVEL - Abnormal; Notable for the following components:    Salicylate Lvl <5.0 (*)     All other components within normal  limits   D DIMER, QUANTITATIVE - Abnormal; Notable for the following components:    D-Dimer 0.59 (*)     All other components within normal limits   URINALYSIS MICROSCOPIC - Abnormal; Notable for the following components:    RBC, UA 35 (*)     WBC, UA 22 (*)     Bacteria Many (*)     All other components within normal limits    Narrative:     Specimen Source->Urine   POCT GLUCOSE - Abnormal; Notable for the following components:    POCT Glucose 162 (*)     All other components within normal limits   CULTURE, BLOOD   CULTURE, BLOOD   TROPONIN I   PROTIME-INR   TSH   DRUG SCREEN PANEL, URINE EMERGENCY    Narrative:     Specimen Source->Urine     EKG Readings: (Independently Interpreted)   Initial Reading: No STEMI. Rhythm: Atrial Fibrillation. Heart Rate: 108. Ectopy: No Ectopy. Conduction: Normal.     Imaging Results              X-Ray Chest AP Portable (Final result)  Result time 05/05/23 15:00:37      Final result by Nandini Martins MD (05/05/23 15:00:37)                   Impression:      As above.      Electronically signed by: Nandini Martins MD  Date:    05/05/2023  Time:    15:00               Narrative:    EXAMINATION:  XR CHEST AP PORTABLE    CLINICAL HISTORY:  post-intub;    TECHNIQUE:  Single frontal view of the chest was performed.    COMPARISON:  05/05/2023    FINDINGS:  Endotracheal tube is appropriately position and terminates at the level of the clavicles.  Enteric tube terminates in the gastric body.  Chronic lung changes are seen.  There is left basilar subsegmental atelectasis.  Possible early consolidation in the left lung base.  Heart is normal in size.  Calcified atheromatous disease affects the aorta.  Skeletal structures are intact.                                       X-Ray Chest AP Portable (Final result)  Result time 05/05/23 14:20:13      Final result by Nandini Martins MD (05/05/23 14:20:13)                   Impression:      As above.      Electronically signed by: Nandini Martins  MD  Date:    05/05/2023  Time:    14:20               Narrative:    EXAMINATION:  XR CHEST AP PORTABLE    CLINICAL HISTORY:  ams;    TECHNIQUE:  Single frontal view of the chest was performed.    COMPARISON:  None    FINDINGS:  The lungs are under expanded with crowding of the pulmonary vascularity.  There is pulmonary vascular congestion with mild pulmonary edema.  Patchy opacity in the left lung base could reflect asymmetric edema, aspiration or pneumonia.  Heart is mildly enlarged.  Skeletal structures are intact.                                       CT Head Without Contrast (Final result)  Result time 05/05/23 14:10:03      Final result by Nandini Martins MD (05/05/23 14:10:03)                   Impression:      Intra-axial hemorrhage at the cervicomedullary junction measuring approximately 1.3 x 1.3 x 1.2 cm in dimension.  A component of extra-axial hemorrhage is difficult to exclude.  There is surrounding edema.    COMMUNICATION  This critical result was discovered/received at 14:00.  The critical information above was relayed directly by me by telephone to Dr. Rushing on 05/05/2023 at 14:00.    Rapid AI was used to detect hemorrhage. Disagree with rapid AI findings.      Electronically signed by: Nandini Martins MD  Date:    05/05/2023  Time:    14:10               Narrative:    EXAMINATION:  CT HEAD WITHOUT CONTRAST    CLINICAL HISTORY:  Mental status change, unknown cause;    TECHNIQUE:  Low dose axial images were obtained through the head.  Coronal and sagittal reformations were also performed. Contrast was not administered.    COMPARISON:  None.    FINDINGS:  There is hemorrhage and the cervicomedullary junction within the medulla, measuring approximately 1.3 x 1.3 cm in transverse dimension and approximately 1.2 cm in craniocaudal dimension.  Is difficult to determine if there is a component of extra-axial hemorrhage.  There is surrounding edema within the medulla.  Focal region of hypoattenuation in  the right parietooccipital region felt to be artifactual related to beam hardening artifact.  No definite supratentorial hemorrhage is seen.  No evidence for active hydrocephalus.  Paranasal sinuses are clear.                                       Medications   rocuronium injection 100 mg (100 mg Intravenous Given 5/5/23 1428)   etomidate injection 30 mg (30 mg Intravenous Given 5/5/23 1425)     Medical Decision Making:   Differential Diagnosis:   DDX:  Many complaints.  Concern for stroke given left-sided facial droop, will expedite head CT and possible neurology evaluation.  Patient tachycardic on arrival, concern for possible AFib RVR, will treat as required to manage rate.  DX:  CBC, CMP, troponin, EKG, chest x-ray, UA, blood cultures, urine cultures, CT head  TX:  Neurology consult.  Rate control prn.  Oxygen.  Dispo:  Admit.  Likely ICU consult.             ED Course as of 05/07/23 0604   Fri May 05, 2023   1434 Patient denies eliquis, xarelto, plavix, brilinta [NB]      ED Course User Index  [NB] Yared Luong MD                 Clinical Impression:   Final diagnoses:  [R00.0] Tachycardia        ED Disposition Condition    Transfer to Another Facility Stable                Yared Luong MD  05/05/23 1346       Yared Luong MD  05/05/23 1357       Yared Luong MD  05/07/23 0604

## 2023-05-05 NOTE — TELEMEDICINE CONSULT
Received a call from [facility].    66 y/o female with a history of Afib w/ RVR (not on AC - confirmed with ) who presented with AMS and vomiting since 7pm yesterday. Today morning  noted a left facial droop. CT head showed an acute hemorrhage in the medulla along with a hypodensity in the right parieto-occipital region (possibly artifactual).  SBP in the 200s.  O2 sats in the 80s.      Recommend transfer to OM and admission to [facility]. Intubate prior to transfer.  SBP<160. Cardene as needed.  CTA head and neck on arrival      Vascular Neurology and NSGY made aware.      Henok Liu MD  Vascular Neurology  [facility].

## 2023-05-05 NOTE — SUBJECTIVE & OBJECTIVE
No past medical history on file.  No past surgical history on file.   Current Facility-Administered Medications on File Prior to Encounter   Medication Dose Route Frequency Provider Last Rate Last Admin    [COMPLETED] etomidate injection 30 mg  30 mg Intravenous ED 1 Time Yared Luong MD   30 mg at 05/05/23 1425    [COMPLETED] rocuronium injection 100 mg  100 mg Intravenous Once Yared Luong MD   100 mg at 05/05/23 1428    [DISCONTINUED] niCARdipine 40 mg/200 mL (0.2 mg/mL) infusion  5 mg/hr Intravenous Continuous Yared Luong MD 50 mL/hr at 05/05/23 1451 10 mg/hr at 05/05/23 1451    [DISCONTINUED] propofol (DIPRIVAN) 10 mg/mL infusion  0-50 mcg/kg/min Intravenous Continuous Yared Luong MD 3.2 mL/hr at 05/05/23 1429 5 mcg/kg/min at 05/05/23 1429    [DISCONTINUED] propofol (DIPRIVAN) 10 mg/mL infusion  0-50 mcg/kg/min Intravenous Continuous Yared Luong MD         Current Outpatient Medications on File Prior to Encounter   Medication Sig Dispense Refill    amLODIPine (NORVASC) 5 MG tablet Take 1 tablet (5 mg total) by mouth once daily. 90 tablet 3    aspirin (ECOTRIN) 81 MG EC tablet Take 81 mg by mouth once daily.      atorvastatin (LIPITOR) 20 MG tablet Take 1 tablet (20 mg total) by mouth every evening. 90 tablet 3    cloNIDine (CATAPRES) 0.1 MG tablet Take one as needed every 12 hours if blood pressure exceeds 150/100 60 tablet 2    fish oil-omega-3 fatty acids 300-1,000 mg capsule Take by mouth once daily.      lisinopriL (PRINIVIL,ZESTRIL) 40 MG tablet Take 1 tablet (40 mg total) by mouth once daily. 90 tablet 3    LORazepam (ATIVAN) 0.5 MG tablet TAKE 1 TABLET (=0.5MG) BY MOUTH  NIGHTLY AS NEEDED FOR ANXIETY 30 tablet 2      Allergies: Patient has no allergy information on record.    Family History   Problem Relation Age of Onset    Hypertension Mother      Social History     Tobacco Use    Smoking status: Never    Smokeless tobacco: Never   Substance Use Topics    Alcohol  use: No    Drug use: No     Review of Systems   Unable to perform ROS: Intubated   Objective:     Vitals:    Temp: 97.8 °F (36.6 °C)  Pulse: 88  BP: 129/85  MAP (mmHg): 101  Resp: (!) 23  SpO2: (!) 94 %    Temp  Min: 97.8 °F (36.6 °C)  Max: 97.8 °F (36.6 °C)  Pulse  Min: 88  Max: 142  BP  Min: 129/85  Max: 250/128  MAP (mmHg)  Min: 101  Max: 182  Resp  Min: 16  Max: 40  SpO2  Min: 94 %  Max: 100 %  Oxygen Concentration (%)  Min: 100  Max: 100    No intake/output data recorded.            Physical Exam  --sedation: fentanyl and propofol  --GCS: [id number]  --Mental Status: sleepy, opens eyes briefly to loud voice then closes again, follows commands briskly   --CN II-XII grossly intact.   --Pupils 3-->2mm, PERRL.   --brainstem: intact  --Motor: moves all extremities symmetrically, not overcoming gravity   --sensory: regards to touch throughout  --Reflexes: not tested  --Gait: deferred         Today I personally reviewed pertinent medications, lines/drains/airways, imaging, cardiology results, laboratory results, microbiology results,

## 2023-05-06 LAB
ALBUMIN SERPL BCP-MCNC: 4 G/DL (ref 3.5–5.2)
ALLENS TEST: ABNORMAL
ALLENS TEST: ABNORMAL
ALP SERPL-CCNC: 61 U/L (ref 55–135)
ALT SERPL W/O P-5'-P-CCNC: 13 U/L (ref 10–44)
ANION GAP SERPL CALC-SCNC: 10 MMOL/L (ref 8–16)
APTT PPP: 33.1 SEC (ref 21–32)
AST SERPL-CCNC: 18 U/L (ref 10–40)
BACTERIA #/AREA URNS AUTO: ABNORMAL /HPF
BASOPHILS # BLD AUTO: 0.04 K/UL (ref 0–0.2)
BASOPHILS NFR BLD: 0.2 % (ref 0–1.9)
BILIRUB SERPL-MCNC: 0.7 MG/DL (ref 0.1–1)
BILIRUB UR QL STRIP: NEGATIVE
BUN SERPL-MCNC: 16 MG/DL (ref 8–23)
CALCIUM SERPL-MCNC: 9.6 MG/DL (ref 8.7–10.5)
CHLORIDE SERPL-SCNC: 100 MMOL/L (ref 95–110)
CLARITY UR REFRACT.AUTO: ABNORMAL
CO2 SERPL-SCNC: 25 MMOL/L (ref 23–29)
COLOR UR AUTO: YELLOW
CREAT SERPL-MCNC: 0.9 MG/DL (ref 0.5–1.4)
DELSYS: ABNORMAL
DELSYS: ABNORMAL
DIFFERENTIAL METHOD: ABNORMAL
EOSINOPHIL # BLD AUTO: 0 K/UL (ref 0–0.5)
EOSINOPHIL NFR BLD: 0.1 % (ref 0–8)
ERYTHROCYTE [DISTWIDTH] IN BLOOD BY AUTOMATED COUNT: 13.1 % (ref 11.5–14.5)
EST. GFR  (NO RACE VARIABLE): >60 ML/MIN/1.73 M^2
FIO2: 50
FIO2: 50
GLUCOSE SERPL-MCNC: 127 MG/DL (ref 70–110)
GLUCOSE UR QL STRIP: NEGATIVE
HCO3 UR-SCNC: 24.8 MMOL/L (ref 24–28)
HCO3 UR-SCNC: 28.6 MMOL/L (ref 24–28)
HCT VFR BLD AUTO: 45.9 % (ref 37–48.5)
HGB BLD-MCNC: 15.2 G/DL (ref 12–16)
HGB UR QL STRIP: ABNORMAL
HYALINE CASTS UR QL AUTO: 3 /LPF
IMM GRANULOCYTES # BLD AUTO: 0.09 K/UL (ref 0–0.04)
IMM GRANULOCYTES NFR BLD AUTO: 0.5 % (ref 0–0.5)
INR PPP: 1 (ref 0.8–1.2)
KETONES UR QL STRIP: NEGATIVE
LEUKOCYTE ESTERASE UR QL STRIP: ABNORMAL
LYMPHOCYTES # BLD AUTO: 1.7 K/UL (ref 1–4.8)
LYMPHOCYTES NFR BLD: 8.9 % (ref 18–48)
MAGNESIUM SERPL-MCNC: 1.9 MG/DL (ref 1.6–2.6)
MCH RBC QN AUTO: 29.2 PG (ref 27–31)
MCHC RBC AUTO-ENTMCNC: 33.1 G/DL (ref 32–36)
MCV RBC AUTO: 88 FL (ref 82–98)
MICROSCOPIC COMMENT: ABNORMAL
MODE: ABNORMAL
MODE: ABNORMAL
MONOCYTES # BLD AUTO: 1.7 K/UL (ref 0.3–1)
MONOCYTES NFR BLD: 8.7 % (ref 4–15)
NEUTROPHILS # BLD AUTO: 15.7 K/UL (ref 1.8–7.7)
NEUTROPHILS NFR BLD: 81.6 % (ref 38–73)
NITRITE UR QL STRIP: NEGATIVE
NRBC BLD-RTO: 0 /100 WBC
PCO2 BLDA: 49.6 MMHG (ref 35–45)
PCO2 BLDA: 60.2 MMHG (ref 35–45)
PEEP: 10
PEEP: 5
PH SMN: 7.29 [PH] (ref 7.35–7.45)
PH SMN: 7.31 [PH] (ref 7.35–7.45)
PH UR STRIP: 6 [PH] (ref 5–8)
PHOSPHATE SERPL-MCNC: 3.3 MG/DL (ref 2.7–4.5)
PLATELET # BLD AUTO: 214 K/UL (ref 150–450)
PMV BLD AUTO: 11 FL (ref 9.2–12.9)
PO2 BLDA: 69 MMHG (ref 80–100)
PO2 BLDA: 70 MMHG (ref 80–100)
POC BE: -2 MMOL/L
POC BE: 2 MMOL/L
POC SATURATED O2: 91 % (ref 95–100)
POC SATURATED O2: 91 % (ref 95–100)
POC TCO2: 26 MMOL/L (ref 23–27)
POC TCO2: 30 MMOL/L (ref 23–27)
POTASSIUM SERPL-SCNC: 4 MMOL/L (ref 3.5–5.1)
PROT SERPL-MCNC: 7.8 G/DL (ref 6–8.4)
PROT UR QL STRIP: ABNORMAL
PROTHROMBIN TIME: 10.8 SEC (ref 9–12.5)
PS: 12
PS: 5
RBC # BLD AUTO: 5.2 M/UL (ref 4–5.4)
RBC #/AREA URNS AUTO: 53 /HPF (ref 0–4)
SAMPLE: ABNORMAL
SAMPLE: ABNORMAL
SITE: ABNORMAL
SITE: ABNORMAL
SODIUM SERPL-SCNC: 135 MMOL/L (ref 136–145)
SP GR UR STRIP: 1.01 (ref 1–1.03)
SPONT RATE: 21
SQUAMOUS #/AREA URNS AUTO: 0 /HPF
TRIGL SERPL-MCNC: 109 MG/DL (ref 30–150)
TRIGL SERPL-MCNC: 133 MG/DL (ref 30–150)
TRIGL SERPL-MCNC: 133 MG/DL (ref 30–150)
TROPONIN I SERPL DL<=0.01 NG/ML-MCNC: 0.03 NG/ML (ref 0–0.03)
TROPONIN I SERPL DL<=0.01 NG/ML-MCNC: 0.04 NG/ML (ref 0–0.03)
URN SPEC COLLECT METH UR: ABNORMAL
WBC # BLD AUTO: 19.25 K/UL (ref 3.9–12.7)
WBC #/AREA URNS AUTO: 38 /HPF (ref 0–5)

## 2023-05-06 PROCEDURE — 83735 ASSAY OF MAGNESIUM: CPT | Performed by: PSYCHIATRY & NEUROLOGY

## 2023-05-06 PROCEDURE — 81001 URINALYSIS AUTO W/SCOPE: CPT | Performed by: PSYCHIATRY & NEUROLOGY

## 2023-05-06 PROCEDURE — 99291 PR CRITICAL CARE, E/M 30-74 MINUTES: ICD-10-PCS | Mod: GC,,, | Performed by: PSYCHIATRY & NEUROLOGY

## 2023-05-06 PROCEDURE — 25000003 PHARM REV CODE 250: Performed by: PSYCHIATRY & NEUROLOGY

## 2023-05-06 PROCEDURE — 87077 CULTURE AEROBIC IDENTIFY: CPT | Performed by: PSYCHIATRY & NEUROLOGY

## 2023-05-06 PROCEDURE — 25000003 PHARM REV CODE 250

## 2023-05-06 PROCEDURE — 20000000 HC ICU ROOM

## 2023-05-06 PROCEDURE — A4217 STERILE WATER/SALINE, 500 ML: HCPCS | Performed by: PSYCHIATRY & NEUROLOGY

## 2023-05-06 PROCEDURE — 25500020 PHARM REV CODE 255: Performed by: PSYCHIATRY & NEUROLOGY

## 2023-05-06 PROCEDURE — 27200966 HC CLOSED SUCTION SYSTEM

## 2023-05-06 PROCEDURE — 84484 ASSAY OF TROPONIN QUANT: CPT | Performed by: PSYCHIATRY & NEUROLOGY

## 2023-05-06 PROCEDURE — 87086 URINE CULTURE/COLONY COUNT: CPT | Performed by: PSYCHIATRY & NEUROLOGY

## 2023-05-06 PROCEDURE — 85025 COMPLETE CBC W/AUTO DIFF WBC: CPT | Performed by: PSYCHIATRY & NEUROLOGY

## 2023-05-06 PROCEDURE — 94003 VENT MGMT INPAT SUBQ DAY: CPT

## 2023-05-06 PROCEDURE — 82803 BLOOD GASES ANY COMBINATION: CPT

## 2023-05-06 PROCEDURE — 99900035 HC TECH TIME PER 15 MIN (STAT)

## 2023-05-06 PROCEDURE — 36600 WITHDRAWAL OF ARTERIAL BLOOD: CPT

## 2023-05-06 PROCEDURE — 87088 URINE BACTERIA CULTURE: CPT | Performed by: PSYCHIATRY & NEUROLOGY

## 2023-05-06 PROCEDURE — 51701 INSERT BLADDER CATHETER: CPT

## 2023-05-06 PROCEDURE — 51798 US URINE CAPACITY MEASURE: CPT

## 2023-05-06 PROCEDURE — 27000221 HC OXYGEN, UP TO 24 HOURS

## 2023-05-06 PROCEDURE — A9585 GADOBUTROL INJECTION: HCPCS | Performed by: PSYCHIATRY & NEUROLOGY

## 2023-05-06 PROCEDURE — 87186 SC STD MICRODIL/AGAR DIL: CPT | Performed by: PSYCHIATRY & NEUROLOGY

## 2023-05-06 PROCEDURE — 97112 NEUROMUSCULAR REEDUCATION: CPT

## 2023-05-06 PROCEDURE — 80053 COMPREHEN METABOLIC PANEL: CPT | Performed by: PSYCHIATRY & NEUROLOGY

## 2023-05-06 PROCEDURE — 84478 ASSAY OF TRIGLYCERIDES: CPT | Mod: 91

## 2023-05-06 PROCEDURE — 84484 ASSAY OF TROPONIN QUANT: CPT | Mod: 91

## 2023-05-06 PROCEDURE — 99900026 HC AIRWAY MAINTENANCE (STAT)

## 2023-05-06 PROCEDURE — 82800 BLOOD PH: CPT

## 2023-05-06 PROCEDURE — 85610 PROTHROMBIN TIME: CPT | Performed by: PSYCHIATRY & NEUROLOGY

## 2023-05-06 PROCEDURE — 94761 N-INVAS EAR/PLS OXIMETRY MLT: CPT

## 2023-05-06 PROCEDURE — 84100 ASSAY OF PHOSPHORUS: CPT | Performed by: PSYCHIATRY & NEUROLOGY

## 2023-05-06 PROCEDURE — 63600175 PHARM REV CODE 636 W HCPCS

## 2023-05-06 PROCEDURE — 85730 THROMBOPLASTIN TIME PARTIAL: CPT | Performed by: PSYCHIATRY & NEUROLOGY

## 2023-05-06 PROCEDURE — 97167 OT EVAL HIGH COMPLEX 60 MIN: CPT

## 2023-05-06 PROCEDURE — 99291 CRITICAL CARE FIRST HOUR: CPT | Mod: GC,,, | Performed by: PSYCHIATRY & NEUROLOGY

## 2023-05-06 PROCEDURE — 63600175 PHARM REV CODE 636 W HCPCS: Performed by: PSYCHIATRY & NEUROLOGY

## 2023-05-06 RX ORDER — FAMOTIDINE 20 MG/1
20 TABLET, FILM COATED ORAL 2 TIMES DAILY
Status: DISCONTINUED | OUTPATIENT
Start: 2023-05-06 | End: 2023-05-17 | Stop reason: HOSPADM

## 2023-05-06 RX ORDER — AMOXICILLIN 250 MG
1 CAPSULE ORAL 2 TIMES DAILY
Status: DISCONTINUED | OUTPATIENT
Start: 2023-05-06 | End: 2023-05-17 | Stop reason: HOSPADM

## 2023-05-06 RX ORDER — GADOBUTROL 604.72 MG/ML
10 INJECTION INTRAVENOUS
Status: COMPLETED | OUTPATIENT
Start: 2023-05-06 | End: 2023-05-06

## 2023-05-06 RX ORDER — FENOFIBRATE 48 MG/1
48 TABLET, FILM COATED ORAL DAILY
Status: DISCONTINUED | OUTPATIENT
Start: 2023-05-06 | End: 2023-05-09

## 2023-05-06 RX ORDER — MULTIVIT WITH IRON,MINERALS
200 TABLET ORAL DAILY
Status: DISCONTINUED | OUTPATIENT
Start: 2023-05-06 | End: 2023-05-07

## 2023-05-06 RX ORDER — QUETIAPINE FUMARATE 25 MG/1
25 TABLET, FILM COATED ORAL NIGHTLY
Status: DISCONTINUED | OUTPATIENT
Start: 2023-05-06 | End: 2023-05-17 | Stop reason: HOSPADM

## 2023-05-06 RX ORDER — PHENYLEPHRINE HCL IN 0.9% NACL 1 MG/10 ML
SYRINGE (ML) INTRAVENOUS
Status: DISCONTINUED
Start: 2023-05-06 | End: 2023-05-06 | Stop reason: WASHOUT

## 2023-05-06 RX ORDER — ATORVASTATIN CALCIUM 40 MG/1
40 TABLET, FILM COATED ORAL DAILY
Status: DISCONTINUED | OUTPATIENT
Start: 2023-05-06 | End: 2023-05-17 | Stop reason: HOSPADM

## 2023-05-06 RX ADMIN — CEFTRIAXONE 1 G: 1 INJECTION, POWDER, FOR SOLUTION INTRAMUSCULAR; INTRAVENOUS at 11:05

## 2023-05-06 RX ADMIN — FAMOTIDINE 20 MG: 20 TABLET ORAL at 08:05

## 2023-05-06 RX ADMIN — FAMOTIDINE 20 MG: 20 TABLET ORAL at 09:05

## 2023-05-06 RX ADMIN — QUETIAPINE FUMARATE 25 MG: 25 TABLET ORAL at 12:05

## 2023-05-06 RX ADMIN — SODIUM CHLORIDE 500 ML: 9 INJECTION, SOLUTION INTRAVENOUS at 07:05

## 2023-05-06 RX ADMIN — SENNOSIDES AND DOCUSATE SODIUM 1 TABLET: 50; 8.6 TABLET ORAL at 09:05

## 2023-05-06 RX ADMIN — GADOBUTROL 10 ML: 604.72 INJECTION INTRAVENOUS at 06:05

## 2023-05-06 RX ADMIN — ATORVASTATIN CALCIUM 40 MG: 40 TABLET, FILM COATED ORAL at 09:05

## 2023-05-06 RX ADMIN — SENNOSIDES AND DOCUSATE SODIUM 1 TABLET: 50; 8.6 TABLET ORAL at 08:05

## 2023-05-06 RX ADMIN — QUETIAPINE FUMARATE 25 MG: 25 TABLET ORAL at 08:05

## 2023-05-06 RX ADMIN — FENOFIBRATE 48 MG: 48 TABLET ORAL at 09:05

## 2023-05-06 RX ADMIN — VASOPRESSIN: 20 INJECTION, SOLUTION INTRAVENOUS at 11:05

## 2023-05-06 RX ADMIN — Medication 200 MG: at 09:05

## 2023-05-06 RX ADMIN — NICARDIPINE HYDROCHLORIDE 2.5 MG/HR: 0.2 INJECTION, SOLUTION INTRAVENOUS at 02:05

## 2023-05-06 RX ADMIN — Medication 200 MCG/HR: at 04:05

## 2023-05-06 NOTE — PT/OT/SLP PROGRESS
Physical Therapy      Patient Name:  Susan Pinto   MRN:  [id number]    Patient not seen today secondary to Therapist assessment (Pt intubated, OT following, PT will hold until pt appropriate for OOB mobility). Will follow-up as appropriate.  Shital Hallman, PT

## 2023-05-06 NOTE — PLAN OF CARE
[facility]  Initial Discharge Assessment       Primary Care Provider: Court Champion NP    Admission Diagnosis: ICH (intracerebral hemorrhage) [I61.9]    Admission Date: 5/5/2023  Expected Discharge Date:     Discharge Barriers Identified: None    Payor: MEDICARE / Plan: MEDICARE PART A & B / Product Type: Government /     Extended Emergency Contact Information  Primary Emergency Contact: Ivan Pinto  Address: [address] [place] [address]  Mobile Phone: [phone]  Relation: Spouse    Discharge Plan A: Home with family, Home Health  Discharge Plan B: Rehab      [facility] [address]  [address]  [facility] [address]  Phone: [phone] Fax: [phone]    CM met with patient and spouse to obtain discharge planning assessment.  Patient is intubated but does respond by nodding to questions asked.    Initial Assessment (most recent)       Adult Discharge Assessment - 05/06/23 1529          Discharge Assessment    Assessment Type Discharge Planning Assessment     Confirmed/corrected address, phone number and insurance Yes     Confirmed Demographics Correct on Facesheet     Source of Information family     Communicated ANITHA with patient/caregiver Date not available/Unable to determine     Reason For Admission ICH     People in Home spouse     Facility Arrived From: [facility]     Do you expect to return to your current living situation? Yes     Do you have help at home or someone to help you manage your care at home? Yes     Who are your caregiver(s) and their phone number(s)? Ivan Pinto - spouse [phone]     Prior to hospitilization cognitive status: Alert/Oriented     Current cognitive status: Coma/Sedated/Intubated   patient is intubated but responds to questions with nodding    Equipment Currently Used at Home none     Readmission within 30 days? No     Patient currently being followed by outpatient case  management? No     Do you currently have service(s) that help you manage your care at home? No     Do you take prescription medications? Yes     Do you have prescription coverage? Yes     Coverage MEDICARE - MEDICARE PART A & B     Do you have any problems affording any of your prescribed medications? No     Is the patient taking medications as prescribed? yes     Who is going to help you get home at discharge? family     Are you on dialysis? No     Do you take coumadin? No     Discharge Plan A Home with family;Home Health     Discharge Plan B Rehab     DME Needed Upon Discharge  other (see comments)   tbd    Discharge Plan discussed with: Spouse/sig other     Name(s) and Number(s) Ivan Pinto - spouse [phone]     Discharge Barriers Identified None        Financial Resource Strain    How hard is it for you to pay for the very basics like food, housing, medical care, and heating? Not hard at all        Housing Stability    In the last 12 months, was there a time when you were not able to pay the mortgage or rent on time? No     In the last 12 months, was there a time when you did not have a steady place to sleep or slept in a shelter (including now)? No        Transportation Needs    In the past 12 months, has lack of transportation kept you from medical appointments or from getting medications? No     In the past 12 months, has lack of transportation kept you from meetings, work, or from getting things needed for daily living? No        Food Insecurity    Within the past 12 months, you worried that your food would run out before you got the money to buy more. Never true        Stress    Do you feel stress - tense, restless, nervous, or anxious, or unable to sleep at night because your mind is troubled all the time - these days? Not at all        Social Connections    In a typical week, how many times do you talk on the phone with family, friends, or neighbors? More than three times a week     How often do  you get together with friends or relatives? More than three times a week     How often do you attend Pentecostalism or Denominational services? More than 4 times per year     Do you belong to any clubs or organizations such as Pentecostalism groups, unions, fraternal or athletic groups, or school groups? Yes     How often do you attend meetings of the clubs or organizations you belong to? 1 to 4 times per year     Are you , , , , never , or living with a partner?         Alcohol Use    Q1: How often do you have a drink containing alcohol? Never     Q3: How often do you have six or more drinks on one occasion? Never        OTHER    Name(s) of People in Home Ivan - spouse

## 2023-05-06 NOTE — CONSULTS
"[facility]  Adult Nutrition  Consult Note    SUMMARY     Recommendations    If able to extubate & advance diet, rec'd Cardiac diet (texture per SLP).  If unable to advance diet, initiate TFs. Rec'd Impact Peptide @ 45 mL/hr - 1620 kcals, 101 g of protein, 832 mL fluid.  RD to monitor & follow-up.    Goals: Meet % EEN, EPN by RD f/u date  Nutrition Goal Status: new  Communication of RD Recs: reviewed with RN    Assessment and Plan    Nutrition Problem:  Inadequate energy intake    Related to (etiology):   Inability to consume sufficient energy    Signs and Symptoms (as evidenced by):   NPO    Interventions(treatment strategy):  Collaboration of nutrition care w/ other providers    Nutrition Diagnosis Status:   New    Reason for Assessment    Reason For Assessment: consult  Diagnosis: other (see comments) (ICH)  Relevant Medical History: HTN  Interdisciplinary Rounds: did not attend    General Information Comments: Intubated w/ no family at bedside. Pt being transported to CT during visit; appears nourished. Unsure of energy intake PTA/UBW (most recent weight was 192# x 2021).  Nutrition Discharge Planning: Pending clinical course    Nutrition/Diet History    Spiritual, Cultural Beliefs, Spiritism Practices, Values that Affect Care: no  Factors Affecting Nutritional Intake: NPO, on mechanical ventilation    Anthropometrics    Temp: 98.6 °F (37 °C)  Height Method: Stated  Height: 5' 4" (162.6 cm)  Height (inches): 64 in  Weight Method: Bed Scale  Weight: 95.3 kg (210 lb 1.6 oz)  Weight (lb): 210.1 lb  Ideal Body Weight (IBW), Female: 120 lb  % Ideal Body Weight, Female (lb): 175.08 %  BMI (Calculated): 36  BMI Grade: 35 - 39.9 - obesity - grade II    Lab/Procedures/Meds    Pertinent Labs Reviewed: reviewed  Pertinent Labs Comments: Na 135  Pertinent Medications Reviewed: reviewed  Pertinent Medications Comments: Fentanyl, Nicardipine, Statin    Estimated/Assessed Needs    Weight Used For " Calorie Calculations: 95.3 kg (210 lb 1.6 oz)    Energy Calorie Requirements (kcal): 1636 kcal/d  Energy Need Method: Tam State (modified)    Protein Requirements:  g/d (1.5-2 g/kg IBW)  Weight Used For Protein Calculations: 54.5 kg (120 lb 2.4 oz)    Estimated Fluid Requirement Method: other (see comments) (Per MD or 1 mL/kcal)  RDA Method (mL): 1636    Nutrition Prescription Ordered    Current Diet Order: NPO    Evaluation of Received Nutrient/Fluid Intake    I/O: +1.8L since admit    Comments: LBM: 5/4    Nutrition Risk    Level of Risk/Frequency of Follow-up:  (1x/week)     Monitor and Evaluation    Food and Nutrient Intake: energy intake, food and beverage intake, enteral nutrition intake  Food and Nutrient Adminstration: diet order, enteral and parenteral nutrition administration  Physical Activity and Function: nutrition-related ADLs and IADLs  Anthropometric Measurements: weight, weight change  Biochemical Data, Medical Tests and Procedures: inflammatory profile, lipid profile, glucose/endocrine profile, gastrointestinal profile, electrolyte and renal panel  Nutrition-Focused Physical Findings: overall appearance     Nutrition Follow-Up    RD Follow-up?: Yes

## 2023-05-06 NOTE — PLAN OF CARE
Recommendations     If able to extubate & advance diet, rec'd Cardiac diet (texture per SLP).  If unable to advance diet, initiate TFs. Rec'd Impact Peptide @ 45 mL/hr - 1620 kcals, 101 g of protein, 832 mL fluid.  RD to monitor & follow-up.     Goals: Meet % EEN, EPN by RD f/u date  Nutrition Goal Status: new  Communication of RD Recs: reviewed with RN

## 2023-05-06 NOTE — PT/OT/SLP EVAL
Occupational Therapy   Evaluation    Name: Susan Pinto  MRN: [id number]  Admitting Diagnosis: Nontraumatic intracerebral hemorrhage  Recent Surgery: * No surgery found *      Recommendations:     Discharge Recommendations:  (pending extubation)  Discharge Equipment Recommendations:   (pending extubation)  Barriers to discharge:  None    Assessment:     Susan Pinto is a 67 y.o. female with a medical diagnosis of Nontraumatic intracerebral hemorrhage.  She presents with performance deficits affecting function: weakness, decreased safety awareness, impaired endurance, impaired sensation, impaired self care skills, impaired functional mobility, gait instability.      Rehab Prognosis: Good; patient would benefit from acute skilled OT services to address these deficits and reach maximum level of function.       Plan:     Patient to be seen 3 x/week to address the above listed problems via cognitive retraining, neuromuscular re-education, therapeutic exercises, therapeutic activities, self-care/home management  Plan of Care Expires: 06/03/23  Plan of Care Reviewed with: patient    Subjective   Patient:  Communicating via head nods    Occupational Profile:  Patient resides in [place] with her  in 2 story home with bedroom on the 2nd floor, one step to enter.  Patient is right handed.  PTA patient independent with ADLs including driving.  Unemployed.  Currently owns no DME.  Hobbies:  TV, trips with her cousin to the casino.     Pain/Comfort:  Pain Rating 1: 0/10  Pain Rating Post-Intervention 1: 0/10    Patients cultural, spiritual, Adventism conflicts given the current situation: no    Objective:     Communicated with: Nurse prior to session.  Patient found supine with ventilator, pulse ox (continuous), peripheral IV, telemetry, blood pressure cuff, restraints upon OT entry to room.    General Precautions: Standard, aspiration, fall, NPO  Orthopedic Precautions: N/A  Braces: N/A  Respiratory Status:  Ventilator    Occupational Performance:    Bed Mobility:    Patient completed Rolling/Turning to Left with  minimum assistance  Patient completed Rolling/Turning to Right with minimum assistance    Activities of Daily Living:  Feeding:  NPO    Grooming: minimum assistance while seated upright    Cognitive/Visual Perceptual:  Cognitive/Psychosocial Skills:     -       Oriented to: Person, Place, and Time   -       Follows Commands/attention:Follows one-step commands  -       Communication: clear/fluent    Physical Exam:  Postural examination/scapula alignment:    -       Rounded shoulders  Upper Extremity Range of Motion:     -       Right Upper Extremity: WNL  -       Left Upper Extremity: WNL  Upper Extremity Strength:    -       Right Upper Extremity: WNL  -       Left Upper Extremity: WNL    AMPAC 6 Click ADL:  AMPAC Total Score: 6    Treatment & Education:  Daily orientation provided.  AAROM performed bilateral UE/LEs one set x 10 rep in all planes of motion. Provided multi-sensory stimulation to prevent sensory deprivation and improve clinical outcomes.  Positioning provided for midline orientation with bilateral UEs elevated and heels lifted off mattress.  Patient alert and able to follow 4/4 one step commands.  Patient attentive and interactive throughout the session.        Patient left supine with all lines intact, call button in reach, and bed alarm on    GOALS:   Multidisciplinary Problems       Occupational Therapy Goals          Problem: Occupational Therapy    Goal Priority Disciplines Outcome Interventions   Occupational Therapy Goal     OT, PT/OT Ongoing, Progressing    Description: Goals set 5/6 to be addressed for 14 days with expiration date, 5/20:  Patient will increase functional independence with ADLs by performing:    Patient will demonstrate rolling to the right with SBA.  Not met   Patient will demonstrate rolling to the left with SBA.   Not met  Patient will demonstrate supine -sit with  SBA.   Not met  Patient will demonstrate stand pivot transfers with SBA.   Not met  Patient will demonstrate grooming while standing with SBA.   Not met  Patient will demonstrate upper body dressing with SBA while seated EOB.   Not met  Patient will demonstrate lower body dressing with min assist while seated EOB.   Not met  Patient will demonstrate toileting with CGA.   Not met  Patient will demonstrate bathing while seated EOB with CGA.   Not met  Patient's family / caregiver will demonstrate independence and safety with assisting patient with self-care skills and functional mobility.     Not met  Patient and/or patient's family will verbalize understanding of stroke prevention guidelines, personal risk factors and stroke warning signs via teachback method.  Not met                                  History:     No past medical history on file.    No past surgical history on file.    Time Tracking:     OT Date of Treatment: 05/06/23  OT Start Time: 0350  OT Stop Time: 0410  OT Total Time (min): 20 min    Billable Minutes:Evaluation 10  Neuromuscular Re-education 10    5/6/2023

## 2023-05-06 NOTE — HPI
Susan Pinto is a 68 yo female who presents due to a medullary ICH.  History limited due to intubation, obtained from collateral information from primary team/chart review.  Ms. Pinto vomited and had initial altered mentation earlier today and subsequently desatted and decompensated when EMS arrived.  Her initial systolic blood pressure was in the 250s.  [place] [facility] [place]ullary [place].

## 2023-05-06 NOTE — SUBJECTIVE & OBJECTIVE
Medications Prior to Admission   Medication Sig Dispense Refill Last Dose    amLODIPine (NORVASC) 5 MG tablet Take 1 tablet (5 mg total) by mouth once daily. 90 tablet 3     aspirin (ECOTRIN) 81 MG EC tablet Take 81 mg by mouth once daily.       atorvastatin (LIPITOR) 20 MG tablet Take 1 tablet (20 mg total) by mouth every evening. 90 tablet 3     cloNIDine (CATAPRES) 0.1 MG tablet Take one as needed every 12 hours if blood pressure exceeds 150/100 60 tablet 2     fish oil-omega-3 fatty acids 300-1,000 mg capsule Take by mouth once daily.       lisinopriL (PRINIVIL,ZESTRIL) 40 MG tablet Take 1 tablet (40 mg total) by mouth once daily. 90 tablet 3     LORazepam (ATIVAN) 0.5 MG tablet TAKE 1 TABLET (=0.5MG) BY MOUTH  NIGHTLY AS NEEDED FOR ANXIETY 30 tablet 2        Review of patient's allergies indicates:  Not on File    No past medical history on file.  No past surgical history on file.  Family History       Problem Relation (Age of Onset)    Hypertension Mother          Tobacco Use    Smoking status: Never    Smokeless tobacco: Never   Substance and Sexual Activity    Alcohol use: No    Drug use: No    Sexual activity: Not on file     Review of Systems   Unable to perform ROS: Intubated   Objective:     Weight: 95.3 kg (210 lb 1.6 oz)  Body mass index is 36.06 kg/m².  Vital Signs (Most Recent):  Temp: 98.2 °F (36.8 °C) (05/05/23 2301)  Pulse: 67 (05/05/23 2301)  Resp: 17 (05/05/23 2301)  BP: 132/76 (05/05/23 2301)  SpO2: 97 % (05/05/23 2301) Vital Signs (24h Range):  Temp:  [97.8 °F (36.6 °C)-98.2 °F (36.8 °C)] 98.2 °F (36.8 °C)  Pulse:  [] 67  Resp:  [16-40] 17  SpO2:  [93 %-100 %] 97 %  BP: ()/() 132/76     Date 05/05/23 0700 - 05/06/23 0659   Shift 6952-7081 3393-2555 7419-6024 24 Hour Total   INTAKE   I.V.  154.8(1.6) 31.3(0.3) 186.1(2)   NG/GT  75  75   Shift Total(mL/kg)  229.8(2.4) 31.3(0.3) 261.1(2.7)   OUTPUT   Shift Total(mL/kg)       Weight (kg)  95.3 95.3 95.3              Vent Mode:  Spont  Oxygen Concentration (%):  [] 50  Resp Rate Total:  [16 br/min-36 br/min] 18 br/min  Vt Set:  [400 mL-450 mL] 450 mL  PEEP/CPAP:  [5 cmH20-10 cmH20] 10 cmH20  Pressure Support:  [5 cmH20] 5 cmH20  Mean Airway Pressure:  [8.9 cmH20-15 cmH20] 12 cmH20             NG/OG Tube 05/05/23 1445 orogastric 18 Fr. Center mouth (Active)   Placement Check placement verified by x-ray 05/05/23 2301   Tolerance no signs/symptoms of discomfort 05/05/23 2301   Securement secured to commercial device 05/05/23 2301   Clamp Status/Tolerance clamped;no abdominal distention;no emesis;no restlessness 05/05/23 2301   Suction Setting/Drainage Method suction at the bedside 05/05/23 2301   Insertion Site Appearance no redness, warmth, tenderness, skin breakdown, drainage 05/05/23 2301   Drainage Brown 05/05/23 1604   Intake (mL) 75 mL 05/05/23 2101       Female External Urinary Catheter 05/05/23 1718 (Active)   Skin no redness;no breakdown;female external urine collection device repositioned 05/05/23 2301   Tolerance no signs/symptoms of discomfort 05/05/23 2301   Suction Continuous suction at 60 mmHg 05/05/23 1901   Date of last wick change 05/05/23 05/05/23 1901   Time of last wick change 1800 05/05/23 1901          Physical Exam     Neurosurgery Physical Exam  Intubated, minimally sedated  Awake, alert.   Nodding to questions appropriately  PERRL, EOMI  Follows commands x 4 full strength  Shrugs shoulders evenly          Significant Labs:  Recent Labs   Lab 05/05/23  1403   *   *   K 4.1   CL 99   CO2 19*   BUN 11   CREATININE 0.8   CALCIUM 10.1     Recent Labs   Lab 05/05/23  1403   WBC 16.77*   HGB 16.6*   HCT 50.0*        Recent Labs   Lab 05/05/23  1403 05/05/23  1827   INR 1.0  --    APTT 34.0* 29.3     Microbiology Results (last 7 days)       ** No results found for the last 168 hours. **          All pertinent labs from the last 24 hours have been reviewed.    Significant Diagnostics:  I have reviewed  all pertinent imaging results/findings within the past 24 hours.

## 2023-05-06 NOTE — NURSING
Pt arrived back to room following CT      Pt was escorted by RN, second RN, and RT on cardiac monitoring and portable vent. Ambubag present.      Patient placed back on bedside monitor with alarms audible, bed in low position with bed alarm on, call light within reach. JERILYN.

## 2023-05-06 NOTE — NURSING
"Pt able to write on paper that her upper and lower extremities feel "sleepy" with decreased sensation after waking up. MD Kemi at bedside to assess. Stat CT ordered.   "

## 2023-05-06 NOTE — SUBJECTIVE & OBJECTIVE
Interal History 5/6 Naeon, MRI showing concern for DVA and likely cavernous malformation.     Review of patient's allergies indicates:  No Known Allergies    No past medical history on file.  No past surgical history on file.  Family History       Problem Relation (Age of Onset)    Hypertension Mother          Tobacco Use    Smoking status: Never    Smokeless tobacco: Never   Substance and Sexual Activity    Alcohol use: No    Drug use: No    Sexual activity: Not on file     Review of Systems   Unable to perform ROS: Intubated   Objective:     Weight: 95.3 kg (210 lb 1.6 oz)  Body mass index is 36.06 kg/m².  Vital Signs (Most Recent):  Temp: 98.6 °F (37 °C) (05/06/23 0701)  Pulse: 66 (05/06/23 0801)  Resp: 16 (05/06/23 0501)  BP: 127/64 (05/06/23 0801)  SpO2: 100 % (05/06/23 0801) Vital Signs (24h Range):  Temp:  [97.8 °F (36.6 °C)-98.6 °F (37 °C)] 98.6 °F (37 °C)  Pulse:  [] 66  Resp:  [16-42] 16  SpO2:  [92 %-100 %] 100 %  BP: ()/() 127/64                  Vent Mode: SIMV  Oxygen Concentration (%):  [] 50  Resp Rate Total:  [15 br/min-36 br/min] 16 br/min  Vt Set:  [400 mL-450 mL] 450 mL  PEEP/CPAP:  [5 cmH20-10 cmH20] 5 cmH20  Pressure Support:  [5 cmH20-8 cmH20] 8 cmH20  Mean Airway Pressure:  [8.9 cmH20-15 cmH20] 8.9 cmH20             NG/OG Tube 05/05/23 1445 orogastric 18 Fr. Center mouth (Active)   Placement Check placement verified by x-ray 05/05/23 2301   Tolerance no signs/symptoms of discomfort 05/05/23 2301   Securement secured to commercial device 05/05/23 2301   Clamp Status/Tolerance clamped;no abdominal distention;no emesis;no restlessness 05/05/23 2301   Suction Setting/Drainage Method suction at the bedside 05/05/23 2301   Insertion Site Appearance no redness, warmth, tenderness, skin breakdown, drainage 05/05/23 2301   Drainage Brown 05/05/23 1604   Intake (mL) 75 mL 05/05/23 2101       Female External Urinary Catheter 05/05/23 1718 (Active)   Skin no redness;no  breakdown;female external urine collection device repositioned 05/05/23 2301   Tolerance no signs/symptoms of discomfort 05/05/23 2301   Suction Continuous suction at 60 mmHg 05/05/23 1901   Date of last wick change 05/05/23 05/05/23 1901   Time of last wick change 1800 05/05/23 1901          Physical Exam     Neurosurgery Physical Exam  Intubated, minimally sedated  Awake, alert.   Nodding to questions appropriately  PERRL, EOMI  Follows commands x 4 full strength  Shrugs shoulders evenly          Significant Labs:  Recent Labs   Lab 05/05/23  1403 05/06/23  0101   * 127*   * 135*   K 4.1 4.0   CL 99 100   CO2 19* 25   BUN 11 16   CREATININE 0.8 0.9   CALCIUM 10.1 9.6   MG  --  1.9       Recent Labs   Lab 05/05/23  1403 05/06/23  0101   WBC 16.77* 19.25*   HGB 16.6* 15.2   HCT 50.0* 45.9    214       Recent Labs   Lab 05/05/23  1403 05/05/23  1827 05/06/23  0101   INR 1.0  --  1.0   APTT 34.0* 29.3 33.1*       Microbiology Results (last 7 days)       Procedure Component Value Units Date/Time    Urine culture [[id number]] Collected: 05/06/23 0432    Order Status: No result Specimen: Urine Updated: 05/06/23 0502          All pertinent labs from the last 24 hours have been reviewed.    Significant Diagnostics:  I have reviewed all pertinent imaging results/findings within the past 24 hours.

## 2023-05-06 NOTE — PROGRESS NOTES
[facility]  Neurosurgery  Progress Note    Subjective:     History of Present Illness: Susan Pinto is a 66 yo female who presents due to a medullary ICH.  History limited due to intubation, obtained from collateral information from primary team/chart review.  Ms. Pinto vomited and had initial altered mentation earlier today and subsequently desatted and decompensated when EMS arrived.  Her initial systolic blood pressure was in the 250s.  CT head at [facility] [place] revealed medullary ICH.       Post-Op Info:  * No surgery found *         Interal History 5/6 Naeon, MRI showing concern for DVA and likely cavernous malformation.     Review of patient's allergies indicates:  No Known Allergies    No past medical history on file.  No past surgical history on file.  Family History       Problem Relation (Age of Onset)    Hypertension Mother          Tobacco Use    Smoking status: Never    Smokeless tobacco: Never   Substance and Sexual Activity    Alcohol use: No    Drug use: No    Sexual activity: Not on file     Review of Systems   Unable to perform ROS: Intubated   Objective:     Weight: 95.3 kg (210 lb 1.6 oz)  Body mass index is 36.06 kg/m².  Vital Signs (Most Recent):  Temp: 98.6 °F (37 °C) (05/06/23 0701)  Pulse: 66 (05/06/23 0801)  Resp: 16 (05/06/23 0501)  BP: 127/64 (05/06/23 0801)  SpO2: 100 % (05/06/23 0801) Vital Signs (24h Range):  Temp:  [97.8 °F (36.6 °C)-98.6 °F (37 °C)] 98.6 °F (37 °C)  Pulse:  [] 66  Resp:  [16-42] 16  SpO2:  [92 %-100 %] 100 %  BP: ()/() 127/64                  Vent Mode: SIMV  Oxygen Concentration (%):  [] 50  Resp Rate Total:  [15 br/min-36 br/min] 16 br/min  Vt Set:  [400 mL-450 mL] 450 mL  PEEP/CPAP:  [5 cmH20-10 cmH20] 5 cmH20  Pressure Support:  [5 cmH20-8 cmH20] 8 cmH20  Mean Airway Pressure:  [8.9 cmH20-15 cmH20] 8.9 cmH20             NG/OG Tube 05/05/23 1445 orogastric 18 Fr. Center mouth (Active)   Placement Check placement  verified by x-ray 05/05/23 2301   Tolerance no signs/symptoms of discomfort 05/05/23 2301   Securement secured to commercial device 05/05/23 2301   Clamp Status/Tolerance clamped;no abdominal distention;no emesis;no restlessness 05/05/23 2301   Suction Setting/Drainage Method suction at the bedside 05/05/23 2301   Insertion Site Appearance no redness, warmth, tenderness, skin breakdown, drainage 05/05/23 2301   Drainage Brown 05/05/23 1604   Intake (mL) 75 mL 05/05/23 2101       Female External Urinary Catheter 05/05/23 1718 (Active)   Skin no redness;no breakdown;female external urine collection device repositioned 05/05/23 2301   Tolerance no signs/symptoms of discomfort 05/05/23 2301   Suction Continuous suction at 60 mmHg 05/05/23 1901   Date of last wick change 05/05/23 05/05/23 1901   Time of last wick change 1800 05/05/23 1901          Physical Exam     Neurosurgery Physical Exam  Intubated, minimally sedated  Awake, alert.   Nodding to questions appropriately  PERRL, EOMI  Follows commands x 4 full strength  Shrugs shoulders evenly          Significant Labs:  Recent Labs   Lab 05/05/23  1403 05/06/23  0101   * 127*   * 135*   K 4.1 4.0   CL 99 100   CO2 19* 25   BUN 11 16   CREATININE 0.8 0.9   CALCIUM 10.1 9.6   MG  --  1.9       Recent Labs   Lab 05/05/23  1403 05/06/23  0101   WBC 16.77* 19.25*   HGB 16.6* 15.2   HCT 50.0* 45.9    214       Recent Labs   Lab 05/05/23  1403 05/05/23  1827 05/06/23  0101   INR 1.0  --  1.0   APTT 34.0* 29.3 33.1*       Microbiology Results (last 7 days)       Procedure Component Value Units Date/Time    Urine culture [[id number]] Collected: 05/06/23 0432    Order Status: No result Specimen: Urine Updated: 05/06/23 0502          All pertinent labs from the last 24 hours have been reviewed.    Significant Diagnostics:  I have reviewed all pertinent imaging results/findings within the past 24 hours.    Assessment/Plan:     * Nontraumatic intracerebral  hemorrhage  66 yo female with a PMH of HTN, Afib with RVR who presents after hypertension today with desaturation and vomiting with subsequent intubation today.  Exam with strength intact, possible lower cranial nerve dysfunction. CT head with 1.3x1.3cm ICH centered in the left medullar with stable size and without apparent vascular anomaly on CTA, no IVH or hydrocephalus. ICH score 1.       --Admitted to neuro ICU  --SBP < 160  --No acute neurosurgical intervention  --MRI brain w/wo 5/6 with possible DVA and concern for cavernous malformation  -- discuss timing for DSA for further evaluation  --Hold anticoagulation  --Medical management per neuro ICU  --Continue to monitor clinically, call with any decline in exam.                   Gordy Brandt MD  Neurosurgery  [facility]

## 2023-05-06 NOTE — ASSESSMENT & PLAN NOTE
68 yo female with a PMH of HTN, Afib with RVR who presents after hypertension today with desaturation and vomiting with subsequent intubation today.  Exam with strength intact, possible lower cranial nerve dysfunction. CT head with 1.3x1.3cm ICH centered in the left medullar with stable size and without apparent vascular anomaly on CTA, no IVH or hydrocephalus. ICH score 1.       --Admitted to neuro ICU  --SBP < 160  --No acute neurosurgical intervention  --Consider MRI brain w/wo when stable from respiratory standpoint   --Hold anticoagulation  --Medical management per neuro ICU  --Continue to monitor clinically, call with any decline in exam.

## 2023-05-06 NOTE — HOSPITAL COURSE
5/6 Naeon, MRI showing concern for DVA and likely cavernous malformation.   5/7: NAEON. Remains intubated with favorable neurologic exam.   5/8: NAEON Remains intubated, neurologically stable.

## 2023-05-06 NOTE — CONSULTS
Inpatient consult to Physical Medicine Rehab  Consult performed by: Lety Tirado NP  Consult ordered by: Tom Mcmullen MD  Reason for consult: Assess rehab needs    Reviewed patient history and current admission.  Rehab team following.  Full consult to follow.    ARTEMIO Awad, FNP-C  Physical Medicine & Rehabilitation   05/06/2023

## 2023-05-06 NOTE — CONSULTS
[facility]  Neurosurgery  Consult Note    Consults  Subjective:     Chief Complaint/Reason for Admission: ICH    History of Present Illness: Susan Pinto is a 68 yo female who presents due to a medullary ICH.  History limited due to intubation, obtained from collateral information from primary team/chart review.  Ms. Pinto vomited and had initial altered mentation earlier today and subsequently desatted and decompensated when EMS arrived.  Her initial systolic blood pressure was in the 250s.  CT head at [facility] revealed medullary ICH.       Medications Prior to Admission   Medication Sig Dispense Refill Last Dose    amLODIPine (NORVASC) 5 MG tablet Take 1 tablet (5 mg total) by mouth once daily. 90 tablet 3     aspirin (ECOTRIN) 81 MG EC tablet Take 81 mg by mouth once daily.       atorvastatin (LIPITOR) 20 MG tablet Take 1 tablet (20 mg total) by mouth every evening. 90 tablet 3     cloNIDine (CATAPRES) 0.1 MG tablet Take one as needed every 12 hours if blood pressure exceeds 150/100 60 tablet 2     fish oil-omega-3 fatty acids 300-1,000 mg capsule Take by mouth once daily.       lisinopriL (PRINIVIL,ZESTRIL) 40 MG tablet Take 1 tablet (40 mg total) by mouth once daily. 90 tablet 3     LORazepam (ATIVAN) 0.5 MG tablet TAKE 1 TABLET (=0.5MG) BY MOUTH  NIGHTLY AS NEEDED FOR ANXIETY 30 tablet 2        Review of patient's allergies indicates:  Not on File    No past medical history on file.  No past surgical history on file.  Family History       Problem Relation (Age of Onset)    Hypertension Mother          Tobacco Use    Smoking status: Never    Smokeless tobacco: Never   Substance and Sexual Activity    Alcohol use: No    Drug use: No    Sexual activity: Not on file     Review of Systems   Unable to perform ROS: Intubated   Objective:     Weight: 95.3 kg (210 lb 1.6 oz)  Body mass index is 36.06 kg/m².  Vital Signs (Most Recent):  Temp: 98.2 °F (36.8 °C) (05/05/23 2301)  Pulse:  67 (05/05/23 2301)  Resp: 17 (05/05/23 2301)  BP: 132/76 (05/05/23 2301)  SpO2: 97 % (05/05/23 2301) Vital Signs (24h Range):  Temp:  [97.8 °F (36.6 °C)-98.2 °F (36.8 °C)] 98.2 °F (36.8 °C)  Pulse:  [] 67  Resp:  [16-40] 17  SpO2:  [93 %-100 %] 97 %  BP: ()/() 132/76     Date 05/05/23 0700 - 05/06/23 0659   Shift 5364-9881 3067-4344 8503-9312 24 Hour Total   INTAKE   I.V.  154.8(1.6) 31.3(0.3) 186.1(2)   NG/GT  75  75   Shift Total(mL/kg)  229.8(2.4) 31.3(0.3) 261.1(2.7)   OUTPUT   Shift Total(mL/kg)       Weight (kg)  95.3 95.3 95.3              Vent Mode: Spont  Oxygen Concentration (%):  [] 50  Resp Rate Total:  [16 br/min-36 br/min] 18 br/min  Vt Set:  [400 mL-450 mL] 450 mL  PEEP/CPAP:  [5 cmH20-10 cmH20] 10 cmH20  Pressure Support:  [5 cmH20] 5 cmH20  Mean Airway Pressure:  [8.9 cmH20-15 cmH20] 12 cmH20             NG/OG Tube 05/05/23 1445 orogastric 18 Fr. Center mouth (Active)   Placement Check placement verified by x-ray 05/05/23 2301   Tolerance no signs/symptoms of discomfort 05/05/23 2301   Securement secured to commercial device 05/05/23 2301   Clamp Status/Tolerance clamped;no abdominal distention;no emesis;no restlessness 05/05/23 2301   Suction Setting/Drainage Method suction at the bedside 05/05/23 2301   Insertion Site Appearance no redness, warmth, tenderness, skin breakdown, drainage 05/05/23 2301   Drainage Brown 05/05/23 1604   Intake (mL) 75 mL 05/05/23 2101       Female External Urinary Catheter 05/05/23 1718 (Active)   Skin no redness;no breakdown;female external urine collection device repositioned 05/05/23 2301   Tolerance no signs/symptoms of discomfort 05/05/23 2301   Suction Continuous suction at 60 mmHg 05/05/23 1901   Date of last wick change 05/05/23 05/05/23 1901   Time of last wick change 1800 05/05/23 1901          Physical Exam     Neurosurgery Physical Exam  Intubated, minimally sedated  Awake, alert.   Nodding to questions appropriately  PERRL,  EOMI  Follows commands x 4 full strength  Shrugs shoulders evenly          Significant Labs:  Recent Labs   Lab 05/05/23  1403   *   *   K 4.1   CL 99   CO2 19*   BUN 11   CREATININE 0.8   CALCIUM 10.1     Recent Labs   Lab 05/05/23  1403   WBC 16.77*   HGB 16.6*   HCT 50.0*        Recent Labs   Lab 05/05/23  1403 05/05/23  1827   INR 1.0  --    APTT 34.0* 29.3     Microbiology Results (last 7 days)       ** No results found for the last 168 hours. **          All pertinent labs from the last 24 hours have been reviewed.    Significant Diagnostics:  I have reviewed all pertinent imaging results/findings within the past 24 hours.    Assessment/Plan:     * Nontraumatic intracerebral hemorrhage  68 yo female with a PMH of HTN, Afib with RVR who presents after hypertension today with desaturation and vomiting with subsequent intubation today.  Exam with strength intact, possible lower cranial nerve dysfunction. CT head with 1.3x1.3cm ICH centered in the left medullar with stable size and without apparent vascular anomaly on CTA, no IVH or hydrocephalus. ICH score 1.       --Admitted to neuro ICU  --Intubated  --Q1 hour checks  --SBP < 160  --No acute neurosurgical intervention  --Consider MRI brain w/wo when stable from respiratory standpoint   --Hold anticoagulation  --Medical management per neuro ICU  --Continue to monitor clinically, call with any decline in exam.                   Thank you for your consult. I will follow-up with patient. Please contact us if you have any additional questions.    Magdiel Fontaine MD  Neurosurgery  [facility] - Neuro Critical Care

## 2023-05-06 NOTE — PLAN OF CARE
[facility] Care Plan    POC reviewed with Suasn Pinto and family at 0300. Pt verbalized understanding. Questions and concerns addressed. No acute events overnight. Pt progressing toward goals. Will continue to monitor. See below and flowsheets for full assessment and VS info.     - Pt on fentanyl gtt for vent discomfort; currently at 125 mcg/hr  - MRI brain and neck completed   - Cardene gtt turned off; pt still experiencing asymptomatic hypotensive episodes; BP as low as 68/47  - Straight cath at 0400; 250 mL UOP  - UA sent   - Pt noted to have large amounts of thin tan/brown oral secretions  - Linens changed  - Pt on spontaneous vent setting overnight; due to ABG results changed back to AC  - Rocephin ordered; will hang when pt returns to room from MRI       Is this a stroke patient? yes- Stroke booklet reviewed with patient and family, risk factors identified for patient and stroke booklet remains at bedside for ongoing education.     Neuro:  [place] Coma Scale  Best Eye Response: 3-->(E3) to speech  Best Motor Response: 6-->(M6) obeys commands  Best Verbal Response: 1-->(V1) none  Naldo Coma Scale Score: 10  Assessment Qualifiers: patient intubated, no eye obstruction present, patient chemically sedated or paralyzed  Pupil PERRLA: yes     24hr Temp:  [97.8 °F (36.6 °C)-98.2 °F (36.8 °C)]     CV:   Rhythm: normal sinus rhythm, sinus bradycardia  BP goals:   SBP < 160  MAP > 65    Resp:      Vent Mode: (S) A/C  Set Rate: (S) 16 BPM  Oxygen Concentration (%): 50  Vt Set: (S) 450 mL  PEEP/CPAP: 10 cmH20  Pressure Support: 5 cmH20    Plan: wean to extubate    GI/:     Diet/Nutrition Received: NPO  Last Bowel Movement: 05/04/23  Voiding Characteristics: external catheter    Intake/Output Summary (Last 24 hours) at 5/6/2023 0614  Last data filed at 5/6/2023 0501  Gross per 24 hour   Intake 491.49 ml   Output --   Net 491.49 ml          Labs/Accuchecks:  Recent Labs   Lab 05/06/23  0101   WBC 19.25*   RBC 5.20    HGB 15.2   HCT 45.9         Recent Labs   Lab 05/06/23  0101   *   K 4.0   CO2 25      BUN 16   CREATININE 0.9   ALKPHOS 61   ALT 13   AST 18   BILITOT 0.7      Recent Labs   Lab 05/06/23  0101   INR 1.0   APTT 33.1*      Recent Labs   Lab 05/06/23  0101   TROPONINI 0.039*       Electrolytes: N/A - electrolytes WDL  Accuchecks: none    Gtts:   fentanyl 150 mcg/hr (05/06/23 0501)    niCARdipine Stopped (05/06/23 0500)       LDA/Wounds:  Lines/Drains/Airways       Drain  Duration                  NG/OG Tube 05/05/23 1445 orogastric 18 Fr. Center mouth <1 day    Female External Urinary Catheter 05/05/23 1718 <1 day              Airway  Duration                  Airway - Non-Surgical 05/05/23 1430 Endotracheal Tube <1 day              Peripheral Intravenous Line  Duration                  Peripheral IV - Single Lumen 05/05/23 18 G Left Antecubital 1 day         Peripheral IV - Single Lumen 05/05/23 1716 18 G Left Upper Arm <1 day                  Wounds: No  Wound care consulted: No

## 2023-05-06 NOTE — PLAN OF CARE
OT evaluation completed  Problem: Occupational Therapy  Goal: Occupational Therapy Goal  Description: Goals set 5/6 to be addressed for 14 days with expiration date, 5/20:  Patient will increase functional independence with ADLs by performing:    Patient will demonstrate rolling to the right with SBA.  Not met   Patient will demonstrate rolling to the left with SBA.   Not met  Patient will demonstrate supine -sit with SBA.   Not met  Patient will demonstrate stand pivot transfers with SBA.   Not met  Patient will demonstrate grooming while standing with SBA.   Not met  Patient will demonstrate upper body dressing with SBA while seated EOB.   Not met  Patient will demonstrate lower body dressing with min assist while seated EOB.   Not met  Patient will demonstrate toileting with CGA.   Not met  Patient will demonstrate bathing while seated EOB with CGA.   Not met  Patient's family / caregiver will demonstrate independence and safety with assisting patient with self-care skills and functional mobility.     Not met  Patient and/or patient's family will verbalize understanding of stroke prevention guidelines, personal risk factors and stroke warning signs via teachback method.  Not met             Outcome: Ongoing, Progressing

## 2023-05-06 NOTE — ASSESSMENT & PLAN NOTE
68 yo female with a PMH of HTN, Afib with RVR who presents after hypertension today with desaturation and vomiting with subsequent intubation today.  Exam with strength intact, possible lower cranial nerve dysfunction. CT head with 1.3x1.3cm ICH centered in the left medullar with stable size and without apparent vascular anomaly on CTA, no IVH or hydrocephalus. ICH score 1.       --Admitted to neuro ICU  --SBP < 160  --No acute neurosurgical intervention  --MRI brain w/wo 5/6 with possible DVA and concern for cavernous malformation  -- discuss timing for DSA for further evaluation  --Hold anticoagulation  --Medical management per neuro ICU  --Continue to monitor clinically, call with any decline in exam.

## 2023-05-06 NOTE — PROGRESS NOTES
"[facility] Neuro Critical [facility]  Neurocritical Care  Progress Note    Admit Date: 5/5/2023  Service Date: 05/06/2023  Length of Stay: 1    Subjective:     Chief Complaint: Nontraumatic intracerebral hemorrhage    History of Present Illness: Susan Pinto is a 67-year-old female with Hx of HTN presenting with altered mental status, tachycardia, left-sided facial droop, admitted for medullary ICH.   Patient started having vomiting that began around 7:00 p.m last night. On EMS arrival was noted to be hypoxic sat's in 80's with AMS, was intubated for airway protection. In ED  on presentation at OSH, CTH was done and was found to have a cervicomedullary junction ICH measuring approximately about 1.3 x 1.3 x 1.2 cm, also had a.fib with RVR Hr in the 150's that responded to BB. Was transferred to [facility] [place] further care     05/06/2023: transient hypotension overnight improved with stopping propofol. MRI with possible DVA, will discuss need for DSA with IR; Endorsed tingling in BLE -> CTH stable, trial of HTS for possible edema. SBT as tolerated. 3 day Abx for UTI    Review of Systems   Unable to perform ROS: Intubated   Objective:      Vitals:  Vitals:    05/06/23 0801 05/06/23 0901 05/06/23 0956 05/06/23 1001   BP: 127/64 137/81  120/61   BP Location:       Patient Position:       Pulse: 66 68  76   Resp:    (!) 29   Temp:       TempSrc:       SpO2: 100% 99%  96%   Weight:   95.3 kg (210 lb 1.6 oz)    Height:   5' 4" (1.626 m)           Physical Exam  --sedation: fentanyl  --GCS: [id number]  --Mental Status: sleepy, opens eyes briefly to loud voice then closes again, follows commands briskly   --CN II-XII grossly intact.   --Pupils 3-->2mm, PERRL.   --brainstem: intact  --Motor: moves all extremities symmetrically against gravity  --sensory: regards to touch throughout  --Reflexes: not tested  --Gait: deferred           Today I personally reviewed pertinent medications, lines/drains/airways, imaging, cardiology " results, laboratory results, microbiology results  Assessment/Plan:     Neuro  * ICH (intracerebral hemorrhage)  67 y.o F with Hx of HTN presenting as a transfer with a cervicomedullary junction ICH measuring approximately 1.3 x 1.3 x 1.2 cm with edema surrounding. Likely HTNive base on location and elevated BP () on presentation. MRI with ?DVA. Bleed stable    - Admit to NCC  - Neuro checks Q1  - SBP< 160  - Hold AC/AP  - Coags & CBC  - VN following  - NSGY following  - PT/OT/SLP    Cardiac/Vascular  A-fib  New onset at OSH, responded to BB    EKG  Monitor for now  Hold A/C    UTI  Rocephin x 3 days      The patient is being Prophylaxed for:  Venous Thromboembolism with: Mechanical  Stress Ulcer with: H2B  Ventilator Pneumonia with: chlorhexidine oral care    Activity Orders            Turn patient starting at 05/05 1800    Elevate HOB starting at 05/05 1626    Diet NPO: NPO starting at 05/05 1626          Full Code    Tom Mcmullen MD  Neurocritical Care  [facility] Neuro [facility]

## 2023-05-06 NOTE — PT/OT/SLP PROGRESS
Speech Language Pathology      Susan Pinto  MRN: [id number]    Patient not seen today secondary to pt intubated at this time. Please re-consult when medically appropriate. No further ST warranted at this time.     Radha Rivas CCC-SLP  Speech-Language Pathologist  [phone]  5/6/2023

## 2023-05-07 PROBLEM — E78.1 HYPERTRIGLYCERIDEMIA: Status: ACTIVE | Noted: 2023-05-07

## 2023-05-07 PROBLEM — R33.9 URINARY RETENTION: Status: ACTIVE | Noted: 2023-05-07

## 2023-05-07 PROBLEM — N30.00 ACUTE CYSTITIS: Status: ACTIVE | Noted: 2023-05-07

## 2023-05-07 LAB
ALBUMIN SERPL BCP-MCNC: 3.3 G/DL (ref 3.5–5.2)
ALLENS TEST: ABNORMAL
ALLENS TEST: NORMAL
ALP SERPL-CCNC: 53 U/L (ref 55–135)
ALT SERPL W/O P-5'-P-CCNC: 12 U/L (ref 10–44)
ANION GAP SERPL CALC-SCNC: 9 MMOL/L (ref 8–16)
ASCENDING AORTA: 2.76 CM
AST SERPL-CCNC: 23 U/L (ref 10–40)
AV INDEX (PROSTH): 0.86
AV MEAN GRADIENT: 4 MMHG
AV PEAK GRADIENT: 8 MMHG
AV VALVE AREA: 2.51 CM2
AV VELOCITY RATIO: 0.88
BACTERIA UR CULT: ABNORMAL
BASOPHILS # BLD AUTO: 0.03 K/UL (ref 0–0.2)
BASOPHILS NFR BLD: 0.2 % (ref 0–1.9)
BILIRUB SERPL-MCNC: 0.6 MG/DL (ref 0.1–1)
BSA FOR ECHO PROCEDURE: 2.07 M2
BUN SERPL-MCNC: 18 MG/DL (ref 8–23)
CALCIUM SERPL-MCNC: 10 MG/DL (ref 8.7–10.5)
CHLORIDE SERPL-SCNC: 104 MMOL/L (ref 95–110)
CO2 SERPL-SCNC: 23 MMOL/L (ref 23–29)
CREAT SERPL-MCNC: 0.8 MG/DL (ref 0.5–1.4)
CV ECHO LV RWT: 0.37 CM
DELSYS: ABNORMAL
DELSYS: NORMAL
DIFFERENTIAL METHOD: ABNORMAL
DOP CALC AO PEAK VEL: 1.38 M/S
DOP CALC AO VTI: 23.24 CM
DOP CALC LVOT AREA: 2.9 CM2
DOP CALC LVOT DIAMETER: 1.93 CM
DOP CALC LVOT PEAK VEL: 1.21 M/S
DOP CALC LVOT STROKE VOLUME: 58.42 CM3
DOP CALCLVOT PEAK VEL VTI: 19.98 CM
E WAVE DECELERATION TIME: 144.81 MSEC
E/A RATIO: 0.87
E/E' RATIO: 8.38 M/S
ECHO LV POSTERIOR WALL: 0.7 CM (ref 0.6–1.1)
EJECTION FRACTION: 65 %
EOSINOPHIL # BLD AUTO: 0 K/UL (ref 0–0.5)
EOSINOPHIL NFR BLD: 0.2 % (ref 0–8)
ERYTHROCYTE [DISTWIDTH] IN BLOOD BY AUTOMATED COUNT: 13.2 % (ref 11.5–14.5)
ERYTHROCYTE [SEDIMENTATION RATE] IN BLOOD BY WESTERGREN METHOD: 16 MM/H
EST. GFR  (NO RACE VARIABLE): >60 ML/MIN/1.73 M^2
FIO2: 50
FIO2: 50
FRACTIONAL SHORTENING: 41 % (ref 28–44)
GLUCOSE SERPL-MCNC: 114 MG/DL (ref 70–110)
HCO3 UR-SCNC: 24.4 MMOL/L (ref 24–28)
HCO3 UR-SCNC: 26.3 MMOL/L (ref 24–28)
HCT VFR BLD AUTO: 41.9 % (ref 37–48.5)
HGB BLD-MCNC: 13.7 G/DL (ref 12–16)
IMM GRANULOCYTES # BLD AUTO: 0.04 K/UL (ref 0–0.04)
IMM GRANULOCYTES NFR BLD AUTO: 0.3 % (ref 0–0.5)
INTERVENTRICULAR SEPTUM: 0.74 CM (ref 0.6–1.1)
IVRT: 74.22 MSEC
LA MAJOR: 5.51 CM
LA MINOR: 5.08 CM
LA WIDTH: 3.68 CM
LEFT ATRIUM SIZE: 3.65 CM
LEFT ATRIUM VOLUME INDEX MOD: 24.5 ML/M2
LEFT ATRIUM VOLUME INDEX: 30.2 ML/M2
LEFT ATRIUM VOLUME MOD: 48.98 CM3
LEFT ATRIUM VOLUME: 60.35 CM3
LEFT INTERNAL DIMENSION IN SYSTOLE: 2.25 CM (ref 2.1–4)
LEFT VENTRICLE DIASTOLIC VOLUME INDEX: 30.97 ML/M2
LEFT VENTRICLE DIASTOLIC VOLUME: 61.94 ML
LEFT VENTRICLE MASS INDEX: 37 G/M2
LEFT VENTRICLE SYSTOLIC VOLUME INDEX: 8.5 ML/M2
LEFT VENTRICLE SYSTOLIC VOLUME: 17.07 ML
LEFT VENTRICULAR INTERNAL DIMENSION IN DIASTOLE: 3.8 CM (ref 3.5–6)
LEFT VENTRICULAR MASS: 74.65 G
LV LATERAL E/E' RATIO: 7.44 M/S
LV SEPTAL E/E' RATIO: 9.57 M/S
LYMPHOCYTES # BLD AUTO: 1.4 K/UL (ref 1–4.8)
LYMPHOCYTES NFR BLD: 11.6 % (ref 18–48)
MAGNESIUM SERPL-MCNC: 1.8 MG/DL (ref 1.6–2.6)
MCH RBC QN AUTO: 29.1 PG (ref 27–31)
MCHC RBC AUTO-ENTMCNC: 32.7 G/DL (ref 32–36)
MCV RBC AUTO: 89 FL (ref 82–98)
MODE: ABNORMAL
MODE: NORMAL
MONOCYTES # BLD AUTO: 1.1 K/UL (ref 0.3–1)
MONOCYTES NFR BLD: 8.7 % (ref 4–15)
MV PEAK A VEL: 0.77 M/S
MV PEAK E VEL: 0.67 M/S
MV STENOSIS PRESSURE HALF TIME: 41.99 MS
MV VALVE AREA P 1/2 METHOD: 5.24 CM2
NEUTROPHILS # BLD AUTO: 9.5 K/UL (ref 1.8–7.7)
NEUTROPHILS NFR BLD: 79 % (ref 38–73)
NRBC BLD-RTO: 0 /100 WBC
PCO2 BLDA: 37 MMHG (ref 35–45)
PCO2 BLDA: 41.3 MMHG (ref 35–45)
PEEP: 5
PEEP: 5
PH SMN: 7.41 [PH] (ref 7.35–7.45)
PH SMN: 7.43 [PH] (ref 7.35–7.45)
PHOSPHATE SERPL-MCNC: 1.8 MG/DL (ref 2.7–4.5)
PLATELET # BLD AUTO: 196 K/UL (ref 150–450)
PMV BLD AUTO: 11.2 FL (ref 9.2–12.9)
PO2 BLDA: 83 MMHG (ref 80–100)
PO2 BLDA: 83 MMHG (ref 80–100)
POC BE: 0 MMOL/L
POC BE: 2 MMOL/L
POC SATURATED O2: 96 % (ref 95–100)
POC SATURATED O2: 97 % (ref 95–100)
POC TCO2: 25 MMOL/L (ref 23–27)
POC TCO2: 28 MMOL/L (ref 23–27)
POTASSIUM SERPL-SCNC: 3.8 MMOL/L (ref 3.5–5.1)
PROT SERPL-MCNC: 6.8 G/DL (ref 6–8.4)
PS: 8
PS: 8
RA MAJOR: 4.27 CM
RA PRESSURE: 3 MMHG
RA WIDTH: 4 CM
RBC # BLD AUTO: 4.7 M/UL (ref 4–5.4)
RIGHT VENTRICULAR END-DIASTOLIC DIMENSION: 2.74 CM
SAMPLE: ABNORMAL
SAMPLE: NORMAL
SINUS: 2.86 CM
SITE: ABNORMAL
SITE: NORMAL
SODIUM SERPL-SCNC: 136 MMOL/L (ref 136–145)
SPONT RATE: 19
STJ: 1.99 CM
TDI LATERAL: 0.09 M/S
TDI SEPTAL: 0.07 M/S
TDI: 0.08 M/S
TRICUSPID ANNULAR PLANE SYSTOLIC EXCURSION: 2.14 CM
VT: 450
WBC # BLD AUTO: 12.07 K/UL (ref 3.9–12.7)

## 2023-05-07 PROCEDURE — 51701 INSERT BLADDER CATHETER: CPT

## 2023-05-07 PROCEDURE — 99233 SBSQ HOSP IP/OBS HIGH 50: CPT | Mod: ,,, | Performed by: NEUROLOGICAL SURGERY

## 2023-05-07 PROCEDURE — 82803 BLOOD GASES ANY COMBINATION: CPT

## 2023-05-07 PROCEDURE — 27000221 HC OXYGEN, UP TO 24 HOURS

## 2023-05-07 PROCEDURE — 84100 ASSAY OF PHOSPHORUS: CPT | Performed by: PSYCHIATRY & NEUROLOGY

## 2023-05-07 PROCEDURE — 51798 US URINE CAPACITY MEASURE: CPT

## 2023-05-07 PROCEDURE — 25000003 PHARM REV CODE 250

## 2023-05-07 PROCEDURE — 94003 VENT MGMT INPAT SUBQ DAY: CPT

## 2023-05-07 PROCEDURE — 25000003 PHARM REV CODE 250: Performed by: PSYCHIATRY & NEUROLOGY

## 2023-05-07 PROCEDURE — 85025 COMPLETE CBC W/AUTO DIFF WBC: CPT | Performed by: PSYCHIATRY & NEUROLOGY

## 2023-05-07 PROCEDURE — 20000000 HC ICU ROOM

## 2023-05-07 PROCEDURE — 36600 WITHDRAWAL OF ARTERIAL BLOOD: CPT

## 2023-05-07 PROCEDURE — 27200966 HC CLOSED SUCTION SYSTEM

## 2023-05-07 PROCEDURE — 99291 PR CRITICAL CARE, E/M 30-74 MINUTES: ICD-10-PCS | Mod: GC,,, | Performed by: PSYCHIATRY & NEUROLOGY

## 2023-05-07 PROCEDURE — 83735 ASSAY OF MAGNESIUM: CPT | Performed by: PSYCHIATRY & NEUROLOGY

## 2023-05-07 PROCEDURE — 99291 CRITICAL CARE FIRST HOUR: CPT | Mod: GC,,, | Performed by: PSYCHIATRY & NEUROLOGY

## 2023-05-07 PROCEDURE — 80053 COMPREHEN METABOLIC PANEL: CPT | Performed by: PSYCHIATRY & NEUROLOGY

## 2023-05-07 PROCEDURE — 99900026 HC AIRWAY MAINTENANCE (STAT)

## 2023-05-07 PROCEDURE — 94761 N-INVAS EAR/PLS OXIMETRY MLT: CPT

## 2023-05-07 PROCEDURE — 63600175 PHARM REV CODE 636 W HCPCS

## 2023-05-07 PROCEDURE — 99900035 HC TECH TIME PER 15 MIN (STAT)

## 2023-05-07 PROCEDURE — 82800 BLOOD PH: CPT

## 2023-05-07 PROCEDURE — 99233 PR SUBSEQUENT HOSPITAL CARE,LEVL III: ICD-10-PCS | Mod: ,,, | Performed by: NEUROLOGICAL SURGERY

## 2023-05-07 RX ORDER — MUPIROCIN 20 MG/G
OINTMENT TOPICAL 2 TIMES DAILY
Status: COMPLETED | OUTPATIENT
Start: 2023-05-07 | End: 2023-05-12

## 2023-05-07 RX ORDER — SILODOSIN 4 MG/1
4 CAPSULE ORAL DAILY
Status: DISCONTINUED | OUTPATIENT
Start: 2023-05-07 | End: 2023-05-17 | Stop reason: HOSPADM

## 2023-05-07 RX ORDER — POLYETHYLENE GLYCOL 3350 17 G/17G
17 POWDER, FOR SOLUTION ORAL DAILY
Status: DISCONTINUED | OUTPATIENT
Start: 2023-05-07 | End: 2023-05-10

## 2023-05-07 RX ORDER — AMLODIPINE BESYLATE 5 MG/1
5 TABLET ORAL DAILY
Status: DISCONTINUED | OUTPATIENT
Start: 2023-05-07 | End: 2023-05-08

## 2023-05-07 RX ADMIN — LABETALOL HYDROCHLORIDE 10 MG: 5 INJECTION, SOLUTION INTRAVENOUS at 02:05

## 2023-05-07 RX ADMIN — POTASSIUM & SODIUM PHOSPHATES POWDER PACK 280-160-250 MG 2 PACKET: 280-160-250 PACK at 10:05

## 2023-05-07 RX ADMIN — POLYETHYLENE GLYCOL 3350 17 G: 17 POWDER, FOR SOLUTION ORAL at 10:05

## 2023-05-07 RX ADMIN — LABETALOL HYDROCHLORIDE 10 MG: 5 INJECTION, SOLUTION INTRAVENOUS at 08:05

## 2023-05-07 RX ADMIN — MUPIROCIN: 20 OINTMENT TOPICAL at 08:05

## 2023-05-07 RX ADMIN — FAMOTIDINE 20 MG: 20 TABLET ORAL at 08:05

## 2023-05-07 RX ADMIN — POTASSIUM & SODIUM PHOSPHATES POWDER PACK 280-160-250 MG 2 PACKET: 280-160-250 PACK at 06:05

## 2023-05-07 RX ADMIN — FAMOTIDINE 20 MG: 20 TABLET ORAL at 10:05

## 2023-05-07 RX ADMIN — ATORVASTATIN CALCIUM 40 MG: 40 TABLET, FILM COATED ORAL at 10:05

## 2023-05-07 RX ADMIN — AMLODIPINE BESYLATE 5 MG: 5 TABLET ORAL at 03:05

## 2023-05-07 RX ADMIN — SENNOSIDES AND DOCUSATE SODIUM 1 TABLET: 50; 8.6 TABLET ORAL at 10:05

## 2023-05-07 RX ADMIN — SENNOSIDES AND DOCUSATE SODIUM 1 TABLET: 50; 8.6 TABLET ORAL at 08:05

## 2023-05-07 RX ADMIN — POTASSIUM & SODIUM PHOSPHATES POWDER PACK 280-160-250 MG 2 PACKET: 280-160-250 PACK at 02:05

## 2023-05-07 RX ADMIN — QUETIAPINE FUMARATE 25 MG: 25 TABLET ORAL at 08:05

## 2023-05-07 RX ADMIN — CEFTRIAXONE 1 G: 1 INJECTION, POWDER, FOR SOLUTION INTRAMUSCULAR; INTRAVENOUS at 10:05

## 2023-05-07 RX ADMIN — FENOFIBRATE 48 MG: 48 TABLET ORAL at 10:05

## 2023-05-07 RX ADMIN — SILODOSIN 4 MG: 4 CAPSULE ORAL at 10:05

## 2023-05-07 NOTE — ASSESSMENT & PLAN NOTE
UA with findigns c/f UTI  Urine Cx with GNR's    - continue 3 day course of CTX (EOC: 5/8)  - f/u urine culture

## 2023-05-07 NOTE — PLAN OF CARE
ICU Attending         Trial of PS 5/5 then to T-piece. Patient did great on PS all morning, became short of breath on T-piece after 45 minutes. Asked to be put back on vent. ABG pending. Will continue to assess daily for safe extubation. Discussed potential for trach and peg should we not be able to safely extubate this week.    Daniel Bowman MD MPH  NeuroCritical Care & Vascular Neurology

## 2023-05-07 NOTE — ASSESSMENT & PLAN NOTE
Triglycerides elevated to 929 on admission, started on niacin and fenofibrate  - TGL have since normalized    - discontinue niacin  - continue fenofibrate  - space out TGL to daily, CTM and adjust regiment as indicated  - follow up with PCP outpatient on discharge

## 2023-05-07 NOTE — SUBJECTIVE & OBJECTIVE
Interval History: As above.    Review of Systems   Unable to perform ROS: Intubated   Eyes:  Positive for visual disturbance (double vision).   Neurological:  Positive for numbness (paresthesias in distal extremities).     Objective:     Vitals:  Temp: 98.4 °F (36.9 °C)  Pulse: 83  Rhythm: normal sinus rhythm  BP: (!) 168/83  MAP (mmHg): 119  Resp: 17  SpO2: 97 %  Oxygen Concentration (%): 50  Vent Mode: SIMV  Set Rate: 16 BPM  Vt Set: 450 mL  Pressure Support: 8 cmH20  PEEP/CPAP: 5 cmH20  Peak Airway Pressure: 30 cmH20  Mean Airway Pressure: 10 cmH20  Plateau Pressure: 0 cmH20    Temp  Min: 98.4 °F (36.9 °C)  Max: 99.3 °F (37.4 °C)  Pulse  Min: 60  Max: 88  BP  Min: 109/57  Max: 187/83  MAP (mmHg)  Min: 77  Max: 119  Resp  Min: 16  Max: 33  SpO2  Min: 92 %  Max: 100 %  Oxygen Concentration (%)  Min: 50  Max: 50    05/06 0701 - 05/07 0700  In: 1277.1 [I.V.:527]  Out: 625 [Urine:625]   Unmeasured Output  Stool Occurrence: 0        Physical Exam  Vitals and nursing note reviewed.   Constitutional:       General: She is not in acute distress.     Appearance: She is obese. She is not diaphoretic.   HENT:      Head: Normocephalic and atraumatic.      Right Ear: External ear normal.      Left Ear: External ear normal.      Nose: Nose normal.      Mouth/Throat:      Comments: ET tube and OG tube intact  Eyes:      General: No scleral icterus.        Right eye: No discharge.         Left eye: No discharge.      Extraocular Movements: Extraocular movements intact.      Pupils: Pupils are equal, round, and reactive to light.   Cardiovascular:      Rate and Rhythm: Normal rate and regular rhythm.   Pulmonary:      Effort: Pulmonary effort is normal. No respiratory distress.      Comments: Ventilator switched from SIMV to spontaneous  Abdominal:      General: Abdomen is flat. There is no distension.   Musculoskeletal:         General: No swelling or deformity. Normal range of motion.      Cervical back: Normal range of motion  and neck supple.      Right lower leg: No edema.      Left lower leg: No edema.   Skin:     General: Skin is warm.      Coloration: Skin is not jaundiced.      Findings: No bruising.   Neurological:      Mental Status: She is alert.      Comments:   [id number]  Alert, follows commands, and can write to communicate  Off sedation  PERRL  EOMI  Face symmetric  Moves all extremities symmetrically against gravity  Regards to touch throughout     Unable to test orientation, language, memory, judgment, insight, fund of knowledge, coordination, gait due to intubation.       Medications:  Continuous Scheduledatorvastatin, 40 mg, Daily  cefTRIAXone (ROCEPHIN) IVPB, 1 g, Q24H  famotidine, 20 mg, BID  fenofibrate, 48 mg, Daily  polyethylene glycol, 17 g, Daily  QUEtiapine, 25 mg, QHS  senna-docusate 8.6-50 mg, 1 tablet, BID  silodosin, 4 mg, Daily    PRNlabetalol, 10 mg, Q4H PRN  magnesium oxide, 800 mg, PRN  magnesium oxide, 800 mg, PRN  ondansetron, 4 mg, Q8H PRN  potassium bicarbonate, 35 mEq, PRN  potassium bicarbonate, 50 mEq, PRN  potassium bicarbonate, 60 mEq, PRN  potassium, sodium phosphates, 2 packet, PRN  potassium, sodium phosphates, 2 packet, PRN  potassium, sodium phosphates, 2 packet, PRN  sodium chloride 0.9%, 10 mL, PRN      Today I personally reviewed pertinent medications, lines/drains/airways, imaging, cardiology results, laboratory results, microbiology results, notably:    Diet  Diet NPO  Diet NPO

## 2023-05-07 NOTE — PLAN OF CARE
[facility] Care Plan    POC reviewed with Susan Pinto and family at 1400. Pt verbalized understanding. Questions and concerns addressed. No acute events today. Pt progressing toward goals. Will continue to monitor. See below and flowsheets for full assessment and VS info.     t-piece trial today, pt only tolerated 45 minutes   Restarted TF      Is this a stroke patient? yes- Stroke booklet reviewed with patient and family, risk factors identified for patient and stroke booklet remains at bedside for ongoing education.     Neuro:  [place] Coma Scale  Best Eye Response: 4-->(E4) spontaneous  Best Motor Response: 6-->(M6) obeys commands  Best Verbal Response: 1-->(V1) none  [place] Coma Scale Score: 11  Assessment Qualifiers: patient intubated  Pupil PERRLA: yes     24 hr Temp:  [98.4 °F (36.9 °C)-99.3 °F (37.4 °C)]     CV:   Rhythm: normal sinus rhythm  BP goals:   SBP < 160  MAP > 65    Resp:      Vent Mode: Spont  Set Rate: 16 BPM  Oxygen Concentration (%): 50  Vt Set: 450 mL  PEEP/CPAP: 5 cmH20  Pressure Support: 12 cmH20    Plan: wean to extubate    GI/:     Diet/Nutrition Received: NPO, tube feeding  Last Bowel Movement: 05/04/23  Voiding Characteristics: external catheter    Intake/Output Summary (Last 24 hours) at 5/7/2023 1737  Last data filed at 5/7/2023 1701  Gross per 24 hour   Intake 396.39 ml   Output 700 ml   Net -303.61 ml     Unmeasured Output  Stool Occurrence: 0    Labs/Accuchecks:  Recent Labs   Lab 05/07/23  0108   WBC 12.07   RBC 4.70   HGB 13.7   HCT 41.9         Recent Labs   Lab 05/07/23  0108      K 3.8   CO2 23      BUN 18   CREATININE 0.8   ALKPHOS 53*   ALT 12   AST 23   BILITOT 0.6      Recent Labs   Lab 05/06/23  0101   INR 1.0   APTT 33.1*      Recent Labs   Lab 05/06/23  1357   TROPONINI 0.029*       Electrolytes: N/A - electrolytes WDL  Accuchecks: Q6H    Gtts:      LDA/Wounds:  Lines/Drains/Airways       Drain  Duration                  NG/OG Tube 05/05/23 8110  orogastric 18 Fr. Center mouth 2 days    Female External Urinary Catheter 05/05/23 1718 2 days              Airway  Duration                  Airway - Non-Surgical 05/05/23 1430 Endotracheal Tube 2 days              Peripheral Intravenous Line  Duration                  Peripheral IV - Single Lumen 05/05/23 1716 18 G Left Upper Arm 2 days         Peripheral IV - Single Lumen 05/07/23 1043 18 G Posterior;Right Forearm <1 day                  Wounds: No  Wound care consulted: No

## 2023-05-07 NOTE — ASSESSMENT & PLAN NOTE
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 with edema surrounding  - MRI brain w/wo (5/6): possible DVA and concern for cavernous malformation    - Admit to NCC  - NSGY following, no acute surgical intervention at this time   - OP f/u w NSGY in 4-6 weeks with a repeat MRI Brain w/wo contrast   - Open intervention for cavernoma may be considered if findings are concordant on repeat imaging given first time hemorrhage, but will discuss in OP setting   - DSA likely not beneficial  - Neuro checks Q1  - SBP< 160 per NSGY and SBP < 140 per VN  - intubated for airway protection, copious secretions and absent cough   - continue trials of spontaneous   - f/u daily ABG's  - Hold AC/AP  - Coags & CBC  - VN following  - PT/OT/SLP

## 2023-05-07 NOTE — PLAN OF CARE
[facility] Care Plan    POC reviewed with Susan Pinto and family at 1400. Pt verbalized understanding. Questions and concerns addressed. No acute events today. Pt progressing toward goals. Will continue to monitor. See below and flowsheets for full assessment and VS info.   -Neuro exam changes throughout shift. CT complete x 2.   -500 ml NS bolus given, 250ml 2% bolus given   -Cardene gtt titrated to maintain SBP < 160        Is this a stroke patient? yes- Stroke booklet reviewed with patient and family, risk factors identified for patient and stroke booklet remains at bedside for ongoing education.     Neuro:  Naldo Coma Scale  Best Eye Response: 4-->(E4) spontaneous  Best Motor Response: 6-->(M6) obeys commands  Best Verbal Response: 1-->(V1) none  Burton Coma Scale Score: 11  Assessment Qualifiers: patient intubated, patient chemically sedated or paralyzed  Pupil PERRLA: yes     24 hr Temp:  [97.9 °F (36.6 °C)-98.6 °F (37 °C)]     CV:   Rhythm: normal sinus rhythm  BP goals:   SBP < 160  MAP > 65    Resp:      Vent Mode: SIMV  Set Rate: 16 BPM  Oxygen Concentration (%): 50  Vt Set: 450 mL  PEEP/CPAP: 5 cmH20  Pressure Support: 8 cmH20    Plan: wean to extubate    GI/:     Diet/Nutrition Received: NPO  Last Bowel Movement: 05/04/23  Voiding Characteristics: external catheter, due to void    Intake/Output Summary (Last 24 hours) at 5/6/2023 1903  Last data filed at 5/6/2023 1801  Gross per 24 hour   Intake 1466.08 ml   Output 310 ml   Net 1156.08 ml     Unmeasured Output  Stool Occurrence: 0    Labs/Accuchecks:  Recent Labs   Lab 05/06/23  0101   WBC 19.25*   RBC 5.20   HGB 15.2   HCT 45.9         Recent Labs   Lab 05/06/23  0101   *   K 4.0   CO2 25      BUN 16   CREATININE 0.9   ALKPHOS 61   ALT 13   AST 18   BILITOT 0.7      Recent Labs   Lab 05/06/23  0101   INR 1.0   APTT 33.1*      Recent Labs   Lab 05/06/23  1357   TROPONINI 0.029*       Electrolytes: No replacement  orders  Accuchecks: none    Gtts:   fentanyl Stopped (05/06/23 1443)    niCARdipine 2.5 mg/hr (05/06/23 1801)       LDA/Wounds:  Lines/Drains/Airways       Drain  Duration                  NG/OG Tube 05/05/23 1445 orogastric 18 Fr. Center mouth 1 day    Female External Urinary Catheter 05/05/23 1718 1 day              Airway  Duration                  Airway - Non-Surgical 05/05/23 1430 Endotracheal Tube 1 day              Peripheral Intravenous Line  Duration                  Peripheral IV - Single Lumen 05/05/23 1716 18 G Left Upper Arm 1 day         Peripheral IV - Single Lumen 05/06/23 1425 20 G Anterior;Left Forearm <1 day                  Wounds: No  Wound care consulted: No

## 2023-05-07 NOTE — ASSESSMENT & PLAN NOTE
68 yo female with a PMH of HTN, Afib with RVR who presents after hypertension today with desaturation and vomiting with subsequent intubation today.  Exam with strength intact, possible lower cranial nerve dysfunction. CT head with 1.3x1.3cm ICH centered in the left medullar with stable size and without apparent vascular anomaly on CTA, no IVH or hydrocephalus. ICH score 1.       --Admitted to neuro ICU  --SBP < 160  --No acute neurosurgical intervention  --MRI brain w/wo 5/6 with possible DVA and concern for cavernous malformation  --Would work to extubate and recover from current ICH; imaging findings stable, favorable neurologic exam.    Outpatient follow up with neurosurgery in 4-6 weeks with a repeat MRI Brain w/wo contrast.     Open Intervention for cavernoma may be considered if findings are concordant on repeat imaging given first time hemorrhage, but will discuss in the outpatient setting.    --DSA likely not beneficial    --Hold anticoagulation  --Medical management per neuro ICU  --Continue to monitor clinically, call with any decline in exam.

## 2023-05-07 NOTE — ASSESSMENT & PLAN NOTE
Home medications: amlodipine 5mg daily, lisinopril 40mg daily, and prn clonidine 0.1mg BID PRN for BP > 150/100    - goal SBP < 140 per VN and < 160 per NSGY  - resume home amlodipine, resume remainder of home meds as indicated  - prn labetalol and hydralazine  - cardene off since 4 am 5/7

## 2023-05-07 NOTE — ASSESSMENT & PLAN NOTE
New onset at OSH, responded to BB  - initial EKG with A-fib RVR, repeat showed NSR    EKG  Monitor for now  Hold A/C

## 2023-05-07 NOTE — PROGRESS NOTES
[facility]  Neurosurgery  Progress Note    Subjective:     History of Present Illness: Susan Pinto is a 66 yo female who presents due to a medullary ICH.  History limited due to intubation, obtained from collateral information from primary team/chart review.  Ms. Pinto vomited and had initial altered mentation earlier today and subsequently desatted and decompensated when EMS arrived.  Her initial systolic blood pressure was in the 250s.  CT head at [facility] revealed medullary ICH.       Post-Op Info:  * No surgery found *         Interval History: 5/7: NAEON. Remains intubated with favorable neurologic exam.    Medications:  Continuous Infusions:   fentanyl Stopped (05/06/23 1443)    niCARdipine Stopped (05/07/23 0411)     Scheduled Meds:   atorvastatin  40 mg Per OG tube Daily    cefTRIAXone (ROCEPHIN) IVPB  1 g Intravenous Q24H    famotidine  20 mg Per OG tube BID    fenofibrate  48 mg Per OG tube Daily    niacin  200 mg Per OG tube Daily    QUEtiapine  25 mg Per OG tube QHS    senna-docusate 8.6-50 mg  1 tablet Per OG tube BID     PRN Meds:labetalol, magnesium oxide, magnesium oxide, ondansetron, potassium bicarbonate, potassium bicarbonate, potassium bicarbonate, potassium, sodium phosphates, potassium, sodium phosphates, potassium, sodium phosphates, sodium chloride 0.9%     Review of Systems  Objective:     Weight: 95.3 kg (210 lb 1.6 oz)  Body mass index is 36.06 kg/m².  Vital Signs (Most Recent):  Temp: 98.8 °F (37.1 °C) (05/07/23 0701)  Pulse: 71 (05/07/23 0801)  Resp: 16 (05/07/23 0801)  BP: (!) 152/67 (05/07/23 0801)  SpO2: 99 % (05/07/23 0801) Vital Signs (24h Range):  Temp:  [98.3 °F (36.8 °C)-99.3 °F (37.4 °C)] 98.8 °F (37.1 °C)  Pulse:  [60-88] 71  Resp:  [16-41] 16  SpO2:  [92 %-100 %] 99 %  BP: (109-187)/(57-83) 152/67     Date 05/07/23 0700 - 05/08/23 0659   Shift [id number] 6379-8425 5405-3788 24 Hour Total   INTAKE   I.V.(mL/kg) 2.2(0)   2.2(0)   Shift  Total(mL/kg) 2.2(0)   2.2(0)   OUTPUT   Shift Total(mL/kg)       Weight (kg) 95.3 95.3 95.3 95.3              Vent Mode: SIMV  Oxygen Concentration (%):  [50] 50  Resp Rate Total:  [15 br/min-73 br/min] 16 br/min  Vt Set:  [450 mL] 450 mL  PEEP/CPAP:  [5 cmH20] 5 cmH20  Pressure Support:  [8 ohM43-38 cmH20] 8 cmH20  Mean Airway Pressure:  [8.3 zxI22-85 cmH20] 10 cmH20             NG/OG Tube 05/05/23 1445 orogastric 18 Fr. Center mouth (Active)   Placement Check placement verified by x-ray 05/07/23 0701   Tolerance no signs/symptoms of discomfort 05/07/23 0701   Securement secured to commercial device 05/07/23 0701   Clamp Status/Tolerance clamped 05/07/23 0701   Suction Setting/Drainage Method suction at the bedside 05/07/23 0701   Insertion Site Appearance no redness, warmth, tenderness, skin breakdown, drainage 05/07/23 0701   Drainage None 05/07/23 0701   Flush/Irrigation flushed w/;water;no resistance met 05/07/23 0701   Intake (mL) 100 mL 05/06/23 1001   Water Bolus (mL) 100 mL 05/07/23 0301       Female External Urinary Catheter 05/05/23 1718 (Active)   Skin perineum cleansed w/ soap and water;no redness;no breakdown 05/07/23 0701   Tolerance no signs/symptoms of discomfort 05/07/23 0701   Suction Continuous suction at 60 mmHg 05/07/23 0701   Date of last wick change 05/07/23 05/07/23 0701   Time of last wick change 0701 05/07/23 0701   Output (mL) 50 mL 05/06/23 2001          Physical Exam         Neurosurgery Physical Exam  [id number]  FC x 4   FLETCHER AG  Numbness noted unclear distribution  PERRL, EOMI  Awake, alert  Intubated    Significant Labs:  Recent Labs   Lab 05/05/23  1403 05/06/23  0101 05/07/23  0108   * 127* 114*   * 135* 136   K 4.1 4.0 3.8   CL 99 100 104   CO2 19* 25 23   BUN 11 16 18   CREATININE 0.8 0.9 0.8   CALCIUM 10.1 9.6 10.0   MG  --  1.9 1.8     Recent Labs   Lab 05/05/23  1403 05/06/23  0101 05/07/23  0108   WBC 16.77* 19.25* 12.07   HGB 16.6* 15.2 13.7   HCT 50.0* 45.9 41.9     214 196     Recent Labs   Lab 05/05/23  1403 05/05/23  1827 05/06/23  0101   INR 1.0  --  1.0   APTT 34.0* 29.3 33.1*     Microbiology Results (last 7 days)       Procedure Component Value Units Date/Time    Urine culture [[id number]] Collected: 05/06/23 0439    Order Status: No result Specimen: Urine Updated: 05/06/23 0502          All pertinent labs from the last 24 hours have been reviewed.    Significant Diagnostics:  I have reviewed all pertinent imaging results/findings within the past 24 hours.    Assessment/Plan:     * Nontraumatic intracerebral hemorrhage  66 yo female with a PMH of HTN, Afib with RVR who presents after hypertension today with desaturation and vomiting with subsequent intubation today.  Exam with strength intact, possible lower cranial nerve dysfunction. CT head with 1.3x1.3cm ICH centered in the left medullar with stable size and without apparent vascular anomaly on CTA, no IVH or hydrocephalus. ICH score 1.       --Admitted to neuro ICU  --SBP < 160  --No acute neurosurgical intervention  --MRI brain w/wo 5/6 with possible DVA and concern for cavernous malformation  --Would work to extubate and recover from current ICH; imaging findings stable, favorable neurologic exam.    Outpatient follow up with neurosurgery in 4-6 weeks with a repeat MRI Brain w/wo contrast.     Open Intervention for cavernoma may be considered if findings are concordant on repeat imaging given first time hemorrhage, but will discuss in the outpatient setting.    --DSA likely not beneficial    --Hold anticoagulation  --Medical management per neuro ICU  --Continue to monitor clinically, call with any decline in exam.                   Magdiel Fontaine MD  Neurosurgery  [facility]

## 2023-05-07 NOTE — HOSPITAL COURSE
05/06/2023 transient hypotension overnight improved with stopping propofol. MRI with possible DVA, will discuss need for DSA with IR. Endorsed tingling in BLE -> CTH stable, trial of HTS for possible edema. SBT as tolerated. 3 day Abx for UTI. New double vision -> repeat CTH stable.   05/07/2023 CO2 retention noted on ABG yesterday after SBT. Gas normalized with SIMV. Will reassess on spontaneous today. Discussed possibility of trach if she fails to wean off the vent. Still with copious secretions and no cough. Day 2/3 of CTX for GNR UTI. Tube feeds initiated.  05/08/2023 Pt remains on spontaneous vent settings with CO2 in normal range. T-piece trial unsuccessful yesterday. Cough reflex improved today.   05/09/2023 Exam stable. Continue trials of spontaneous ventilation.   05/10/2023 BP better controlled overnight. Will advance bowel regiment for constipation. Pt still not tolerating secretions, remains intubated.   05/11/2023 Patient with N/V overnight and new left shoulder/chest pain, troponin negative and EKG w/o ischemic changes. Constipation resolved.  05/12/2023 naeon, pt agreeable to early trach/peg  5/13/2023 NAEO, plan for T/P Monday. Patient awake and following, would like to participate with PT/OT. Start SQH for DVT ppx  5/14/2023 NAEO, T/P tomorrow. Patient with elevated BP will add her home clonidine prn for SBP >160. Continue SIMV and SBT trials.   05/15/2023 T/P today. Will resume tube feeds via G-tube 24hrs post procedure.  05/16/2023 Some oozing blood from trach site overnight, clotted over by morning. Pain around PEG site improving.  05/17/2023 Patient stable for transfer to LTAC today. Outpatient MRI and NSGY follow up scheduled.

## 2023-05-07 NOTE — SUBJECTIVE & OBJECTIVE
Interval History: 5/7: NAEON. Remains intubated with favorable neurologic exam.    Medications:  Continuous Infusions:   fentanyl Stopped (05/06/23 1443)    niCARdipine Stopped (05/07/23 0411)     Scheduled Meds:   atorvastatin  40 mg Per OG tube Daily    cefTRIAXone (ROCEPHIN) IVPB  1 g Intravenous Q24H    famotidine  20 mg Per OG tube BID    fenofibrate  48 mg Per OG tube Daily    niacin  200 mg Per OG tube Daily    QUEtiapine  25 mg Per OG tube QHS    senna-docusate 8.6-50 mg  1 tablet Per OG tube BID     PRN Meds:labetalol, magnesium oxide, magnesium oxide, ondansetron, potassium bicarbonate, potassium bicarbonate, potassium bicarbonate, potassium, sodium phosphates, potassium, sodium phosphates, potassium, sodium phosphates, sodium chloride 0.9%     Review of Systems  Objective:     Weight: 95.3 kg (210 lb 1.6 oz)  Body mass index is 36.06 kg/m².  Vital Signs (Most Recent):  Temp: 98.8 °F (37.1 °C) (05/07/23 0701)  Pulse: 71 (05/07/23 0801)  Resp: 16 (05/07/23 0801)  BP: (!) 152/67 (05/07/23 0801)  SpO2: 99 % (05/07/23 0801) Vital Signs (24h Range):  Temp:  [98.3 °F (36.8 °C)-99.3 °F (37.4 °C)] 98.8 °F (37.1 °C)  Pulse:  [60-88] 71  Resp:  [16-41] 16  SpO2:  [92 %-100 %] 99 %  BP: (109-187)/(57-83) 152/67     Date 05/07/23 0700 - 05/08/23 0659   Shift 3569-3985 1103-6015 5510-9207 24 Hour Total   INTAKE   I.V.(mL/kg) 2.2(0)   2.2(0)   Shift Total(mL/kg) 2.2(0)   2.2(0)   OUTPUT   Shift Total(mL/kg)       Weight (kg) 95.3 95.3 95.3 95.3              Vent Mode: SIMV  Oxygen Concentration (%):  [50] 50  Resp Rate Total:  [15 br/min-73 br/min] 16 br/min  Vt Set:  [450 mL] 450 mL  PEEP/CPAP:  [5 cmH20] 5 cmH20  Pressure Support:  [8 jeC23-03 cmH20] 8 cmH20  Mean Airway Pressure:  [8.3 eiS38-35 cmH20] 10 cmH20             NG/OG Tube 05/05/23 1445 orogastric 18 Fr. Center mouth (Active)   Placement Check placement verified by x-ray 05/07/23 0701   Tolerance no signs/symptoms of discomfort 05/07/23 0701   Securement  secured to commercial device 05/07/23 0701   Clamp Status/Tolerance clamped 05/07/23 0701   Suction Setting/Drainage Method suction at the bedside 05/07/23 0701   Insertion Site Appearance no redness, warmth, tenderness, skin breakdown, drainage 05/07/23 0701   Drainage None 05/07/23 0701   Flush/Irrigation flushed w/;water;no resistance met 05/07/23 0701   Intake (mL) 100 mL 05/06/23 1001   Water Bolus (mL) 100 mL 05/07/23 0301       Female External Urinary Catheter 05/05/23 1718 (Active)   Skin perineum cleansed w/ soap and water;no redness;no breakdown 05/07/23 0701   Tolerance no signs/symptoms of discomfort 05/07/23 0701   Suction Continuous suction at 60 mmHg 05/07/23 0701   Date of last wick change 05/07/23 05/07/23 0701   Time of last wick change 0701 05/07/23 0701   Output (mL) 50 mL 05/06/23 2001          Physical Exam         Neurosurgery Physical Exam  E4vtM6  FC x 4   FLETCHER AG  Numbness noted unclear distribution  PERRL, EOMI  Awake, alert  Intubated    Significant Labs:  Recent Labs   Lab 05/05/23  1403 05/06/23 0101 05/07/23  0108   * 127* 114*   * 135* 136   K 4.1 4.0 3.8   CL 99 100 104   CO2 19* 25 23   BUN 11 16 18   CREATININE 0.8 0.9 0.8   CALCIUM 10.1 9.6 10.0   MG  --  1.9 1.8     Recent Labs   Lab 05/05/23  1403 05/06/23  0101 05/07/23  0108   WBC 16.77* 19.25* 12.07   HGB 16.6* 15.2 13.7   HCT 50.0* 45.9 41.9    214 196     Recent Labs   Lab 05/05/23  1403 05/05/23  1827 05/06/23  0101   INR 1.0  --  1.0   APTT 34.0* 29.3 33.1*     Microbiology Results (last 7 days)       Procedure Component Value Units Date/Time    Urine culture [[id number]] Collected: 05/06/23 0432    Order Status: No result Specimen: Urine Updated: 05/06/23 0502          All pertinent labs from the last 24 hours have been reviewed.    Significant Diagnostics:  I have reviewed all pertinent imaging results/findings within the past 24 hours.

## 2023-05-07 NOTE — PROGRESS NOTES
[facility]  Neurocritical Care  Progress Note    Admit Date: 5/5/2023  Service Date: 05/07/2023  Length of Stay: 2    Subjective:     Chief Complaint: Nontraumatic intracerebral hemorrhage    History of Present Illness: Susan Pinto is a 67-year-old female with Hx of HTN presenting with altered mental status, tachycardia, left-sided facial droop, admitted for medullary ICH.   Patient started having vomiting that began around 7:00 p.m last night. On EMS arrival was noted to be hypoxic sat's in 80's with AMS, was intubated for airway protection. In ED at OSH CTh was done and was found to have a cervicomedullary junction ICH measuring approximately about 1.3 x 1.3 x 1.2 cm, also had a.fib with RVR Hr in the 150's that responded to BB. Was transferred to [facility] for further care         Hospital Course: 05/06/2023 transient hypotension overnight improved with stopping propofol. MRI with possible DVA, will discuss need for DSA with IR. Endorsed tingling in BLE -> CTH stable, trial of HTS for possible edema. SBT as tolerated. 3 day Abx for UTI. New double vision -> repeat CTH stable.   05/07/2023 CO2 retention noted on ABG yesterday after SBT. Gas normalized with SIMV. Will reassess on spontaneous today. Discussed possibility of trach if she fails to wean off the vent. Still with copious secretions and no cough. Day 2/3 of CTX for GNR UTI. Tube feeds initiated.       Interval History: As above.    Review of Systems   Unable to perform ROS: Intubated   Eyes:  Positive for visual disturbance (double vision).   Neurological:  Positive for numbness (paresthesias in distal extremities).     Objective:     Vitals:  Temp: 98.4 °F (36.9 °C)  Pulse: 83  Rhythm: normal sinus rhythm  BP: (!) 168/83  MAP (mmHg): 119  Resp: 17  SpO2: 97 %  Oxygen Concentration (%): 50  Vent Mode: SIMV  Set Rate: 16 BPM  Vt Set: 450 mL  Pressure Support: 8 cmH20  PEEP/CPAP: 5 cmH20  Peak Airway Pressure: 30 cmH20  Mean Airway  Pressure: 10 cmH20  Plateau Pressure: 0 cmH20    Temp  Min: 98.4 °F (36.9 °C)  Max: 99.3 °F (37.4 °C)  Pulse  Min: 60  Max: 88  BP  Min: 109/57  Max: 187/83  MAP (mmHg)  Min: 77  Max: 119  Resp  Min: 16  Max: 33  SpO2  Min: 92 %  Max: 100 %  Oxygen Concentration (%)  Min: 50  Max: 50    05/06 0701 - 05/07 0700  In: 1277.1 [I.V.:527]  Out: 625 [Urine:625]   Unmeasured Output  Stool Occurrence: 0        Physical Exam  Vitals and nursing note reviewed.   Constitutional:       General: She is not in acute distress.     Appearance: She is obese. She is not diaphoretic.   HENT:      Head: Normocephalic and atraumatic.      Right Ear: External ear normal.      Left Ear: External ear normal.      Nose: Nose normal.      Mouth/Throat:      Comments: ET tube and OG tube intact  Eyes:      General: No scleral icterus.        Right eye: No discharge.         Left eye: No discharge.      Extraocular Movements: Extraocular movements intact.      Pupils: Pupils are equal, round, and reactive to light.   Cardiovascular:      Rate and Rhythm: Normal rate and regular rhythm.   Pulmonary:      Effort: Pulmonary effort is normal. No respiratory distress.      Comments: Ventilator switched from SIMV to spontaneous  Abdominal:      General: Abdomen is flat. There is no distension.   Musculoskeletal:         General: No swelling or deformity. Normal range of motion.      Cervical back: Normal range of motion and neck supple.      Right lower leg: No edema.      Left lower leg: No edema.   Skin:     General: Skin is warm.      Coloration: Skin is not jaundiced.      Findings: No bruising.   Neurological:      Mental Status: She is alert.      Comments:   E4VTM6  Alert, follows commands, and can write to communicate  Off sedation  PERRL  EOMI  Face symmetric  Moves all extremities symmetrically against gravity  Regards to touch throughout     Unable to test orientation, language, memory, judgment, insight, fund of knowledge, coordination,  gait due to intubation.       Medications:  Continuous Scheduledatorvastatin, 40 mg, Daily  cefTRIAXone (ROCEPHIN) IVPB, 1 g, Q24H  famotidine, 20 mg, BID  fenofibrate, 48 mg, Daily  polyethylene glycol, 17 g, Daily  QUEtiapine, 25 mg, QHS  senna-docusate 8.6-50 mg, 1 tablet, BID  silodosin, 4 mg, Daily    PRNlabetalol, 10 mg, Q4H PRN  magnesium oxide, 800 mg, PRN  magnesium oxide, 800 mg, PRN  ondansetron, 4 mg, Q8H PRN  potassium bicarbonate, 35 mEq, PRN  potassium bicarbonate, 50 mEq, PRN  potassium bicarbonate, 60 mEq, PRN  potassium, sodium phosphates, 2 packet, PRN  potassium, sodium phosphates, 2 packet, PRN  potassium, sodium phosphates, 2 packet, PRN  sodium chloride 0.9%, 10 mL, PRN      Today I personally reviewed pertinent medications, lines/drains/airways, imaging, cardiology results, laboratory results, microbiology results, notably:    Diet  Diet NPO  Diet NPO      Assessment/Plan:     Neuro  * Nontraumatic intracerebral hemorrhage  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 with edema surrounding  - MRI brain w/wo (5/6): possible DVA and concern for cavernous malformation    - Admit to NCC  - NSGY following, no acute surgical intervention at this time   - OP f/u w NSGY in 4-6 weeks with a repeat MRI Brain w/wo contrast   - Open intervention for cavernoma may be considered if findings are concordant on repeat imaging given first time hemorrhage, but will discuss in OP setting   - DSA likely not beneficial  - Neuro checks Q1  - SBP< 160 per NSGY and SBP < 140 per VN  - intubated for airway protection, copious secretions and absent cough   - continue trials of spontaneous   - f/u daily ABG's  - Hold AC/AP  - Coags & CBC  - VN following  - PT/OT/SLP    Vasogenic cerebral edema  See ICH    Cardiac/Vascular  Hypertriglyceridemia  Triglycerides elevated to 929 on admission, started on niacin and fenofibrate  - TGL have since normalized    -  discontinue niacin  - continue fenofibrate  - space out TGL to daily, CTM and adjust regiment as indicated  - follow up with PCP outpatient on discharge     Hypertension  Home medications: amlodipine 5mg daily, lisinopril 40mg daily, and prn clonidine 0.1mg BID PRN for BP > 150/100    - goal SBP < 140 per VN and < 160 per NSGY  - resume home amlodipine, resume remainder of home meds as indicated  - prn labetalol and hydralazine  - cardene off since 4 am 5/7    A-fib  New onset at OSH, responded to BB  - initial EKG with A-fib RVR, repeat showed NSR    EKG  Monitor for now  Hold A/C    Hyperlipidemia LDL goal <70  Atorvastatin 40mg daily    Renal/  Urinary retention  Start silodosin  Prn straight cath    Acute cystitis  UA with findigns c/f UTI  Urine Cx with GNR's    - continue 3 day course of CTX (EOC: 5/8)  - f/u urine culture        The patient is being Prophylaxed for:  Venous Thromboembolism with: Mechanical  Stress Ulcer with: H2B  Ventilator Pneumonia with: chlorhexidine oral care    Activity Orders          Turn patient starting at 05/05 1800    Elevate HOB starting at 05/05 1626    Diet NPO: NPO starting at 05/05 1626        Full Code    Gilma Morris MD  Neurocritical Care  [facility]

## 2023-05-07 NOTE — PLAN OF CARE
"[facility] Care Plan    POC reviewed with Susan Pinto and family at 0300. Pt is intubated and unable to verbalize understanding. Questions and concerns addressed. No acute events overnight. Pt progressing toward goals. Will continue to monitor. See below and flowsheets for full assessment and VS info.     - Cardene gtt on at 2.5; titrated to maintain SBP <160.   - Replacing Phos (1.8); dose 2/3 given. Next dose to be administered at 1027.   - Straight cath at 0400; total   - Pt is using writing to communicate and can nod/shake head to answer y/n questions at this time.   - Pt reports vision still blurry and sometimes seeing double at a distance (not up close). Confirms that she is still experiencing numbness/tingling in hands and feet, but it is "not as bad"        Is this a stroke patient? yes- Stroke booklet reviewed with patient and family, risk factors identified for patient and stroke booklet remains at bedside for ongoing education.     Neuro:  Naldo Coma Scale  Best Eye Response: 4-->(E4) spontaneous  Best Motor Response: 6-->(M6) obeys commands  Best Verbal Response: 1-->(V1) none  Naldo Coma Scale Score: 11  Assessment Qualifiers: patient intubated, no eye obstruction present  Pupil PERRLA: yes     24hr Temp:  [98.3 °F (36.8 °C)-99.3 °F (37.4 °C)]     CV:   Rhythm: normal sinus rhythm, sinus bradycardia  BP goals:   SBP < 160  MAP > 65    Resp:      Vent Mode: SIMV  Set Rate: 16 BPM  Oxygen Concentration (%): 50  Vt Set: 450 mL  PEEP/CPAP: 5 cmH20  Pressure Support: 8 cmH20    Plan: wean to extubate    GI/:     Diet/Nutrition Received: NPO  Last Bowel Movement: 05/04/23  Voiding Characteristics: external catheter    Intake/Output Summary (Last 24 hours) at 5/7/2023 0341  Last data filed at 5/7/2023 0301  Gross per 24 hour   Intake 1248.34 ml   Output 510 ml   Net 738.34 ml     Unmeasured Output  Stool Occurrence: 0    Labs/Accuchecks:  Recent Labs   Lab 05/07/23  0108   WBC 12.07   RBC 4.70 "   HGB 13.7   HCT 41.9         Recent Labs   Lab 05/07/23  0108      K 3.8   CO2 23      BUN 18   CREATININE 0.8   ALKPHOS 53*   ALT 12   AST 23   BILITOT 0.6      Recent Labs   Lab 05/06/23  0101   INR 1.0   APTT 33.1*      Recent Labs   Lab 05/06/23  1357   TROPONINI 0.029*       Electrolytes: Electrolytes replaced  Accuchecks: none    Gtts:   fentanyl Stopped (05/06/23 1443)    niCARdipine 2.5 mg/hr (05/07/23 0301)       LDA/Wounds:  Lines/Drains/Airways       Drain  Duration                  NG/OG Tube 05/05/23 1445 orogastric 18 Fr. Center mouth 1 day    Female External Urinary Catheter 05/05/23 1718 1 day              Airway  Duration                  Airway - Non-Surgical 05/05/23 1430 Endotracheal Tube 1 day              Peripheral Intravenous Line  Duration                  Peripheral IV - Single Lumen 05/05/23 1716 18 G Left Upper Arm 1 day         Peripheral IV - Single Lumen 05/06/23 1425 20 G Anterior;Left Forearm <1 day                  Wounds: No  Wound care consulted: No

## 2023-05-08 LAB
ALBUMIN SERPL BCP-MCNC: 3.1 G/DL (ref 3.5–5.2)
ALLENS TEST: ABNORMAL
ALP SERPL-CCNC: 52 U/L (ref 55–135)
ALT SERPL W/O P-5'-P-CCNC: 11 U/L (ref 10–44)
ANION GAP SERPL CALC-SCNC: 10 MMOL/L (ref 8–16)
AST SERPL-CCNC: 20 U/L (ref 10–40)
BACTERIA UR CULT: ABNORMAL
BASOPHILS # BLD AUTO: 0.05 K/UL (ref 0–0.2)
BASOPHILS NFR BLD: 0.4 % (ref 0–1.9)
BILIRUB SERPL-MCNC: 0.5 MG/DL (ref 0.1–1)
BUN SERPL-MCNC: 19 MG/DL (ref 8–23)
CALCIUM SERPL-MCNC: 9.5 MG/DL (ref 8.7–10.5)
CHLORIDE SERPL-SCNC: 104 MMOL/L (ref 95–110)
CO2 SERPL-SCNC: 24 MMOL/L (ref 23–29)
CREAT SERPL-MCNC: 0.8 MG/DL (ref 0.5–1.4)
DELSYS: ABNORMAL
DIFFERENTIAL METHOD: ABNORMAL
EOSINOPHIL # BLD AUTO: 0.1 K/UL (ref 0–0.5)
EOSINOPHIL NFR BLD: 0.6 % (ref 0–8)
ERYTHROCYTE [DISTWIDTH] IN BLOOD BY AUTOMATED COUNT: 13.5 % (ref 11.5–14.5)
EST. GFR  (NO RACE VARIABLE): >60 ML/MIN/1.73 M^2
FIO2: 50
GLUCOSE SERPL-MCNC: 118 MG/DL (ref 70–110)
HCO3 UR-SCNC: 29.5 MMOL/L (ref 24–28)
HCT VFR BLD AUTO: 41.2 % (ref 37–48.5)
HGB BLD-MCNC: 13.4 G/DL (ref 12–16)
IMM GRANULOCYTES # BLD AUTO: 0.06 K/UL (ref 0–0.04)
IMM GRANULOCYTES NFR BLD AUTO: 0.5 % (ref 0–0.5)
LYMPHOCYTES # BLD AUTO: 2.4 K/UL (ref 1–4.8)
LYMPHOCYTES NFR BLD: 20.3 % (ref 18–48)
MAGNESIUM SERPL-MCNC: 2 MG/DL (ref 1.6–2.6)
MAP: 121
MCH RBC QN AUTO: 29.1 PG (ref 27–31)
MCHC RBC AUTO-ENTMCNC: 32.5 G/DL (ref 32–36)
MCV RBC AUTO: 90 FL (ref 82–98)
MIN VOL: 8.87
MODE: ABNORMAL
MONOCYTES # BLD AUTO: 1.3 K/UL (ref 0.3–1)
MONOCYTES NFR BLD: 11.3 % (ref 4–15)
NEUTROPHILS # BLD AUTO: 7.7 K/UL (ref 1.8–7.7)
NEUTROPHILS NFR BLD: 66.9 % (ref 38–73)
NRBC BLD-RTO: 0 /100 WBC
PCO2 BLDA: 44.1 MMHG (ref 35–45)
PEEP: 5
PH SMN: 7.43 [PH] (ref 7.35–7.45)
PHOSPHATE SERPL-MCNC: 2.4 MG/DL (ref 2.7–4.5)
PLATELET # BLD AUTO: 199 K/UL (ref 150–450)
PLATELET BLD QL SMEAR: ABNORMAL
PMV BLD AUTO: 11.3 FL (ref 9.2–12.9)
PO2 BLDA: 88 MMHG (ref 80–100)
POC BE: 5 MMOL/L
POC SATURATED O2: 97 % (ref 95–100)
POC TCO2: 31 MMOL/L (ref 23–27)
POCT GLUCOSE: 127 MG/DL (ref 70–110)
POTASSIUM SERPL-SCNC: 4.6 MMOL/L (ref 3.5–5.1)
PROT SERPL-MCNC: 6.9 G/DL (ref 6–8.4)
PS: 12
RBC # BLD AUTO: 4.6 M/UL (ref 4–5.4)
SAMPLE: ABNORMAL
SITE: ABNORMAL
SODIUM SERPL-SCNC: 138 MMOL/L (ref 136–145)
SP02: 97
SPONT RATE: 19
TRIGL SERPL-MCNC: 84 MG/DL (ref 30–150)
VOL: 501
WBC # BLD AUTO: 11.55 K/UL (ref 3.9–12.7)

## 2023-05-08 PROCEDURE — 27200966 HC CLOSED SUCTION SYSTEM

## 2023-05-08 PROCEDURE — 84478 ASSAY OF TRIGLYCERIDES: CPT

## 2023-05-08 PROCEDURE — 99900026 HC AIRWAY MAINTENANCE (STAT)

## 2023-05-08 PROCEDURE — 27000221 HC OXYGEN, UP TO 24 HOURS

## 2023-05-08 PROCEDURE — 99900035 HC TECH TIME PER 15 MIN (STAT)

## 2023-05-08 PROCEDURE — 25000003 PHARM REV CODE 250

## 2023-05-08 PROCEDURE — 20000000 HC ICU ROOM

## 2023-05-08 PROCEDURE — 85025 COMPLETE CBC W/AUTO DIFF WBC: CPT | Performed by: PSYCHIATRY & NEUROLOGY

## 2023-05-08 PROCEDURE — 82803 BLOOD GASES ANY COMBINATION: CPT

## 2023-05-08 PROCEDURE — 80053 COMPREHEN METABOLIC PANEL: CPT | Performed by: PSYCHIATRY & NEUROLOGY

## 2023-05-08 PROCEDURE — 94761 N-INVAS EAR/PLS OXIMETRY MLT: CPT

## 2023-05-08 PROCEDURE — 36600 WITHDRAWAL OF ARTERIAL BLOOD: CPT

## 2023-05-08 PROCEDURE — 99291 PR CRITICAL CARE, E/M 30-74 MINUTES: ICD-10-PCS | Mod: GC,,, | Performed by: INTERNAL MEDICINE

## 2023-05-08 PROCEDURE — 51798 US URINE CAPACITY MEASURE: CPT

## 2023-05-08 PROCEDURE — 84100 ASSAY OF PHOSPHORUS: CPT | Performed by: PSYCHIATRY & NEUROLOGY

## 2023-05-08 PROCEDURE — 63600175 PHARM REV CODE 636 W HCPCS: Performed by: PSYCHIATRY & NEUROLOGY

## 2023-05-08 PROCEDURE — 63600175 PHARM REV CODE 636 W HCPCS

## 2023-05-08 PROCEDURE — 25000003 PHARM REV CODE 250: Performed by: PSYCHIATRY & NEUROLOGY

## 2023-05-08 PROCEDURE — 99233 SBSQ HOSP IP/OBS HIGH 50: CPT | Mod: ,,, | Performed by: NEUROLOGICAL SURGERY

## 2023-05-08 PROCEDURE — 94003 VENT MGMT INPAT SUBQ DAY: CPT

## 2023-05-08 PROCEDURE — 83735 ASSAY OF MAGNESIUM: CPT | Performed by: PSYCHIATRY & NEUROLOGY

## 2023-05-08 PROCEDURE — 99291 CRITICAL CARE FIRST HOUR: CPT | Mod: GC,,, | Performed by: INTERNAL MEDICINE

## 2023-05-08 PROCEDURE — 97112 NEUROMUSCULAR REEDUCATION: CPT

## 2023-05-08 PROCEDURE — 99233 PR SUBSEQUENT HOSPITAL CARE,LEVL III: ICD-10-PCS | Mod: ,,, | Performed by: NEUROLOGICAL SURGERY

## 2023-05-08 RX ORDER — LISINOPRIL 20 MG/1
40 TABLET ORAL DAILY
Status: DISCONTINUED | OUTPATIENT
Start: 2023-05-08 | End: 2023-05-17 | Stop reason: HOSPADM

## 2023-05-08 RX ORDER — BISACODYL 10 MG
10 SUPPOSITORY, RECTAL RECTAL DAILY PRN
Status: DISCONTINUED | OUTPATIENT
Start: 2023-05-08 | End: 2023-05-17 | Stop reason: HOSPADM

## 2023-05-08 RX ORDER — HYDRALAZINE HYDROCHLORIDE 20 MG/ML
10 INJECTION INTRAMUSCULAR; INTRAVENOUS EVERY 6 HOURS PRN
Status: DISCONTINUED | OUTPATIENT
Start: 2023-05-08 | End: 2023-05-17 | Stop reason: HOSPADM

## 2023-05-08 RX ORDER — AMLODIPINE BESYLATE 10 MG/1
10 TABLET ORAL DAILY
Status: DISCONTINUED | OUTPATIENT
Start: 2023-05-08 | End: 2023-05-17 | Stop reason: HOSPADM

## 2023-05-08 RX ADMIN — LABETALOL HYDROCHLORIDE 10 MG: 5 INJECTION, SOLUTION INTRAVENOUS at 02:05

## 2023-05-08 RX ADMIN — ONDANSETRON 4 MG: 2 INJECTION INTRAMUSCULAR; INTRAVENOUS at 07:05

## 2023-05-08 RX ADMIN — LISINOPRIL 40 MG: 20 TABLET ORAL at 10:05

## 2023-05-08 RX ADMIN — AMLODIPINE BESYLATE 10 MG: 10 TABLET ORAL at 08:05

## 2023-05-08 RX ADMIN — SENNOSIDES AND DOCUSATE SODIUM 1 TABLET: 50; 8.6 TABLET ORAL at 08:05

## 2023-05-08 RX ADMIN — POLYETHYLENE GLYCOL 3350 17 G: 17 POWDER, FOR SOLUTION ORAL at 08:05

## 2023-05-08 RX ADMIN — FAMOTIDINE 20 MG: 20 TABLET ORAL at 08:05

## 2023-05-08 RX ADMIN — QUETIAPINE FUMARATE 25 MG: 25 TABLET ORAL at 08:05

## 2023-05-08 RX ADMIN — MUPIROCIN: 20 OINTMENT TOPICAL at 08:05

## 2023-05-08 RX ADMIN — ATORVASTATIN CALCIUM 40 MG: 40 TABLET, FILM COATED ORAL at 08:05

## 2023-05-08 RX ADMIN — POTASSIUM & SODIUM PHOSPHATES POWDER PACK 280-160-250 MG 2 PACKET: 280-160-250 PACK at 08:05

## 2023-05-08 RX ADMIN — FENOFIBRATE 48 MG: 48 TABLET ORAL at 08:05

## 2023-05-08 RX ADMIN — LABETALOL HYDROCHLORIDE 10 MG: 5 INJECTION, SOLUTION INTRAVENOUS at 09:05

## 2023-05-08 RX ADMIN — HYDRALAZINE HYDROCHLORIDE 10 MG: 20 INJECTION, SOLUTION INTRAMUSCULAR; INTRAVENOUS at 04:05

## 2023-05-08 RX ADMIN — CEFTRIAXONE 1 G: 1 INJECTION, POWDER, FOR SOLUTION INTRAMUSCULAR; INTRAVENOUS at 10:05

## 2023-05-08 RX ADMIN — BISACODYL 10 MG: 10 SUPPOSITORY RECTAL at 01:05

## 2023-05-08 RX ADMIN — SILODOSIN 4 MG: 4 CAPSULE ORAL at 08:05

## 2023-05-08 RX ADMIN — LABETALOL HYDROCHLORIDE 10 MG: 5 INJECTION, SOLUTION INTRAVENOUS at 08:05

## 2023-05-08 RX ADMIN — ONDANSETRON 4 MG: 2 INJECTION INTRAMUSCULAR; INTRAVENOUS at 08:05

## 2023-05-08 RX ADMIN — LABETALOL HYDROCHLORIDE 10 MG: 5 INJECTION, SOLUTION INTRAVENOUS at 06:05

## 2023-05-08 RX ADMIN — POTASSIUM & SODIUM PHOSPHATES POWDER PACK 280-160-250 MG 2 PACKET: 280-160-250 PACK at 03:05

## 2023-05-08 NOTE — PROGRESS NOTES
[facility]  Neurosurgery  Progress Note    Subjective:     History of Present Illness: Susan Pinto is a 66 yo female who presents due to a medullary ICH.  History limited due to intubation, obtained from collateral information from primary team/chart review.  Ms. Pinto vomited and had initial altered mentation earlier today and subsequently desatted and decompensated when EMS arrived.  Her initial systolic blood pressure was in the 250s.  CT head at [facility] revealed medullary ICH.       Post-Op Info:  * No surgery found *         Interval History: 5/8: DEBBIE Remains intubated, neurologically stable.     Medications:  Continuous Infusions:  Scheduled Meds:   amLODIPine  5 mg Per OG tube Daily    atorvastatin  40 mg Per OG tube Daily    cefTRIAXone (ROCEPHIN) IVPB  1 g Intravenous Q24H    famotidine  20 mg Per OG tube BID    fenofibrate  48 mg Per OG tube Daily    mupirocin   Nasal BID    polyethylene glycol  17 g Per NG tube Daily    QUEtiapine  25 mg Per OG tube QHS    senna-docusate 8.6-50 mg  1 tablet Per OG tube BID    silodosin  4 mg Per OG tube Daily     PRN Meds:hydrALAZINE, labetalol, magnesium oxide, magnesium oxide, ondansetron, potassium bicarbonate, potassium bicarbonate, potassium bicarbonate, potassium, sodium phosphates, potassium, sodium phosphates, potassium, sodium phosphates, sodium chloride 0.9%     Review of Systems  Objective:     Weight: 95.3 kg (210 lb)  Body mass index is 36.05 kg/m².  Vital Signs (Most Recent):  Temp: 99 °F (37.2 °C) (05/08/23 0301)  Pulse: 83 (05/08/23 0616)  Resp: (!) 25 (05/08/23 0616)  BP: (!) 177/86 (05/08/23 0435)  SpO2: 99 % (05/08/23 0616) Vital Signs (24h Range):  Temp:  [98.4 °F (36.9 °C)-99.8 °F (37.7 °C)] 99 °F (37.2 °C)  Pulse:  [66-99] 83  Resp:  [16-37] 25  SpO2:  [94 %-100 %] 99 %  BP: (130-177)/() 177/86                Vent Mode: Spont  Oxygen Concentration (%):  [50] 50  Resp Rate Total:  [13 br/min-32 br/min] 24  br/min  Vt Set:  [450 mL] 450 mL  PEEP/CPAP:  [5 cmH20] 5 cmH20  Pressure Support:  [5 tkY28-04 cmH20] 12 cmH20  Mean Airway Pressure:  [7.8 cmH20-10 cmH20] 9.6 cmH20             NG/OG Tube 05/05/23 1445 orogastric 18 Fr. Center mouth (Active)   Placement Check placement verified by x-ray;placement verified by distal tube length measurement 05/08/23 0301   Tolerance no signs/symptoms of discomfort 05/08/23 0301   Securement secured to commercial device 05/08/23 0301   Clamp Status/Tolerance clamped;no abdominal distention;no emesis;no restlessness 05/08/23 0301   Suction Setting/Drainage Method suction at the bedside 05/08/23 0301   Insertion Site Appearance no redness, warmth, tenderness, skin breakdown, drainage 05/08/23 0301   Drainage None 05/08/23 0301   Flush/Irrigation flushed w/;water;no resistance met 05/08/23 0301   Feeding Type continuous;by pump 05/08/23 0301   Feeding Action feeding continued 05/08/23 0301   Current Rate (mL/hr) 25 mL/hr 05/07/23 1901   Goal Rate (mL/hr) 45 mL/hr 05/07/23 1901   Intake (mL) 60 mL 05/07/23 1501   Water Bolus (mL) 100 mL 05/07/23 2001   Intake (mL) - Formula Tube Feeding 35 05/08/23 0201       Female External Urinary Catheter 05/05/23 1718 (Active)   Skin no redness;no breakdown;female external urine collection device repositioned 05/08/23 0301   Tolerance no signs/symptoms of discomfort 05/08/23 0301   Suction Continuous suction at 60 mmHg 05/07/23 1901   Date of last wick change 05/08/23 05/07/23 1901   Time of last wick change 2200 05/07/23 1901   Output (mL) 100 mL 05/08/23 0201          Physical Exam     Neurosurgery Physical Exam  [id number]  FC x 4  PERRL  EOMI  Nodding to questions appropriately  No distress.             Significant Labs:  Recent Labs   Lab 05/07/23  0108 05/08/23  0210   * 118*    138   K 3.8 4.6    104   CO2 23 24   BUN 18 19   CREATININE 0.8 0.8   CALCIUM 10.0 9.5   MG 1.8 2.0     Recent Labs   Lab 05/07/23  0108 05/08/23  0210    WBC 12.07 11.55   HGB 13.7 13.4   HCT 41.9 41.2    199     No results for input(s): LABPT, INR, APTT in the last 48 hours.  Microbiology Results (last 7 days)       Procedure Component Value Units Date/Time    Urine culture [[id number]]  (Abnormal) Collected: 05/06/23 0432    Order Status: Completed Specimen: Urine Updated: 05/07/23 1152     Urine Culture, Routine PRESUMPTIVE E COLI  >100,000 cfu/ml  Identification and susceptibility pending      Narrative:      Specimen Source->Urine          All pertinent labs from the last 24 hours have been reviewed.    Significant Diagnostics:  I have reviewed all pertinent imaging results/findings within the past 24 hours.    Assessment/Plan:     * Nontraumatic intracerebral hemorrhage  66 yo female with a PMH of HTN, Afib with RVR who presents after hypertension today with desaturation and vomiting with subsequent intubation today.  Exam with strength intact, possible lower cranial nerve dysfunction. CT head with 1.3x1.3cm ICH centered in the left medullar with stable size and without apparent vascular anomaly on CTA, no IVH or hydrocephalus. ICH score 1.       --Admitted to neuro ICU  --SBP < 160  --No acute neurosurgical intervention  --MRI brain w/wo 5/6 with possible DVA and concern for cavernous malformation  --Wean to extubate as tolerated  --Imaging findings stable, favorable neurologic exam.    Outpatient follow up with neurosurgery in 4-6 weeks with a repeat MRI Brain w/wo contrast.     Open Intervention for cavernoma may be considered if findings are concordant on repeat imaging given first time hemorrhage, but will discuss in the outpatient setting.    --DSA likely not beneficial    --Hold anticoagulation  --Medical management per neuro ICU  --Continue to monitor clinically, call with any decline in exam.                   Magdiel Fontaine MD  Neurosurgery  [facility]

## 2023-05-08 NOTE — PLAN OF CARE
[facility]  Discharge Reassessment    Primary Care Provider: Court Champion NP    Expected Discharge Date: 5/15/2023    Pt stepped up from NPU over weekend and intubated. Pending extubation per MD team. DC plan: TBD      Reassessment (most recent)       Discharge Reassessment - 05/08/23 1345          Discharge Reassessment    Assessment Type Discharge Planning Reassessment (P)      Did the patient's condition or plan change since previous assessment? Yes (P)      Discharge Plan discussed with: Spouse/sig other (P)      Name(s) and Number(s) Ivan Pinto (spouse) [phone] (P)      Communicated ANITHA with patient/caregiver Date not available/Unable to determine (P)      Discharge Plan A Home with family;Home Health (P)      Discharge Plan B Rehab (P)      DME Needed Upon Discharge  other (see comments) (P)    TBD    Discharge Barriers Identified None (P)      Why the patient remains in the hospital Requires continued medical care (P)         Post-Acute Status    Discharge Delays None known at this time (P)                    Ekta Arora LCSW  Neurocritical Care   [facility]  [id number]

## 2023-05-08 NOTE — ASSESSMENT & PLAN NOTE
Home medications: amlodipine 5mg daily, lisinopril 40mg daily, and prn clonidine 0.1mg BID PRN for BP > 150/100    - goal SBP < 140 per VN and < 160 per NSGY  - resume home amlodipine, increased dose to 10mg daily  - restart home lisinopril 40mg daily  - prn labetalol and hydralazine  - cardene off since 4 am 5/7

## 2023-05-08 NOTE — PROGRESS NOTES
[facility]  Neurocritical Care  Progress Note    Admit Date: 5/5/2023  Service Date: 05/08/2023  Length of Stay: 3    Subjective:     Chief Complaint: Nontraumatic intracerebral hemorrhage    History of Present Illness: Susan Pinto is a 67-year-old female with Hx of HTN presenting with altered mental status, tachycardia, left-sided facial droop, admitted for medullary ICH.   Patient started having vomiting that began around 7:00 p.m last night. On EMS arrival was noted to be hypoxic sat's in 80's with AMS, was intubated for airway protection. In ED at OSH CTh was done and was found to have a cervicomedullary junction ICH measuring approximately about 1.3 x 1.3 x 1.2 cm, also had a.fib with RVR Hr in the 150's that responded to BB. Was transferred to [facility] for further care         Hospital Course: 05/06/2023 transient hypotension overnight improved with stopping propofol. MRI with possible DVA, will discuss need for DSA with IR. Endorsed tingling in BLE -> CTH stable, trial of HTS for possible edema. SBT as tolerated. 3 day Abx for UTI. New double vision -> repeat CTH stable.   05/07/2023 CO2 retention noted on ABG yesterday after SBT. Gas normalized with SIMV. Will reassess on spontaneous today. Discussed possibility of trach if she fails to wean off the vent. Still with copious secretions and no cough. Day 2/3 of CTX for GNR UTI. Tube feeds initiated.  05/08/2023 Pt remains on spontaneous vent settings with CO2 in normal range. T-piece trial unsuccessful yesterday. Cough reflex improved today.       Interval History: As above.    Review of Systems   Unable to perform ROS: Intubated   Eyes:  Positive for visual disturbance (double vision).   Gastrointestinal:  Positive for nausea.   Neurological:  Positive for numbness (paresthesias in distal extremities).     Objective:     Vitals:  Temp: 98 °F (36.7 °C)  Pulse: 76  Rhythm: normal sinus rhythm  BP: (!) 150/78  MAP (mmHg): 108  Resp:  18  SpO2: 96 %  Oxygen Concentration (%): 50  Vent Mode: Spont  Pressure Support: 12 cmH20  PEEP/CPAP: 5 cmH20  Peak Airway Pressure: 17 cmH20  Mean Airway Pressure: 8 cmH20  Plateau Pressure: 0 cmH20    Temp  Min: 98 °F (36.7 °C)  Max: 99.8 °F (37.7 °C)  Pulse  Min: 66  Max: 99  BP  Min: 130/73  Max: 177/86  MAP (mmHg)  Min: 92  Max: 123  Resp  Min: 16  Max: 37  SpO2  Min: 94 %  Max: 100 %  Oxygen Concentration (%)  Min: 50  Max: 50    05/07 0701 - 05/08 0700  In: 511.4 [I.V.:37.2]  Out: 650 [Urine:650]   Unmeasured Output  Stool Occurrence: 0        Physical Exam  Vitals and nursing note reviewed.   Constitutional:       General: She is not in acute distress.     Appearance: She is obese. She is not diaphoretic.   HENT:      Head: Normocephalic and atraumatic.      Right Ear: External ear normal.      Left Ear: External ear normal.      Nose: Nose normal.      Mouth/Throat:      Comments: ET tube and OG tube intact  Copious pooled secretions in oral cavity  Eyes:      General: No scleral icterus.        Right eye: No discharge.         Left eye: No discharge.      Extraocular Movements: Extraocular movements intact.      Pupils: Pupils are equal, round, and reactive to light.   Cardiovascular:      Rate and Rhythm: Normal rate and regular rhythm.   Pulmonary:      Effort: Pulmonary effort is normal. No respiratory distress.      Comments: Ventilator on spontaneous  Abdominal:      General: Abdomen is flat. There is no distension.   Musculoskeletal:         General: No swelling or deformity. Normal range of motion.      Cervical back: Normal range of motion and neck supple.      Right lower leg: No edema.      Left lower leg: No edema.   Skin:     General: Skin is warm.      Coloration: Skin is not jaundiced.      Findings: No bruising.   Neurological:      Mental Status: She is alert.      Comments:   [id number]  Alert, follows commands, and can write to communicate  Off sedation  Cough and gag reflex  intact  PERRL  EOMI  Face symmetric  Moves all extremities symmetrically against gravity  Regards to touch throughout     Unable to test orientation, language, memory, judgment, insight, fund of knowledge, coordination, gait due to intubation.       Medications:  Continuous ScheduledamLODIPine, 10 mg, Daily  atorvastatin, 40 mg, Daily  cefTRIAXone (ROCEPHIN) IVPB, 1 g, Q24H  famotidine, 20 mg, BID  fenofibrate, 48 mg, Daily  lisinopriL, 40 mg, Daily  mupirocin, , BID  polyethylene glycol, 17 g, Daily  QUEtiapine, 25 mg, QHS  senna-docusate 8.6-50 mg, 1 tablet, BID  silodosin, 4 mg, Daily    PRNbisacodyL, 10 mg, Daily PRN  hydrALAZINE, 10 mg, Q6H PRN  labetalol, 10 mg, Q4H PRN  magnesium oxide, 800 mg, PRN  magnesium oxide, 800 mg, PRN  ondansetron, 4 mg, Q8H PRN  potassium bicarbonate, 35 mEq, PRN  potassium bicarbonate, 50 mEq, PRN  potassium bicarbonate, 60 mEq, PRN  potassium, sodium phosphates, 2 packet, PRN  potassium, sodium phosphates, 2 packet, PRN  potassium, sodium phosphates, 2 packet, PRN  sodium chloride 0.9%, 10 mL, PRN      Today I personally reviewed pertinent medications, lines/drains/airways, imaging, cardiology results, laboratory results, microbiology results, notably:    Diet  Diet NPO  Diet NPO      Assessment/Plan:     Neuro  * Nontraumatic intracerebral hemorrhage  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 with edema surrounding  - MRI brain w/wo (5/6): possible DVA and concern for cavernous malformation  - left medullary hemorrhage vs cavernoma    Plan:  - Admit to NCC  - NSGY following, no acute surgical intervention at this time   - OP f/u w NSGY in 4-6 weeks with a repeat MRI Brain w/wo contrast   - Open intervention for cavernoma may be considered if findings are concordant on repeat imaging given first time hemorrhage, but will discuss in OP setting   - DSA likely not beneficial  - Neuro checks Q1  - SBP< 160 per NSGY  -  intubated for airway protection, copious secretions and weak cough   - continue spontaneous ventilator setting   - continue trails of T-piece   - f/u daily ABG's  - Hold AC/AP  - Coags & CBC  - VN following  - PT/OT/SLP    Vasogenic cerebral edema  See ICH    Cardiac/Vascular  Hypertriglyceridemia  Triglycerides elevated to 929 on admission, started on niacin and fenofibrate  - TGL have since normalized    - discontinue niacin  - continue fenofibrate  - space out TGL to daily, CTM and adjust regiment as indicated  - follow up with PCP outpatient on discharge     Hypertension  Home medications: amlodipine 5mg daily, lisinopril 40mg daily, and prn clonidine 0.1mg BID PRN for BP > 150/100    - goal SBP < 140 per VN and < 160 per NSGY  - resume home amlodipine, increased dose to 10mg daily  - restart home lisinopril 40mg daily  - prn labetalol and hydralazine  - cardene off since 4 am 5/7    A-fib  New onset at OSH, responded to BB  - initial EKG with A-fib RVR, repeat showed NSR    EKG  Monitor for now  Hold A/C    Hyperlipidemia LDL goal <70  Atorvastatin 40mg daily    Renal/  Urinary retention  Continue silodosin  Prn straight cath    Acute cystitis  UA with findigns c/f UTI  Urine Cx with E. Coli    - continue 3 day course of CTX (EOC: 5/8)          The patient is being Prophylaxed for:  Venous Thromboembolism with: Mechanical  Stress Ulcer with: H2B  Ventilator Pneumonia with: chlorhexidine oral care    Activity Orders          Turn patient starting at 05/05 1800    Elevate HOB starting at 05/05 1626    Diet NPO: NPO starting at 05/05 1626        Full Code    Gilma Morris MD  Neurocritical Care  [facility]

## 2023-05-08 NOTE — SUBJECTIVE & OBJECTIVE
Interval History: As above.    Review of Systems   Unable to perform ROS: Intubated   Eyes:  Positive for visual disturbance (double vision).   Gastrointestinal:  Positive for nausea.   Neurological:  Positive for numbness (paresthesias in distal extremities).     Objective:     Vitals:  Temp: 98 °F (36.7 °C)  Pulse: 76  Rhythm: normal sinus rhythm  BP: (!) 150/78  MAP (mmHg): 108  Resp: 18  SpO2: 96 %  Oxygen Concentration (%): 50  Vent Mode: Spont  Pressure Support: 12 cmH20  PEEP/CPAP: 5 cmH20  Peak Airway Pressure: 17 cmH20  Mean Airway Pressure: 8 cmH20  Plateau Pressure: 0 cmH20    Temp  Min: 98 °F (36.7 °C)  Max: 99.8 °F (37.7 °C)  Pulse  Min: 66  Max: 99  BP  Min: 130/73  Max: 177/86  MAP (mmHg)  Min: 92  Max: 123  Resp  Min: 16  Max: 37  SpO2  Min: 94 %  Max: 100 %  Oxygen Concentration (%)  Min: 50  Max: 50    05/07 0701 - 05/08 0700  In: 511.4 [I.V.:37.2]  Out: 650 [Urine:650]   Unmeasured Output  Stool Occurrence: 0        Physical Exam  Vitals and nursing note reviewed.   Constitutional:       General: She is not in acute distress.     Appearance: She is obese. She is not diaphoretic.   HENT:      Head: Normocephalic and atraumatic.      Right Ear: External ear normal.      Left Ear: External ear normal.      Nose: Nose normal.      Mouth/Throat:      Comments: ET tube and OG tube intact  Copious pooled secretions in oral cavity  Eyes:      General: No scleral icterus.        Right eye: No discharge.         Left eye: No discharge.      Extraocular Movements: Extraocular movements intact.      Pupils: Pupils are equal, round, and reactive to light.   Cardiovascular:      Rate and Rhythm: Normal rate and regular rhythm.   Pulmonary:      Effort: Pulmonary effort is normal. No respiratory distress.      Comments: Ventilator on spontaneous  Abdominal:      General: Abdomen is flat. There is no distension.   Musculoskeletal:         General: No swelling or deformity. Normal range of motion.      Cervical  back: Normal range of motion and neck supple.      Right lower leg: No edema.      Left lower leg: No edema.   Skin:     General: Skin is warm.      Coloration: Skin is not jaundiced.      Findings: No bruising.   Neurological:      Mental Status: She is alert.      Comments:   [id number]  Alert, follows commands, and can write to communicate  Off sedation  Cough and gag reflex intact  PERRL  EOMI  Face symmetric  Moves all extremities symmetrically against gravity  Regards to touch throughout     Unable to test orientation, language, memory, judgment, insight, fund of knowledge, coordination, gait due to intubation.       Medications:  Continuous ScheduledamLODIPine, 10 mg, Daily  atorvastatin, 40 mg, Daily  cefTRIAXone (ROCEPHIN) IVPB, 1 g, Q24H  famotidine, 20 mg, BID  fenofibrate, 48 mg, Daily  lisinopriL, 40 mg, Daily  mupirocin, , BID  polyethylene glycol, 17 g, Daily  QUEtiapine, 25 mg, QHS  senna-docusate 8.6-50 mg, 1 tablet, BID  silodosin, 4 mg, Daily    PRNbisacodyL, 10 mg, Daily PRN  hydrALAZINE, 10 mg, Q6H PRN  labetalol, 10 mg, Q4H PRN  magnesium oxide, 800 mg, PRN  magnesium oxide, 800 mg, PRN  ondansetron, 4 mg, Q8H PRN  potassium bicarbonate, 35 mEq, PRN  potassium bicarbonate, 50 mEq, PRN  potassium bicarbonate, 60 mEq, PRN  potassium, sodium phosphates, 2 packet, PRN  potassium, sodium phosphates, 2 packet, PRN  potassium, sodium phosphates, 2 packet, PRN  sodium chloride 0.9%, 10 mL, PRN      Today I personally reviewed pertinent medications, lines/drains/airways, imaging, cardiology results, laboratory results, microbiology results, notably:    Diet  Diet NPO  Diet NPO

## 2023-05-08 NOTE — PT/OT/SLP PROGRESS
Occupational Therapy   Treatment    Name: Susan Pinto  MRN: [id number]  Admitting Diagnosis:  Nontraumatic intracerebral hemorrhage       Recommendations:     Discharge Recommendations:  (pending extubation)  Discharge Equipment Recommendations:   (pending extubation)  Barriers to discharge:  None    Assessment:     Susan Pinto is a 67 y.o. female with a medical diagnosis of Nontraumatic intracerebral hemorrhage.  She presents with performance deficits affecting function are weakness, impaired balance, impaired endurance, impaired self care skills, impaired functional mobility.     Rehab Prognosis:  Good; patient would benefit from acute skilled OT services to address these deficits and reach maximum level of function.       Plan:     Patient to be seen 3 x/week to address the above listed problems via cognitive retraining, neuromuscular re-education, therapeutic exercises, therapeutic activities, self-care/home management  Plan of Care Expires: 06/03/23  Plan of Care Reviewed with: patient    Subjective     Patient:  Communicating via head nods.  Pain/Comfort:  Pain Rating 1: 0/10  Pain Rating Post-Intervention 1: 0/10    Objective:     Communicated with: Nurse prior to session.  Patient found supine with telemetry, ventilator, SCD, peripheral IV, pulse ox (continuous), restraints upon OT entry to room.    General Precautions: Standard, aspiration, fall    Orthopedic Precautions:N/A  Braces: N/A  Respiratory Status: Room air     Occupational Performance:     Bed Mobility:    Patient completed Rolling/Turning to Left with  maximal assistance  Patient completed Rolling/Turning to Right with maximal assistance     Activities of Daily Living:  Feeding:  NPO      [facility] 6 Click ADL: 6    Treatment & Education:  Daily orientation provided.  AAROM performed bilateral UE/LEs one set x 10 rep in all planes of motion. Provided multi-sensory stimulation to prevent sensory deprivation and improve clinical outcomes.   Positioning provided for midline orientation with bilateral UEs elevated and heels lifted off mattress.  Patient alert and able to follow 4/4 one step commands.  Patient attentive and interactive throughout the session.         Patient left supine with all lines intact, call button in reach, and bed alarm on    GOALS:   Multidisciplinary Problems       Occupational Therapy Goals          Problem: Occupational Therapy    Goal Priority Disciplines Outcome Interventions   Occupational Therapy Goal     OT, PT/OT Ongoing, Progressing    Description: Goals set 5/6 to be addressed for 14 days with expiration date, 5/20:  Patient will increase functional independence with ADLs by performing:    Patient will demonstrate rolling to the right with SBA.  Not met   Patient will demonstrate rolling to the left with SBA.   Not met  Patient will demonstrate supine -sit with SBA.   Not met  Patient will demonstrate stand pivot transfers with SBA.   Not met  Patient will demonstrate grooming while standing with SBA.   Not met  Patient will demonstrate upper body dressing with SBA while seated EOB.   Not met  Patient will demonstrate lower body dressing with min assist while seated EOB.   Not met  Patient will demonstrate toileting with CGA.   Not met  Patient will demonstrate bathing while seated EOB with CGA.   Not met  Patient's family / caregiver will demonstrate independence and safety with assisting patient with self-care skills and functional mobility.     Not met  Patient and/or patient's family will verbalize understanding of stroke prevention guidelines, personal risk factors and stroke warning signs via teachback method.  Not met                                  Time Tracking:     OT Date of Treatment: 05/08/23  OT Start Time: 0523  OT Stop Time: 0533  OT Total Time (min): 10 min    Billable Minutes:Neuromuscular Re-education 10    OT/PEYTON: OT          5/8/2023

## 2023-05-08 NOTE — PLAN OF CARE
[facility] Care Plan    POC reviewed with Susan Pinto and family at 1645. Pt verbalized understanding. Questions and concerns addressed. No acute events today. Pt progressing toward goals. Will continue to monitor. See below and flowsheets for full assessment and VS info.     Labetalol x1  PRN suppository given  Zofran x1 for nausea  Home lisinopril added  VSS stable throughout shift     Is this a stroke patient? yes- Stroke booklet reviewed with patient and family, risk factors identified for patient and stroke booklet remains at bedside for ongoing education.     Neuro:  Bee Coma Scale  Best Eye Response: 4-->(E4) spontaneous  Best Motor Response: 6-->(M6) obeys commands  Best Verbal Response: 1-->(V1) none  Bee Coma Scale Score: 11  Assessment Qualifiers: patient intubated  Pupil PERRLA: yes     24 hr Temp:  [98 °F (36.7 °C)-99.8 °F (37.7 °C)]     CV:   Rhythm: normal sinus rhythm  BP goals:   SBP < 160  MAP > 65    Resp:      Vent Mode: Spont  Set Rate: 16 BPM  Oxygen Concentration (%): 50  Vt Set: 450 mL  PEEP/CPAP: 5 cmH20  Pressure Support: 12 cmH20    Plan: wean to extubate    GI/:     Diet/Nutrition Received: NPO, tube feeding  Last Bowel Movement: 05/04/23  Voiding Characteristics: external catheter    Intake/Output Summary (Last 24 hours) at 5/8/2023 1640  Last data filed at 5/8/2023 1601  Gross per 24 hour   Intake 914.19 ml   Output 1175 ml   Net -260.81 ml     Unmeasured Output  Stool Occurrence: 0    Labs/Accuchecks:  Recent Labs   Lab 05/08/23  0210   WBC 11.55   RBC 4.60   HGB 13.4   HCT 41.2         Recent Labs   Lab 05/08/23  0210      K 4.6   CO2 24      BUN 19   CREATININE 0.8   ALKPHOS 52*   ALT 11   AST 20   BILITOT 0.5      Recent Labs   Lab 05/06/23  0101   INR 1.0   APTT 33.1*      Recent Labs   Lab 05/06/23  1357   TROPONINI 0.029*       Electrolytes: Electrolytes replaced  Accuchecks: none    Gtts:      LDA/Wounds:  Lines/Drains/Airways       Drain  Duration                   NG/OG Tube 05/05/23 1445 orogastric 18 Fr. Center mouth 3 days    Female External Urinary Catheter 05/05/23 1718 2 days              Airway  Duration                  Airway - Non-Surgical 05/05/23 1430 Endotracheal Tube 3 days              Peripheral Intravenous Line  Duration                  Peripheral IV - Single Lumen 05/05/23 1716 18 G Left Upper Arm 2 days         Peripheral IV - Single Lumen 05/07/23 1043 18 G Posterior;Right Forearm 1 day                  Wounds: No  Wound care consulted: No

## 2023-05-08 NOTE — SUBJECTIVE & OBJECTIVE
Interval History: 5/8: DEBBIE Remains intubated, neurologically stable.     Medications:  Continuous Infusions:  Scheduled Meds:   amLODIPine  5 mg Per OG tube Daily    atorvastatin  40 mg Per OG tube Daily    cefTRIAXone (ROCEPHIN) IVPB  1 g Intravenous Q24H    famotidine  20 mg Per OG tube BID    fenofibrate  48 mg Per OG tube Daily    mupirocin   Nasal BID    polyethylene glycol  17 g Per NG tube Daily    QUEtiapine  25 mg Per OG tube QHS    senna-docusate 8.6-50 mg  1 tablet Per OG tube BID    silodosin  4 mg Per OG tube Daily     PRN Meds:hydrALAZINE, labetalol, magnesium oxide, magnesium oxide, ondansetron, potassium bicarbonate, potassium bicarbonate, potassium bicarbonate, potassium, sodium phosphates, potassium, sodium phosphates, potassium, sodium phosphates, sodium chloride 0.9%     Review of Systems  Objective:     Weight: 95.3 kg (210 lb)  Body mass index is 36.05 kg/m².  Vital Signs (Most Recent):  Temp: 99 °F (37.2 °C) (05/08/23 0301)  Pulse: 83 (05/08/23 0616)  Resp: (!) 25 (05/08/23 0616)  BP: (!) 177/86 (05/08/23 0435)  SpO2: 99 % (05/08/23 0616) Vital Signs (24h Range):  Temp:  [98.4 °F (36.9 °C)-99.8 °F (37.7 °C)] 99 °F (37.2 °C)  Pulse:  [66-99] 83  Resp:  [16-37] 25  SpO2:  [94 %-100 %] 99 %  BP: (130-177)/() 177/86                Vent Mode: Spont  Oxygen Concentration (%):  [50] 50  Resp Rate Total:  [13 br/min-32 br/min] 24 br/min  Vt Set:  [450 mL] 450 mL  PEEP/CPAP:  [5 cmH20] 5 cmH20  Pressure Support:  [5 zhH04-00 cmH20] 12 cmH20  Mean Airway Pressure:  [7.8 cmH20-10 cmH20] 9.6 cmH20             NG/OG Tube 05/05/23 1445 orogastric 18 Fr. Center mouth (Active)   Placement Check placement verified by x-ray;placement verified by distal tube length measurement 05/08/23 0301   Tolerance no signs/symptoms of discomfort 05/08/23 0301   Securement secured to commercial device 05/08/23 0301   Clamp Status/Tolerance clamped;no abdominal distention;no emesis;no restlessness 05/08/23 0301    Suction Setting/Drainage Method suction at the bedside 05/08/23 0301   Insertion Site Appearance no redness, warmth, tenderness, skin breakdown, drainage 05/08/23 0301   Drainage None 05/08/23 0301   Flush/Irrigation flushed w/;water;no resistance met 05/08/23 0301   Feeding Type continuous;by pump 05/08/23 0301   Feeding Action feeding continued 05/08/23 0301   Current Rate (mL/hr) 25 mL/hr 05/07/23 1901   Goal Rate (mL/hr) 45 mL/hr 05/07/23 1901   Intake (mL) 60 mL 05/07/23 1501   Water Bolus (mL) 100 mL 05/07/23 2001   Intake (mL) - Formula Tube Feeding 35 05/08/23 0201       Female External Urinary Catheter 05/05/23 1718 (Active)   Skin no redness;no breakdown;female external urine collection device repositioned 05/08/23 0301   Tolerance no signs/symptoms of discomfort 05/08/23 0301   Suction Continuous suction at 60 mmHg 05/07/23 1901   Date of last wick change 05/08/23 05/07/23 1901   Time of last wick change 2200 05/07/23 1901   Output (mL) 100 mL 05/08/23 0201          Physical Exam     Neurosurgery Physical Exam  [id number]  FC x 4  PERRL  EOMI  Nodding to questions appropriately  No distress.             Significant Labs:  Recent Labs   Lab 05/07/23  0108 05/08/23  0210   * 118*    138   K 3.8 4.6    104   CO2 23 24   BUN 18 19   CREATININE 0.8 0.8   CALCIUM 10.0 9.5   MG 1.8 2.0     Recent Labs   Lab 05/07/23  0108 05/08/23  0210   WBC 12.07 11.55   HGB 13.7 13.4   HCT 41.9 41.2    199     No results for input(s): LABPT, INR, APTT in the last 48 hours.  Microbiology Results (last 7 days)       Procedure Component Value Units Date/Time    Urine culture [[id number]]  (Abnormal) Collected: 05/06/23 0432    Order Status: Completed Specimen: Urine Updated: 05/07/23 1152     Urine Culture, Routine PRESUMPTIVE E COLI  >100,000 cfu/ml  Identification and susceptibility pending      Narrative:      Specimen Source->Urine          All pertinent labs from the last 24 hours have been  reviewed.    Significant Diagnostics:  I have reviewed all pertinent imaging results/findings within the past 24 hours.

## 2023-05-08 NOTE — PLAN OF CARE
[facility] Care Plan    POC reviewed with Susan Pinto and family at 0300. Pt Is intubated and unable to verbalize understanding. Questions and concerns addressed. No acute events overnight. Pt progressing toward goals. Will continue to monitor. See below and flowsheets for full assessment and VS info.     - Labetalol given x3 for SBP >160; Hydralazine given x1  - Bath complete  - Replacing phos (2.4); dose 1/2 given, second dose due @1000  -   - At 0401 neuro check, Pt noted to have improved cough, gag reflex present. Pt has increased secretions.   -       Is this a stroke patient? yes- Stroke booklet reviewed with patient and family, risk factors identified for patient and stroke booklet remains at bedside for ongoing education.     Neuro:  Naldo Coma Scale  Best Eye Response: 4-->(E4) spontaneous  Best Motor Response: 6-->(M6) obeys commands  Best Verbal Response: 1-->(V1) none  Naldo Coma Scale Score: 11  Assessment Qualifiers: patient intubated, no eye obstruction present  Pupil PERRLA: yes     24hr Temp:  [98.4 °F (36.9 °C)-99.8 °F (37.7 °C)]     CV:   Rhythm: normal sinus rhythm, sinus bradycardia  BP goals:   SBP < 160  MAP > 65    Resp:      Vent Mode: Spont  Set Rate: 16 BPM  Oxygen Concentration (%): 50  Vt Set: 450 mL  PEEP/CPAP: 5 cmH20  Pressure Support: 12 cmH20    Plan: N/A    GI/:     Diet/Nutrition Received: tube feeding, NPO  Last Bowel Movement: 05/04/23  Voiding Characteristics: external catheter    Intake/Output Summary (Last 24 hours) at 5/8/2023 0413  Last data filed at 5/8/2023 0201  Gross per 24 hour   Intake 511.38 ml   Output 725 ml   Net -213.62 ml     Unmeasured Output  Stool Occurrence: 0    Labs/Accuchecks:  Recent Labs   Lab 05/08/23  0210   WBC 11.55   RBC 4.60   HGB 13.4   HCT 41.2         Recent Labs   Lab 05/08/23 0210      K 4.6   CO2 24      BUN 19   CREATININE 0.8   ALKPHOS 52*   ALT 11   AST 20   BILITOT 0.5      Recent Labs   Lab  05/06/23  0101   INR 1.0   APTT 33.1*      Recent Labs   Lab 05/06/23  1357   TROPONINI 0.029*       Electrolytes: Electrolytes replaced  Accuchecks: none    Gtts:      LDA/Wounds:  Lines/Drains/Airways       Drain  Duration                  NG/OG Tube 05/05/23 1445 orogastric 18 Fr. Center mouth 2 days    Female External Urinary Catheter 05/05/23 1718 2 days              Airway  Duration                  Airway - Non-Surgical 05/05/23 1430 Endotracheal Tube 2 days              Peripheral Intravenous Line  Duration                  Peripheral IV - Single Lumen 05/05/23 1716 18 G Left Upper Arm 2 days         Peripheral IV - Single Lumen 05/07/23 1043 18 G Posterior;Right Forearm <1 day                  Wounds: No  Wound care consulted: No

## 2023-05-08 NOTE — PLAN OF CARE
Goals remain appropriate.  Problem: Occupational Therapy  Goal: Occupational Therapy Goal  Description: Goals set 5/6 to be addressed for 14 days with expiration date, 5/20:  Patient will increase functional independence with ADLs by performing:    Patient will demonstrate rolling to the right with SBA.  Not met   Patient will demonstrate rolling to the left with SBA.   Not met  Patient will demonstrate supine -sit with SBA.   Not met  Patient will demonstrate stand pivot transfers with SBA.   Not met  Patient will demonstrate grooming while standing with SBA.   Not met  Patient will demonstrate upper body dressing with SBA while seated EOB.   Not met  Patient will demonstrate lower body dressing with min assist while seated EOB.   Not met  Patient will demonstrate toileting with CGA.   Not met  Patient will demonstrate bathing while seated EOB with CGA.   Not met  Patient's family / caregiver will demonstrate independence and safety with assisting patient with self-care skills and functional mobility.     Not met  Patient and/or patient's family will verbalize understanding of stroke prevention guidelines, personal risk factors and stroke warning signs via teachback method.  Not met             Outcome: Ongoing, Progressing

## 2023-05-08 NOTE — ASSESSMENT & PLAN NOTE
66 yo female with a PMH of HTN, Afib with RVR who presents after hypertension today with desaturation and vomiting with subsequent intubation today.  Exam with strength intact, possible lower cranial nerve dysfunction. CT head with 1.3x1.3cm ICH centered in the left medullar with stable size and without apparent vascular anomaly on CTA, no IVH or hydrocephalus. ICH score 1.       --Admitted to neuro ICU  --SBP < 160  --No acute neurosurgical intervention  --MRI brain w/wo 5/6 with possible DVA and concern for cavernous malformation  --Wean to extubate as tolerated  --Imaging findings stable, favorable neurologic exam.    Outpatient follow up with neurosurgery in 4-6 weeks with a repeat MRI Brain w/wo contrast.     Open Intervention for cavernoma may be considered if findings are concordant on repeat imaging given first time hemorrhage, but will discuss in the outpatient setting.    --DSA likely not beneficial    --Hold anticoagulation  --Medical management per neuro ICU  --Continue to monitor clinically, call with any decline in exam.

## 2023-05-08 NOTE — ASSESSMENT & PLAN NOTE
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 with edema surrounding  - MRI brain w/wo (5/6): possible DVA and concern for cavernous malformation  - left medullary hemorrhage vs cavernoma    Plan:  - Admit to NCC  - NSGY following, no acute surgical intervention at this time   - OP f/u w NSGY in 4-6 weeks with a repeat MRI Brain w/wo contrast   - Open intervention for cavernoma may be considered if findings are concordant on repeat imaging given first time hemorrhage, but will discuss in OP setting   - DSA likely not beneficial  - Neuro checks Q1  - SBP< 160 per NSGY  - intubated for airway protection, copious secretions and weak cough   - continue spontaneous ventilator setting   - continue trails of T-piece   - f/u daily ABG's  - Hold AC/AP  - Coags & CBC  - VN following  - PT/OT/SLP

## 2023-05-08 NOTE — CLINICAL REVIEW
Patient's chart was reviewed by a stroke team provider.  Patient intubated, NCC continuing to perform vent weaning trials.    There is no new imaging to review.  Pending diagnostics to follow up on include:  None .  For other recommendations please see our previous note completed on: 5/6/2023.      There are no new recommendations at this time. Will continue to follow. Discussed patient with staff. Please contact stroke team for any questions or concerns.       Qiana Caban DNP  [facility]  Department of Vascular Neurology   [facility]  [phone]

## 2023-05-09 ENCOUNTER — TELEPHONE (OUTPATIENT)
Dept: NEUROSURGERY | Facility: CLINIC | Age: 68
End: 2023-05-09
Payer: MEDICARE

## 2023-05-09 LAB
ALBUMIN SERPL BCP-MCNC: 3.2 G/DL (ref 3.5–5.2)
ALLENS TEST: ABNORMAL
ALP SERPL-CCNC: 55 U/L (ref 55–135)
ALT SERPL W/O P-5'-P-CCNC: 12 U/L (ref 10–44)
ANION GAP SERPL CALC-SCNC: 10 MMOL/L (ref 8–16)
AST SERPL-CCNC: 17 U/L (ref 10–40)
BASOPHILS # BLD AUTO: 0.07 K/UL (ref 0–0.2)
BASOPHILS NFR BLD: 0.5 % (ref 0–1.9)
BILIRUB SERPL-MCNC: 0.5 MG/DL (ref 0.1–1)
BUN SERPL-MCNC: 23 MG/DL (ref 8–23)
CALCIUM SERPL-MCNC: 10.6 MG/DL (ref 8.7–10.5)
CHLORIDE SERPL-SCNC: 104 MMOL/L (ref 95–110)
CO2 SERPL-SCNC: 26 MMOL/L (ref 23–29)
CREAT SERPL-MCNC: 0.8 MG/DL (ref 0.5–1.4)
DELSYS: ABNORMAL
DIFFERENTIAL METHOD: ABNORMAL
EOSINOPHIL # BLD AUTO: 0.1 K/UL (ref 0–0.5)
EOSINOPHIL NFR BLD: 0.7 % (ref 0–8)
ERYTHROCYTE [DISTWIDTH] IN BLOOD BY AUTOMATED COUNT: 13.4 % (ref 11.5–14.5)
ERYTHROCYTE [SEDIMENTATION RATE] IN BLOOD BY WESTERGREN METHOD: 16 MM/H
EST. GFR  (NO RACE VARIABLE): >60 ML/MIN/1.73 M^2
FIO2: 50
GLUCOSE SERPL-MCNC: 113 MG/DL (ref 70–110)
HCO3 UR-SCNC: 30.9 MMOL/L (ref 24–28)
HCT VFR BLD AUTO: 44.8 % (ref 37–48.5)
HGB BLD-MCNC: 14.3 G/DL (ref 12–16)
IMM GRANULOCYTES # BLD AUTO: 0.08 K/UL (ref 0–0.04)
IMM GRANULOCYTES NFR BLD AUTO: 0.6 % (ref 0–0.5)
LYMPHOCYTES # BLD AUTO: 2.4 K/UL (ref 1–4.8)
LYMPHOCYTES NFR BLD: 17.3 % (ref 18–48)
MAGNESIUM SERPL-MCNC: 2 MG/DL (ref 1.6–2.6)
MCH RBC QN AUTO: 29.2 PG (ref 27–31)
MCHC RBC AUTO-ENTMCNC: 31.9 G/DL (ref 32–36)
MCV RBC AUTO: 91 FL (ref 82–98)
MODE: ABNORMAL
MONOCYTES # BLD AUTO: 1.5 K/UL (ref 0.3–1)
MONOCYTES NFR BLD: 11 % (ref 4–15)
NEUTROPHILS # BLD AUTO: 9.5 K/UL (ref 1.8–7.7)
NEUTROPHILS NFR BLD: 69.9 % (ref 38–73)
NRBC BLD-RTO: 0 /100 WBC
PCO2 BLDA: 41 MMHG (ref 35–45)
PEEP: 5
PH SMN: 7.49 [PH] (ref 7.35–7.45)
PHOSPHATE SERPL-MCNC: 2.6 MG/DL (ref 2.7–4.5)
PLATELET # BLD AUTO: 256 K/UL (ref 150–450)
PMV BLD AUTO: 10.8 FL (ref 9.2–12.9)
PO2 BLDA: 85 MMHG (ref 80–100)
POC BE: 7 MMOL/L
POC SATURATED O2: 97 % (ref 95–100)
POC TCO2: 32 MMOL/L (ref 23–27)
POTASSIUM SERPL-SCNC: 4.2 MMOL/L (ref 3.5–5.1)
PROT SERPL-MCNC: 7.3 G/DL (ref 6–8.4)
PS: 12
RBC # BLD AUTO: 4.9 M/UL (ref 4–5.4)
SAMPLE: ABNORMAL
SITE: ABNORMAL
SODIUM SERPL-SCNC: 140 MMOL/L (ref 136–145)
SP02: 97
TRIGL SERPL-MCNC: 61 MG/DL (ref 30–150)
VT: 450
WBC # BLD AUTO: 13.59 K/UL (ref 3.9–12.7)

## 2023-05-09 PROCEDURE — 99233 PR SUBSEQUENT HOSPITAL CARE,LEVL III: ICD-10-PCS | Mod: ,,, | Performed by: PSYCHIATRY & NEUROLOGY

## 2023-05-09 PROCEDURE — 99291 PR CRITICAL CARE, E/M 30-74 MINUTES: ICD-10-PCS | Mod: GC,,, | Performed by: INTERNAL MEDICINE

## 2023-05-09 PROCEDURE — 99900026 HC AIRWAY MAINTENANCE (STAT)

## 2023-05-09 PROCEDURE — 82800 BLOOD PH: CPT

## 2023-05-09 PROCEDURE — 84478 ASSAY OF TRIGLYCERIDES: CPT

## 2023-05-09 PROCEDURE — 99233 SBSQ HOSP IP/OBS HIGH 50: CPT | Mod: ,,, | Performed by: PSYCHIATRY & NEUROLOGY

## 2023-05-09 PROCEDURE — 80053 COMPREHEN METABOLIC PANEL: CPT | Performed by: PSYCHIATRY & NEUROLOGY

## 2023-05-09 PROCEDURE — 85025 COMPLETE CBC W/AUTO DIFF WBC: CPT | Performed by: PSYCHIATRY & NEUROLOGY

## 2023-05-09 PROCEDURE — 84100 ASSAY OF PHOSPHORUS: CPT | Performed by: PSYCHIATRY & NEUROLOGY

## 2023-05-09 PROCEDURE — 99900035 HC TECH TIME PER 15 MIN (STAT)

## 2023-05-09 PROCEDURE — 63600175 PHARM REV CODE 636 W HCPCS: Performed by: PHYSICIAN ASSISTANT

## 2023-05-09 PROCEDURE — 20000000 HC ICU ROOM

## 2023-05-09 PROCEDURE — 25000003 PHARM REV CODE 250

## 2023-05-09 PROCEDURE — 25000003 PHARM REV CODE 250: Performed by: PSYCHIATRY & NEUROLOGY

## 2023-05-09 PROCEDURE — 94003 VENT MGMT INPAT SUBQ DAY: CPT

## 2023-05-09 PROCEDURE — 36600 WITHDRAWAL OF ARTERIAL BLOOD: CPT

## 2023-05-09 PROCEDURE — 82803 BLOOD GASES ANY COMBINATION: CPT

## 2023-05-09 PROCEDURE — 83735 ASSAY OF MAGNESIUM: CPT | Performed by: PSYCHIATRY & NEUROLOGY

## 2023-05-09 PROCEDURE — 27200966 HC CLOSED SUCTION SYSTEM

## 2023-05-09 PROCEDURE — 27000221 HC OXYGEN, UP TO 24 HOURS

## 2023-05-09 PROCEDURE — 99291 CRITICAL CARE FIRST HOUR: CPT | Mod: GC,,, | Performed by: INTERNAL MEDICINE

## 2023-05-09 PROCEDURE — 94761 N-INVAS EAR/PLS OXIMETRY MLT: CPT

## 2023-05-09 PROCEDURE — 25000003 PHARM REV CODE 250: Performed by: PHYSICIAN ASSISTANT

## 2023-05-09 PROCEDURE — 63600175 PHARM REV CODE 636 W HCPCS

## 2023-05-09 PROCEDURE — 63600175 PHARM REV CODE 636 W HCPCS: Performed by: PSYCHIATRY & NEUROLOGY

## 2023-05-09 RX ORDER — ACETAMINOPHEN 325 MG/1
650 TABLET ORAL EVERY 6 HOURS PRN
Status: DISCONTINUED | OUTPATIENT
Start: 2023-05-09 | End: 2023-05-15

## 2023-05-09 RX ORDER — SUCRALFATE 1 G/10ML
1 SUSPENSION ORAL EVERY 6 HOURS PRN
Status: DISCONTINUED | OUTPATIENT
Start: 2023-05-09 | End: 2023-05-17 | Stop reason: HOSPADM

## 2023-05-09 RX ORDER — FENTANYL CITRATE 50 UG/ML
25 INJECTION, SOLUTION INTRAMUSCULAR; INTRAVENOUS EVERY 4 HOURS PRN
Status: DISCONTINUED | OUTPATIENT
Start: 2023-05-09 | End: 2023-05-17 | Stop reason: HOSPADM

## 2023-05-09 RX ADMIN — LISINOPRIL 40 MG: 20 TABLET ORAL at 08:05

## 2023-05-09 RX ADMIN — SUCRALFATE 1 G: 1 SUSPENSION ORAL at 03:05

## 2023-05-09 RX ADMIN — ACETAMINOPHEN 650 MG: 325 TABLET ORAL at 06:05

## 2023-05-09 RX ADMIN — HYDRALAZINE HYDROCHLORIDE 10 MG: 20 INJECTION, SOLUTION INTRAMUSCULAR; INTRAVENOUS at 04:05

## 2023-05-09 RX ADMIN — LABETALOL HYDROCHLORIDE 10 MG: 5 INJECTION, SOLUTION INTRAVENOUS at 02:05

## 2023-05-09 RX ADMIN — FENTANYL CITRATE 25 MCG: 50 INJECTION INTRAMUSCULAR; INTRAVENOUS at 06:05

## 2023-05-09 RX ADMIN — QUETIAPINE FUMARATE 25 MG: 25 TABLET ORAL at 09:05

## 2023-05-09 RX ADMIN — SENNOSIDES AND DOCUSATE SODIUM 1 TABLET: 50; 8.6 TABLET ORAL at 08:05

## 2023-05-09 RX ADMIN — POLYETHYLENE GLYCOL 3350 17 G: 17 POWDER, FOR SOLUTION ORAL at 08:05

## 2023-05-09 RX ADMIN — ATORVASTATIN CALCIUM 40 MG: 40 TABLET, FILM COATED ORAL at 08:05

## 2023-05-09 RX ADMIN — AMLODIPINE BESYLATE 10 MG: 10 TABLET ORAL at 01:05

## 2023-05-09 RX ADMIN — LABETALOL HYDROCHLORIDE 10 MG: 5 INJECTION, SOLUTION INTRAVENOUS at 12:05

## 2023-05-09 RX ADMIN — HYDRALAZINE HYDROCHLORIDE 10 MG: 20 INJECTION, SOLUTION INTRAMUSCULAR; INTRAVENOUS at 05:05

## 2023-05-09 RX ADMIN — ONDANSETRON 4 MG: 2 INJECTION INTRAMUSCULAR; INTRAVENOUS at 06:05

## 2023-05-09 RX ADMIN — FAMOTIDINE 20 MG: 20 TABLET ORAL at 08:05

## 2023-05-09 RX ADMIN — SODIUM PHOSPHATE, MONOBASIC, MONOHYDRATE AND SODIUM PHOSPHATE, DIBASIC, ANHYDROUS 15 MMOL: 142; 276 INJECTION, SOLUTION INTRAVENOUS at 08:05

## 2023-05-09 RX ADMIN — MUPIROCIN: 20 OINTMENT TOPICAL at 08:05

## 2023-05-09 RX ADMIN — MUPIROCIN: 20 OINTMENT TOPICAL at 12:05

## 2023-05-09 RX ADMIN — SENNOSIDES AND DOCUSATE SODIUM 1 TABLET: 50; 8.6 TABLET ORAL at 09:05

## 2023-05-09 RX ADMIN — LABETALOL HYDROCHLORIDE 10 MG: 5 INJECTION, SOLUTION INTRAVENOUS at 05:05

## 2023-05-09 RX ADMIN — FAMOTIDINE 20 MG: 20 TABLET ORAL at 09:05

## 2023-05-09 RX ADMIN — BISACODYL 10 MG: 10 SUPPOSITORY RECTAL at 01:05

## 2023-05-09 RX ADMIN — MUPIROCIN: 20 OINTMENT TOPICAL at 09:05

## 2023-05-09 RX ADMIN — FENOFIBRATE 48 MG: 48 TABLET ORAL at 08:05

## 2023-05-09 RX ADMIN — SILODOSIN 4 MG: 4 CAPSULE ORAL at 08:05

## 2023-05-09 NOTE — PLAN OF CARE
[facility] Care Plan    POC reviewed with Susan Pinto and family at 0300. Pt is intubated and unable to verbalize understanding. Questions and concerns addressed. No acute events overnight. Pt progressing toward goals. Will continue to monitor. See below and flowsheets for full assessment and VS info.     - CTH completed   - Bath completed   - Fentanyl PRN administered, acetaminophen administered       Is this a stroke patient? yes- Stroke booklet reviewed with patient and family, risk factors identified for patient and stroke booklet remains at bedside for ongoing education.     Neuro:  Warren Coma Scale  Best Eye Response: 4-->(E4) spontaneous  Best Motor Response: 6-->(M6) obeys commands  Best Verbal Response: 1-->(V1) none  Warren Coma Scale Score: 11  Assessment Qualifiers: patient intubated, no eye obstruction present  Pupil PERRLA: yes     24hr Temp:  [98 °F (36.7 °C)-98.9 °F (37.2 °C)]     CV:   Rhythm: normal sinus rhythm, sinus bradycardia  BP goals:   SBP < 160  MAP > 65    Resp:      Vent Mode: Spont  Set Rate: 16 BPM  Oxygen Concentration (%): 50  Vt Set: 450 mL  PEEP/CPAP: 5 cmH20  Pressure Support: 12 cmH20    Plan: wean to extubate    GI/:     Diet/Nutrition Received: tube feeding  Last Bowel Movement: 05/09/23  Voiding Characteristics: external catheter    Intake/Output Summary (Last 24 hours) at 5/9/2023 0711  Last data filed at 5/9/2023 0501  Gross per 24 hour   Intake 699.19 ml   Output 1850 ml   Net -1150.81 ml     Unmeasured Output  Urine Occurrence: 1  Stool Occurrence: 1  Pad Count: 2    Labs/Accuchecks:  Recent Labs   Lab 05/09/23  0449   WBC 13.59*   RBC 4.90   HGB 14.3   HCT 44.8         Recent Labs   Lab 05/09/23  0449      K 4.2   CO2 26      BUN 23   CREATININE 0.8   ALKPHOS 55   ALT 12   AST 17   BILITOT 0.5      Recent Labs   Lab 05/06/23  0101   INR 1.0   APTT 33.1*      Recent Labs   Lab 05/06/23  1357   TROPONINI 0.029*       Electrolytes: Electrolytes  replaced  Accuchecks: none    Gtts:      LDA/Wounds:  Lines/Drains/Airways       Drain  Duration                  NG/OG Tube 05/05/23 1445 orogastric 18 Fr. Center mouth 3 days    Female External Urinary Catheter 05/05/23 1718 3 days              Airway  Duration                  Airway - Non-Surgical 05/05/23 1430 Endotracheal Tube 3 days              Peripheral Intravenous Line  Duration                  Peripheral IV - Single Lumen 05/05/23 1716 18 G Left Upper Arm 3 days         Peripheral IV - Single Lumen 05/07/23 1043 18 G Posterior;Right Forearm 1 day                  Wounds: No  Wound care consulted: No

## 2023-05-09 NOTE — PROGRESS NOTES
[facility]  Neurosurgery  Progress Note    Subjective:     History of Present Illness: Susan Pinto is a 68 yo female who presents due to a medullary ICH.  History limited due to intubation, obtained from collateral information from primary team/chart review.  Ms. Pinto vomited and had initial altered mentation earlier today and subsequently desatted and decompensated when EMS arrived.  Her initial systolic blood pressure was in the 250s.  [place] at [facility] revealed medullary ICH.       Post-Op Info:  * No surgery found *         Interval History:      NAEON  neurologically stable  CT head stable    Medications:  Continuous Infusions:  Scheduled Meds:   amLODIPine  10 mg Per OG tube Daily    atorvastatin  40 mg Per OG tube Daily    famotidine  20 mg Per OG tube BID    fenofibrate  48 mg Per OG tube Daily    lisinopriL  40 mg Per OG tube Daily    mupirocin   Nasal BID    polyethylene glycol  17 g Per NG tube Daily    QUEtiapine  25 mg Per OG tube QHS    senna-docusate 8.6-50 mg  1 tablet Per OG tube BID    silodosin  4 mg Per OG tube Daily    sodium phosphate IVPB  15 mmol Intravenous Once     PRN Meds:acetaminophen, bisacodyL, fentaNYL, hydrALAZINE, labetalol, magnesium oxide, magnesium oxide, ondansetron, potassium bicarbonate, potassium bicarbonate, potassium bicarbonate, potassium, sodium phosphates, potassium, sodium phosphates, potassium, sodium phosphates, sodium chloride 0.9%, sucralfate     Review of Systems  Objective:     Weight: 95.3 kg (210 lb)  Body mass index is 36.05 kg/m².  Vital Signs (Most Recent):  Temp: 97.8 °F (36.6 °C) (05/09/23 0701)  Pulse: 67 (05/09/23 0901)  Resp: 16 (05/09/23 0901)  BP: (!) 151/84 (05/09/23 0901)  SpO2: 96 % (05/09/23 0901) Vital Signs (24h Range):  Temp:  [97.8 °F (36.6 °C)-98.9 °F (37.2 °C)] 97.8 °F (36.6 °C)  Pulse:  [63-83] 67  Resp:  [14-31] 16  SpO2:  [94 %-99 %] 96 %  BP: (121-190)/(65-91) 151/84     Date 05/09/23 0700 - 05/10/23  0659   Shift 0697-7672 2216-1371 9940-3942 24 Hour Total   INTAKE   NG/   135   IV Piggyback 31   31   Shift Total(mL/kg) 166(1.7)   166(1.7)   OUTPUT   Shift Total(mL/kg)       Weight (kg) 95.3 95.3 95.3 95.3              Vent Mode: SIMV  Oxygen Concentration (%):  [50] 50  Resp Rate Total:  [13 br/min-28 br/min] 16 br/min  Vt Set:  [450 mL] 450 mL  PEEP/CPAP:  [5 cmH20] 5 cmH20  Pressure Support:  [12 cmH20] 12 cmH20  Mean Airway Pressure:  [8.4 cmH20-9.8 cmH20] 9.3 cmH20             NG/OG Tube 05/05/23 1445 orogastric 18 Fr. Center mouth (Active)   Placement Check placement verified by x-ray;placement verified by distal tube length measurement 05/08/23 0301   Tolerance no signs/symptoms of discomfort 05/08/23 0301   Securement secured to commercial device 05/08/23 0301   Clamp Status/Tolerance clamped;no abdominal distention;no emesis;no restlessness 05/08/23 0301   Suction Setting/Drainage Method suction at the bedside 05/08/23 0301   Insertion Site Appearance no redness, warmth, tenderness, skin breakdown, drainage 05/08/23 0301   Drainage None 05/08/23 0301   Flush/Irrigation flushed w/;water;no resistance met 05/08/23 0301   Feeding Type continuous;by pump 05/08/23 0301   Feeding Action feeding continued 05/08/23 0301   Current Rate (mL/hr) 25 mL/hr 05/07/23 1901   Goal Rate (mL/hr) 45 mL/hr 05/07/23 1901   Intake (mL) 60 mL 05/07/23 1501   Water Bolus (mL) 100 mL 05/07/23 2001   Intake (mL) - Formula Tube Feeding 35 05/08/23 0201       Female External Urinary Catheter 05/05/23 1718 (Active)   Skin no redness;no breakdown;female external urine collection device repositioned 05/08/23 0301   Tolerance no signs/symptoms of discomfort 05/08/23 0301   Suction Continuous suction at 60 mmHg 05/07/23 1901   Date of last wick change 05/08/23 05/07/23 1901   Time of last wick change 2200 05/07/23 1901   Output (mL) 100 mL 05/08/23 0201         Physical Exam     Neurosurgery Physical Exam  [id number]  FC x  4  PERRL  EOMI  Nodding to questions appropriately  No distress.             Significant Labs:  Recent Labs   Lab 05/08/23  0210 05/09/23  0449   * 113*    140   K 4.6 4.2    104   CO2 24 26   BUN 19 23   CREATININE 0.8 0.8   CALCIUM 9.5 10.6*   MG 2.0 2.0       Recent Labs   Lab 05/08/23  0210 05/09/23  0449   WBC 11.55 13.59*   HGB 13.4 14.3   HCT 41.2 44.8    256       No results for input(s): LABPT, INR, APTT in the last 48 hours.  Microbiology Results (last 7 days)       Procedure Component Value Units Date/Time    Urine culture [[id number]]  (Abnormal)  (Susceptibility) Collected: 05/06/23 0432    Order Status: Completed Specimen: Urine Updated: 05/08/23 1008     Urine Culture, Routine ESCHERICHIA COLI  >100,000 cfu/ml      Narrative:      Specimen Source->Urine          All pertinent labs from the last 24 hours have been reviewed.    Significant Diagnostics:  I have reviewed all pertinent imaging results/findings within the past 24 hours.    Assessment/Plan:     * Nontraumatic intracerebral hemorrhage  66 yo female with a PMH of HTN, Afib with RVR who presents after hypertension today with desaturation and vomiting with subsequent intubation today.  Exam with strength intact, possible lower cranial nerve dysfunction. CT head with 1.3x1.3cm ICH centered in the left medullar with stable size and without apparent vascular anomaly on CTA, no IVH or hydrocephalus. ICH score 1.       --Admitted to neuro ICU  --No acute neurosurgical intervention  --MRI brain w/wo 5/6 with possible DVA and concern for cavernous malformation  --CTH stable x3  --Imaging findings stable, favorable neurologic exam    Outpatient follow up with neurosurgery in 4-6 weeks with a repeat MRI Brain w/wo contrast.     Open Intervention for cavernoma may be considered if findings are concordant on repeat imaging given first time hemorrhage, but will discuss in the outpatient setting.  --SBP < 160  --Wean to extubate as  tolerated    --DSA likely not beneficial    --Hold anticoagulation  --Medical management per neuro ICU  --Continue to monitor clinically, call with any decline in exam  --NSGY will sign off, please call for further questions                  Aniyah Humphrey MD  Neurosurgery  [facility]

## 2023-05-09 NOTE — ASSESSMENT & PLAN NOTE
Home medications: amlodipine 5mg daily, lisinopril 40mg daily, and prn clonidine 0.1mg BID PRN for BP > 150/100    - goal SBP < 160  - continue home amlodipine at higher dose to 10mg daily  - continue home lisinopril 40mg daily  - prn labetalol and hydralazine  - cardene off since 4 am 5/7

## 2023-05-09 NOTE — SUBJECTIVE & OBJECTIVE
Interval History:      NAEON  neurologically stable  CT head stable    Medications:  Continuous Infusions:  Scheduled Meds:   amLODIPine  10 mg Per OG tube Daily    atorvastatin  40 mg Per OG tube Daily    famotidine  20 mg Per OG tube BID    fenofibrate  48 mg Per OG tube Daily    lisinopriL  40 mg Per OG tube Daily    mupirocin   Nasal BID    polyethylene glycol  17 g Per NG tube Daily    QUEtiapine  25 mg Per OG tube QHS    senna-docusate 8.6-50 mg  1 tablet Per OG tube BID    silodosin  4 mg Per OG tube Daily    sodium phosphate IVPB  15 mmol Intravenous Once     PRN Meds:acetaminophen, bisacodyL, fentaNYL, hydrALAZINE, labetalol, magnesium oxide, magnesium oxide, ondansetron, potassium bicarbonate, potassium bicarbonate, potassium bicarbonate, potassium, sodium phosphates, potassium, sodium phosphates, potassium, sodium phosphates, sodium chloride 0.9%, sucralfate     Review of Systems  Objective:     Weight: 95.3 kg (210 lb)  Body mass index is 36.05 kg/m².  Vital Signs (Most Recent):  Temp: 97.8 °F (36.6 °C) (05/09/23 0701)  Pulse: 67 (05/09/23 0901)  Resp: 16 (05/09/23 0901)  BP: (!) 151/84 (05/09/23 0901)  SpO2: 96 % (05/09/23 0901) Vital Signs (24h Range):  Temp:  [97.8 °F (36.6 °C)-98.9 °F (37.2 °C)] 97.8 °F (36.6 °C)  Pulse:  [63-83] 67  Resp:  [14-31] 16  SpO2:  [94 %-99 %] 96 %  BP: (121-190)/(65-91) 151/84     Date 05/09/23 0700 - 05/10/23 0659   Shift 8072-9438 5194-0832 8184-9752 24 Hour Total   INTAKE   NG/   135   IV Piggyback 31   31   Shift Total(mL/kg) 166(1.7)   166(1.7)   OUTPUT   Shift Total(mL/kg)       Weight (kg) 95.3 95.3 95.3 95.3              Vent Mode: SIMV  Oxygen Concentration (%):  [50] 50  Resp Rate Total:  [13 br/min-28 br/min] 16 br/min  Vt Set:  [450 mL] 450 mL  PEEP/CPAP:  [5 cmH20] 5 cmH20  Pressure Support:  [12 cmH20] 12 cmH20  Mean Airway Pressure:  [8.4 cmH20-9.8 cmH20] 9.3 cmH20             NG/OG Tube 05/05/23 1445 orogastric 18 Fr. Center mouth (Active)    Placement Check placement verified by x-ray;placement verified by distal tube length measurement 05/08/23 0301   Tolerance no signs/symptoms of discomfort 05/08/23 0301   Securement secured to commercial device 05/08/23 0301   Clamp Status/Tolerance clamped;no abdominal distention;no emesis;no restlessness 05/08/23 0301   Suction Setting/Drainage Method suction at the bedside 05/08/23 0301   Insertion Site Appearance no redness, warmth, tenderness, skin breakdown, drainage 05/08/23 0301   Drainage None 05/08/23 0301   Flush/Irrigation flushed w/;water;no resistance met 05/08/23 0301   Feeding Type continuous;by pump 05/08/23 0301   Feeding Action feeding continued 05/08/23 0301   Current Rate (mL/hr) 25 mL/hr 05/07/23 1901   Goal Rate (mL/hr) 45 mL/hr 05/07/23 1901   Intake (mL) 60 mL 05/07/23 1501   Water Bolus (mL) 100 mL 05/07/23 2001   Intake (mL) - Formula Tube Feeding 35 05/08/23 0201       Female External Urinary Catheter 05/05/23 1718 (Active)   Skin no redness;no breakdown;female external urine collection device repositioned 05/08/23 0301   Tolerance no signs/symptoms of discomfort 05/08/23 0301   Suction Continuous suction at 60 mmHg 05/07/23 1901   Date of last wick change 05/08/23 05/07/23 1901   Time of last wick change 2200 05/07/23 1901   Output (mL) 100 mL 05/08/23 0201          Physical Exam     Neurosurgery Physical Exam  [id number]  FC x 4  PERRL  EOMI  Nodding to questions appropriately  No distress.             Significant Labs:  Recent Labs   Lab 05/08/23 0210 05/09/23 0449   * 113*    140   K 4.6 4.2    104   CO2 24 26   BUN 19 23   CREATININE 0.8 0.8   CALCIUM 9.5 10.6*   MG 2.0 2.0       Recent Labs   Lab 05/08/23 0210 05/09/23 0449   WBC 11.55 13.59*   HGB 13.4 14.3   HCT 41.2 44.8    256       No results for input(s): LABPT, INR, APTT in the last 48 hours.  Microbiology Results (last 7 days)       Procedure Component Value Units Date/Time    Urine culture  [[id number]]  (Abnormal)  (Susceptibility) Collected: 05/06/23 0432    Order Status: Completed Specimen: Urine Updated: 05/08/23 1008     Urine Culture, Routine ESCHERICHIA COLI  >100,000 cfu/ml      Narrative:      Specimen Source->Urine          All pertinent labs from the last 24 hours have been reviewed.    Significant Diagnostics:  I have reviewed all pertinent imaging results/findings within the past 24 hours.

## 2023-05-09 NOTE — NURSING
Pt c/o increased numbness and tingling in upper extremities, particularly L side, along with increased pain/tightness in back, discomfort and headache.Temp 98.8. Spoke with VERNON Petersen, and PRN meds for pain were ordered. CT completed this morning at 0430. Nsgy at bedside for rounds; changes reported. No new orders at this time.

## 2023-05-09 NOTE — PROGRESS NOTES
[facility]  Vascular Neurology  [facility]  Progress Note    Assessment/Plan:     * Nontraumatic intracerebral hemorrhage  67-year-old female with PMH of HTN and HLD who is a transfer from OSH for an ICH. She presented to OSH ED  with AMS, tachycardia, left-sided facial droop. History obtained from  as patient was intubated. He reported multiple episodes of vomiting, when assisting patient from the floor he noticed her eyes rolling back. EMS was called and  noticed facial droop and dysarthria. Patient hypoxic on EMS arrival requiring intubation.  on arrival to OSH, patient stop taking all her medications few months ago because she doesn't believe in doctors. CTH with cervicomedullary junction ICH. She was also noted to be in afib with RVR.    CTA head/neck without cerebrovascular abnormalities. MRI W/WO brain and cervical spine with possible venous anomaly. Neurosurgery planning for repeat MRI in 4-6 weeks with possible intervention pending findings.      Antithrombotics for secondary stroke prevention: Antiplatelets: None: Intracerebral Hemorrhage      Aggressive risk factor modification: HTN, HLD     Rehab efforts: The patient has been evaluated by a stroke team provider and the therapy needs have been fully considered based off the presenting complaints and exam findings. The following therapy evaluations are needed: PT evaluate and treat, OT evaluate and treat, SLP evaluate and treat, PM&R evaluate for appropriate placement pending extubation    Diagnostics ordered/pending: Other: repeat CT for any changes in exam    VTE prophylaxis: Mechanical prophylaxis: Place SCDs  None: Reason for No Pharmacological VTE Prophylaxis: History of systemic or intracranial bleeding    BP parameters: ICH: SBP <140        Acute cystitis  Completed ceftriaxone on 5/8    Vasogenic cerebral edema  Area of cerebral edema identified when reviewing brain imaging  Continue  frequent neuro checks   STAT CT for any exam changes which may signify expansion of the insult and/or the area of the edema. Such changes may require acute interventions to prevent loss of function and/or death.            Primary hypertension  Stroke risk factor SBP <140    A-fib  New onset, on initial EKG  Not on AC at home  Hold AC in the setting of ICH    Hyperlipidemia LDL goal <70  Continue atorvastatin 40  Reported noncompliant by  on admit         5/9 patient drowsy but able to follow commands and nod appropriately, remains intubated, currently undergoing spontaneous vent trials      STROKE DOCUMENTATION        NIH Scale:  1a. Level of Consciousness: 1-->Not alert, but arousable by minor stimulation to obey, answer, or respond  1b. LOC Questions: 0-->Answers both questions correctly (nods appropriately)  1c. LOC Commands: 0-->Performs both tasks correctly  2. Best Gaze: 0-->Normal  3. Visual: 0-->No visual loss  4. Facial Palsy: 0-->Normal symmetrical movements  5a. Motor Arm, Left: 0-->No drift, limb holds 90 (or 45) degrees for full 10 secs  5b. Motor Arm, Right: 0-->No drift, limb holds 90 (or 45) degrees for full 10 secs  6a. Motor Leg, Left: 0-->No drift, leg holds 30 degree position for full 5 secs  6b. Motor Leg, Right: 0-->No drift, leg holds 30 degree position for full 5 secs  7. Limb Ataxia: 0-->Absent  8. Sensory: 0-->Normal, no sensory loss  9. Best Language: 0-->No aphasia, normal  10. Dysarthria: (UN) Intubated or other physical barrier  11. Extinction and Inattention (formerly Neglect): 0-->No abnormality  Total (NIH Stroke Scale): 1       Modified Bambi Score: 0  Naldo Coma Scale:    ABCD2 Score:    [id number] Score:   HAS -BLED Score:   ICH Score:2  Hunt & Trujillo Classification:      Hemorrhagic change of an Ischemic Stroke: Does this patient have an ischemic stroke with hemorrhagic changes? No     Neurologic Chief Complaint: N/V, facial droop, dysarthria    Subjective:      Interval History: Patient is seen for follow-up neurological assessment and treatment recommendations: patient drowsy but able to follow commands and nod appropriately, remains intubated, currently undergoing spontaneous vent trials    HPI, Past Medical, Family, and Social History remains the same as documented in the initial encounter.     Review of Systems   Constitutional:  Negative for fever.   Respiratory:          Intubated   Neurological:  Positive for speech difficulty. Negative for weakness and numbness.   Scheduled Meds:   amLODIPine  10 mg Per OG tube Daily    atorvastatin  40 mg Per OG tube Daily    famotidine  20 mg Per OG tube BID    lisinopriL  40 mg Per OG tube Daily    mupirocin   Nasal BID    polyethylene glycol  17 g Per NG tube Daily    QUEtiapine  25 mg Per OG tube QHS    senna-docusate 8.6-50 mg  1 tablet Per OG tube BID    silodosin  4 mg Per OG tube Daily     Continuous Infusions:  PRN Meds:acetaminophen, bisacodyL, fentaNYL, hydrALAZINE, labetalol, magnesium oxide, magnesium oxide, ondansetron, potassium bicarbonate, potassium bicarbonate, potassium bicarbonate, potassium, sodium phosphates, potassium, sodium phosphates, potassium, sodium phosphates, sodium chloride 0.9%, sucralfate    Objective:     Vital Signs (Most Recent):  Temp: 98.2 °F (36.8 °C) (05/09/23 1501)  Pulse: 66 (05/09/23 1659)  Resp: 16 (05/09/23 1659)  BP: (!) 157/83 (05/09/23 1550)  SpO2: 97 % (05/09/23 1659)  BP Location: Right arm    Vital Signs Range (Last 24H):  Temp:  [97.8 °F (36.6 °C)-98.9 °F (37.2 °C)]   Pulse:  [63-83]   Resp:  [15-31]   BP: (121-190)/(69-93)   SpO2:  [93 %-100 %]   BP Location: Right arm       Physical Exam  Vitals reviewed.   Constitutional:       General: She is not in acute distress.  HENT:      Head: Normocephalic and atraumatic.   Cardiovascular:      Rate and Rhythm: Normal rate.   Pulmonary:      Effort: No respiratory distress.      Comments: intubated  Neurological:       Mental Status: She is alert.            Neurological Exam:   LOC: drowsy  Attention Span: Good   Language: intubated but follows commands and nods appropriately  Articulation: Untestable due to intubation  Orientation: nods appropriately  Facial Movement (CN VII): Unable to test   Motor: Arm left  Normal 5/5  Leg left  Normal 5/5  Arm right  Normal 5/5  Leg right Normal 5/5  Tone: Normal tone throughout    Laboratory:  CMP:   Recent Labs   Lab 05/09/23  0449   CALCIUM 10.6*   ALBUMIN 3.2*   PROT 7.3      K 4.2   CO2 26      BUN 23   CREATININE 0.8   ALKPHOS 55   ALT 12   AST 17   BILITOT 0.5     CBC:   Recent Labs   Lab 05/09/23  0449   WBC 13.59*   RBC 4.90   HGB 14.3   HCT 44.8      MCV 91   MCH 29.2   MCHC 31.9*     Lipid Panel:   Recent Labs   Lab 05/05/23  1640 05/06/23  0431 05/09/23  0449   CHOL 257*  257*  --   --    LDLCALC Invalid, Trig>400.0  Invalid, Trig>400.0  --   --    HDL 74  74  --   --    TRIG 929*  929*   < > 61    < > = values in this interval not displayed.     Coagulation:   Recent Labs   Lab 05/06/23  0101   INR 1.0   APTT 33.1*     Platelet Aggregation Study: No results for input(s): PLTAGG, PLTAGINTERP, PLTAGREGLACO, ADPPLTAGGREG in the last 168 hours.  Hgb A1C:   Recent Labs   Lab 05/05/23  1640   HGBA1C 5.2  5.2     TSH:   Recent Labs   Lab 05/05/23  1640   TSH 0.866       Diagnostic Results     Brain Imaging   CT Head 5/9/2023  Impression:     Stable medullary hemorrhage on the left.  No intraventricular extension or hydrocephalus.    MRI Brain W/WO 5/6/2023  MRI brain: Evolving acute left paramedian parenchymal hemorrhage within the medulla.  There is subtle curvilinear enhancement along the left lateral aspect of this extending to the pial surface configuration concerning for a developmental venous anomaly which raises concern for hemorrhage within a cavernous malformation.  No evidence for nodular enhancement to suggest underlying mass however component of AV  shunting cannot be entirely excluded and consideration for further characterization with conventional angiography and follow-up is advised.     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  No evidence for additional hemorrhage or enhancement throughout the cervical spinal cord.    CTH 5/5/2023  Intra-axial hemorrhage at the cervicomedullary junction measuring approximately 1.3 x 1.3 x 1.2 cm in dimension.  A component of extra-axial hemorrhage is difficult to exclude.  There is surrounding edema.    Vessel Imaging   CTA head and neck 5/5/2023  1. Redemonstration of acute hemorrhage involving the medulla/cervicomedullary junction which appears similar in overall size and extent to prior exam.  This appears similar on pre and postcontrast images without no abnormal tangle of adjacent vessels.  Further assessment with conventional angiography or MRI can be performed as warranted.  Consider appropriate short-term imaging follow-up to evaluate appropriate maturation of blood products.  2. CTA head and neck within normal limits without evidence of hemodynamically significant stenosis or occlusion.  3. No CT evidence of acute cervical spine fracture or traumatic subluxation.  Degenerative change of the cervical spine.  4. Right upper lobe atelectasis with possible interlobular septal thickening.    Cardiac Imaging   TTE 5/7/23   The left ventricle is normal in size with normal systolic function.   The estimated ejection fraction is 65%.   Normal left ventricular diastolic function.   Normal right ventricular size with normal right ventricular systolic function.   Normal central venous pressure (3 mmHg).      Lety France PA-C  Com[facility] St[facility] Center  Department of Vascular Neurology   [facility] - Neuro Critical Care

## 2023-05-09 NOTE — PROGRESS NOTES
[facility] - Neuro Critical Care  Neurocritical Care  Progress Note    Admit Date: 5/5/2023  Service Date: 05/09/2023  Length of Stay: 4    Subjective:     Chief Complaint: Nontraumatic intracerebral hemorrhage    History of Present Illness: Susan Pinto is a 67-year-old female with Hx of HTN presenting with altered mental status, tachycardia, left-sided facial droop, admitted for medullary ICH.   Patient started having vomiting that began around 7:00 p.m last night. On EMS arrival was noted to be hypoxic sat's in 80's with AMS, was intubated for airway protection. In ED at OSH CTh was done and was found to have a cervicomedullary junction ICH measuring approximately about 1.3 x 1.3 x 1.2 cm, also had a.fib with RVR Hr in the 150's that responded to BB. Was transferred to [facility] [place] for further care         Hospital Course: 05/06/2023 transient hypotension overnight improved with stopping propofol. MRI with possible DVA, will discuss need for DSA with IR. Endorsed tingling in BLE -> CTH stable, trial of HTS for possible edema. SBT as tolerated. 3 day Abx for UTI. New double vision -> repeat CTH stable.   05/07/2023 CO2 retention noted on ABG yesterday after SBT. Gas normalized with SIMV. Will reassess on spontaneous today. Discussed possibility of trach if she fails to wean off the vent. Still with copious secretions and no cough. Day 2/3 of CTX for GNR UTI. Tube feeds initiated.  05/08/2023 Pt remains on spontaneous vent settings with CO2 in normal range. T-piece trial unsuccessful yesterday. Cough reflex improved today.   05/09/2023 Exam stable. CT and MRI pending. Continue trials of spontaneous ventilation.       Interval History: As above.    Review of Systems   Unable to perform ROS: Intubated   Eyes:  Positive for visual disturbance (double vision).   Neurological:  Positive for numbness (paresthesias in distal extremities).     Objective:     Vitals:  Temp: 98.8 °F (37.1 °C)  Pulse: 65  Rhythm: normal sinus  rhythm  BP: (!) 158/82  MAP (mmHg): 113  Resp: 15  SpO2: (!) 93 %  Oxygen Concentration (%): 50  Vent Mode: SIMV  Set Rate: 16 BPM  Vt Set: 450 mL  Pressure Support: 12 cmH20  PEEP/CPAP: 5 cmH20  Peak Airway Pressure: 24 cmH20  Mean Airway Pressure: 9.2 cmH20  Plateau Pressure: 0 cmH20    Temp  Min: 97.8 °F (36.6 °C)  Max: 98.9 °F (37.2 °C)  Pulse  Min: 63  Max: 83  BP  Min: 121/70  Max: 190/91  MAP (mmHg)  Min: 90  Max: 131  Resp  Min: 14  Max: 31  SpO2  Min: 93 %  Max: 99 %  Oxygen Concentration (%)  Min: 50  Max: 50    05/08 0701 - 05/09 0700  In: 744.2   Out: 1850 [Urine:1850]   Unmeasured Output  Urine Occurrence: 1  Stool Occurrence: 1  Pad Count: 2        Physical Exam  Vitals and nursing note reviewed.   Constitutional:       General: She is not in acute distress.     Appearance: She is obese. She is not diaphoretic.   HENT:      Head: Normocephalic and atraumatic.      Right Ear: External ear normal.      Left Ear: External ear normal.      Nose: Nose normal.      Mouth/Throat:      Comments: ET tube and OG tube intact  Copious pooled secretions in oral cavity  Eyes:      General: No scleral icterus.        Right eye: No discharge.         Left eye: No discharge.      Extraocular Movements: Extraocular movements intact.      Pupils: Pupils are equal, round, and reactive to light.   Cardiovascular:      Rate and Rhythm: Normal rate and regular rhythm.   Pulmonary:      Effort: Pulmonary effort is normal. No respiratory distress.      Comments: Ventilator on SIMV  Abdominal:      General: Abdomen is flat. There is no distension.      Tenderness: There is no abdominal tenderness.   Musculoskeletal:         General: No swelling or deformity. Normal range of motion.      Cervical back: Normal range of motion and neck supple.      Right lower leg: No edema.      Left lower leg: No edema.   Skin:     General: Skin is warm.      Coloration: Skin is not jaundiced.      Findings: No bruising.   Neurological:       Mental Status: She is alert.      Comments:   [id number]  Alert, follows commands, and can write to communicate  Off sedation  Cough and gag reflex intact  PERRL  EOMI  Face symmetric  Moves all extremities symmetrically against gravity  Regards to touch throughout     Unable to test orientation, language, memory, judgment, insight, fund of knowledge, coordination, gait due to intubation.       Medications:  Continuous ScheduledamLODIPine, 10 mg, Daily  atorvastatin, 40 mg, Daily  famotidine, 20 mg, BID  lisinopriL, 40 mg, Daily  mupirocin, , BID  polyethylene glycol, 17 g, Daily  QUEtiapine, 25 mg, QHS  senna-docusate 8.6-50 mg, 1 tablet, BID  silodosin, 4 mg, Daily    PRNacetaminophen, 650 mg, Q6H PRN  bisacodyL, 10 mg, Daily PRN  fentaNYL, 25 mcg, Q4H PRN  hydrALAZINE, 10 mg, Q6H PRN  labetalol, 10 mg, Q4H PRN  magnesium oxide, 800 mg, PRN  magnesium oxide, 800 mg, PRN  ondansetron, 4 mg, Q8H PRN  potassium bicarbonate, 35 mEq, PRN  potassium bicarbonate, 50 mEq, PRN  potassium bicarbonate, 60 mEq, PRN  potassium, sodium phosphates, 2 packet, PRN  potassium, sodium phosphates, 2 packet, PRN  potassium, sodium phosphates, 2 packet, PRN  sodium chloride 0.9%, 10 mL, PRN  sucralfate, 1 g, Q6H PRN      Today I personally reviewed pertinent medications, lines/drains/airways, imaging, cardiology results, laboratory results, microbiology results, notably:    Diet  Diet NPO  Diet NPO      Assessment/Plan:     Neuro  * Nontraumatic intracerebral hemorrhage  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 with edema surrounding  - MRI brain w/wo (5/6): possible DVA and concern for cavernous malformation  - left medullary hemorrhage vs cavernoma    Plan:  - Admit to NCC  - f/u CT and MRI per NSGY  - NSGY following, no acute surgical intervention at this time   - OP f/u w NSGY in 4-6 weeks with a repeat MRI Brain w/wo contrast   - Open intervention for cavernoma may be  considered if findings are concordant on repeat imaging given first time hemorrhage, but will discuss in OP setting   - DSA likely not beneficial  - Neuro checks Q1  - SBP< 160 per NSGY  - intubated for airway protection, copious secretions and weak cough   - continue trials of spontaneous ventilator setting   - failed T-piece trial   - f/u daily ABG's  - Hold AC/AP  - Coags & CBC  - VN following  - PT/OT/SLP    Vasogenic cerebral edema  See ICH    Cardiac/Vascular  Hypertriglyceridemia  Triglycerides elevated to 929 on admission, started on niacin and fenofibrate  Likely 2/2 propofol infusion as TGL have since normalized    - discontinue niacin  - discontinue fenofibrate  - discontinue TGL draws  - follow up with PCP outpatient on discharge     RESOLVED     Hypertension  Home medications: amlodipine 5mg daily, lisinopril 40mg daily, and prn clonidine 0.1mg BID PRN for BP > 150/100    - goal SBP < 160  - continue home amlodipine at higher dose to 10mg daily  - continue home lisinopril 40mg daily  - prn labetalol and hydralazine  - cardene off since 4 am 5/7    A-fib  New onset at OSH, responded to BB  - initial EKG with A-fib RVR, repeat showed NSR    EKG  Monitor for now  Hold A/C    Hyperlipidemia LDL goal <70  Atorvastatin 40mg daily    Renal/  Urinary retention  Continue silodosin  Prn straight cath    Acute cystitis  UA with findigns c/f UTI  Urine Cx with E. Coli  S/P 3 day course of CTX (EOC: 5/8)          The patient is being Prophylaxed for:  Venous Thromboembolism with: Mechanical  Stress Ulcer with: H2B  Ventilator Pneumonia with: chlorhexidine oral care    Activity Orders          Turn patient starting at 05/05 1800    Elevate HOB starting at 05/05 1626    Diet NPO: NPO starting at 05/05 1626        Full Code    Gilma Morris MD  Neurocritical Care  [facility] - Neuro Critical Care

## 2023-05-09 NOTE — ASSESSMENT & PLAN NOTE
Area of cerebral edema identified when reviewing brain imaging  Continue frequent neuro checks   STAT CT for any exam changes which may signify expansion of the insult and/or the area of the edema. Such changes may require acute interventions to prevent loss of function and/or death.

## 2023-05-09 NOTE — ASSESSMENT & PLAN NOTE
Triglycerides elevated to 929 on admission, started on niacin and fenofibrate  Likely 2/2 propofol infusion as TGL have since normalized    - discontinue niacin  - discontinue fenofibrate  - discontinue TGL draws  - follow up with PCP outpatient on discharge     RESOLVED

## 2023-05-09 NOTE — TELEPHONE ENCOUNTER
----- Message from Aniyah Humphrey MD sent at 5/9/2023  9:11 AM CDT -----      Follow up with  in 4-6 weeks with MRI brain w/wo

## 2023-05-09 NOTE — ASSESSMENT & PLAN NOTE
66 yo female with a PMH of HTN, Afib with RVR who presents after hypertension today with desaturation and vomiting with subsequent intubation today.  Exam with strength intact, possible lower cranial nerve dysfunction. CT head with 1.3x1.3cm ICH centered in the left medullar with stable size and without apparent vascular anomaly on CTA, no IVH or hydrocephalus. ICH score 1.       --Admitted to neuro ICU  --No acute neurosurgical intervention  --MRI brain w/wo 5/6 with possible DVA and concern for cavernous malformation  --CTH stable x3  --Imaging findings stable, favorable neurologic exam    Outpatient follow up with neurosurgery in 4-6 weeks with a repeat MRI Brain w/wo contrast.     Open Intervention for cavernoma may be considered if findings are concordant on repeat imaging given first time hemorrhage, but will discuss in the outpatient setting.  --SBP < 160  --Wean to extubate as tolerated    --DSA likely not beneficial    --Hold anticoagulation  --Medical management per neuro ICU  --Continue to monitor clinically, call with any decline in exam  --NSGY will sign off, please call for further questions

## 2023-05-09 NOTE — HOSPITAL COURSE
5/9 patient drowsy but able to follow commands and nod appropriately, remains intubated, currently undergoing spontaneous vent trials  5/12 Patient is easily aroused and follows commands and communicates by writing. ETT in place. Reviewed imaging and considering possible cavernoma as etio.  5/13/2023 Patient will need repeat imaging to evaluate for possible cavernoma. She remains intubated. Gen surg consulted for trach/PEG and will likely go Monday. Discussed with staff and VN to sign off at this time. Thank you for your consult. Please do not hesitate to contact us if we can be of further assistance.

## 2023-05-09 NOTE — PLAN OF CARE
[facility] Care Plan    POC reviewed with Susan Pinto and family at 1400. Pt verbalized understanding. Questions and concerns addressed. No acute events today. Pt progressing toward goals. Will continue to monitor. See below and flowsheets for full assessment and VS info.     Suppository given  ABG within normal limits in the morning so placed on spontaneous, tiring out 1600 placed back on SIMV  New IV placed  PO cardiac medication adjusted    Is this a stroke patient? yes- Stroke booklet reviewed with patient and family, risk factors identified for patient and stroke booklet remains at bedside for ongoing education.     Neuro:  [place] Coma Scale  Best Eye Response: 4-->(E4) spontaneous  Best Motor Response: 6-->(M6) obeys commands  Best Verbal Response: 1-->(V1) none  [place] Coma Scale Score: 11  Assessment Qualifiers: patient intubated  Pupil PERRLA: yes     24 hr Temp:  [97.8 °F (36.6 °C)-98.9 °F (37.2 °C)]     CV:   Rhythm: normal sinus rhythm  BP goals:   SBP < 160  MAP > 65    Resp:      Vent Mode: Spont  Set Rate: 16 BPM  Oxygen Concentration (%): 50  Vt Set: 450 mL  PEEP/CPAP: 5 cmH20  Pressure Support: 8 cmH20    Plan: wean to extubate    GI/:     Diet/Nutrition Received: tube feeding  Last Bowel Movement: 05/08/23 (per night shift,  OVN)  Voiding Characteristics: external catheter    Intake/Output Summary (Last 24 hours) at 5/9/2023 1601  Last data filed at 5/9/2023 1501  Gross per 24 hour   Intake 660.95 ml   Output 1950 ml   Net -1289.05 ml     Unmeasured Output  Urine Occurrence: 1  Stool Occurrence: 1  Pad Count: 2    Labs/Accuchecks:  Recent Labs   Lab 05/09/23  0449   WBC 13.59*   RBC 4.90   HGB 14.3   HCT 44.8         Recent Labs   Lab 05/09/23  0449      K 4.2   CO2 26      BUN 23   CREATININE 0.8   ALKPHOS 55   ALT 12   AST 17   BILITOT 0.5      Recent Labs   Lab 05/06/23  0101   INR 1.0   APTT 33.1*      Recent Labs   Lab 05/06/23  1357   TROPONINI 0.029*        Electrolytes: Electrolytes replaced  Accuchecks: none    Gtts:      LDA/Wounds:  Lines/Drains/Airways       Drain  Duration                  NG/OG Tube 05/05/23 1445 orogastric 18 Fr. Center mouth 4 days    Female External Urinary Catheter 05/05/23 1718 3 days              Airway  Duration                  Airway - Non-Surgical 05/05/23 1430 Endotracheal Tube 4 days              Peripheral Intravenous Line  Duration                  Peripheral IV - Single Lumen 05/07/23 1043 18 G Posterior;Right Forearm 2 days         Peripheral IV - Single Lumen 05/09/23 1325 20 G Left;Posterior Forearm <1 day                  Wounds: No  Wound care consulted: No

## 2023-05-09 NOTE — ASSESSMENT & PLAN NOTE
67-year-old female with PMH of HTN and HLD who is a transfer from OSH for an ICH. She presented to OSH ED  with AMS, tachycardia, left-sided facial droop. History obtained from  as patient was intubated. He reported multiple episodes of vomiting, when assisting patient from the floor he noticed her eyes rolling back. EMS was called and  noticed facial droop and dysarthria. Patient hypoxic on EMS arrival requiring intubation.  on arrival to OSH, patient stop taking all her medications few months ago because she doesn't believe in doctors. CTH with cervicomedullary junction ICH. She was also noted to be in afib with RVR.    CTA head/neck without cerebrovascular abnormalities. MRI W/WO brain and cervical spine with possible venous anomaly. Neurosurgery planning for repeat MRI in 4-6 weeks with possible intervention pending findings.      Antithrombotics for secondary stroke prevention: Antiplatelets: None: Intracerebral Hemorrhage      Aggressive risk factor modification: HTN, HLD     Rehab efforts: The patient has been evaluated by a stroke team provider and the therapy needs have been fully considered based off the presenting complaints and exam findings. The following therapy evaluations are needed: PT evaluate and treat, OT evaluate and treat, SLP evaluate and treat, PM&R evaluate for appropriate placement pending extubation    Diagnostics ordered/pending: Other: repeat CT for any changes in exam    VTE prophylaxis: Mechanical prophylaxis: Place SCDs  None: Reason for No Pharmacological VTE Prophylaxis: History of systemic or intracranial bleeding    BP parameters: ICH: SBP <140

## 2023-05-09 NOTE — SUBJECTIVE & OBJECTIVE
Interval History: As above.    Review of Systems   Unable to perform ROS: Intubated   Eyes:  Positive for visual disturbance (double vision).   Neurological:  Positive for numbness (paresthesias in distal extremities).     Objective:     Vitals:  Temp: 98.8 °F (37.1 °C)  Pulse: 65  Rhythm: normal sinus rhythm  BP: (!) 158/82  MAP (mmHg): 113  Resp: 15  SpO2: (!) 93 %  Oxygen Concentration (%): 50  Vent Mode: SIMV  Set Rate: 16 BPM  Vt Set: 450 mL  Pressure Support: 12 cmH20  PEEP/CPAP: 5 cmH20  Peak Airway Pressure: 24 cmH20  Mean Airway Pressure: 9.2 cmH20  Plateau Pressure: 0 cmH20    Temp  Min: 97.8 °F (36.6 °C)  Max: 98.9 °F (37.2 °C)  Pulse  Min: 63  Max: 83  BP  Min: 121/70  Max: 190/91  MAP (mmHg)  Min: 90  Max: 131  Resp  Min: 14  Max: 31  SpO2  Min: 93 %  Max: 99 %  Oxygen Concentration (%)  Min: 50  Max: 50    05/08 0701 - 05/09 0700  In: 744.2   Out: 1850 [Urine:1850]   Unmeasured Output  Urine Occurrence: 1  Stool Occurrence: 1  Pad Count: 2        Physical Exam  Vitals and nursing note reviewed.   Constitutional:       General: She is not in acute distress.     Appearance: She is obese. She is not diaphoretic.   HENT:      Head: Normocephalic and atraumatic.      Right Ear: External ear normal.      Left Ear: External ear normal.      Nose: Nose normal.      Mouth/Throat:      Comments: ET tube and OG tube intact  Copious pooled secretions in oral cavity  Eyes:      General: No scleral icterus.        Right eye: No discharge.         Left eye: No discharge.      Extraocular Movements: Extraocular movements intact.      Pupils: Pupils are equal, round, and reactive to light.   Cardiovascular:      Rate and Rhythm: Normal rate and regular rhythm.   Pulmonary:      Effort: Pulmonary effort is normal. No respiratory distress.      Comments: Ventilator on SIMV  Abdominal:      General: Abdomen is flat. There is no distension.      Tenderness: There is no abdominal tenderness.   Musculoskeletal:          General: No swelling or deformity. Normal range of motion.      Cervical back: Normal range of motion and neck supple.      Right lower leg: No edema.      Left lower leg: No edema.   Skin:     General: Skin is warm.      Coloration: Skin is not jaundiced.      Findings: No bruising.   Neurological:      Mental Status: She is alert.      Comments:   E4VTM6  Alert, follows commands, and can write to communicate  Off sedation  Cough and gag reflex intact  PERRL  EOMI  Face symmetric  Moves all extremities symmetrically against gravity  Regards to touch throughout     Unable to test orientation, language, memory, judgment, insight, fund of knowledge, coordination, gait due to intubation.       Medications:  Continuous ScheduledamLODIPine, 10 mg, Daily  atorvastatin, 40 mg, Daily  famotidine, 20 mg, BID  lisinopriL, 40 mg, Daily  mupirocin, , BID  polyethylene glycol, 17 g, Daily  QUEtiapine, 25 mg, QHS  senna-docusate 8.6-50 mg, 1 tablet, BID  silodosin, 4 mg, Daily    PRNacetaminophen, 650 mg, Q6H PRN  bisacodyL, 10 mg, Daily PRN  fentaNYL, 25 mcg, Q4H PRN  hydrALAZINE, 10 mg, Q6H PRN  labetalol, 10 mg, Q4H PRN  magnesium oxide, 800 mg, PRN  magnesium oxide, 800 mg, PRN  ondansetron, 4 mg, Q8H PRN  potassium bicarbonate, 35 mEq, PRN  potassium bicarbonate, 50 mEq, PRN  potassium bicarbonate, 60 mEq, PRN  potassium, sodium phosphates, 2 packet, PRN  potassium, sodium phosphates, 2 packet, PRN  potassium, sodium phosphates, 2 packet, PRN  sodium chloride 0.9%, 10 mL, PRN  sucralfate, 1 g, Q6H PRN      Today I personally reviewed pertinent medications, lines/drains/airways, imaging, cardiology results, laboratory results, microbiology results, notably:    Diet  Diet NPO  Diet NPO

## 2023-05-09 NOTE — SUBJECTIVE & OBJECTIVE
Neurologic Chief Complaint: N/V, facial droop, dysarthria    Subjective:     Interval History: Patient is seen for follow-up neurological assessment and treatment recommendations: patient drowsy but able to follow commands and nod appropriately, remains intubated, currently undergoing spontaneous vent trials    HPI, Past Medical, Family, and Social History remains the same as documented in the initial encounter.     Review of Systems   Constitutional:  Negative for fever.   Respiratory:          Intubated   Neurological:  Positive for speech difficulty. Negative for weakness and numbness.   Scheduled Meds:   amLODIPine  10 mg Per OG tube Daily    atorvastatin  40 mg Per OG tube Daily    famotidine  20 mg Per OG tube BID    lisinopriL  40 mg Per OG tube Daily    mupirocin   Nasal BID    polyethylene glycol  17 g Per NG tube Daily    QUEtiapine  25 mg Per OG tube QHS    senna-docusate 8.6-50 mg  1 tablet Per OG tube BID    silodosin  4 mg Per OG tube Daily     Continuous Infusions:  PRN Meds:acetaminophen, bisacodyL, fentaNYL, hydrALAZINE, labetalol, magnesium oxide, magnesium oxide, ondansetron, potassium bicarbonate, potassium bicarbonate, potassium bicarbonate, potassium, sodium phosphates, potassium, sodium phosphates, potassium, sodium phosphates, sodium chloride 0.9%, sucralfate    Objective:     Vital Signs (Most Recent):  Temp: 98.2 °F (36.8 °C) (05/09/23 1501)  Pulse: 66 (05/09/23 1659)  Resp: 16 (05/09/23 1659)  BP: (!) 157/83 (05/09/23 1550)  SpO2: 97 % (05/09/23 1659)  BP Location: Right arm    Vital Signs Range (Last 24H):  Temp:  [97.8 °F (36.6 °C)-98.9 °F (37.2 °C)]   Pulse:  [63-83]   Resp:  [15-31]   BP: (121-190)/(69-93)   SpO2:  [93 %-100 %]   BP Location: Right arm       Physical Exam  Vitals reviewed.   Constitutional:       General: She is not in acute distress.  HENT:      Head: Normocephalic and atraumatic.   Cardiovascular:      Rate and Rhythm: Normal rate.   Pulmonary:      Effort: No  respiratory distress.      Comments: intubated  Neurological:      Mental Status: She is alert.            Neurological Exam:   LOC: drowsy  Attention Span: Good   Language: intubated but follows commands and nods appropriately  Articulation: Untestable due to intubation  Orientation: nods appropriately  Facial Movement (CN VII): Unable to test   Motor: Arm left  Normal 5/5  Leg left  Normal 5/5  Arm right  Normal 5/5  Leg right Normal 5/5  Tone: Normal tone throughout    Laboratory:  CMP:   Recent Labs   Lab 05/09/23  0449   CALCIUM 10.6*   ALBUMIN 3.2*   PROT 7.3      K 4.2   CO2 26      BUN 23   CREATININE 0.8   ALKPHOS 55   ALT 12   AST 17   BILITOT 0.5     CBC:   Recent Labs   Lab 05/09/23 0449   WBC 13.59*   RBC 4.90   HGB 14.3   HCT 44.8      MCV 91   MCH 29.2   MCHC 31.9*     Lipid Panel:   Recent Labs   Lab 05/05/23  1640 05/06/23  0431 05/09/23  0449   CHOL 257*  257*  --   --    LDLCALC Invalid, Trig>400.0  Invalid, Trig>400.0  --   --    HDL 74  74  --   --    TRIG 929*  929*   < > 61    < > = values in this interval not displayed.     Coagulation:   Recent Labs   Lab 05/06/23  0101   INR 1.0   APTT 33.1*     Platelet Aggregation Study: No results for input(s): PLTAGG, PLTAGINTERP, PLTAGREGLACO, ADPPLTAGGREG in the last 168 hours.  Hgb A1C:   Recent Labs   Lab 05/05/23  1640   HGBA1C 5.2  5.2     TSH:   Recent Labs   Lab 05/05/23  1640   TSH 0.866       Diagnostic Results     Brain Imaging   CT Head 5/9/2023  Impression:     Stable medullary hemorrhage on the left.  No intraventricular extension or hydrocephalus.    MRI Brain W/WO 5/6/2023  MRI brain: Evolving acute left paramedian parenchymal hemorrhage within the medulla.  There is subtle curvilinear enhancement along the left lateral aspect of this extending to the pial surface configuration concerning for a developmental venous anomaly which raises concern for hemorrhage within a cavernous malformation.  No evidence for  nodular enhancement to suggest underlying mass however component of AV shunting cannot be entirely excluded and consideration for further characterization with conventional angiography and follow-up is advised.     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  No evidence for additional hemorrhage or enhancement throughout the cervical spinal cord.    CTH 5/5/2023  Intra-axial hemorrhage at the cervicomedullary junction measuring approximately 1.3 x 1.3 x 1.2 cm in dimension.  A component of extra-axial hemorrhage is difficult to exclude.  There is surrounding edema.    Vessel Imaging   CTA head and neck 5/5/2023  1. Redemonstration of acute hemorrhage involving the medulla/cervicomedullary junction which appears similar in overall size and extent to prior exam.  This appears similar on pre and postcontrast images without no abnormal tangle of adjacent vessels.  Further assessment with conventional angiography or MRI can be performed as warranted.  Consider appropriate short-term imaging follow-up to evaluate appropriate maturation of blood products.  2. CTA head and neck within normal limits without evidence of hemodynamically significant stenosis or occlusion.  3. No CT evidence of acute cervical spine fracture or traumatic subluxation.  Degenerative change of the cervical spine.  4. Right upper lobe atelectasis with possible interlobular septal thickening.    Cardiac Imaging   TTE 5/7/23  The left ventricle is normal in size with normal systolic function.  The estimated ejection fraction is 65%.  Normal left ventricular diastolic function.  Normal right ventricular size with normal right ventricular systolic function.  Normal central venous pressure (3 mmHg).

## 2023-05-09 NOTE — NURSING
Pt transported to CT scan at 0410 via bed by RN and RT along with monitoring, ventilator, and ambu bag. Pt tolerated scan well. Returned to room at 0430. Pt resting comfortably, bed in low locked position. All alarms activated and audible.

## 2023-05-10 LAB
ALBUMIN SERPL BCP-MCNC: 3.2 G/DL (ref 3.5–5.2)
ALLENS TEST: ABNORMAL
ALP SERPL-CCNC: 60 U/L (ref 55–135)
ALT SERPL W/O P-5'-P-CCNC: 12 U/L (ref 10–44)
ANION GAP SERPL CALC-SCNC: 12 MMOL/L (ref 8–16)
AST SERPL-CCNC: 17 U/L (ref 10–40)
BASOPHILS # BLD AUTO: 0.08 K/UL (ref 0–0.2)
BASOPHILS NFR BLD: 0.6 % (ref 0–1.9)
BILIRUB SERPL-MCNC: 0.5 MG/DL (ref 0.1–1)
BUN SERPL-MCNC: 26 MG/DL (ref 8–23)
CALCIUM SERPL-MCNC: 11.1 MG/DL (ref 8.7–10.5)
CHLORIDE SERPL-SCNC: 105 MMOL/L (ref 95–110)
CO2 SERPL-SCNC: 23 MMOL/L (ref 23–29)
CREAT SERPL-MCNC: 0.8 MG/DL (ref 0.5–1.4)
DELSYS: ABNORMAL
DIFFERENTIAL METHOD: ABNORMAL
EOSINOPHIL # BLD AUTO: 0.2 K/UL (ref 0–0.5)
EOSINOPHIL NFR BLD: 1.2 % (ref 0–8)
ERYTHROCYTE [DISTWIDTH] IN BLOOD BY AUTOMATED COUNT: 13.8 % (ref 11.5–14.5)
ERYTHROCYTE [SEDIMENTATION RATE] IN BLOOD BY WESTERGREN METHOD: 16 MM/H
EST. GFR  (NO RACE VARIABLE): >60 ML/MIN/1.73 M^2
FIO2: 50
GLUCOSE SERPL-MCNC: 115 MG/DL (ref 70–110)
HCO3 UR-SCNC: 29.8 MMOL/L (ref 24–28)
HCT VFR BLD AUTO: 46.2 % (ref 37–48.5)
HGB BLD-MCNC: 14.3 G/DL (ref 12–16)
IMM GRANULOCYTES # BLD AUTO: 0.08 K/UL (ref 0–0.04)
IMM GRANULOCYTES NFR BLD AUTO: 0.6 % (ref 0–0.5)
LYMPHOCYTES # BLD AUTO: 2.6 K/UL (ref 1–4.8)
LYMPHOCYTES NFR BLD: 20.5 % (ref 18–48)
MAGNESIUM SERPL-MCNC: 2.1 MG/DL (ref 1.6–2.6)
MCH RBC QN AUTO: 28.8 PG (ref 27–31)
MCHC RBC AUTO-ENTMCNC: 31 G/DL (ref 32–36)
MCV RBC AUTO: 93 FL (ref 82–98)
MODE: ABNORMAL
MONOCYTES # BLD AUTO: 1.3 K/UL (ref 0.3–1)
MONOCYTES NFR BLD: 10.6 % (ref 4–15)
NEUTROPHILS # BLD AUTO: 8.4 K/UL (ref 1.8–7.7)
NEUTROPHILS NFR BLD: 66.5 % (ref 38–73)
NRBC BLD-RTO: 0 /100 WBC
PCO2 BLDA: 38.1 MMHG (ref 35–45)
PEEP: 5
PH SMN: 7.5 [PH] (ref 7.35–7.45)
PHOSPHATE SERPL-MCNC: 2.9 MG/DL (ref 2.7–4.5)
PLATELET # BLD AUTO: 297 K/UL (ref 150–450)
PMV BLD AUTO: 11.1 FL (ref 9.2–12.9)
PO2 BLDA: 80 MMHG (ref 80–100)
POC BE: 7 MMOL/L
POC SATURATED O2: 97 % (ref 95–100)
POC TCO2: 31 MMOL/L (ref 23–27)
POTASSIUM SERPL-SCNC: 4.1 MMOL/L (ref 3.5–5.1)
PROT SERPL-MCNC: 7.4 G/DL (ref 6–8.4)
PS: 12
RBC # BLD AUTO: 4.97 M/UL (ref 4–5.4)
SAMPLE: ABNORMAL
SITE: ABNORMAL
SODIUM SERPL-SCNC: 140 MMOL/L (ref 136–145)
SP02: 97
VT: 450
WBC # BLD AUTO: 12.58 K/UL (ref 3.9–12.7)

## 2023-05-10 PROCEDURE — 99291 PR CRITICAL CARE, E/M 30-74 MINUTES: ICD-10-PCS | Mod: GC,,, | Performed by: INTERNAL MEDICINE

## 2023-05-10 PROCEDURE — 27000221 HC OXYGEN, UP TO 24 HOURS

## 2023-05-10 PROCEDURE — 27200966 HC CLOSED SUCTION SYSTEM

## 2023-05-10 PROCEDURE — 99900026 HC AIRWAY MAINTENANCE (STAT)

## 2023-05-10 PROCEDURE — 25000003 PHARM REV CODE 250: Performed by: PSYCHIATRY & NEUROLOGY

## 2023-05-10 PROCEDURE — 25000003 PHARM REV CODE 250

## 2023-05-10 PROCEDURE — 25000003 PHARM REV CODE 250: Performed by: PHYSICIAN ASSISTANT

## 2023-05-10 PROCEDURE — 99900035 HC TECH TIME PER 15 MIN (STAT)

## 2023-05-10 PROCEDURE — 80053 COMPREHEN METABOLIC PANEL: CPT | Performed by: PSYCHIATRY & NEUROLOGY

## 2023-05-10 PROCEDURE — 94761 N-INVAS EAR/PLS OXIMETRY MLT: CPT

## 2023-05-10 PROCEDURE — 25000003 PHARM REV CODE 250: Performed by: INTERNAL MEDICINE

## 2023-05-10 PROCEDURE — 94003 VENT MGMT INPAT SUBQ DAY: CPT

## 2023-05-10 PROCEDURE — 84100 ASSAY OF PHOSPHORUS: CPT | Performed by: PSYCHIATRY & NEUROLOGY

## 2023-05-10 PROCEDURE — 63600175 PHARM REV CODE 636 W HCPCS: Performed by: PHYSICIAN ASSISTANT

## 2023-05-10 PROCEDURE — 99291 CRITICAL CARE FIRST HOUR: CPT | Mod: GC,,, | Performed by: INTERNAL MEDICINE

## 2023-05-10 PROCEDURE — 83735 ASSAY OF MAGNESIUM: CPT | Performed by: PSYCHIATRY & NEUROLOGY

## 2023-05-10 PROCEDURE — 85025 COMPLETE CBC W/AUTO DIFF WBC: CPT | Performed by: PSYCHIATRY & NEUROLOGY

## 2023-05-10 PROCEDURE — 63600175 PHARM REV CODE 636 W HCPCS: Performed by: PSYCHIATRY & NEUROLOGY

## 2023-05-10 PROCEDURE — 20000000 HC ICU ROOM

## 2023-05-10 PROCEDURE — 97112 NEUROMUSCULAR REEDUCATION: CPT

## 2023-05-10 RX ORDER — GLYCOPYRROLATE 0.2 MG/ML
0.1 INJECTION INTRAMUSCULAR; INTRAVENOUS ONCE
Status: COMPLETED | OUTPATIENT
Start: 2023-05-10 | End: 2023-05-10

## 2023-05-10 RX ORDER — LACTULOSE 10 G/15ML
200 SOLUTION ORAL; RECTAL ONCE
Status: COMPLETED | OUTPATIENT
Start: 2023-05-10 | End: 2023-05-10

## 2023-05-10 RX ORDER — POLYETHYLENE GLYCOL 3350 17 G/17G
17 POWDER, FOR SOLUTION ORAL 2 TIMES DAILY
Status: DISCONTINUED | OUTPATIENT
Start: 2023-05-10 | End: 2023-05-17 | Stop reason: HOSPADM

## 2023-05-10 RX ADMIN — POLYETHYLENE GLYCOL 3350 17 G: 17 POWDER, FOR SOLUTION ORAL at 09:05

## 2023-05-10 RX ADMIN — ATORVASTATIN CALCIUM 40 MG: 40 TABLET, FILM COATED ORAL at 09:05

## 2023-05-10 RX ADMIN — ACETAMINOPHEN 650 MG: 325 TABLET ORAL at 05:05

## 2023-05-10 RX ADMIN — MUPIROCIN: 20 OINTMENT TOPICAL at 09:05

## 2023-05-10 RX ADMIN — SUCRALFATE 1 G: 1 SUSPENSION ORAL at 01:05

## 2023-05-10 RX ADMIN — LACTULOSE 200 G: 10 SOLUTION ORAL at 07:05

## 2023-05-10 RX ADMIN — GLYCOPYRROLATE 0.1 MG: 0.2 INJECTION INTRAMUSCULAR; INTRAVENOUS at 05:05

## 2023-05-10 RX ADMIN — FAMOTIDINE 20 MG: 20 TABLET ORAL at 09:05

## 2023-05-10 RX ADMIN — ONDANSETRON 4 MG: 2 INJECTION INTRAMUSCULAR; INTRAVENOUS at 12:05

## 2023-05-10 RX ADMIN — FENTANYL CITRATE 25 MCG: 50 INJECTION INTRAMUSCULAR; INTRAVENOUS at 01:05

## 2023-05-10 RX ADMIN — SENNOSIDES AND DOCUSATE SODIUM 1 TABLET: 50; 8.6 TABLET ORAL at 09:05

## 2023-05-10 RX ADMIN — SILODOSIN 4 MG: 4 CAPSULE ORAL at 09:05

## 2023-05-10 RX ADMIN — LISINOPRIL 40 MG: 20 TABLET ORAL at 09:05

## 2023-05-10 RX ADMIN — SUCRALFATE 1 G: 1 SUSPENSION ORAL at 11:05

## 2023-05-10 RX ADMIN — QUETIAPINE FUMARATE 25 MG: 25 TABLET ORAL at 09:05

## 2023-05-10 RX ADMIN — AMLODIPINE BESYLATE 10 MG: 10 TABLET ORAL at 03:05

## 2023-05-10 RX ADMIN — FENTANYL CITRATE 25 MCG: 50 INJECTION INTRAMUSCULAR; INTRAVENOUS at 10:05

## 2023-05-10 RX ADMIN — LABETALOL HYDROCHLORIDE 10 MG: 5 INJECTION, SOLUTION INTRAVENOUS at 01:05

## 2023-05-10 RX ADMIN — SUCRALFATE 1 G: 1 SUSPENSION ORAL at 09:05

## 2023-05-10 RX ADMIN — FENTANYL CITRATE 25 MCG: 50 INJECTION INTRAMUSCULAR; INTRAVENOUS at 06:05

## 2023-05-10 NOTE — PLAN OF CARE
[facility] Care Plan    POC reviewed with Susan Pinto and family at 0300. Pt verbalized understanding. Questions and concerns addressed. No acute events overnight. Pt progressing toward goals. Will continue to monitor. See below and flowsheets for full assessment and VS info.     - TF @ 10 mL/hr  - Fentanyl given x1 for vent discomfort  - Zofran given x1 for nausea      Is this a stroke patient? yes- Stroke booklet reviewed with patient and family, risk factors identified for patient and stroke booklet remains at bedside for ongoing education.     Neuro:  Naldo Coma Scale  Best Eye Response: 4-->(E4) spontaneous  Best Motor Response: 6-->(M6) obeys commands  Best Verbal Response: 1-->(V1) none  Lynn Coma Scale Score: 11  Assessment Qualifiers: patient intubated, no eye obstruction present  Pupil PERRLA: yes     24hr Temp:  [97.8 °F (36.6 °C)-98.8 °F (37.1 °C)]     CV:   Rhythm: normal sinus rhythm, sinus bradycardia  BP goals:   SBP < 160  MAP > 65    Resp:      Vent Mode: SIMV  Set Rate: 16 BPM  Oxygen Concentration (%): 50  Vt Set: 450 mL  PEEP/CPAP: 5 cmH20  Pressure Support: 12 cmH20    Plan: wean to extubate    GI/:     Diet/Nutrition Received: tube feeding  Last Bowel Movement: 05/04/23  Voiding Characteristics: external catheter    Intake/Output Summary (Last 24 hours) at 5/10/2023 0428  Last data filed at 5/10/2023 0201  Gross per 24 hour   Intake 1138.97 ml   Output 2100 ml   Net -961.03 ml     Unmeasured Output  Urine Occurrence: 1  Stool Occurrence: 1  Pad Count: 2    Labs/Accuchecks:  Recent Labs   Lab 05/10/23  0125   WBC 12.58   RBC 4.97   HGB 14.3   HCT 46.2         Recent Labs   Lab 05/10/23  0125      K 4.1   CO2 23      BUN 26*   CREATININE 0.8   ALKPHOS 60   ALT 12   AST 17   BILITOT 0.5      Recent Labs   Lab 05/06/23  0101   INR 1.0   APTT 33.1*      Recent Labs   Lab 05/06/23  1357   TROPONINI 0.029*       Electrolytes: N/A - electrolytes WDL  Accuchecks:  none    Gtts:      LDA/Wounds:  Lines/Drains/Airways       Drain  Duration                  NG/OG Tube 05/05/23 1445 orogastric 18 Fr. Center mouth 4 days    Female External Urinary Catheter 05/05/23 1718 4 days              Airway  Duration                  Airway - Non-Surgical 05/05/23 1430 Endotracheal Tube 4 days              Peripheral Intravenous Line  Duration                  Peripheral IV - Single Lumen 05/07/23 1043 18 G Posterior;Right Forearm 2 days         Peripheral IV - Single Lumen 05/09/23 1325 20 G Left;Posterior Forearm <1 day                  Wounds: No  Wound care consulted: No

## 2023-05-10 NOTE — ASSESSMENT & PLAN NOTE
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 with edema surrounding  - MRI brain w/wo (5/6): possible DVA and concern for cavernous malformation  - left medullary hemorrhage vs cavernoma    Plan:  - Admit to [facility]  - f/u CT and MRI per NSGY  - NSGY following, no acute surgical intervention at this time   - OP f/u w NSGY in 4-6 weeks with a repeat MRI Brain w/wo contrast   - Open intervention for cavernoma may be considered if findings are concordant on repeat imaging given first time hemorrhage, but will discuss in OP setting   - DSA likely not beneficial  - Neuro checks Q1  - SBP< 160 per NSGY  - intubated for airway protection, copious secretions and weak cough   - continue trials of spontaneous ventilator setting   - failed T-piece trial   - f/u daily ABG's  - Hold AC/AP  - Coags & CBC  - VN following  - PT/OT/SLP

## 2023-05-10 NOTE — PLAN OF CARE
[facility]  Discharge Reassessment    Primary Care Provider: Court Champion NP    Expected Discharge Date: 5/17/2023    Patient intubated on vent.  Not medically stable for discharge.      Reassessment (most recent)       Discharge Reassessment - 05/10/23 1642          Discharge Reassessment    Assessment Type Discharge Planning Reassessment     Did the patient's condition or plan change since previous assessment? No     Communicated ANITHA with patient/caregiver Date not available/Unable to determine     Discharge Plan A Long-term acute care facility (LTAC)     Discharge Plan B Skilled Nursing Facility     DME Needed Upon Discharge  other (see comments)   tbd    Why the patient remains in the hospital Requires continued medical care                   Sarai Cardoso RN, CCRN-K, [facility]  Neuro-Critical Care   [phone]

## 2023-05-10 NOTE — PROGRESS NOTES
[facility] Critical [facility]  Neurocritical Care  Progress Note    Admit Date: 5/5/2023  Service Date: 05/10/2023  Length of Stay: 5    Subjective:     Chief Complaint: Nontraumatic intracerebral hemorrhage    History of Present Illness: Susan Pinto is a 67-year-old female with Hx of HTN presenting with altered mental status, tachycardia, left-sided facial droop, admitted for medullary ICH.   Patient started having vomiting that began around 7:00 p.m last night. On EMS arrival was noted to be hypoxic sat's in 80's with AMS, was intubated for airway protection. In ED at OSH CTh was done and was found to have a cervicomedullary junction ICH measuring approximately about 1.3 x 1.3 x 1.2 cm, also had a.fib with RVR Hr in the 150's that responded to BB. [place] [facility]         Hospital Course: 05/06/2023 transient hypotension overnight improved with stopping propofol. MRI with possible DVA, will discuss need for DSA with IR. Endorsed tingling in BLE -> CTH stable, trial of HTS for possible edema. SBT as tolerated. 3 day Abx for UTI. New double vision -> repeat CTH stable.   05/07/2023 CO2 retention noted on ABG yesterday after SBT. Gas normalized with SIMV. Will reassess on spontaneous today. Discussed possibility of trach if she fails to wean off the vent. Still with copious secretions and no cough. Day 2/3 of CTX for GNR UTI. Tube feeds initiated.  05/08/2023 Pt remains on spontaneous vent settings with CO2 in normal range. T-piece trial unsuccessful yesterday. Cough reflex improved today.   05/09/2023 Exam stable. CT and MRI pending. Continue trials of spontaneous ventilation.   05/10/2023 BP better controlled overnight. Will advance bowel regiment for constipation. Pt still not tolerating secretions, remains intubated.       Interval History: As above.    Review of Systems   Unable to perform ROS: Intubated   Eyes:  Positive for visual disturbance (double vision).   Gastrointestinal:   Positive for nausea.   Musculoskeletal:  Positive for back pain.   Neurological:  Positive for numbness (paresthesias in distal extremities).     Objective:     Vitals:  Temp: 98 °F (36.7 °C)  Pulse: 63  Rhythm: normal sinus rhythm  BP: (!) 196/108  MAP (mmHg): 140  Resp: 16  SpO2: 98 %  Oxygen Concentration (%): 50  Vent Mode: SIMV  Set Rate: 16 BPM  Vt Set: 450 mL  Pressure Support: 12 cmH20  PEEP/CPAP: 5 cmH20  Peak Airway Pressure: 25 cmH20  Mean Airway Pressure: 9.2 cmH20  Plateau Pressure: 0 cmH20    Temp  Min: 98 °F (36.7 °C)  Max: 98.8 °F (37.1 °C)  Pulse  Min: 59  Max: 80  BP  Min: 117/62  Max: 196/108  MAP (mmHg)  Min: 85  Max: 140  Resp  Min: 16  Max: 30  SpO2  Min: 94 %  Max: 100 %  Oxygen Concentration (%)  Min: 50  Max: 50    05/09 0701 - 05/10 0700  In: 1139   Out: 1600 [Urine:1600]   Unmeasured Output  Urine Occurrence: 1  Stool Occurrence: 1  Pad Count: 2        Physical Exam  Vitals and nursing note reviewed.   Constitutional:       General: She is not in acute distress.     Appearance: She is obese. She is not diaphoretic.   HENT:      Head: Normocephalic and atraumatic.      Right Ear: External ear normal.      Left Ear: External ear normal.      Nose: Nose normal.      Mouth/Throat:      Comments: ET tube and OG tube intact  Copious pooled clear secretions in oral cavity  Eyes:      General: No scleral icterus.        Right eye: No discharge.         Left eye: No discharge.      Extraocular Movements: Extraocular movements intact.      Pupils: Pupils are equal, round, and reactive to light.   Cardiovascular:      Rate and Rhythm: Normal rate and regular rhythm.   Pulmonary:      Effort: Pulmonary effort is normal. No respiratory distress.      Comments: Ventilator on SIMV  Abdominal:      General: Abdomen is flat. There is no distension.      Tenderness: There is no abdominal tenderness.   Musculoskeletal:         General: No swelling or deformity. Normal range of motion.      Cervical back:  Normal range of motion and neck supple.      Right lower leg: No edema.      Left lower leg: No edema.   Skin:     General: Skin is warm.      Coloration: Skin is not jaundiced.      Findings: No bruising.   Neurological:      Mental Status: She is alert.      Comments:   [id number]  Alert, follows commands, and can write to communicate  Off sedation  Cough and gag reflex intact  PERRL  EOMI  Face symmetric  Moves all extremities symmetrically against gravity  Regards to touch throughout     Unable to test orientation, language, memory, judgment, insight, fund of knowledge, coordination, gait due to intubation.       Medications:  Continuous ScheduledamLODIPine, 10 mg, Daily  atorvastatin, 40 mg, Daily  famotidine, 20 mg, BID  glycopyrrolate (PF), 0.1 mg, Once  lactulose, 200 g, Once  lisinopriL, 40 mg, Daily  mupirocin, , BID  polyethylene glycol, 17 g, BID  QUEtiapine, 25 mg, QHS  senna-docusate 8.6-50 mg, 1 tablet, BID  silodosin, 4 mg, Daily    PRNacetaminophen, 650 mg, Q6H PRN  bisacodyL, 10 mg, Daily PRN  fentaNYL, 25 mcg, Q4H PRN  hydrALAZINE, 10 mg, Q6H PRN  labetalol, 10 mg, Q4H PRN  magnesium oxide, 800 mg, PRN  magnesium oxide, 800 mg, PRN  ondansetron, 4 mg, Q8H PRN  potassium bicarbonate, 35 mEq, PRN  potassium bicarbonate, 50 mEq, PRN  potassium bicarbonate, 60 mEq, PRN  potassium, sodium phosphates, 2 packet, PRN  potassium, sodium phosphates, 2 packet, PRN  potassium, sodium phosphates, 2 packet, PRN  sodium chloride 0.9%, 10 mL, PRN  sucralfate, 1 g, Q6H PRN      Today I personally reviewed pertinent medications, lines/drains/airways, imaging, cardiology results, laboratory results, microbiology results, notably:    Diet  Diet NPO  Diet NPO      Assessment/Plan:     Neuro  * Nontraumatic intracerebral hemorrhage  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 with edema surrounding  - MRI brain w/wo (5/6): possible DVA and concern for cavernous  malformation  - left medullary hemorrhage vs cavernoma    Plan:  - Admit to [facility]  - f/u CT and MRI per NSGY  - NSGY following, no acute surgical intervention at this time   - OP f/u w NSGY in 4-6 weeks with a repeat MRI Brain w/wo contrast   - Open intervention for cavernoma may be considered if findings are concordant on repeat imaging given first time hemorrhage, but will discuss in OP setting   - DSA likely not beneficial  - Neuro checks Q1  - SBP< 160 per NSGY  - intubated for airway protection, copious secretions and weak cough   - continue trials of spontaneous ventilator setting   - failed T-piece trial   - f/u daily ABG's  - Hold AC/AP  - Coags & CBC  - VN following  - PT/OT/SLP    Vasogenic cerebral edema  See ICH    Cardiac/Vascular  Hypertriglyceridemia  Triglycerides elevated to 929 on admission, started on niacin and fenofibrate  Likely 2/2 propofol infusion as TGL have since normalized    - discontinue niacin  - discontinue fenofibrate  - discontinue TGL draws  - follow up with PCP outpatient on discharge     RESOLVED     Primary hypertension  Home medications: amlodipine 5mg daily, lisinopril 40mg daily, and prn clonidine 0.1mg BID PRN for BP > 150/100    - goal SBP < 160  - continue home amlodipine at higher dose to 10mg daily  - continue home lisinopril 40mg daily  - prn labetalol and hydralazine  - cardene off since 4 am 5/7    A-fib  New onset at OSH, responded to BB  - initial EKG with A-fib RVR, repeat showed NSR    EKG  Monitor for now  Hold A/C    Hyperlipidemia LDL goal <70  Atorvastatin 40mg daily    Renal/  Urinary retention  Continue silodosin  Prn straight cath    Acute cystitis  UA with findigns c/f UTI  Urine Cx with E. Coli  S/P 3 day course of CTX (EOC: 5/8)          The patient is being Prophylaxed for:  Venous Thromboembolism with: Mechanical  Stress Ulcer with: H2B  Ventilator Pneumonia with: chlorhexidine oral care    Activity Orders          Turn patient starting at 05/05 1800     Elevate HOB starting at 05/05 1626    Diet NPO: NPO starting at 05/05 1626        Full Code    Gilma Morris MD  Neurocritical Care  [facility]

## 2023-05-10 NOTE — PLAN OF CARE
[facility] Care Plan    POC reviewed with Susan Pinot and family at 1400. Spouse verbalized understanding. Questions and concerns addressed. No acute events today. Pt progressing toward goals. Will continue to monitor. See below and flowsheets for full assessment and VS info.             Is this a stroke patient? yes- Stroke booklet reviewed with patient and family, risk factors identified for patient and stroke booklet remains at bedside for ongoing education.     Neuro:  P[place] Coma Scale  Best Eye Response: 4-->(E4) spontaneous  Best Motor Response: 6-->(M6) obeys commands  Best Verbal Response: 1-->(V1) none  P[place]ah Coma Scale Score: 11  Assessment Qualifiers: patient intubated  Pupil PERRLA: yes     24 hr Temp:  [97.7 °F (36.5 °C)-98.8 °F (37.1 °C)]     CV:   Rhythm: normal sinus rhythm  BP goals:   SBP < 160  MAP > 65    Resp:      Vent Mode: SIMV  Set Rate: 16 BPM  Oxygen Concentration (%): 50  Vt Set: 450 mL  PEEP/CPAP: 5 cmH20  Pressure Support: 12 cmH20    Plan: N/A    GI/:     Diet/Nutrition Received: tube feeding  Last Bowel Movement: 05/04/23  Voiding Characteristics: external catheter    Intake/Output Summary (Last 24 hours) at 5/10/2023 1619  Last data filed at 5/10/2023 1608  Gross per 24 hour   Intake 890 ml   Output 900 ml   Net -10 ml     Unmeasured Output  Urine Occurrence: 1  Stool Occurrence: 1  Pad Count: 2    Labs/Accuchecks:  Recent Labs   Lab 05/10/23  0125   WBC 12.58   RBC 4.97   HGB 14.3   HCT 46.2         Recent Labs   Lab 05/10/23  0125      K 4.1   CO2 23      BUN 26*   CREATININE 0.8   ALKPHOS 60   ALT 12   AST 17   BILITOT 0.5      Recent Labs   Lab 05/06/23  0101   INR 1.0   APTT 33.1*      Recent Labs   Lab 05/06/23  1357   TROPONINI 0.029*       Electrolytes: N/A - electrolytes WDL  Accuchecks: none    Gtts:      LDA/Wounds:  Lines/Drains/Airways       Drain  Duration                  NG/OG Tube 05/05/23 1445 orogastric 18 Fr. Center mouth 5 days    Female  External Urinary Catheter 05/05/23 1718 4 days              Airway  Duration                  Airway - Non-Surgical 05/05/23 1430 Endotracheal Tube 5 days              Peripheral Intravenous Line  Duration                  Peripheral IV - Single Lumen 05/07/23 1043 18 G Posterior;Right Forearm 3 days         Peripheral IV - Single Lumen 05/09/23 1325 20 G Left;Posterior Forearm 1 day                  Wounds: No  Wound care consulted: No

## 2023-05-10 NOTE — PT/OT/SLP PROGRESS
Occupational Therapy   Treatment    Name: Susan Pinto  MRN: [id number]  Admitting Diagnosis:  Nontraumatic intracerebral hemorrhage       Recommendations:     Discharge Recommendations:  (pending extubation)  Discharge Equipment Recommendations:   (pending extubation)  Barriers to discharge:  None    Assessment:     Susan Pinto is a 67 y.o. female with a medical diagnosis of Nontraumatic intracerebral hemorrhage.  She presents with performance deficits affecting function are weakness, impaired endurance, impaired self care skills, impaired functional mobility, impaired balance, impaired cognition, decreased coordination, decreased upper extremity function.     Rehab Prognosis:  Good; patient would benefit from acute skilled OT services to address these deficits and reach maximum level of function.       Plan:     Patient to be seen 2 x/week to address the above listed problems via cognitive retraining, neuromuscular re-education, therapeutic exercises, therapeutic activities, self-care/home management  Plan of Care Expires: 06/03/23  Plan of Care Reviewed with: patient, spouse    Subjective     Patient:  Communicating via head nods  Pain/Comfort:  Pain Rating 1: 0/10  Pain Rating Post-Intervention 1: 0/10    Objective:     Communicated with: Nurse prior to session.  Patient found supine with telemetry, ventilator, SCD, peripheral IV, pulse ox (continuous), restraints, bed alarm, blood pressure cuff upon OT entry to room.    General Precautions: Standard, aspiration, fall    Orthopedic Precautions:N/A  Braces: N/A  Respiratory Status: Ventilator     Occupational Performance:     Bed Mobility:    Patient completed Rolling/Turning to Left with  maximal assistance  Patient completed Rolling/Turning to Right with maximal assistance     Functional Mobility/Transfers:  Dependent drawsheet transfers    Activities of Daily Living:  Grooming: maximal assistance while supine    [facility] 6 Click ADL: 6    Treatment &  Education:  Daily orientation provided.  AAROM performed bilateral UE/LEs one set x 10 rep in all planes of motion. Provided multi-sensory stimulation to prevent sensory deprivation and improve clinical outcomes.  Positioning provided for midline orientation with bilateral UEs elevated and heels lifted off mattress.  Patient alert and able to follow 4/4 one step commands.  Patient attentive and interactive throughout the session.        Patient left supine with all lines intact, call button in reach, bed alarm on, and restraints reapplied at end of session    GOALS:   Multidisciplinary Problems       Occupational Therapy Goals          Problem: Occupational Therapy    Goal Priority Disciplines Outcome Interventions   Occupational Therapy Goal     OT, PT/OT Ongoing, Progressing    Description: Goals set 5/6 to be addressed for 14 days with expiration date, 5/20:  Patient will increase functional independence with ADLs by performing:    Patient will demonstrate rolling to the right with SBA.  Not met   Patient will demonstrate rolling to the left with SBA.   Not met  Patient will demonstrate supine -sit with SBA.   Not met  Patient will demonstrate stand pivot transfers with SBA.   Not met  Patient will demonstrate grooming while standing with SBA.   Not met  Patient will demonstrate upper body dressing with SBA while seated EOB.   Not met  Patient will demonstrate lower body dressing with min assist while seated EOB.   Not met  Patient will demonstrate toileting with CGA.   Not met  Patient will demonstrate bathing while seated EOB with CGA.   Not met  Patient's family / caregiver will demonstrate independence and safety with assisting patient with self-care skills and functional mobility.     Not met  Patient and/or patient's family will verbalize understanding of stroke prevention guidelines, personal risk factors and stroke warning signs via teachback method.  Not met                                  Time  Tracking:     OT Date of Treatment: 05/10/23  OT Start Time: 0443  OT Stop Time: 0453  OT Total Time (min): 10 min    Billable Minutes:Neuromuscular Re-education 10    OT/PEYTON: OT          5/10/2023

## 2023-05-10 NOTE — SUBJECTIVE & OBJECTIVE
Interval History: As above.    Review of Systems   Unable to perform ROS: Intubated   Eyes:  Positive for visual disturbance (double vision).   Gastrointestinal:  Positive for nausea.   Musculoskeletal:  Positive for back pain.   Neurological:  Positive for numbness (paresthesias in distal extremities).     Objective:     Vitals:  Temp: 98 °F (36.7 °C)  Pulse: 63  Rhythm: normal sinus rhythm  BP: (!) 196/108  MAP (mmHg): 140  Resp: 16  SpO2: 98 %  Oxygen Concentration (%): 50  Vent Mode: SIMV  Set Rate: 16 BPM  Vt Set: 450 mL  Pressure Support: 12 cmH20  PEEP/CPAP: 5 cmH20  Peak Airway Pressure: 25 cmH20  Mean Airway Pressure: 9.2 cmH20  Plateau Pressure: 0 cmH20    Temp  Min: 98 °F (36.7 °C)  Max: 98.8 °F (37.1 °C)  Pulse  Min: 59  Max: 80  BP  Min: 117/62  Max: 196/108  MAP (mmHg)  Min: 85  Max: 140  Resp  Min: 16  Max: 30  SpO2  Min: 94 %  Max: 100 %  Oxygen Concentration (%)  Min: 50  Max: 50    05/09 0701 - 05/10 0700  In: 1139   Out: 1600 [Urine:1600]   Unmeasured Output  Urine Occurrence: 1  Stool Occurrence: 1  Pad Count: 2        Physical Exam  Vitals and nursing note reviewed.   Constitutional:       General: She is not in acute distress.     Appearance: She is obese. She is not diaphoretic.   HENT:      Head: Normocephalic and atraumatic.      Right Ear: External ear normal.      Left Ear: External ear normal.      Nose: Nose normal.      Mouth/Throat:      Comments: ET tube and OG tube intact  Copious pooled clear secretions in oral cavity  Eyes:      General: No scleral icterus.        Right eye: No discharge.         Left eye: No discharge.      Extraocular Movements: Extraocular movements intact.      Pupils: Pupils are equal, round, and reactive to light.   Cardiovascular:      Rate and Rhythm: Normal rate and regular rhythm.   Pulmonary:      Effort: Pulmonary effort is normal. No respiratory distress.      Comments: Ventilator on SIMV  Abdominal:      General: Abdomen is flat. There is no  distension.      Tenderness: There is no abdominal tenderness.   Musculoskeletal:         General: No swelling or deformity. Normal range of motion.      Cervical back: Normal range of motion and neck supple.      Right lower leg: No edema.      Left lower leg: No edema.   Skin:     General: Skin is warm.      Coloration: Skin is not jaundiced.      Findings: No bruising.   Neurological:      Mental Status: She is alert.      Comments:   E4VTM6  Alert, follows commands, and can write to communicate  Off sedation  Cough and gag reflex intact  PERRL  EOMI  Face symmetric  Moves all extremities symmetrically against gravity  Regards to touch throughout     Unable to test orientation, language, memory, judgment, insight, fund of knowledge, coordination, gait due to intubation.       Medications:  Continuous ScheduledamLODIPine, 10 mg, Daily  atorvastatin, 40 mg, Daily  famotidine, 20 mg, BID  glycopyrrolate (PF), 0.1 mg, Once  lactulose, 200 g, Once  lisinopriL, 40 mg, Daily  mupirocin, , BID  polyethylene glycol, 17 g, BID  QUEtiapine, 25 mg, QHS  senna-docusate 8.6-50 mg, 1 tablet, BID  silodosin, 4 mg, Daily    PRNacetaminophen, 650 mg, Q6H PRN  bisacodyL, 10 mg, Daily PRN  fentaNYL, 25 mcg, Q4H PRN  hydrALAZINE, 10 mg, Q6H PRN  labetalol, 10 mg, Q4H PRN  magnesium oxide, 800 mg, PRN  magnesium oxide, 800 mg, PRN  ondansetron, 4 mg, Q8H PRN  potassium bicarbonate, 35 mEq, PRN  potassium bicarbonate, 50 mEq, PRN  potassium bicarbonate, 60 mEq, PRN  potassium, sodium phosphates, 2 packet, PRN  potassium, sodium phosphates, 2 packet, PRN  potassium, sodium phosphates, 2 packet, PRN  sodium chloride 0.9%, 10 mL, PRN  sucralfate, 1 g, Q6H PRN      Today I personally reviewed pertinent medications, lines/drains/airways, imaging, cardiology results, laboratory results, microbiology results, notably:    Diet  Diet NPO  Diet NPO

## 2023-05-10 NOTE — PT/OT/SLP PROGRESS
Physical Therapy      Patient Name:  Susan Pinto   MRN:  [id number]    Patient not seen today secondary to pt intubated. Pt appropriate for only one discipline at this time, OT following. Chen Rivas PT 5/10/23

## 2023-05-11 LAB
ALBUMIN SERPL BCP-MCNC: 3.3 G/DL (ref 3.5–5.2)
ALLENS TEST: ABNORMAL
ALP SERPL-CCNC: 55 U/L (ref 55–135)
ALT SERPL W/O P-5'-P-CCNC: 14 U/L (ref 10–44)
ANION GAP SERPL CALC-SCNC: 12 MMOL/L (ref 8–16)
AST SERPL-CCNC: 25 U/L (ref 10–40)
BACTERIA BLD CULT: NORMAL
BACTERIA BLD CULT: NORMAL
BASOPHILS # BLD AUTO: 0.08 K/UL (ref 0–0.2)
BASOPHILS NFR BLD: 0.6 % (ref 0–1.9)
BILIRUB SERPL-MCNC: 0.5 MG/DL (ref 0.1–1)
BUN SERPL-MCNC: 27 MG/DL (ref 8–23)
CALCIUM SERPL-MCNC: 10.9 MG/DL (ref 8.7–10.5)
CHLORIDE SERPL-SCNC: 107 MMOL/L (ref 95–110)
CO2 SERPL-SCNC: 24 MMOL/L (ref 23–29)
CREAT SERPL-MCNC: 0.9 MG/DL (ref 0.5–1.4)
DELSYS: ABNORMAL
DIFFERENTIAL METHOD: ABNORMAL
EOSINOPHIL # BLD AUTO: 0.1 K/UL (ref 0–0.5)
EOSINOPHIL NFR BLD: 1 % (ref 0–8)
ERYTHROCYTE [DISTWIDTH] IN BLOOD BY AUTOMATED COUNT: 14.8 % (ref 11.5–14.5)
ERYTHROCYTE [SEDIMENTATION RATE] IN BLOOD BY WESTERGREN METHOD: 16 MM/H
EST. GFR  (NO RACE VARIABLE): >60 ML/MIN/1.73 M^2
FIO2: 50
GLUCOSE SERPL-MCNC: 98 MG/DL (ref 70–110)
HCO3 UR-SCNC: 25 MMOL/L (ref 24–28)
HCT VFR BLD AUTO: 46.5 % (ref 37–48.5)
HGB BLD-MCNC: 14.9 G/DL (ref 12–16)
IMM GRANULOCYTES # BLD AUTO: 0.06 K/UL (ref 0–0.04)
IMM GRANULOCYTES NFR BLD AUTO: 0.5 % (ref 0–0.5)
LYMPHOCYTES # BLD AUTO: 2.3 K/UL (ref 1–4.8)
LYMPHOCYTES NFR BLD: 18.5 % (ref 18–48)
MAGNESIUM SERPL-MCNC: 2.2 MG/DL (ref 1.6–2.6)
MCH RBC QN AUTO: 28.9 PG (ref 27–31)
MCHC RBC AUTO-ENTMCNC: 32 G/DL (ref 32–36)
MCV RBC AUTO: 90 FL (ref 82–98)
MODE: ABNORMAL
MONOCYTES # BLD AUTO: 1.2 K/UL (ref 0.3–1)
MONOCYTES NFR BLD: 9.9 % (ref 4–15)
NEUTROPHILS # BLD AUTO: 8.7 K/UL (ref 1.8–7.7)
NEUTROPHILS NFR BLD: 69.5 % (ref 38–73)
NRBC BLD-RTO: 0 /100 WBC
PCO2 BLDA: 31.7 MMHG (ref 35–45)
PEEP: 5
PH SMN: 7.51 [PH] (ref 7.35–7.45)
PHOSPHATE SERPL-MCNC: 2.7 MG/DL (ref 2.7–4.5)
PLATELET # BLD AUTO: 344 K/UL (ref 150–450)
PMV BLD AUTO: 12 FL (ref 9.2–12.9)
PO2 BLDA: 104 MMHG (ref 80–100)
POC BE: 2 MMOL/L
POC SATURATED O2: 99 % (ref 95–100)
POC TCO2: 26 MMOL/L (ref 23–27)
POTASSIUM SERPL-SCNC: 4.5 MMOL/L (ref 3.5–5.1)
PROT SERPL-MCNC: 8 G/DL (ref 6–8.4)
PS: 12
PTH-INTACT SERPL-MCNC: 58.6 PG/ML (ref 9–77)
RBC # BLD AUTO: 5.15 M/UL (ref 4–5.4)
SAMPLE: ABNORMAL
SITE: ABNORMAL
SODIUM SERPL-SCNC: 143 MMOL/L (ref 136–145)
TROPONIN I SERPL DL<=0.01 NG/ML-MCNC: <0.006 NG/ML (ref 0–0.03)
VT: 450
WBC # BLD AUTO: 12.47 K/UL (ref 3.9–12.7)

## 2023-05-11 PROCEDURE — 99900035 HC TECH TIME PER 15 MIN (STAT)

## 2023-05-11 PROCEDURE — 25000003 PHARM REV CODE 250

## 2023-05-11 PROCEDURE — 27200966 HC CLOSED SUCTION SYSTEM

## 2023-05-11 PROCEDURE — 99900026 HC AIRWAY MAINTENANCE (STAT)

## 2023-05-11 PROCEDURE — 63600175 PHARM REV CODE 636 W HCPCS

## 2023-05-11 PROCEDURE — 93005 ELECTROCARDIOGRAM TRACING: CPT

## 2023-05-11 PROCEDURE — 36600 WITHDRAWAL OF ARTERIAL BLOOD: CPT

## 2023-05-11 PROCEDURE — 99291 CRITICAL CARE FIRST HOUR: CPT | Mod: GC,,, | Performed by: INTERNAL MEDICINE

## 2023-05-11 PROCEDURE — 85025 COMPLETE CBC W/AUTO DIFF WBC: CPT | Performed by: PSYCHIATRY & NEUROLOGY

## 2023-05-11 PROCEDURE — 27000221 HC OXYGEN, UP TO 24 HOURS

## 2023-05-11 PROCEDURE — 94003 VENT MGMT INPAT SUBQ DAY: CPT

## 2023-05-11 PROCEDURE — 36415 COLL VENOUS BLD VENIPUNCTURE: CPT | Performed by: PSYCHIATRY & NEUROLOGY

## 2023-05-11 PROCEDURE — 25000003 PHARM REV CODE 250: Performed by: PSYCHIATRY & NEUROLOGY

## 2023-05-11 PROCEDURE — 99291 PR CRITICAL CARE, E/M 30-74 MINUTES: ICD-10-PCS | Mod: GC,,, | Performed by: INTERNAL MEDICINE

## 2023-05-11 PROCEDURE — 51798 US URINE CAPACITY MEASURE: CPT

## 2023-05-11 PROCEDURE — 63600175 PHARM REV CODE 636 W HCPCS: Performed by: PHYSICIAN ASSISTANT

## 2023-05-11 PROCEDURE — 93010 ELECTROCARDIOGRAM REPORT: CPT | Mod: ,,, | Performed by: INTERNAL MEDICINE

## 2023-05-11 PROCEDURE — 84100 ASSAY OF PHOSPHORUS: CPT | Performed by: INTERNAL MEDICINE

## 2023-05-11 PROCEDURE — 94761 N-INVAS EAR/PLS OXIMETRY MLT: CPT

## 2023-05-11 PROCEDURE — 63600175 PHARM REV CODE 636 W HCPCS: Performed by: PSYCHIATRY & NEUROLOGY

## 2023-05-11 PROCEDURE — 20000000 HC ICU ROOM

## 2023-05-11 PROCEDURE — 93010 EKG 12-LEAD: ICD-10-PCS | Mod: ,,, | Performed by: INTERNAL MEDICINE

## 2023-05-11 PROCEDURE — 83970 ASSAY OF PARATHORMONE: CPT | Performed by: PSYCHIATRY & NEUROLOGY

## 2023-05-11 PROCEDURE — 82803 BLOOD GASES ANY COMBINATION: CPT

## 2023-05-11 PROCEDURE — 83735 ASSAY OF MAGNESIUM: CPT | Performed by: INTERNAL MEDICINE

## 2023-05-11 PROCEDURE — 80053 COMPREHEN METABOLIC PANEL: CPT | Performed by: INTERNAL MEDICINE

## 2023-05-11 PROCEDURE — 84484 ASSAY OF TROPONIN QUANT: CPT

## 2023-05-11 PROCEDURE — 82800 BLOOD PH: CPT

## 2023-05-11 PROCEDURE — C9113 INJ PANTOPRAZOLE SODIUM, VIA: HCPCS | Performed by: PSYCHIATRY & NEUROLOGY

## 2023-05-11 RX ORDER — PROCHLORPERAZINE EDISYLATE 5 MG/ML
5 INJECTION INTRAMUSCULAR; INTRAVENOUS ONCE
Status: COMPLETED | OUTPATIENT
Start: 2023-05-11 | End: 2023-05-11

## 2023-05-11 RX ORDER — SODIUM CHLORIDE 9 MG/ML
INJECTION, SOLUTION INTRAVENOUS CONTINUOUS
Status: ACTIVE | OUTPATIENT
Start: 2023-05-11 | End: 2023-05-11

## 2023-05-11 RX ORDER — PANTOPRAZOLE SODIUM 40 MG/10ML
40 INJECTION, POWDER, LYOPHILIZED, FOR SOLUTION INTRAVENOUS ONCE
Status: COMPLETED | OUTPATIENT
Start: 2023-05-11 | End: 2023-05-11

## 2023-05-11 RX ORDER — GLYCOPYRROLATE 1 MG/5ML
1 SOLUTION ORAL 3 TIMES DAILY
Status: DISCONTINUED | OUTPATIENT
Start: 2023-05-11 | End: 2023-05-13

## 2023-05-11 RX ADMIN — LABETALOL HYDROCHLORIDE 10 MG: 5 INJECTION, SOLUTION INTRAVENOUS at 07:05

## 2023-05-11 RX ADMIN — MUPIROCIN: 20 OINTMENT TOPICAL at 09:05

## 2023-05-11 RX ADMIN — GLYCOPYRROLATE 1 MG: 1 LIQUID ORAL at 12:05

## 2023-05-11 RX ADMIN — SODIUM CHLORIDE: 9 INJECTION, SOLUTION INTRAVENOUS at 12:05

## 2023-05-11 RX ADMIN — PANTOPRAZOLE SODIUM 40 MG: 40 INJECTION, POWDER, FOR SOLUTION INTRAVENOUS at 08:05

## 2023-05-11 RX ADMIN — MUPIROCIN: 20 OINTMENT TOPICAL at 08:05

## 2023-05-11 RX ADMIN — ATORVASTATIN CALCIUM 40 MG: 40 TABLET, FILM COATED ORAL at 09:05

## 2023-05-11 RX ADMIN — FAMOTIDINE 20 MG: 20 TABLET ORAL at 09:05

## 2023-05-11 RX ADMIN — AMLODIPINE BESYLATE 10 MG: 10 TABLET ORAL at 02:05

## 2023-05-11 RX ADMIN — SENNOSIDES AND DOCUSATE SODIUM 1 TABLET: 50; 8.6 TABLET ORAL at 09:05

## 2023-05-11 RX ADMIN — FAMOTIDINE 20 MG: 20 TABLET ORAL at 08:05

## 2023-05-11 RX ADMIN — QUETIAPINE FUMARATE 25 MG: 25 TABLET ORAL at 08:05

## 2023-05-11 RX ADMIN — LISINOPRIL 40 MG: 20 TABLET ORAL at 09:05

## 2023-05-11 RX ADMIN — SENNOSIDES AND DOCUSATE SODIUM 1 TABLET: 50; 8.6 TABLET ORAL at 08:05

## 2023-05-11 RX ADMIN — POLYETHYLENE GLYCOL 3350 17 G: 17 POWDER, FOR SOLUTION ORAL at 09:05

## 2023-05-11 RX ADMIN — FENTANYL CITRATE 25 MCG: 50 INJECTION INTRAMUSCULAR; INTRAVENOUS at 02:05

## 2023-05-11 RX ADMIN — GLYCOPYRROLATE 1 MG: 1 LIQUID ORAL at 03:05

## 2023-05-11 RX ADMIN — POLYETHYLENE GLYCOL 3350 17 G: 17 POWDER, FOR SOLUTION ORAL at 08:05

## 2023-05-11 RX ADMIN — GLYCOPYRROLATE 1 MG: 1 LIQUID ORAL at 08:05

## 2023-05-11 RX ADMIN — ONDANSETRON 4 MG: 2 INJECTION INTRAMUSCULAR; INTRAVENOUS at 01:05

## 2023-05-11 RX ADMIN — HYDRALAZINE HYDROCHLORIDE 10 MG: 20 INJECTION, SOLUTION INTRAMUSCULAR; INTRAVENOUS at 06:05

## 2023-05-11 RX ADMIN — PROCHLORPERAZINE EDISYLATE 5 MG: 5 INJECTION INTRAMUSCULAR; INTRAVENOUS at 08:05

## 2023-05-11 RX ADMIN — SILODOSIN 4 MG: 4 CAPSULE ORAL at 09:05

## 2023-05-11 NOTE — PROGRESS NOTES
"[facility]  Adult Nutrition  Progress Note    SUMMARY       Recommendations    When medically able, initiate TF regimen of [employer] Peptide 1.5 @ goal rate of 45 mL/hr- provides 1661 kcals, 80 g pro, and 756 mL fluid.     If able to tolerate PO intake, ADAT to cardiac- texture per SLP.     RD following.     Goals: Meet % EEN, EPN by RD f/u date  Nutrition Goal Status: new  Communication of RD Recs:  (POC)    Assessment and Plan    Nutrition Problem:  Inadequate energy intake     Related to (etiology):   Inability to consume sufficient energy     Signs and Symptoms (as evidenced by):   NPO     Interventions(treatment strategy):  Collaboration of nutrition care w/ other providers  EN     Nutrition Diagnosis Status:   Continues    Reason for Assessment    Reason For Assessment: RD follow-up  Diagnosis: other (see comments) (ICH)  Relevant Medical History: HTN  Interdisciplinary Rounds: did not attend  General Information Comments: Remains intubated w/ no family at bedside. Unsure of energy intake PTA/UBW (most recent weight was 192# x 2021). TF currently clamped. Noted issues with n/v overnight and this AM. Noted issues with constipation resolved. No wt changes since last RD visit. No s/s of malnutrition, appears well-nourished. LBM 5/11.  Nutrition Discharge Planning: Pending clinical course    Nutrition Risk Screen    Nutrition Risk Screen: tube feeding or parenteral nutrition, difficulty chewing/swallowing    Nutrition/Diet History    Spiritual, Cultural Beliefs, Denominational Practices, Values that Affect Care: no  Factors Affecting Nutritional Intake: NPO, on mechanical ventilation, difficulty/impaired swallowing    Anthropometrics    Temp: 98.8 °F (37.1 °C)  Height Method: Stated  Height: 5' 4" (162.6 cm)  Height (inches): 64 in  Weight Method: Bed Scale  Weight: 95.3 kg (210 lb)  Weight (lb): 210 lb  Ideal Body Weight (IBW), Female: 120 lb  % Ideal Body Weight, Female (lb): 175 %  BMI " (Calculated): 36  BMI Grade: 35 - 39.9 - obesity - grade II    Lab/Procedures/Meds    Pertinent Labs Reviewed: reviewed  Pertinent Labs Comments: Albumin 3.3, Ca 10.9, BUN 27, , Cholesterol 257  Pertinent Medications Reviewed: reviewed  Pertinent Medications Comments: amlodipine, atorvastatin, famotidine, senna-docusate    Estimated/Assessed Needs    Weight Used For Calorie Calculations: 95.3 kg (210 lb 1.6 oz)  Energy Calorie Requirements (kcal): 1725 kcals  Energy Need Method: [place] (modified)  Protein Requirements:  g/d (1.5-2 g/kg IBW)  Weight Used For Protein Calculations: 54.5 kg (120 lb 2.4 oz)  Estimated Fluid Requirement Method: other (see comments) (Per MD or 1 mL/kcal)  RDA Method (mL): 1725    Nutrition Prescription Ordered    Current Diet Order: NPO  Nutrition Order Comments: TF held    Evaluation of Received Nutrient/Fluid Intake    I/O: -1.9 L since admit  % Intake of Estimated Energy Needs: 0%  % Meal Intake: NPO    Nutrition Risk    Level of Risk/Frequency of Follow-up:  (1x/week)     Monitor and Evaluation    Food and Nutrient Intake: enteral nutrition intake  Food and Nutrient Adminstration: enteral and parenteral nutrition administration  Physical Activity and Function: nutrition-related ADLs and IADLs  Anthropometric Measurements: weight, weight change  Biochemical Data, Medical Tests and Procedures: inflammatory profile, lipid profile, glucose/endocrine profile, gastrointestinal profile, electrolyte and renal panel  Nutrition-Focused Physical Findings: overall appearance     Nutrition Follow-Up    RD Follow-up?: Yes    Kaela Colon RDN,LDN

## 2023-05-11 NOTE — PLAN OF CARE
[facility] Care Plan    POC reviewed with Susan Pinto and family at 0500. Pt verbalized understanding. Questions and concerns addressed. No acute events overnight. Pt progressing toward goals. Will continue to monitor. See below and flowsheets for full assessment and VS info.     -Enema + large BM  -fentanyl x2 for vent compliance  -PRN zofran for emesis  -increasing back pain        Is this a stroke patient? yes- Stroke booklet reviewed with patient and family, risk factors identified for patient and stroke booklet remains at bedside for ongoing education.     Neuro:  [place] Coma Scale  Best Eye Response: 4-->(E4) spontaneous  Best Motor Response: 6-->(M6) obeys commands  Best Verbal Response: 1-->(V1) none  [place] Coma Scale Score: 11  Assessment Qualifiers: patient intubated  Pupil PERRLA: yes     24hr Temp:  [97.7 °F (36.5 °C)-98.9 °F (37.2 °C)]     CV:   Rhythm: normal sinus rhythm  BP goals:   SBP < 160  MAP > 65    Resp:      Vent Mode: SIMV  Set Rate: 16 BPM  Oxygen Concentration (%): 50  Vt Set: 450 mL  PEEP/CPAP: 5 cmH20  Pressure Support: 12 cmH20    Plan: wean to extubate    GI/:     Diet/Nutrition Received: tube feeding  Last Bowel Movement: 05/10/23  Voiding Characteristics: external catheter    Intake/Output Summary (Last 24 hours) at 5/11/2023 0505  Last data filed at 5/11/2023 0402  Gross per 24 hour   Intake 595 ml   Output 1650 ml   Net -1055 ml     Unmeasured Output  Urine Occurrence: 1  Stool Occurrence: 1  Pad Count: 4    Labs/Accuchecks:  Recent Labs   Lab 05/11/23  0044   WBC 12.47   RBC 5.15   HGB 14.9   HCT 46.5         Recent Labs   Lab 05/11/23  0206      K 4.5   CO2 24      BUN 27*   CREATININE 0.9   ALKPHOS 55   ALT 14   AST 25   BILITOT 0.5      Recent Labs   Lab 05/06/23  0101   INR 1.0   APTT 33.1*      Recent Labs   Lab 05/06/23  1357   TROPONINI 0.029*       Electrolytes: N/A - electrolytes WDL  Accuchecks:  none    Gtts:      LDA/Wounds:  Lines/Drains/Airways       Drain  Duration                  NG/OG Tube 05/05/23 1445 orogastric 18 Fr. Center mouth 5 days    Female External Urinary Catheter 05/05/23 1718 5 days              Airway  Duration                  Airway - Non-Surgical 05/05/23 1430 Endotracheal Tube 5 days              Peripheral Intravenous Line  Duration                  Peripheral IV - Single Lumen 05/07/23 1043 18 G Posterior;Right Forearm 3 days         Peripheral IV - Single Lumen 05/09/23 1325 20 G Left;Posterior Forearm 1 day                  Wounds: Yes  Wound care consulted: No

## 2023-05-11 NOTE — PROGRESS NOTES
[facility]  Neurocritical Care  Progress Note    Admit Date: 5/5/2023  Service Date: 05/11/2023  Length of Stay: 6    Subjective:     Chief Complaint: Nontraumatic intracerebral hemorrhage    History of Present Illness: Susan Pinto is a 67-year-old female with Hx of HTN presenting with altered mental status, tachycardia, left-sided facial droop, admitted for medullary ICH.   Patient started having vomiting that began around 7:00 p.m last night. On EMS arrival was noted to be hypoxic sat's in 80's with AMS, was intubated for airway protection. In ED at OSH CTh was done and was found to have a cervicomedullary junction ICH measuring approximately about 1.3 x 1.3 x 1.2 cm, also had a.fib with RVR Hr in the 150's that responded to BB. Was transferred to [facility] for further care         Hospital Course: 05/06/2023 transient hypotension overnight improved with stopping propofol. MRI with possible DVA, will discuss need for DSA with IR. Endorsed tingling in BLE -> CTH stable, trial of HTS for possible edema. SBT as tolerated. 3 day Abx for UTI. New double vision -> repeat CTH stable.   05/07/2023 CO2 retention noted on ABG yesterday after SBT. Gas normalized with SIMV. Will reassess on spontaneous today. Discussed possibility of trach if she fails to wean off the vent. Still with copious secretions and no cough. Day 2/3 of CTX for GNR UTI. Tube feeds initiated.  05/08/2023 Pt remains on spontaneous vent settings with CO2 in normal range. T-piece trial unsuccessful yesterday. Cough reflex improved today.   05/09/2023 Exam stable. Continue trials of spontaneous ventilation.   05/10/2023 BP better controlled overnight. Will advance bowel regiment for constipation. Pt still not tolerating secretions, remains intubated.   05/11/2023 Patient with N/V overnight and new left shoulder/chest pain, troponin negative and EKG w/o ischemic changes. Constipation resolved.      Interval History: As above.    Review  of Systems   Unable to perform ROS: Intubated   HENT:  Positive for sore throat.    Eyes:  Positive for visual disturbance (double vision).   Cardiovascular:  Positive for chest pain.   Gastrointestinal:  Positive for nausea and vomiting.   Musculoskeletal:  Positive for arthralgias and back pain.   Neurological:  Positive for numbness (paresthesias in distal extremities).     Objective:     Vitals:  Temp: 98.8 °F (37.1 °C)  Pulse: 72  Rhythm: normal sinus rhythm  BP: (!) 140/72  MAP (mmHg): 99  Resp: (!) 26  SpO2: 95 %  Oxygen Concentration (%): 50  Vent Mode: SIMV  Set Rate: 10 BPM  Vt Set: 450 mL  Pressure Support: 5 cmH20  PEEP/CPAP: 5 cmH20  Peak Airway Pressure: 10 cmH20  Mean Airway Pressure: 8.8 cmH20  Plateau Pressure: 0 cmH20    Temp  Min: 98.1 °F (36.7 °C)  Max: 98.9 °F (37.2 °C)  Pulse  Min: 65  Max: 89  BP  Min: 129/79  Max: 177/88  MAP (mmHg)  Min: 90  Max: 122  Resp  Min: 16  Max: 31  SpO2  Min: 95 %  Max: 98 %  Oxygen Concentration (%)  Min: 50  Max: 50    05/10 0701 - 05/11 0700  In: 595 [I.V.:55]  Out: 1650 [Urine:1650]   Unmeasured Output  Urine Occurrence: 1  Stool Occurrence: 1  Emesis Occurrence: 1  Pad Count: 4        Physical Exam  Vitals and nursing note reviewed.   Constitutional:       General: She is not in acute distress.     Appearance: She is obese. She is not diaphoretic.   HENT:      Head: Normocephalic and atraumatic.      Right Ear: External ear normal.      Left Ear: External ear normal.      Nose: Nose normal.      Mouth/Throat:      Comments: ET tube and OG tube intact  Copious pooled clear secretions in oral cavity  Eyes:      General: No scleral icterus.        Right eye: No discharge.         Left eye: No discharge.      Extraocular Movements: Extraocular movements intact.      Pupils: Pupils are equal, round, and reactive to light.   Cardiovascular:      Rate and Rhythm: Normal rate and regular rhythm.      Heart sounds: Normal heart sounds. No murmur heard.  Pulmonary:       Effort: Pulmonary effort is normal. No respiratory distress.      Breath sounds: Normal breath sounds. No wheezing or rales.      Comments: Ventilator on SIMV  Chest:      Chest wall: Tenderness (anterior TTP) present.   Abdominal:      General: Abdomen is flat. Bowel sounds are normal. There is no distension.      Palpations: Abdomen is soft.      Tenderness: There is no abdominal tenderness. There is no guarding.   Musculoskeletal:         General: No swelling or deformity. Normal range of motion.      Cervical back: Normal range of motion and neck supple.      Right lower leg: No edema.      Left lower leg: No edema.   Skin:     General: Skin is warm.      Coloration: Skin is not jaundiced.      Findings: No bruising.   Neurological:      Mental Status: She is alert.      Comments:   E4VTM6  Alert, follows commands, and can write to communicate  Off sedation  Cough and gag reflex intact  PERRL  EOMI  Face symmetric  Moves all extremities symmetrically against gravity  Regards to touch throughout     Unable to test orientation, language, memory, judgment, insight, fund of knowledge, coordination, gait due to intubation.       Medications:  Continuoussodium chloride 0.9%    ScheduledamLODIPine, 10 mg, Daily  atorvastatin, 40 mg, Daily  famotidine, 20 mg, BID  glycopyrrolate, 1 mg, TID  lisinopriL, 40 mg, Daily  mupirocin, , BID  polyethylene glycol, 17 g, BID  QUEtiapine, 25 mg, QHS  senna-docusate 8.6-50 mg, 1 tablet, BID  silodosin, 4 mg, Daily    PRNacetaminophen, 650 mg, Q6H PRN  bisacodyL, 10 mg, Daily PRN  fentaNYL, 25 mcg, Q4H PRN  hydrALAZINE, 10 mg, Q6H PRN  labetalol, 10 mg, Q4H PRN  magnesium oxide, 800 mg, PRN  magnesium oxide, 800 mg, PRN  ondansetron, 4 mg, Q8H PRN  potassium bicarbonate, 35 mEq, PRN  potassium bicarbonate, 50 mEq, PRN  potassium bicarbonate, 60 mEq, PRN  potassium, sodium phosphates, 2 packet, PRN  potassium, sodium phosphates, 2 packet, PRN  potassium, sodium phosphates, 2 packet,  PRN  sodium chloride 0.9%, 10 mL, PRN  sucralfate, 1 g, Q6H PRN      Today I personally reviewed pertinent medications, lines/drains/airways, imaging, cardiology results, laboratory results, microbiology results, notably:    Diet  Diet NPO  Diet NPO      Assessment/Plan:     Neuro  * Nontraumatic intracerebral hemorrhage  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 with edema surrounding  - MRI brain w/wo (5/6): possible DVA and concern for cavernous malformation  - left medullary hemorrhage vs cavernoma    Plan:  - NSGY following, no acute surgical intervention at this time   - OP f/u w NSGY in 4-6 weeks with a repeat MRI Brain w/wo contrast on 6/12   - Open intervention for cavernoma may be considered if findings are concordant on repeat imaging given first time hemorrhage, but will discuss in OP setting   - DSA likely not beneficial  - Neuro checks Q1  - SBP< 160 per NSGY  - intubated for airway protection, copious secretions and weak cough   - continue trials of spontaneous ventilator setting   - failed T-piece trial   - f/u daily ABG's  - Hold AC/AP  - Coags & CBC  - VN following  - PT/OT/SLP    Vasogenic cerebral edema  See ICH    Cardiac/Vascular  Hypertriglyceridemia  Triglycerides elevated to 929 on admission, started on niacin and fenofibrate  Likely 2/2 propofol infusion as TGL have since normalized    - discontinue niacin  - discontinue fenofibrate  - discontinue TGL draws  - follow up with PCP outpatient on discharge     RESOLVED     Primary hypertension  Home medications: amlodipine 5mg daily, lisinopril 40mg daily, and prn clonidine 0.1mg BID PRN for BP > 150/100    - goal SBP < 160  - continue home amlodipine at higher dose to 10mg daily  - continue home lisinopril 40mg daily  - prn labetalol and hydralazine  - cardene off since 4 am 5/7    A-fib  New onset at OSH, responded to BB  - initial EKG with A-fib RVR, repeat showed  NSR    EKG  Monitor for now  Hold A/C    Hyperlipidemia LDL goal <70  Atorvastatin 40mg daily    Renal/  Urinary retention  Continue silodosin  Prn straight cath    Acute cystitis  UA with findigns c/f UTI  Urine Cx with E. Coli  S/P 3 day course of CTX (EOC: 5/8)          The patient is being Prophylaxed for:  Venous Thromboembolism with: Mechanical  Stress Ulcer with: H2B  Ventilator Pneumonia with: chlorhexidine oral care    Activity Orders          Turn patient starting at 05/05 1800    Elevate HOB starting at 05/05 1626    Diet NPO: NPO starting at 05/05 1626        Full Code    Gilma Morris MD  Neurocritical Care  [facility]

## 2023-05-11 NOTE — PLAN OF CARE
[facility] Care Plan    POC reviewed with Susan Pinto and family at 1400. Spouse verbalized understanding. Questions and concerns addressed. No acute events today. Pt progressing toward goals. Will continue to monitor. See below and flowsheets for full assessment and VS info.             Is this a stroke patient? yes- Stroke booklet reviewed with patient and family, risk factors identified for patient and stroke booklet remains at bedside for ongoing education.     Neuro:  [place] Coma Scale  Best Eye Response: 4-->(E4) spontaneous  Best Motor Response: 6-->(M6) obeys commands  Best Verbal Response: 1-->(V1) none  [place] Coma Scale Score: 11  Assessment Qualifiers: patient intubated  Pupil PERRLA: (P) yes     24 hr Temp:  [98 °F (36.7 °C)-98.9 °F (37.2 °C)]     CV:   Rhythm: (P) normal sinus rhythm  BP goals:   SBP < 160  MAP > 65    Resp:      Vent Mode: SIMV  Set Rate: 10 BPM  Oxygen Concentration (%): 50  Vt Set: 450 mL  PEEP/CPAP: 5 cmH20  Pressure Support: 5 cmH20    Plan: N/A    GI/:     Diet/Nutrition Received: tube feeding  Last Bowel Movement: (P) 05/11/23  Voiding Characteristics: (P) external catheter    Intake/Output Summary (Last 24 hours) at 5/11/2023 1848  Last data filed at 5/11/2023 1712  Gross per 24 hour   Intake 490.89 ml   Output 1500 ml   Net -1009.11 ml     Unmeasured Output  Urine Occurrence: 1  Stool Occurrence: 1  Emesis Occurrence: 1  Pad Count: 4    Labs/Accuchecks:  Recent Labs   Lab 05/11/23  0044   WBC 12.47   RBC 5.15   HGB 14.9   HCT 46.5         Recent Labs   Lab 05/11/23  0206      K 4.5   CO2 24      BUN 27*   CREATININE 0.9   ALKPHOS 55   ALT 14   AST 25   BILITOT 0.5      Recent Labs   Lab 05/06/23  0101   INR 1.0   APTT 33.1*      Recent Labs   Lab 05/11/23  0857   TROPONINI <0.006       Electrolytes: Electrolytes replaced  Accuchecks: none    Gtts:   sodium chloride 0.9% 100 mL/hr at 05/11/23 1712       LDA/Wounds:  Lines/Drains/Airways       Drain   Duration                  NG/OG Tube 05/05/23 1445 orogastric 18 Fr. Center mouth 6 days    Female External Urinary Catheter 05/05/23 1718 6 days              Airway  Duration                  Airway - Non-Surgical 05/05/23 1430 Endotracheal Tube 6 days              Peripheral Intravenous Line  Duration                  Peripheral IV - Single Lumen 05/07/23 1043 18 G Posterior;Right Forearm 4 days         Peripheral IV - Single Lumen 05/09/23 1325 20 G Left;Posterior Forearm 2 days                  Wounds: No  Wound care consulted: No

## 2023-05-11 NOTE — SUBJECTIVE & OBJECTIVE
Interval History: As above.    Review of Systems   Unable to perform ROS: Intubated   HENT:  Positive for sore throat.    Eyes:  Positive for visual disturbance (double vision).   Cardiovascular:  Positive for chest pain.   Gastrointestinal:  Positive for nausea and vomiting.   Musculoskeletal:  Positive for arthralgias and back pain.   Neurological:  Positive for numbness (paresthesias in distal extremities).     Objective:     Vitals:  Temp: 98.8 °F (37.1 °C)  Pulse: 72  Rhythm: normal sinus rhythm  BP: (!) 140/72  MAP (mmHg): 99  Resp: (!) 26  SpO2: 95 %  Oxygen Concentration (%): 50  Vent Mode: SIMV  Set Rate: 10 BPM  Vt Set: 450 mL  Pressure Support: 5 cmH20  PEEP/CPAP: 5 cmH20  Peak Airway Pressure: 10 cmH20  Mean Airway Pressure: 8.8 cmH20  Plateau Pressure: 0 cmH20    Temp  Min: 98.1 °F (36.7 °C)  Max: 98.9 °F (37.2 °C)  Pulse  Min: 65  Max: 89  BP  Min: 129/79  Max: 177/88  MAP (mmHg)  Min: 90  Max: 122  Resp  Min: 16  Max: 31  SpO2  Min: 95 %  Max: 98 %  Oxygen Concentration (%)  Min: 50  Max: 50    05/10 0701 - 05/11 0700  In: 595 [I.V.:55]  Out: 1650 [Urine:1650]   Unmeasured Output  Urine Occurrence: 1  Stool Occurrence: 1  Emesis Occurrence: 1  Pad Count: 4        Physical Exam  Vitals and nursing note reviewed.   Constitutional:       General: She is not in acute distress.     Appearance: She is obese. She is not diaphoretic.   HENT:      Head: Normocephalic and atraumatic.      Right Ear: External ear normal.      Left Ear: External ear normal.      Nose: Nose normal.      Mouth/Throat:      Comments: ET tube and OG tube intact  Copious pooled clear secretions in oral cavity  Eyes:      General: No scleral icterus.        Right eye: No discharge.         Left eye: No discharge.      Extraocular Movements: Extraocular movements intact.      Pupils: Pupils are equal, round, and reactive to light.   Cardiovascular:      Rate and Rhythm: Normal rate and regular rhythm.      Heart sounds: Normal heart  sounds. No murmur heard.  Pulmonary:      Effort: Pulmonary effort is normal. No respiratory distress.      Breath sounds: Normal breath sounds. No wheezing or rales.      Comments: Ventilator on SIMV  Chest:      Chest wall: Tenderness (anterior TTP) present.   Abdominal:      General: Abdomen is flat. Bowel sounds are normal. There is no distension.      Palpations: Abdomen is soft.      Tenderness: There is no abdominal tenderness. There is no guarding.   Musculoskeletal:         General: No swelling or deformity. Normal range of motion.      Cervical back: Normal range of motion and neck supple.      Right lower leg: No edema.      Left lower leg: No edema.   Skin:     General: Skin is warm.      Coloration: Skin is not jaundiced.      Findings: No bruising.   Neurological:      Mental Status: She is alert.      Comments:   E4VTM6  Alert, follows commands, and can write to communicate  Off sedation  Cough and gag reflex intact  PERRL  EOMI  Face symmetric  Moves all extremities symmetrically against gravity  Regards to touch throughout     Unable to test orientation, language, memory, judgment, insight, fund of knowledge, coordination, gait due to intubation.       Medications:  Continuoussodium chloride 0.9%    ScheduledamLODIPine, 10 mg, Daily  atorvastatin, 40 mg, Daily  famotidine, 20 mg, BID  glycopyrrolate, 1 mg, TID  lisinopriL, 40 mg, Daily  mupirocin, , BID  polyethylene glycol, 17 g, BID  QUEtiapine, 25 mg, QHS  senna-docusate 8.6-50 mg, 1 tablet, BID  silodosin, 4 mg, Daily    PRNacetaminophen, 650 mg, Q6H PRN  bisacodyL, 10 mg, Daily PRN  fentaNYL, 25 mcg, Q4H PRN  hydrALAZINE, 10 mg, Q6H PRN  labetalol, 10 mg, Q4H PRN  magnesium oxide, 800 mg, PRN  magnesium oxide, 800 mg, PRN  ondansetron, 4 mg, Q8H PRN  potassium bicarbonate, 35 mEq, PRN  potassium bicarbonate, 50 mEq, PRN  potassium bicarbonate, 60 mEq, PRN  potassium, sodium phosphates, 2 packet, PRN  potassium, sodium phosphates, 2 packet,  PRN  potassium, sodium phosphates, 2 packet, PRN  sodium chloride 0.9%, 10 mL, PRN  sucralfate, 1 g, Q6H PRN      Today I personally reviewed pertinent medications, lines/drains/airways, imaging, cardiology results, laboratory results, microbiology results, notably:    Diet  Diet NPO  Diet NPO

## 2023-05-11 NOTE — NURSING
Dr Mcmullen and Arturo at bedside to assess pt complained of chest pain.Stat EKG unchanged from baseline.Troponin ordered.No acute distress noted.

## 2023-05-11 NOTE — ASSESSMENT & PLAN NOTE
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 with edema surrounding  - MRI brain w/wo (5/6): possible DVA and concern for cavernous malformation  - left medullary hemorrhage vs cavernoma    Plan:  - NSGY following, no acute surgical intervention at this time   - OP f/u w NSGY in 4-6 weeks with a repeat MRI Brain w/wo contrast on 6/12   - Open intervention for cavernoma may be considered if findings are concordant on repeat imaging given first time hemorrhage, but will discuss in OP setting   - DSA likely not beneficial  - Neuro checks Q1  - SBP< 160 per NSGY  - intubated for airway protection, copious secretions and weak cough   - continue trials of spontaneous ventilator setting   - failed T-piece trial   - f/u daily ABG's  - Hold AC/AP  - Coags & CBC  - VN following  - PT/OT/SLP

## 2023-05-11 NOTE — NURSING
Pt vomited small amount green bile liquid contents orally after getting ogt medications.OG tube placed to low walll suction.Dr Mcmullen notified.

## 2023-05-11 NOTE — PLAN OF CARE
Recommendations     When medically able, initiate TF regimen of [email or web address] Peptide 1.5 @ goal rate of 45 mL/hr- provides 1661 kcals, 80 g pro, and 756 mL fluid.      If able to tolerate PO intake, ADAT to cardiac- texture per SLP.      RD following.      Goals: Meet % EEN, EPN by RD f/u date  Nutrition Goal Status: new  Communication of RD Recs:  (POC)    Kaela Conner RDN,LDN

## 2023-05-12 PROBLEM — E78.2 MIXED HYPERLIPIDEMIA: Status: ACTIVE | Noted: 2017-05-23

## 2023-05-12 LAB
ALBUMIN SERPL BCP-MCNC: 3.1 G/DL (ref 3.5–5.2)
ALLENS TEST: NORMAL
ALP SERPL-CCNC: 55 U/L (ref 55–135)
ALT SERPL W/O P-5'-P-CCNC: 13 U/L (ref 10–44)
ANION GAP SERPL CALC-SCNC: 11 MMOL/L (ref 8–16)
AST SERPL-CCNC: 13 U/L (ref 10–40)
BASOPHILS # BLD AUTO: 0.07 K/UL (ref 0–0.2)
BASOPHILS NFR BLD: 0.6 % (ref 0–1.9)
BILIRUB SERPL-MCNC: 0.5 MG/DL (ref 0.1–1)
BUN SERPL-MCNC: 25 MG/DL (ref 8–23)
CALCIUM SERPL-MCNC: 10.1 MG/DL (ref 8.7–10.5)
CHLORIDE SERPL-SCNC: 109 MMOL/L (ref 95–110)
CO2 SERPL-SCNC: 22 MMOL/L (ref 23–29)
CREAT SERPL-MCNC: 0.8 MG/DL (ref 0.5–1.4)
DELSYS: NORMAL
DIFFERENTIAL METHOD: ABNORMAL
EOSINOPHIL # BLD AUTO: 0.3 K/UL (ref 0–0.5)
EOSINOPHIL NFR BLD: 2.3 % (ref 0–8)
ERYTHROCYTE [DISTWIDTH] IN BLOOD BY AUTOMATED COUNT: 13.9 % (ref 11.5–14.5)
ERYTHROCYTE [SEDIMENTATION RATE] IN BLOOD BY WESTERGREN METHOD: 10 MM/H
EST. GFR  (NO RACE VARIABLE): >60 ML/MIN/1.73 M^2
FIO2: 50
GLUCOSE SERPL-MCNC: 99 MG/DL (ref 70–110)
HCO3 UR-SCNC: 25.5 MMOL/L (ref 24–28)
HCT VFR BLD AUTO: 42.7 % (ref 37–48.5)
HGB BLD-MCNC: 13.5 G/DL (ref 12–16)
IMM GRANULOCYTES # BLD AUTO: 0.07 K/UL (ref 0–0.04)
IMM GRANULOCYTES NFR BLD AUTO: 0.6 % (ref 0–0.5)
LYMPHOCYTES # BLD AUTO: 2 K/UL (ref 1–4.8)
LYMPHOCYTES NFR BLD: 17.1 % (ref 18–48)
MAGNESIUM SERPL-MCNC: 2.1 MG/DL (ref 1.6–2.6)
MCH RBC QN AUTO: 29.7 PG (ref 27–31)
MCHC RBC AUTO-ENTMCNC: 31.6 G/DL (ref 32–36)
MCV RBC AUTO: 94 FL (ref 82–98)
MODE: NORMAL
MONOCYTES # BLD AUTO: 1.1 K/UL (ref 0.3–1)
MONOCYTES NFR BLD: 8.9 % (ref 4–15)
NEUTROPHILS # BLD AUTO: 8.3 K/UL (ref 1.8–7.7)
NEUTROPHILS NFR BLD: 70.5 % (ref 38–73)
NRBC BLD-RTO: 0 /100 WBC
PCO2 BLDA: 42.1 MMHG (ref 35–45)
PEEP: 5
PH SMN: 7.39 [PH] (ref 7.35–7.45)
PHOSPHATE SERPL-MCNC: 2.9 MG/DL (ref 2.7–4.5)
PLATELET # BLD AUTO: 272 K/UL (ref 150–450)
PMV BLD AUTO: 10.6 FL (ref 9.2–12.9)
PO2 BLDA: 88 MMHG (ref 80–100)
POC BE: 1 MMOL/L
POC SATURATED O2: 97 % (ref 95–100)
POC TCO2: 27 MMOL/L (ref 23–27)
POTASSIUM SERPL-SCNC: 3.9 MMOL/L (ref 3.5–5.1)
PROT SERPL-MCNC: 7.1 G/DL (ref 6–8.4)
PS: 5
RBC # BLD AUTO: 4.55 M/UL (ref 4–5.4)
SAMPLE: NORMAL
SITE: NORMAL
SODIUM SERPL-SCNC: 142 MMOL/L (ref 136–145)
VT: 450
WBC # BLD AUTO: 11.8 K/UL (ref 3.9–12.7)

## 2023-05-12 PROCEDURE — 94761 N-INVAS EAR/PLS OXIMETRY MLT: CPT

## 2023-05-12 PROCEDURE — 99900026 HC AIRWAY MAINTENANCE (STAT): Mod: FS

## 2023-05-12 PROCEDURE — 25000003 PHARM REV CODE 250

## 2023-05-12 PROCEDURE — 99900035 HC TECH TIME PER 15 MIN (STAT): Mod: FS

## 2023-05-12 PROCEDURE — 94003 VENT MGMT INPAT SUBQ DAY: CPT | Mod: FS

## 2023-05-12 PROCEDURE — 99233 PR SUBSEQUENT HOSPITAL CARE,LEVL III: ICD-10-PCS | Mod: ,,, | Performed by: NURSE PRACTITIONER

## 2023-05-12 PROCEDURE — 20000000 HC ICU ROOM: Mod: FS

## 2023-05-12 PROCEDURE — 80053 COMPREHEN METABOLIC PANEL: CPT | Performed by: PSYCHIATRY & NEUROLOGY

## 2023-05-12 PROCEDURE — 36600 WITHDRAWAL OF ARTERIAL BLOOD: CPT

## 2023-05-12 PROCEDURE — 63600175 PHARM REV CODE 636 W HCPCS: Mod: FS | Performed by: PHYSICIAN ASSISTANT

## 2023-05-12 PROCEDURE — 85025 COMPLETE CBC W/AUTO DIFF WBC: CPT | Performed by: PSYCHIATRY & NEUROLOGY

## 2023-05-12 PROCEDURE — 27000221 HC OXYGEN, UP TO 24 HOURS

## 2023-05-12 PROCEDURE — 99900026 HC AIRWAY MAINTENANCE (STAT)

## 2023-05-12 PROCEDURE — 99233 PR SUBSEQUENT HOSPITAL CARE,LEVL III: ICD-10-PCS | Mod: FS,GC,, | Performed by: PSYCHIATRY & NEUROLOGY

## 2023-05-12 PROCEDURE — 83735 ASSAY OF MAGNESIUM: CPT | Performed by: PSYCHIATRY & NEUROLOGY

## 2023-05-12 PROCEDURE — 25000003 PHARM REV CODE 250: Mod: FS

## 2023-05-12 PROCEDURE — 84100 ASSAY OF PHOSPHORUS: CPT | Performed by: PSYCHIATRY & NEUROLOGY

## 2023-05-12 PROCEDURE — 99900035 HC TECH TIME PER 15 MIN (STAT)

## 2023-05-12 PROCEDURE — 27200966 HC CLOSED SUCTION SYSTEM

## 2023-05-12 PROCEDURE — 99233 SBSQ HOSP IP/OBS HIGH 50: CPT | Mod: FS,GC,, | Performed by: PSYCHIATRY & NEUROLOGY

## 2023-05-12 PROCEDURE — 82803 BLOOD GASES ANY COMBINATION: CPT

## 2023-05-12 PROCEDURE — 25000003 PHARM REV CODE 250: Mod: FS | Performed by: PSYCHIATRY & NEUROLOGY

## 2023-05-12 PROCEDURE — 63600175 PHARM REV CODE 636 W HCPCS

## 2023-05-12 PROCEDURE — 82800 BLOOD PH: CPT

## 2023-05-12 PROCEDURE — 99233 SBSQ HOSP IP/OBS HIGH 50: CPT | Mod: ,,, | Performed by: NURSE PRACTITIONER

## 2023-05-12 RX ADMIN — POLYETHYLENE GLYCOL 3350 17 G: 17 POWDER, FOR SOLUTION ORAL at 08:05

## 2023-05-12 RX ADMIN — FAMOTIDINE 20 MG: 20 TABLET ORAL at 08:05

## 2023-05-12 RX ADMIN — ATORVASTATIN CALCIUM 40 MG: 40 TABLET, FILM COATED ORAL at 08:05

## 2023-05-12 RX ADMIN — FENTANYL CITRATE 25 MCG: 50 INJECTION INTRAMUSCULAR; INTRAVENOUS at 12:05

## 2023-05-12 RX ADMIN — GLYCOPYRROLATE 1 MG: 1 LIQUID ORAL at 08:05

## 2023-05-12 RX ADMIN — SENNOSIDES AND DOCUSATE SODIUM 1 TABLET: 50; 8.6 TABLET ORAL at 08:05

## 2023-05-12 RX ADMIN — LISINOPRIL 40 MG: 20 TABLET ORAL at 08:05

## 2023-05-12 RX ADMIN — FENTANYL CITRATE 25 MCG: 50 INJECTION INTRAMUSCULAR; INTRAVENOUS at 08:05

## 2023-05-12 RX ADMIN — HYDRALAZINE HYDROCHLORIDE 10 MG: 20 INJECTION, SOLUTION INTRAMUSCULAR; INTRAVENOUS at 12:05

## 2023-05-12 RX ADMIN — MUPIROCIN: 20 OINTMENT TOPICAL at 08:05

## 2023-05-12 RX ADMIN — SILODOSIN 4 MG: 4 CAPSULE ORAL at 08:05

## 2023-05-12 RX ADMIN — QUETIAPINE FUMARATE 25 MG: 25 TABLET ORAL at 08:05

## 2023-05-12 RX ADMIN — GLYCOPYRROLATE 1 MG: 1 LIQUID ORAL at 02:05

## 2023-05-12 RX ADMIN — HYDRALAZINE HYDROCHLORIDE 10 MG: 20 INJECTION, SOLUTION INTRAMUSCULAR; INTRAVENOUS at 09:05

## 2023-05-12 RX ADMIN — AMLODIPINE BESYLATE 10 MG: 10 TABLET ORAL at 01:05

## 2023-05-12 RX ADMIN — HYDRALAZINE HYDROCHLORIDE 10 MG: 20 INJECTION, SOLUTION INTRAMUSCULAR; INTRAVENOUS at 07:05

## 2023-05-12 RX ADMIN — LABETALOL HYDROCHLORIDE 10 MG: 5 INJECTION, SOLUTION INTRAVENOUS at 05:05

## 2023-05-12 NOTE — PROGRESS NOTES
[facility]  Vascular Neurology  [facility]  Progress Note    Assessment/Plan:     * Nontraumatic intracerebral hemorrhage  67-year-old female with PMH of HTN and HLD who is a transfer from OSH for an ICH. She presented to OSH ED  with AMS, tachycardia, left-sided facial droop. History obtained from  as patient was intubated. He reported multiple episodes of vomiting, when assisting patient from the floor he noticed her eyes rolling back. EMS was called and  noticed facial droop and dysarthria. Patient hypoxic on EMS arrival requiring intubation.  on arrival to OSH, patient stop taking all her medications few months ago because she doesn't believe in doctors. CTH with cervicomedullary junction ICH. She was also noted to be in afib with RVR.    CTA head/neck without cerebrovascular abnormalities. MRI W/WO brain and cervical spine with possible venous anomaly. Neurosurgery planning for repeat MRI in 4-6 weeks with possible intervention pending findings.      Antithrombotics for secondary stroke prevention: Antiplatelets: None: Intracerebral Hemorrhage      Aggressive risk factor modification: HTN, HLD     Rehab efforts: The patient has been evaluated by a stroke team provider and the therapy needs have been fully considered based off the presenting complaints and exam findings. The following therapy evaluations are needed: PT evaluate and treat, OT evaluate and treat, SLP evaluate and treat, PM&R evaluate for appropriate placement pending extubation    Diagnostics ordered/pending: Other: repeat CT for any changes in exam    VTE prophylaxis: Mechanical prophylaxis: Place SCDs  None: Reason for No Pharmacological VTE Prophylaxis: History of systemic or intracranial bleeding    BP parameters: ICH: SBP <140        Acute cystitis  Completed ceftriaxone on 5/8    Vasogenic cerebral edema  Area of cerebral edema identified when reviewing brain imaging  Continue  frequent neuro checks   STAT CT for any exam changes which may signify expansion of the insult and/or the area of the edema. Such changes may require acute interventions to prevent loss of function and/or death.            Primary hypertension  Stroke risk factor SBP <140    A-fib  New onset, on initial EKG  Not on AC at home  Hold AC in the setting of ICH    Mixed hyperlipidemia  Continue atorvastatin 40  Reported noncompliant by  on admit       5/9 patient drowsy but able to follow commands and nod appropriately, remains intubated, currently undergoing spontaneous vent trials  5/12 Patient is easily aroused and follows commands and communicates by writing. ETT in place. Reviewed imaging and considering possible cavernoma as etio.      STROKE DOCUMENTATION        NIH Scale:  1a. Level of Consciousness: 1-->Not alert, but arousable by minor stimulation to obey, answer, or respond  1b. LOC Questions: 0-->Answers both questions correctly  1c. LOC Commands: 0-->Performs both tasks correctly  2. Best Gaze: 0-->Normal  3. Visual: 0-->No visual loss  4. Facial Palsy: 2-->Partial paralysis (total or near-total paralysis of lower face)  5a. Motor Arm, Left: 0-->No drift, limb holds 90 (or 45) degrees for full 10 secs  5b. Motor Arm, Right: 0-->No drift, limb holds 90 (or 45) degrees for full 10 secs  6a. Motor Leg, Left: 0-->No drift, leg holds 30 degree position for full 5 secs  6b. Motor Leg, Right: 0-->No drift, leg holds 30 degree position for full 5 secs  7. Limb Ataxia: 0-->Absent  8. Sensory: 0-->Normal, no sensory loss  9. Best Language: 0-->No aphasia, normal  10. Dysarthria: (UN) Intubated or other physical barrier  11. Extinction and Inattention (formerly Neglect): 0-->No abnormality  Total (NIH Stroke Scale): 3       Modified Morgan Score: 0  [place] Coma Scale:    ABCD2 Score:    [id number] Score:   HAS -BLED Score:   ICH Score:2  Hunt & Truj[place] Classification:      Hemorrhagic change of an Ischemic  Stroke: Does this patient have an ischemic stroke with hemorrhagic changes? No     Interval History: As above.    Review of Systems   Unable to perform ROS: Intubated   HENT:  Positive for sore throat.    Eyes:  Positive for visual disturbance (double vision).   Cardiovascular:  Positive for chest pain.   Gastrointestinal:  Positive for nausea and vomiting.   Musculoskeletal:  Positive for arthralgias and back pain.     Objective:     Vitals:  Temp: 98.8 °F (37.1 °C)  Pulse: 82  Rhythm: normal sinus rhythm  BP: (!) 145/69  MAP (mmHg): 99  Resp: (!) 31  SpO2: 96 %  Oxygen Concentration (%): 50  Vent Mode: SIMV  Set Rate: 10 BPM  Vt Set: 450 mL  Pressure Support: 5 cmH20  PEEP/CPAP: 5 cmH20  Peak Airway Pressure: 11 cmH20  Mean Airway Pressure: 9.1 cmH20  Plateau Pressure: 0 cmH20    Temp  Min: 97.6 °F (36.4 °C)  Max: 99.3 °F (37.4 °C)  Pulse  Min: 62  Max: 87  BP  Min: 126/76  Max: 190/82  MAP (mmHg)  Min: 86  Max: 122  Resp  Min: 15  Max: 33  SpO2  Min: 95 %  Max: 100 %  Oxygen Concentration (%)  Min: 50  Max: 50    05/11 0701 - 05/12 0700  In: 1765.9 [I.V.:1705.9]  Out: 950 [Urine:950]   Unmeasured Output  Urine Occurrence: 1  Stool Occurrence: 1  Emesis Occurrence: 1  Pad Count: 2        Physical Exam  Vitals and nursing note reviewed.   Constitutional:       General: She is not in acute distress.     Appearance: She is obese. She is not diaphoretic.   HENT:      Head: Normocephalic and atraumatic.      Right Ear: External ear normal.      Left Ear: External ear normal.      Nose: Nose normal.      Mouth/Throat:      Comments: ET tube and OG tube intact  Copious pooled clear secretions in oral cavity  Eyes:      General: No scleral icterus.        Right eye: No discharge.         Left eye: No discharge.      Extraocular Movements: Extraocular movements intact.      Pupils: Pupils are equal, round, and reactive to light.   Cardiovascular:      Rate and Rhythm: Normal rate and regular rhythm.      Heart sounds:  Normal heart sounds. No murmur heard.  Pulmonary:      Effort: Pulmonary effort is normal. No respiratory distress.      Breath sounds: Normal breath sounds. No wheezing or rales.      Comments: Ventilator on SIMV  Chest:      Chest wall: Tenderness (anterior TTP) present.   Abdominal:      General: Abdomen is flat. Bowel sounds are normal. There is no distension.      Palpations: Abdomen is soft.      Tenderness: There is no abdominal tenderness. There is no guarding.   Musculoskeletal:         General: No swelling or deformity. Normal range of motion.      Cervical back: Normal range of motion and neck supple.      Right lower leg: No edema.      Left lower leg: No edema.   Skin:     General: Skin is warm.      Coloration: Skin is not jaundiced.      Findings: No bruising.   Neurological:      Mental Status: She is alert.      Comments:   E4VTM6  Alert, follows commands, and can write to communicate  Off sedation  Cough and gag reflex intact  PERRL  EOMI  Face symmetric  Moves all extremities symmetrically against gravity  Regards to touch throughout     Unable to test orientation, language, memory, judgment, insight, fund of knowledge, coordination, gait due to intubation.       Medications:  Continuous   ScheduledamLODIPine, 10 mg, Daily  atorvastatin, 40 mg, Daily  famotidine, 20 mg, BID  glycopyrrolate, 1 mg, TID  lisinopriL, 40 mg, Daily  polyethylene glycol, 17 g, BID  QUEtiapine, 25 mg, QHS  senna-docusate 8.6-50 mg, 1 tablet, BID  silodosin, 4 mg, Daily    PRNacetaminophen, 650 mg, Q6H PRN  bisacodyL, 10 mg, Daily PRN  fentaNYL, 25 mcg, Q4H PRN  hydrALAZINE, 10 mg, Q6H PRN  labetalol, 10 mg, Q4H PRN  magnesium oxide, 800 mg, PRN  magnesium oxide, 800 mg, PRN  ondansetron, 4 mg, Q8H PRN  potassium bicarbonate, 35 mEq, PRN  potassium bicarbonate, 50 mEq, PRN  potassium bicarbonate, 60 mEq, PRN  potassium, sodium phosphates, 2 packet, PRN  potassium, sodium phosphates, 2 packet, PRN  potassium, sodium  phosphates, 2 packet, PRN  sodium chloride 0.9%, 10 mL, PRN  sucralfate, 1 g, Q6H PRN      Today I personally reviewed pertinent medications, lines/drains/airways, imaging, cardiology results, laboratory results, microbiology results, notably:    Diet  Diet NPO  Diet NPO        Lashay Huizar MD  [facility]  Department of Vascular Neurology   [facility]

## 2023-05-12 NOTE — CONSULTS
[facility]  General Surgery  Consult Note    Patient Name: Susan Pinto  MRN: [id number]  Code Status: Full Code  Admission Date: 5/5/2023  Hospital Length of Stay: 7 days  Attending Physician: David Iverson MD  Primary Care Provider: Court Champion NP    Patient information was obtained from spouse/SO, past medical records, ER records and primary team.     Inpatient consult to General Surgery  Consult performed by: Magdiel Benites MD  Consult ordered by: Tea Jones NP        Subjective:     Principal Problem: Nontraumatic intracerebral hemorrhage    History of Present Illness: 67-year-old female with multiple medical co-morbidities who presents with medullary ICH requiring prolonged intubation. Surgery consulted for tracheostomy and PEG tube placement.    ET tube 7.5  No head/neck surgeries  No upper abdominal surgeries  Receiving tube feeds  No anticoagulation  Vent 5/50        No current facility-administered medications on file prior to encounter.     Current Outpatient Medications on File Prior to Encounter   Medication Sig    amLODIPine (NORVASC) 5 MG tablet Take 1 tablet (5 mg total) by mouth once daily.    aspirin (ECOTRIN) 81 MG EC tablet Take 81 mg by mouth once daily.    atorvastatin (LIPITOR) 20 MG tablet Take 1 tablet (20 mg total) by mouth every evening.    cloNIDine (CATAPRES) 0.1 MG tablet Take one as needed every 12 hours if blood pressure exceeds 150/100    fish oil-omega-3 fatty acids 300-1,000 mg capsule Take by mouth once daily.    lisinopriL (PRINIVIL,ZESTRIL) 40 MG tablet Take 1 tablet (40 mg total) by mouth once daily.    LORazepam (ATIVAN) 0.5 MG tablet TAKE 1 TABLET (=0.5MG) BY MOUTH  NIGHTLY AS NEEDED FOR ANXIETY       Review of patient's allergies indicates:  No Known Allergies    History reviewed. No pertinent past medical history.  History reviewed. No pertinent surgical history.  Family History       Problem Relation (Age of Onset)    Hypertension Mother           Tobacco Use    Smoking status: Never    Smokeless tobacco: Never   Substance and Sexual Activity    Alcohol use: No    Drug use: No    Sexual activity: Not on file     Review of Systems   Unable to perform ROS: Intubated   Objective:     Vital Signs (Most Recent):  Temp: 98.8 °F (37.1 °C) (05/12/23 1505)  Pulse: 68 (05/12/23 1805)  Resp: (!) 23 (05/12/23 1805)  BP: 138/70 (05/12/23 1805)  SpO2: 98 % (05/12/23 1805) Vital Signs (24h Range):  Temp:  [97.6 °F (36.4 °C)-99.3 °F (37.4 °C)] 98.8 °F (37.1 °C)  Pulse:  [62-87] 68  Resp:  [15-47] 23  SpO2:  [95 %-100 %] 98 %  BP: (126-190)/(60-91) 138/70     Weight: 95.3 kg (210 lb)  Body mass index is 36.05 kg/m².     Physical Exam  Vitals and nursing note reviewed.   HENT:      Head: Normocephalic and atraumatic.      Right Ear: External ear normal.      Left Ear: External ear normal.      Nose: Nose normal.      Mouth/Throat:      Mouth: Mucous membranes are moist.   Eyes:      Conjunctiva/sclera: Conjunctivae normal.   Cardiovascular:      Rate and Rhythm: Normal rate.   Pulmonary:      Comments: Vent SIMV 5/50  Abdominal:      General: There is no distension.      Tenderness: There is no abdominal tenderness.      Comments: No obvious abdominal scars.    Musculoskeletal:         General: No swelling.      Cervical back: Neck supple.   Skin:     General: Skin is warm and dry.   Neurological:      General: No focal deficit present.      Mental Status: She is alert.   Psychiatric:      Comments: Answers yes or no questions while intubated, seems appropriate.           I have reviewed all pertinent lab results within the past 24 hours.  CBC:   Recent Labs   Lab 05/12/23  0504   WBC 11.80   RBC 4.55   HGB 13.5   HCT 42.7      MCV 94   MCH 29.7   MCHC 31.6*     BMP:   Recent Labs   Lab 05/12/23  0504   GLU 99      K 3.9      CO2 22*   BUN 25*   CREATININE 0.8   CALCIUM 10.1   MG 2.1       Significant Diagnostics:  none      Assessment/Plan:     *  Nontraumatic intracerebral hemorrhage  67-year-old female with multiple medical co-morbidities presents with ICH now in need of trach/peg. 7.5 ET tube. Tube feeds going. Minimal vent settings 50/5. No abdominal surgeries. No pressors. Seems to be good candidate.     -tentatively plan for trach/peg for Monday  -surgery will follow.       VTE Risk Mitigation (From admission, onward)           Ordered     Reason for No Pharmacological VTE Prophylaxis  Once        Question:  Reasons:  Answer:  Active Bleeding    05/05/23 1630     IP VTE HIGH RISK PATIENT  Once         05/05/23 1630     Place sequential compression device  Until discontinued         05/05/23 1630                    Thank you for your consult. I will follow-up with patient. Please contact us if you have any additional questions.    Magdiel Benites MD  General Surgery  [facility]        I have personally performed a detailed history and physical examination on this patient. My findings are summarized in the resident's note included in the record.   Plan trach/PEG Monday

## 2023-05-12 NOTE — PLAN OF CARE
[facility] Care Plan    POC reviewed with Susan Pinto and family at 1400. Pt verbalized understanding. Questions and concerns addressed. No acute events today. Pt progressing toward goals. Will continue to monitor. See below and flowsheets for full assessment and VS info.   Gnl surgery consult for trach and PEG             Is this a stroke patient? yes- Stroke booklet reviewed with patient and family, risk factors identified for patient and stroke booklet remains at bedside for ongoing education.     Neuro:  [place] Coma Scale  Best Eye Response: 4-->(E4) spontaneous  Best Motor Response: 6-->(M6) obeys commands  Best Verbal Response: 1-->(V1) none  [place] Coma Scale Score: 11  Assessment Qualifiers: patient intubated  Pupil PERRLA: yes     24 hr Temp:  [97.6 °F (36.4 °C)-99.3 °F (37.4 °C)]     CV:   Rhythm: normal sinus rhythm  BP goals:   SBP < 160  MAP > 65    Resp:      Vent Mode: SIMV  Set Rate: 10 BPM  Oxygen Concentration (%): 50  Vt Set: 450 mL  PEEP/CPAP: 5 cmH20  Pressure Support: 5 cmH20    Plan: trach/PEG discussions    GI/:     Diet/Nutrition Received: tube feeding  Last Bowel Movement: 05/11/23  Voiding Characteristics: external catheter    Intake/Output Summary (Last 24 hours) at 5/12/2023 1637  Last data filed at 5/12/2023 1605  Gross per 24 hour   Intake 1785 ml   Output 650 ml   Net 1135 ml     Unmeasured Output  Urine Occurrence: 1  Stool Occurrence: 1  Emesis Occurrence: 1  Pad Count: 2    Labs/Accuchecks:  Recent Labs   Lab 05/12/23  0504   WBC 11.80   RBC 4.55   HGB 13.5   HCT 42.7         Recent Labs   Lab 05/12/23  0504      K 3.9   CO2 22*      BUN 25*   CREATININE 0.8   ALKPHOS 55   ALT 13   AST 13   BILITOT 0.5      Recent Labs   Lab 05/06/23  0101   INR 1.0   APTT 33.1*      Recent Labs   Lab 05/11/23  0857   TROPONINI <0.006       Electrolytes: N/A - electrolytes WDL  Accuchecks: none    Gtts:      LDA/Wounds:  Lines/Drains/Airways       Drain  Duration                   NG/OG Tube 05/05/23 1445 orogastric 18 Fr. Center mouth 7 days    Female External Urinary Catheter 05/05/23 1718 6 days              Airway  Duration                  Airway - Non-Surgical 05/05/23 1430 Endotracheal Tube 7 days              Peripheral Intravenous Line  Duration                  Peripheral IV - Single Lumen 05/07/23 1043 18 G Posterior;Right Forearm 5 days         Peripheral IV - Single Lumen 05/09/23 1325 20 G Left;Posterior Forearm 3 days                  Wounds: No  Wound care consulted: No

## 2023-05-12 NOTE — ASSESSMENT & PLAN NOTE
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 with edema surrounding  - MRI brain w/wo (5/6): possible DVA and concern for cavernous malformation  - left medullary hemorrhage vs cavernoma    Plan:  - NSGY following, no acute surgical intervention at this time   - OP f/u w NSGY in 4-6 weeks with a repeat MRI Brain w/wo contrast on 6/12   - Open intervention for cavernoma may be considered if findings are concordant on repeat imaging given first time hemorrhage, but will discuss in OP setting   - DSA likely not beneficial  - Neuro q4, sleep holiday, delirium precautions  - SBP< 160 per NSGY  - intubated for airway protection, copious secretions and weak cough   - continue trials of spontaneous ventilator setting   - failed T-piece trial   - f/u daily ABG's  - Hold AC/AP  - Coags & CBC  - VN following  - PT/OT/SLP  5/12/2023: patient agreeable with peg and trach, consult gen surgery

## 2023-05-12 NOTE — PROGRESS NOTES
[facility]  Neurocritical Care  Progress Note    Admit Date: 5/5/2023  Service Date: 05/12/2023  Length of Stay: 7    Subjective:     Chief Complaint: Nontraumatic intracerebral hemorrhage    History of Present Illness: Susan Pinto is a 67-year-old female with Hx of HTN presenting with altered mental status, tachycardia, left-sided facial droop, admitted for medullary ICH.   Patient started having vomiting that began around 7:00 p.m last night. On EMS arrival was noted to be hypoxic sat's in 80's with AMS, was intubated for airway protection. In ED at OSH CTh was done and was found to have a cervicomedullary junction ICH measuring approximately about 1.3 x 1.3 x 1.2 cm, also had a.fib with RVR Hr in the 150's that responded to BB. [place] [facility] [place]         Hospital Course: 05/06/2023 transient hypotension overnight improved with stopping propofol. MRI with possible DVA, will discuss need for DSA with IR. Endorsed tingling in BLE -> CTH stable, trial of HTS for possible edema. SBT as tolerated. 3 day Abx for UTI. New double vision -> repeat CTH stable.   05/07/2023 CO2 retention noted on ABG yesterday after SBT. Gas normalized with SIMV. Will reassess on spontaneous today. Discussed possibility of trach if she fails to wean off the vent. Still with copious secretions and no cough. Day 2/3 of CTX for GNR UTI. Tube feeds initiated.  05/08/2023 Pt remains on spontaneous vent settings with CO2 in normal range. T-piece trial unsuccessful yesterday. Cough reflex improved today.   05/09/2023 Exam stable. Continue trials of spontaneous ventilation.   05/10/2023 BP better controlled overnight. Will advance bowel regiment for constipation. Pt still not tolerating secretions, remains intubated.   05/11/2023 Patient with N/V overnight and new left shoulder/chest pain, troponin negative and EKG w/o ischemic changes. Constipation resolved.  05/12/2023 ana m, pt agreeable to early  trach/peg          Objective:     Vitals:  Temp: 98.6 °F (37 °C)  Pulse: 75  Rhythm: normal sinus rhythm  BP: (!) 190/82  MAP (mmHg): 118  Resp: 20  SpO2: 98 %  Oxygen Concentration (%): 50  Vent Mode: SIMV  Set Rate: 10 BPM  Vt Set: 450 mL  Pressure Support: 5 cmH20  PEEP/CPAP: 5 cmH20  Peak Airway Pressure: 11 cmH20  Mean Airway Pressure: 8.5 cmH20  Plateau Pressure: 0 cmH20    Temp  Min: 97.6 °F (36.4 °C)  Max: 99.3 °F (37.4 °C)  Pulse  Min: 62  Max: 82  BP  Min: 126/76  Max: 190/82  MAP (mmHg)  Min: 86  Max: 122  Resp  Min: 15  Max: 32  SpO2  Min: 96 %  Max: 100 %  Oxygen Concentration (%)  Min: 50  Max: 50    05/11 0701 - 05/12 0700  In: 1765.9 [I.V.:1705.9]  Out: 950 [Urine:950]   Unmeasured Output  Urine Occurrence: 1  Stool Occurrence: 1  Emesis Occurrence: 1  Pad Count: 4        Physical Exam  GA: Alert, comfortable, no acute distress.   HEENT: No scleral icterus or JVD.   Pulmonary: Clear to auscultation A/L.  Cardiac: RRR S1 & S2 w/o rubs/murmurs/gallops.   Abdominal: Bowel sounds present x 4. No appreciable hepatosplenomegaly.  Skin: No jaundice, rashes, or visible lesions.  Neuro:  --GCS: E3 Vt1 M6  --Mental Status:  awake, oriented X4, follows commands, nods appropriately  --CN II-XII grossly intact.   --Pupils 3mm, PERRL.   --Corneal reflex, gag, cough intact.  --FLETCHER spont         Medications:  Continuous ScheduledamLODIPine, 10 mg, Daily  atorvastatin, 40 mg, Daily  famotidine, 20 mg, BID  glycopyrrolate, 1 mg, TID  lisinopriL, 40 mg, Daily  polyethylene glycol, 17 g, BID  QUEtiapine, 25 mg, QHS  senna-docusate 8.6-50 mg, 1 tablet, BID  silodosin, 4 mg, Daily    PRNacetaminophen, 650 mg, Q6H PRN  bisacodyL, 10 mg, Daily PRN  fentaNYL, 25 mcg, Q4H PRN  hydrALAZINE, 10 mg, Q6H PRN  labetalol, 10 mg, Q4H PRN  magnesium oxide, 800 mg, PRN  magnesium oxide, 800 mg, PRN  ondansetron, 4 mg, Q8H PRN  potassium bicarbonate, 35 mEq, PRN  potassium bicarbonate, 50 mEq, PRN  potassium bicarbonate, 60 mEq,  PRN  potassium, sodium phosphates, 2 packet, PRN  potassium, sodium phosphates, 2 packet, PRN  potassium, sodium phosphates, 2 packet, PRN  sodium chloride 0.9%, 10 mL, PRN  sucralfate, 1 g, Q6H PRN          Assessment/Plan:     Neuro  * Nontraumatic intracerebral hemorrhage  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 with edema surrounding  - MRI brain w/wo (5/6): possible DVA and concern for cavernous malformation  - left medullary hemorrhage vs cavernoma    Plan:  - NSGY following, no acute surgical intervention at this time   - OP f/u w NSGY in 4-6 weeks with a repeat MRI Brain w/wo contrast on 6/12   - Open intervention for cavernoma may be considered if findings are concordant on repeat imaging given first time hemorrhage, but will discuss in OP setting   - DSA likely not beneficial  - Neuro q4, sleep holiday, delirium precautions  - SBP< 160 per NSGY  - intubated for airway protection, copious secretions and weak cough   - continue trials of spontaneous ventilator setting   - failed T-piece trial   - f/u daily ABG's  - Hold AC/AP  - Coags & CBC  - VN following  - PT/OT/SLP  5/12/2023: patient agreeable with peg and trach, consult gen surgery     Vasogenic cerebral edema  See ICH    Cardiac/Vascular  Primary hypertension  Home medications: amlodipine 5mg daily, lisinopril 40mg daily, and prn clonidine 0.1mg BID PRN for BP > 150/100    - goal SBP < 160  - continue home amlodipine at higher dose to 10mg daily  - continue home lisinopril 40mg daily  - prn labetalol and hydralazine  - cardene off since 4 am 5/7    A-fib  New onset at OSH, responded to BB  - initial EKG with A-fib RVR, repeat showed NSR    EKG  Monitor for now  Hold A/C    Renal/  Urinary retention  Continue silodosin  Prn straight cath    Acute cystitis  UA with findigns c/f UTI  Urine Cx with E. Coli  S/P 3 day course of CTX (EOC: 5/8)          The patient is being Prophylaxed  for:  Venous Thromboembolism with: Mechanical  Stress Ulcer with: H2B  Ventilator Pneumonia with: chlorhexidine oral care    Activity Orders          Turn patient starting at 05/05 1800    Elevate HOB starting at 05/05 1626    Diet NPO: NPO starting at 05/05 1626        Full Code    Tea Jones NP  Neurocritical Care  [facility]

## 2023-05-12 NOTE — SUBJECTIVE & OBJECTIVE
No current facility-administered medications on file prior to encounter.     Current Outpatient Medications on File Prior to Encounter   Medication Sig    amLODIPine (NORVASC) 5 MG tablet Take 1 tablet (5 mg total) by mouth once daily.    aspirin (ECOTRIN) 81 MG EC tablet Take 81 mg by mouth once daily.    atorvastatin (LIPITOR) 20 MG tablet Take 1 tablet (20 mg total) by mouth every evening.    cloNIDine (CATAPRES) 0.1 MG tablet Take one as needed every 12 hours if blood pressure exceeds 150/100    fish oil-omega-3 fatty acids 300-1,000 mg capsule Take by mouth once daily.    lisinopriL (PRINIVIL,ZESTRIL) 40 MG tablet Take 1 tablet (40 mg total) by mouth once daily.    LORazepam (ATIVAN) 0.5 MG tablet TAKE 1 TABLET (=0.5MG) BY MOUTH  NIGHTLY AS NEEDED FOR ANXIETY       Review of patient's allergies indicates:  No Known Allergies    History reviewed. No pertinent past medical history.  History reviewed. No pertinent surgical history.  Family History       Problem Relation (Age of Onset)    Hypertension Mother          Tobacco Use    Smoking status: Never    Smokeless tobacco: Never   Substance and Sexual Activity    Alcohol use: No    Drug use: No    Sexual activity: Not on file     Review of Systems   Unable to perform ROS: Intubated   Objective:     Vital Signs (Most Recent):  Temp: 98.8 °F (37.1 °C) (05/12/23 1505)  Pulse: 68 (05/12/23 1805)  Resp: (!) 23 (05/12/23 1805)  BP: 138/70 (05/12/23 1805)  SpO2: 98 % (05/12/23 1805) Vital Signs (24h Range):  Temp:  [97.6 °F (36.4 °C)-99.3 °F (37.4 °C)] 98.8 °F (37.1 °C)  Pulse:  [62-87] 68  Resp:  [15-47] 23  SpO2:  [95 %-100 %] 98 %  BP: (126-190)/(60-91) 138/70     Weight: 95.3 kg (210 lb)  Body mass index is 36.05 kg/m².     Physical Exam  Vitals and nursing note reviewed.   HENT:      Head: Normocephalic and atraumatic.      Right Ear: External ear normal.      Left Ear: External ear normal.      Nose: Nose normal.      Mouth/Throat:      Mouth: Mucous membranes are  moist.   Eyes:      Conjunctiva/sclera: Conjunctivae normal.   Cardiovascular:      Rate and Rhythm: Normal rate.   Pulmonary:      Comments: Vent SIMV 5/50  Abdominal:      General: There is no distension.      Tenderness: There is no abdominal tenderness.      Comments: No obvious abdominal scars.    Musculoskeletal:         General: No swelling.      Cervical back: Neck supple.   Skin:     General: Skin is warm and dry.   Neurological:      General: No focal deficit present.      Mental Status: She is alert.   Psychiatric:      Comments: Answers yes or no questions while intubated, seems appropriate.           I have reviewed all pertinent lab results within the past 24 hours.  CBC:   Recent Labs   Lab 05/12/23  0504   WBC 11.80   RBC 4.55   HGB 13.5   HCT 42.7      MCV 94   MCH 29.7   MCHC 31.6*     BMP:   Recent Labs   Lab 05/12/23  0504   GLU 99      K 3.9      CO2 22*   BUN 25*   CREATININE 0.8   CALCIUM 10.1   MG 2.1       Significant Diagnostics:  none

## 2023-05-12 NOTE — HPI
67-year-old female with multiple medical co-morbidities who presents with medullary ICH requiring prolonged intubation. Surgery consulted for tracheostomy and PEG tube placement.    ET tube 7.5  No head/neck surgeries  No upper abdominal surgeries  Receiving tube feeds  No anticoagulation  Vent 5/50

## 2023-05-12 NOTE — SUBJECTIVE & OBJECTIVE
Objective:     Vitals:  Temp: 98.6 °F (37 °C)  Pulse: 75  Rhythm: normal sinus rhythm  BP: (!) 190/82  MAP (mmHg): 118  Resp: 20  SpO2: 98 %  Oxygen Concentration (%): 50  Vent Mode: SIMV  Set Rate: 10 BPM  Vt Set: 450 mL  Pressure Support: 5 cmH20  PEEP/CPAP: 5 cmH20  Peak Airway Pressure: 11 cmH20  Mean Airway Pressure: 8.5 cmH20  Plateau Pressure: 0 cmH20    Temp  Min: 97.6 °F (36.4 °C)  Max: 99.3 °F (37.4 °C)  Pulse  Min: 62  Max: 82  BP  Min: 126/76  Max: 190/82  MAP (mmHg)  Min: 86  Max: 122  Resp  Min: 15  Max: 32  SpO2  Min: 96 %  Max: 100 %  Oxygen Concentration (%)  Min: 50  Max: 50    05/11 0701 - 05/12 0700  In: 1765.9 [I.V.:1705.9]  Out: 950 [Urine:950]   Unmeasured Output  Urine Occurrence: 1  Stool Occurrence: 1  Emesis Occurrence: 1  Pad Count: 4        Physical Exam  GA: Alert, comfortable, no acute distress.   HEENT: No scleral icterus or JVD.   Pulmonary: Clear to auscultation A/L.  Cardiac: RRR S1 & S2 w/o rubs/murmurs/gallops.   Abdominal: Bowel sounds present x 4. No appreciable hepatosplenomegaly.  Skin: No jaundice, rashes, or visible lesions.  Neuro:  --GCS: E3 Vt1 M6  --Mental Status:  awake, oriented X4, follows commands, nods appropriately  --CN II-XII grossly intact.   --Pupils 3mm, PERRL.   --Corneal reflex, gag, cough intact.  --FLETCHER spont         Medications:  Continuous ScheduledamLODIPine, 10 mg, Daily  atorvastatin, 40 mg, Daily  famotidine, 20 mg, BID  glycopyrrolate, 1 mg, TID  lisinopriL, 40 mg, Daily  polyethylene glycol, 17 g, BID  QUEtiapine, 25 mg, QHS  senna-docusate 8.6-50 mg, 1 tablet, BID  silodosin, 4 mg, Daily    PRNacetaminophen, 650 mg, Q6H PRN  bisacodyL, 10 mg, Daily PRN  fentaNYL, 25 mcg, Q4H PRN  hydrALAZINE, 10 mg, Q6H PRN  labetalol, 10 mg, Q4H PRN  magnesium oxide, 800 mg, PRN  magnesium oxide, 800 mg, PRN  ondansetron, 4 mg, Q8H PRN  potassium bicarbonate, 35 mEq, PRN  potassium bicarbonate, 50 mEq, PRN  potassium bicarbonate, 60 mEq, PRN  potassium, sodium  phosphates, 2 packet, PRN  potassium, sodium phosphates, 2 packet, PRN  potassium, sodium phosphates, 2 packet, PRN  sodium chloride 0.9%, 10 mL, PRN  sucralfate, 1 g, Q6H PRN

## 2023-05-12 NOTE — SUBJECTIVE & OBJECTIVE
Interval History: As above.    Review of Systems   Unable to perform ROS: Intubated   HENT:  Positive for sore throat.    Eyes:  Positive for visual disturbance (double vision).   Cardiovascular:  Positive for chest pain.   Gastrointestinal:  Positive for nausea and vomiting.   Musculoskeletal:  Positive for arthralgias and back pain.     Objective:     Vitals:  Temp: 98.8 °F (37.1 °C)  Pulse: 82  Rhythm: normal sinus rhythm  BP: (!) 145/69  MAP (mmHg): 99  Resp: (!) 31  SpO2: 96 %  Oxygen Concentration (%): 50  Vent Mode: SIMV  Set Rate: 10 BPM  Vt Set: 450 mL  Pressure Support: 5 cmH20  PEEP/CPAP: 5 cmH20  Peak Airway Pressure: 11 cmH20  Mean Airway Pressure: 9.1 cmH20  Plateau Pressure: 0 cmH20    Temp  Min: 97.6 °F (36.4 °C)  Max: 99.3 °F (37.4 °C)  Pulse  Min: 62  Max: 87  BP  Min: 126/76  Max: 190/82  MAP (mmHg)  Min: 86  Max: 122  Resp  Min: 15  Max: 33  SpO2  Min: 95 %  Max: 100 %  Oxygen Concentration (%)  Min: 50  Max: 50    05/11 0701 - 05/12 0700  In: 1765.9 [I.V.:1705.9]  Out: 950 [Urine:950]   Unmeasured Output  Urine Occurrence: 1  Stool Occurrence: 1  Emesis Occurrence: 1  Pad Count: 2        Physical Exam  Vitals and nursing note reviewed.   Constitutional:       General: She is not in acute distress.     Appearance: She is obese. She is not diaphoretic.   HENT:      Head: Normocephalic and atraumatic.      Right Ear: External ear normal.      Left Ear: External ear normal.      Nose: Nose normal.      Mouth/Throat:      Comments: ET tube and OG tube intact  Copious pooled clear secretions in oral cavity  Eyes:      General: No scleral icterus.        Right eye: No discharge.         Left eye: No discharge.      Extraocular Movements: Extraocular movements intact.      Pupils: Pupils are equal, round, and reactive to light.   Cardiovascular:      Rate and Rhythm: Normal rate and regular rhythm.      Heart sounds: Normal heart sounds. No murmur heard.  Pulmonary:      Effort: Pulmonary effort is  normal. No respiratory distress.      Breath sounds: Normal breath sounds. No wheezing or rales.      Comments: Ventilator on SIMV  Chest:      Chest wall: Tenderness (anterior TTP) present.   Abdominal:      General: Abdomen is flat. Bowel sounds are normal. There is no distension.      Palpations: Abdomen is soft.      Tenderness: There is no abdominal tenderness. There is no guarding.   Musculoskeletal:         General: No swelling or deformity. Normal range of motion.      Cervical back: Normal range of motion and neck supple.      Right lower leg: No edema.      Left lower leg: No edema.   Skin:     General: Skin is warm.      Coloration: Skin is not jaundiced.      Findings: No bruising.   Neurological:      Mental Status: She is alert.      Comments:   [id number]  Alert, follows commands, and can write to communicate  Off sedation  Cough and gag reflex intact  PERRL  EOMI  Face symmetric  Moves all extremities symmetrically against gravity  Regards to touch throughout     Unable to test orientation, language, memory, judgment, insight, fund of knowledge, coordination, gait due to intubation.       Medications:  Continuous   ScheduledamLODIPine, 10 mg, Daily  atorvastatin, 40 mg, Daily  famotidine, 20 mg, BID  glycopyrrolate, 1 mg, TID  lisinopriL, 40 mg, Daily  polyethylene glycol, 17 g, BID  QUEtiapine, 25 mg, QHS  senna-docusate 8.6-50 mg, 1 tablet, BID  silodosin, 4 mg, Daily    PRNacetaminophen, 650 mg, Q6H PRN  bisacodyL, 10 mg, Daily PRN  fentaNYL, 25 mcg, Q4H PRN  hydrALAZINE, 10 mg, Q6H PRN  labetalol, 10 mg, Q4H PRN  magnesium oxide, 800 mg, PRN  magnesium oxide, 800 mg, PRN  ondansetron, 4 mg, Q8H PRN  potassium bicarbonate, 35 mEq, PRN  potassium bicarbonate, 50 mEq, PRN  potassium bicarbonate, 60 mEq, PRN  potassium, sodium phosphates, 2 packet, PRN  potassium, sodium phosphates, 2 packet, PRN  potassium, sodium phosphates, 2 packet, PRN  sodium chloride 0.9%, 10 mL, PRN  sucralfate, 1 g, Q6H  PRN      Today I personally reviewed pertinent medications, lines/drains/airways, imaging, cardiology results, laboratory results, microbiology results, notably:    Diet  Diet NPO  Diet NPO

## 2023-05-12 NOTE — PLAN OF CARE
[facility] Care Plan    POC reviewed with Susan Pinto  at 0300. Pt verbalized understanding. Questions and concerns addressed. No acute events overnight. Pt progressing toward goals. Will continue to monitor. See below and flowsheets for full assessment and VS info.           Is this a stroke patient? yes- Stroke booklet reviewed with patient, risk factors identified for patient and stroke booklet remains at bedside for ongoing education.     Neuro:  [place] Coma Scale  Best Eye Response: 4-->(E4) spontaneous  Best Motor Response: 6-->(M6) obeys commands  Best Verbal Response: 1-->(V1) none  [place] Coma Scale Score: 11  Assessment Qualifiers: patient intubated  Pupil PERRLA: (P) yes     24hr Temp:  [97.6 °F (36.4 °C)-99.3 °F (37.4 °C)]     CV:   Rhythm: normal sinus rhythm  BP goals:   SBP < 160  MAP > 65    Resp:      Vent Mode: SIMV  Set Rate: 10 BPM  Oxygen Concentration (%): 50  Vt Set: 450 mL  PEEP/CPAP: 5 cmH20  Pressure Support: 5 cmH20    Plan: wean to extubate    GI/:     Diet/Nutrition Received: tube feeding  Last Bowel Movement: 05/11/23  Voiding Characteristics: external catheter    Intake/Output Summary (Last 24 hours) at 5/12/2023 0413  Last data filed at 5/12/2023 0403  Gross per 24 hour   Intake 1565.89 ml   Output 650 ml   Net 915.89 ml     Unmeasured Output  Urine Occurrence: 1  Stool Occurrence: 1  Emesis Occurrence: 1  Pad Count: 4    Labs/Accuchecks:  Recent Labs   Lab 05/11/23  0044   WBC 12.47   RBC 5.15   HGB 14.9   HCT 46.5         Recent Labs   Lab 05/11/23  0206      K 4.5   CO2 24      BUN 27*   CREATININE 0.9   ALKPHOS 55   ALT 14   AST 25   BILITOT 0.5      Recent Labs   Lab 05/06/23  0101   INR 1.0   APTT 33.1*      Recent Labs   Lab 05/11/23  0857   TROPONINI <0.006       Electrolytes: Electrolytes replaced  Accuchecks: none    Gtts:      LDA/Wounds:  Lines/Drains/Airways       Drain  Duration                  NG/OG Tube 05/05/23 1445 orogastric 18 Fr. Center  mouth 6 days    Female External Urinary Catheter 05/05/23 1718 6 days              Airway  Duration                  Airway - Non-Surgical 05/05/23 1430 Endotracheal Tube 6 days              Peripheral Intravenous Line  Duration                  Peripheral IV - Single Lumen 05/07/23 1043 18 G Posterior;Right Forearm 4 days         Peripheral IV - Single Lumen 05/09/23 1325 20 G Left;Posterior Forearm 2 days                  Wounds: No  Wound care consulted: No

## 2023-05-12 NOTE — ASSESSMENT & PLAN NOTE
67-year-old female with multiple medical co-morbidities presents with ICH now in need of trach/peg. 7.5 ET tube. Tube feeds going. Minimal vent settings 50/5. No abdominal surgeries. No pressors. Seems to be good candidate.     -tentatively plan for trach/peg for Monday  -surgery will follow.

## 2023-05-13 PROBLEM — Z99.11 ON MECHANICALLY ASSISTED VENTILATION: Status: ACTIVE | Noted: 2023-05-13

## 2023-05-13 PROBLEM — N30.00 ACUTE CYSTITIS: Status: RESOLVED | Noted: 2023-05-07 | Resolved: 2023-05-13

## 2023-05-13 PROBLEM — I48.0 PAROXYSMAL ATRIAL FIBRILLATION: Status: ACTIVE | Noted: 2023-05-05

## 2023-05-13 LAB
ALBUMIN SERPL BCP-MCNC: 3.3 G/DL (ref 3.5–5.2)
ALLENS TEST: NORMAL
ALP SERPL-CCNC: 61 U/L (ref 55–135)
ALT SERPL W/O P-5'-P-CCNC: 14 U/L (ref 10–44)
ANION GAP SERPL CALC-SCNC: 14 MMOL/L (ref 8–16)
AST SERPL-CCNC: 14 U/L (ref 10–40)
BASOPHILS # BLD AUTO: 0.09 K/UL (ref 0–0.2)
BASOPHILS NFR BLD: 0.7 % (ref 0–1.9)
BILIRUB SERPL-MCNC: 0.5 MG/DL (ref 0.1–1)
BUN SERPL-MCNC: 19 MG/DL (ref 8–23)
CALCIUM SERPL-MCNC: 10.7 MG/DL (ref 8.7–10.5)
CHLORIDE SERPL-SCNC: 110 MMOL/L (ref 95–110)
CO2 SERPL-SCNC: 22 MMOL/L (ref 23–29)
CREAT SERPL-MCNC: 0.8 MG/DL (ref 0.5–1.4)
DELSYS: NORMAL
DIFFERENTIAL METHOD: ABNORMAL
EOSINOPHIL # BLD AUTO: 0.1 K/UL (ref 0–0.5)
EOSINOPHIL NFR BLD: 1.1 % (ref 0–8)
ERYTHROCYTE [DISTWIDTH] IN BLOOD BY AUTOMATED COUNT: 13.9 % (ref 11.5–14.5)
ERYTHROCYTE [SEDIMENTATION RATE] IN BLOOD BY WESTERGREN METHOD: 10 MM/H
EST. GFR  (NO RACE VARIABLE): >60 ML/MIN/1.73 M^2
FIO2: 50
GLUCOSE SERPL-MCNC: 105 MG/DL (ref 70–110)
HCO3 UR-SCNC: 25.5 MMOL/L (ref 24–28)
HCT VFR BLD AUTO: 46.3 % (ref 37–48.5)
HGB BLD-MCNC: 14.2 G/DL (ref 12–16)
IMM GRANULOCYTES # BLD AUTO: 0.11 K/UL (ref 0–0.04)
IMM GRANULOCYTES NFR BLD AUTO: 0.8 % (ref 0–0.5)
LYMPHOCYTES # BLD AUTO: 1.9 K/UL (ref 1–4.8)
LYMPHOCYTES NFR BLD: 14.8 % (ref 18–48)
MAGNESIUM SERPL-MCNC: 2.4 MG/DL (ref 1.6–2.6)
MCH RBC QN AUTO: 29.1 PG (ref 27–31)
MCHC RBC AUTO-ENTMCNC: 30.7 G/DL (ref 32–36)
MCV RBC AUTO: 95 FL (ref 82–98)
MODE: NORMAL
MONOCYTES # BLD AUTO: 1.1 K/UL (ref 0.3–1)
MONOCYTES NFR BLD: 8.4 % (ref 4–15)
NEUTROPHILS # BLD AUTO: 9.8 K/UL (ref 1.8–7.7)
NEUTROPHILS NFR BLD: 74.2 % (ref 38–73)
NRBC BLD-RTO: 0 /100 WBC
PCO2 BLDA: 42.1 MMHG (ref 35–45)
PEEP: 5
PH SMN: 7.39 [PH] (ref 7.35–7.45)
PHOSPHATE SERPL-MCNC: 2.9 MG/DL (ref 2.7–4.5)
PIP: 29
PLATELET # BLD AUTO: 289 K/UL (ref 150–450)
PMV BLD AUTO: 10.9 FL (ref 9.2–12.9)
PO2 BLDA: 80 MMHG (ref 80–100)
POC BE: 1 MMOL/L
POC SATURATED O2: 96 % (ref 95–100)
POC TCO2: 27 MMOL/L (ref 23–27)
POTASSIUM SERPL-SCNC: 3.8 MMOL/L (ref 3.5–5.1)
PROT SERPL-MCNC: 7.6 G/DL (ref 6–8.4)
PS: 5
RBC # BLD AUTO: 4.88 M/UL (ref 4–5.4)
SAMPLE: NORMAL
SITE: NORMAL
SODIUM SERPL-SCNC: 146 MMOL/L (ref 136–145)
SP02: 95
VT: 450
WBC # BLD AUTO: 13.14 K/UL (ref 3.9–12.7)

## 2023-05-13 PROCEDURE — 63600175 PHARM REV CODE 636 W HCPCS: Mod: FS | Performed by: PHYSICIAN ASSISTANT

## 2023-05-13 PROCEDURE — 99900026 HC AIRWAY MAINTENANCE (STAT): Mod: FS

## 2023-05-13 PROCEDURE — 94761 N-INVAS EAR/PLS OXIMETRY MLT: CPT | Mod: FS

## 2023-05-13 PROCEDURE — 83735 ASSAY OF MAGNESIUM: CPT | Mod: FS | Performed by: PSYCHIATRY & NEUROLOGY

## 2023-05-13 PROCEDURE — 94003 VENT MGMT INPAT SUBQ DAY: CPT | Mod: FS

## 2023-05-13 PROCEDURE — 25000003 PHARM REV CODE 250: Mod: FS | Performed by: PHYSICIAN ASSISTANT

## 2023-05-13 PROCEDURE — 20000000 HC ICU ROOM: Mod: FS

## 2023-05-13 PROCEDURE — 36600 WITHDRAWAL OF ARTERIAL BLOOD: CPT | Mod: FS

## 2023-05-13 PROCEDURE — 84100 ASSAY OF PHOSPHORUS: CPT | Mod: FS | Performed by: PSYCHIATRY & NEUROLOGY

## 2023-05-13 PROCEDURE — 85025 COMPLETE CBC W/AUTO DIFF WBC: CPT | Mod: FS | Performed by: PSYCHIATRY & NEUROLOGY

## 2023-05-13 PROCEDURE — 25000003 PHARM REV CODE 250: Mod: FS | Performed by: PSYCHIATRY & NEUROLOGY

## 2023-05-13 PROCEDURE — 82803 BLOOD GASES ANY COMBINATION: CPT | Mod: FS

## 2023-05-13 PROCEDURE — 80053 COMPREHEN METABOLIC PANEL: CPT | Mod: FS | Performed by: PSYCHIATRY & NEUROLOGY

## 2023-05-13 PROCEDURE — 99291 PR CRITICAL CARE, E/M 30-74 MINUTES: ICD-10-PCS | Mod: ,,, | Performed by: PHYSICIAN ASSISTANT

## 2023-05-13 PROCEDURE — 99900035 HC TECH TIME PER 15 MIN (STAT): Mod: FS

## 2023-05-13 PROCEDURE — 99233 SBSQ HOSP IP/OBS HIGH 50: CPT | Mod: FS,,, | Performed by: PSYCHIATRY & NEUROLOGY

## 2023-05-13 PROCEDURE — 99233 PR SUBSEQUENT HOSPITAL CARE,LEVL III: ICD-10-PCS | Mod: FS,,, | Performed by: PSYCHIATRY & NEUROLOGY

## 2023-05-13 PROCEDURE — 25000003 PHARM REV CODE 250: Mod: FS

## 2023-05-13 PROCEDURE — 27000221 HC OXYGEN, UP TO 24 HOURS: Mod: FS

## 2023-05-13 PROCEDURE — 25000003 PHARM REV CODE 250: Mod: FS | Performed by: INTERNAL MEDICINE

## 2023-05-13 PROCEDURE — 99291 CRITICAL CARE FIRST HOUR: CPT | Mod: ,,, | Performed by: PHYSICIAN ASSISTANT

## 2023-05-13 RX ORDER — HEPARIN SODIUM 5000 [USP'U]/ML
5000 INJECTION, SOLUTION INTRAVENOUS; SUBCUTANEOUS EVERY 8 HOURS
Status: DISCONTINUED | OUTPATIENT
Start: 2023-05-13 | End: 2023-05-17 | Stop reason: HOSPADM

## 2023-05-13 RX ORDER — SODIUM CHLORIDE 9 MG/ML
INJECTION, SOLUTION INTRAVENOUS CONTINUOUS
Status: DISCONTINUED | OUTPATIENT
Start: 2023-05-13 | End: 2023-05-16

## 2023-05-13 RX ADMIN — QUETIAPINE FUMARATE 25 MG: 25 TABLET ORAL at 09:05

## 2023-05-13 RX ADMIN — POLYETHYLENE GLYCOL 3350 17 G: 17 POWDER, FOR SOLUTION ORAL at 09:05

## 2023-05-13 RX ADMIN — SENNOSIDES AND DOCUSATE SODIUM 1 TABLET: 50; 8.6 TABLET ORAL at 09:05

## 2023-05-13 RX ADMIN — HEPARIN SODIUM 5000 UNITS: 5000 INJECTION INTRAVENOUS; SUBCUTANEOUS at 11:05

## 2023-05-13 RX ADMIN — ATORVASTATIN CALCIUM 40 MG: 40 TABLET, FILM COATED ORAL at 09:05

## 2023-05-13 RX ADMIN — SILODOSIN 4 MG: 4 CAPSULE ORAL at 09:05

## 2023-05-13 RX ADMIN — FENTANYL CITRATE 25 MCG: 50 INJECTION INTRAMUSCULAR; INTRAVENOUS at 05:05

## 2023-05-13 RX ADMIN — SODIUM CHLORIDE: 9 INJECTION, SOLUTION INTRAVENOUS at 10:05

## 2023-05-13 RX ADMIN — AMLODIPINE BESYLATE 10 MG: 10 TABLET ORAL at 02:05

## 2023-05-13 RX ADMIN — LABETALOL HYDROCHLORIDE 10 MG: 5 INJECTION, SOLUTION INTRAVENOUS at 06:05

## 2023-05-13 RX ADMIN — FENTANYL CITRATE 25 MCG: 50 INJECTION INTRAMUSCULAR; INTRAVENOUS at 01:05

## 2023-05-13 RX ADMIN — LISINOPRIL 40 MG: 20 TABLET ORAL at 09:05

## 2023-05-13 RX ADMIN — GLYCOPYRROLATE 1 MG: 1 LIQUID ORAL at 09:05

## 2023-05-13 RX ADMIN — FAMOTIDINE 20 MG: 20 TABLET ORAL at 09:05

## 2023-05-13 RX ADMIN — HEPARIN SODIUM 5000 UNITS: 5000 INJECTION INTRAVENOUS; SUBCUTANEOUS at 09:05

## 2023-05-13 RX ADMIN — TOPICAL ANESTHETIC: 200 SPRAY DENTAL; PERIODONTAL at 10:05

## 2023-05-13 RX ADMIN — POTASSIUM BICARBONATE 50 MEQ: 978 TABLET, EFFERVESCENT ORAL at 06:05

## 2023-05-13 NOTE — ASSESSMENT & PLAN NOTE
Remains intubated for bulbar weakness   -active vent weaning daily   Vent Mode: SIMV  Oxygen Concentration (%):  [50] 50  Resp Rate Total:  [19 br/min-35 br/min] 23 br/min  Vt Set:  [450 mL] 450 mL  PEEP/CPAP:  [5 cmH20] 5 cmH20  Pressure Support:  [5 cmH20] 5 cmH20  Mean Airway Pressure:  [7.5 cmH20-10 cmH20] 7.5 cmH20

## 2023-05-13 NOTE — PLAN OF CARE
[facility] Care Plan    POC reviewed with Susan Pinto and family at 0300. Pt verbalized understanding. Questions and concerns addressed. No acute events overnight. Pt progressing toward goals. Will continue to monitor. See below and flowsheets for full assessment and VS info.     -hydralazine given x1 for SBP >160   -fentanyl x3 given for vent comfort and agitation  -external female catheter remains in place   -tube feedings restarted @ 10, tolerating well with no complaints of nausea overnight   -K replaced  -communicates well through hand gestures and written communication   -no acute events overnight, neuro exam unchanged   -trach/peg planned for Monday           Is this a stroke patient? yes- Stroke booklet reviewed with patient and family, risk factors identified for patient and stroke booklet remains at bedside for ongoing education.     Neuro:  [place] Coma Scale  Best Eye Response: 4-->(E4) spontaneous  Best Motor Response: 6-->(M6) obeys commands  Best Verbal Response: 1-->(V1) none  [place] Coma Scale Score: 11  Assessment Qualifiers: patient intubated  Pupil PERRLA: yes     24hr Temp:  [97.8 °F (36.6 °C)-99 °F (37.2 °C)]     CV:   Rhythm: normal sinus rhythm  BP goals:   SBP < 160  MAP > 65    Resp:      Vent Mode: SIMV  Set Rate: 10 BPM  Oxygen Concentration (%): 50  Vt Set: 450 mL  PEEP/CPAP: 5 cmH20  Pressure Support: 5 cmH20    Plan: wean to extubate    GI/:     Diet/Nutrition Received: tube feeding  Last Bowel Movement: 05/11/23  Voiding Characteristics: external catheter    Intake/Output Summary (Last 24 hours) at 5/13/2023 0255  Last data filed at 5/13/2023 0202  Gross per 24 hour   Intake 1170 ml   Output 950 ml   Net 220 ml     Unmeasured Output  Urine Occurrence: 1  Stool Occurrence: 1  Emesis Occurrence: 1  Pad Count: 2    Labs/Accuchecks:  Recent Labs   Lab 05/13/23 0212   WBC 13.14*   RBC 4.88   HGB 14.2   HCT 46.3         Recent Labs   Lab 05/13/23 0212   *   K 3.8   CO2  22*      BUN 19   CREATININE 0.8   ALKPHOS 61   ALT 14   AST 14   BILITOT 0.5    No results for input(s): PROTIME, INR, APTT, HEPANTIXA in the last 168 hours.   Recent Labs   Lab 05/11/23  0857   TROPONINI <0.006       Electrolytes: Electrolytes replaced - potassium level 3.8  Accuchecks: none    Gtts:      LDA/Wounds:  Lines/Drains/Airways       Drain  Duration                  NG/OG Tube 05/05/23 1445 orogastric 18 Fr. Center mouth 7 days    Female External Urinary Catheter 05/05/23 1718 7 days              Airway  Duration                  Airway - Non-Surgical 05/05/23 1430 Endotracheal Tube 7 days              Peripheral Intravenous Line  Duration                  Peripheral IV - Single Lumen 05/07/23 1043 18 G Posterior;Right Forearm 5 days         Peripheral IV - Single Lumen 05/09/23 1325 20 G Left;Posterior Forearm 3 days                  Wounds: No  Wound care consulted: No

## 2023-05-13 NOTE — PLAN OF CARE
[facility] Care Plan    POC reviewed with Susan Pinto and family at 1400. Pt verbalized understanding. Questions and concerns addressed. No acute events today. Pt progressing toward goals. Will continue to monitor. See below and flowsheets for full assessment and VS info.             Is this a stroke patient? yes- Stroke booklet reviewed with patient and family, risk factors identified for patient and stroke booklet remains at bedside for ongoing education.     Neuro:  [place] Coma Scale  Best Eye Response: 4-->(E4) spontaneous  Best Motor Response: 6-->(M6) obeys commands  Best Verbal Response: 1-->(V1) none  [place] Coma Scale Score: 11  Assessment Qualifiers: patient intubated  Pupil PERRLA: yes     24 hr Temp:  [97.8 °F (36.6 °C)-98.1 °F (36.7 °C)]     CV:   Rhythm: normal sinus rhythm  BP goals:   SBP < 160  MAP > 65    Resp:      Vent Mode: SIMV  Set Rate: 10 BPM  Oxygen Concentration (%): 50  Vt Set: 450 mL  PEEP/CPAP: 5 cmH20  Pressure Support: 5 cmH20    Plan:   - plan for trach and Peg placement tentatively scheduled for Monday  - SBP bodering the 160 threshold most of the day  - patient positioned to watch the river and get more sunlight       GI/:     Diet/Nutrition Received: tube feeding  Last Bowel Movement: 05/11/23  Voiding Characteristics: external catheter    Intake/Output Summary (Last 24 hours) at 5/13/2023 1646  Last data filed at 5/13/2023 0705  Gross per 24 hour   Intake 290 ml   Output 500 ml   Net -210 ml     Unmeasured Output  Urine Occurrence: 1  Stool Occurrence: 1  Emesis Occurrence: 1  Pad Count: 2    Labs/Accuchecks:  Recent Labs   Lab 05/13/23  0212   WBC 13.14*   RBC 4.88   HGB 14.2   HCT 46.3         Recent Labs   Lab 05/13/23  0212   *   K 3.8   CO2 22*      BUN 19   CREATININE 0.8   ALKPHOS 61   ALT 14   AST 14   BILITOT 0.5    No results for input(s): PROTIME, INR, APTT, HEPANTIXA in the last 168 hours.   Recent Labs   Lab 05/11/23  0857   TROPONINI <0.006        Electrolytes: {electrolyte replacement:42252}  Accuchecks: {accuchecks:92350}    Gtts:   sodium chloride 0.9% 75 mL/hr at 05/13/23 1023       LDA/Wounds:  Lines/Drains/Airways       Drain  Duration                  NG/OG Tube 05/05/23 1445 orogastric 18 Fr. Center mouth 8 days    Female External Urinary Catheter 05/05/23 1718 7 days              Airway  Duration                  Airway - Non-Surgical 05/05/23 1430 Endotracheal Tube 8 days              Peripheral Intravenous Line  Duration                  Peripheral IV - Single Lumen 05/07/23 1043 18 G Posterior;Right Forearm 6 days         Peripheral IV - Single Lumen 05/09/23 1325 20 G Left;Posterior Forearm 4 days                  Wounds: {YES NO:59703}  Wound care consulted: {YES NO:25539}

## 2023-05-13 NOTE — SUBJECTIVE & OBJECTIVE
Interval History:   Patient will need repeat imaging to evaluate for likely cavernoma. She remains intubated. Gen surg consulted for trach/PEG and will likely go Monday. Discussed with staff and VN to sign off at this time. Thank you for your consult. Please do not hesitate to contact us if we can be of further assistance.     Review of Systems   Unable to perform ROS: Intubated   HENT:  Positive for sore throat.    Eyes:  Positive for visual disturbance (double vision).   Cardiovascular:  Positive for chest pain.   Gastrointestinal:  Positive for nausea and vomiting.   Musculoskeletal:  Positive for arthralgias and back pain.     Objective:     Vitals:  Temp: 98 °F (36.7 °C)  Pulse: 78  Rhythm: normal sinus rhythm  BP: (!) 154/77  MAP (mmHg): 105  Resp: (!) 29  SpO2: 98 %  Oxygen Concentration (%): 50  Vent Mode: SIMV  Set Rate: 10 BPM  Vt Set: 450 mL  Pressure Support: 5 cmH20  PEEP/CPAP: 5 cmH20  Peak Airway Pressure: 11 cmH20  Mean Airway Pressure: 9 cmH20  Plateau Pressure: 0 cmH20    Temp  Min: 97.8 °F (36.6 °C)  Max: 98.8 °F (37.1 °C)  Pulse  Min: 68  Max: 87  BP  Min: 132/63  Max: 181/77  MAP (mmHg)  Min: 90  Max: 120  Resp  Min: 15  Max: 61  SpO2  Min: 93 %  Max: 98 %  Oxygen Concentration (%)  Min: 50  Max: 50    05/12 0701 - 05/13 0700  In: 610 [I.V.:300]  Out: 800 [Urine:800]   Unmeasured Output  Urine Occurrence: 1  Stool Occurrence: 1  Emesis Occurrence: 1  Pad Count: 2        Physical Exam  Vitals and nursing note reviewed.   Constitutional:       General: She is not in acute distress.     Appearance: She is obese. She is not diaphoretic.   HENT:      Head: Normocephalic and atraumatic.      Right Ear: External ear normal.      Left Ear: External ear normal.      Nose: Nose normal.      Mouth/Throat:      Comments: ET tube and OG tube intact    Eyes:      Extraocular Movements: Extraocular movements intact.   Cardiovascular:      Rate and Rhythm: Normal rate and regular rhythm.      Heart sounds:  Normal heart sounds. No murmur heard.  Pulmonary:      Effort: Pulmonary effort is normal.      Comments: intubated  Abdominal:      General: Abdomen is flat.   Musculoskeletal:         General: Normal range of motion.      Cervical back: Normal range of motion.      Right lower leg: No edema.      Left lower leg: No edema.   Skin:     General: Skin is warm.   Neurological:      Mental Status: She is alert.      Comments: Alert, follows commands, and can write to communicate  Cough and gag reflex intact  Moves all extremities symmetrically against gravity     Unable to test orientation, language, memory, judgment, insight, fund of knowledge, coordination, gait due to intubation.       LOC: alert  Attention Span: Good   Language: No aphasia  Articulation: Untestable due to intubation  EOM (CN III, IV, VI): Full/intact  Motor: LISA and AG      Medications:  Continuoussodium chloride 0.9%, Last Rate: 75 mL/hr at 05/13/23 1023    ScheduledamLODIPine, 10 mg, Daily  atorvastatin, 40 mg, Daily  famotidine, 20 mg, BID  heparin (porcine), 5,000 Units, Q8H  lisinopriL, 40 mg, Daily  polyethylene glycol, 17 g, BID  QUEtiapine, 25 mg, QHS  senna-docusate 8.6-50 mg, 1 tablet, BID  silodosin, 4 mg, Daily    PRNacetaminophen, 650 mg, Q6H PRN  bisacodyL, 10 mg, Daily PRN  fentaNYL, 25 mcg, Q4H PRN  hydrALAZINE, 10 mg, Q6H PRN  labetalol, 10 mg, Q4H PRN  magnesium oxide, 800 mg, PRN  magnesium oxide, 800 mg, PRN  ondansetron, 4 mg, Q8H PRN  potassium bicarbonate, 35 mEq, PRN  potassium bicarbonate, 50 mEq, PRN  potassium bicarbonate, 60 mEq, PRN  potassium, sodium phosphates, 2 packet, PRN  potassium, sodium phosphates, 2 packet, PRN  potassium, sodium phosphates, 2 packet, PRN  sodium chloride 0.9%, 10 mL, PRN  sucralfate, 1 g, Q6H PRN      Today I personally reviewed pertinent medications, lines/drains/airways, imaging, cardiology results, laboratory results, microbiology results, notably:    Diet  Diet NPO  Diet NPO          Diagnostic Results      Brain Imaging   CT Head 5/9/2023  Impression:     Stable medullary hemorrhage on the left.  No intraventricular extension or hydrocephalus.     MRI Brain W/WO 5/6/2023  MRI brain: Evolving acute left paramedian parenchymal hemorrhage within the medulla.  There is subtle curvilinear enhancement along the left lateral aspect of this extending to the pial surface configuration concerning for a developmental venous anomaly which raises concern for hemorrhage within a cavernous malformation.  No evidence for nodular enhancement to suggest underlying mass however component of AV shunting cannot be entirely excluded and consideration for further characterization with conventional angiography and follow-up is advised.     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  No evidence for additional hemorrhage or enhancement throughout the cervical spinal cord.     CTH 5/5/2023  Intra-axial hemorrhage at the cervicomedullary junction measuring approximately 1.3 x 1.3 x 1.2 cm in dimension.  A component of extra-axial hemorrhage is difficult to exclude.  There is surrounding edema.     Vessel Imaging   CTA head and neck 5/5/2023  1. Redemonstration of acute hemorrhage involving the medulla/cervicomedullary junction which appears similar in overall size and extent to prior exam.  This appears similar on pre and postcontrast images without no abnormal tangle of adjacent vessels.  Further assessment with conventional angiography or MRI can be performed as warranted.  Consider appropriate short-term imaging follow-up to evaluate appropriate maturation of blood products.  2. CTA head and neck within normal limits without evidence of hemodynamically significant stenosis or occlusion.  3. No CT evidence of acute cervical spine fracture or traumatic subluxation.  Degenerative change  of the cervical spine.  4. Right upper lobe atelectasis with possible interlobular septal thickening.     Cardiac Imaging   TTE 5/7/23  The left ventricle is normal in size with normal systolic function.  The estimated ejection fraction is 65%.  Normal left ventricular diastolic function.  Normal right ventricular size with normal right ventricular systolic function.  Normal central venous pressure (3 mmHg).

## 2023-05-13 NOTE — ASSESSMENT & PLAN NOTE
Home medications: amlodipine 5mg daily, lisinopril 40mg daily, and prn clonidine 0.1mg BID PRN for BP > 150/100    - goal SBP < 160  - continue home amlodipine at higher dose to 10mg daily  - continue home lisinopril 40mg daily  - prn labetalol and hydralazine

## 2023-05-13 NOTE — PROGRESS NOTES
[facility] - Neuro Critical Care  Vascular Neurology  [facility]  Progress Note    Assessment/Plan:     * Nontraumatic intracerebral hemorrhage  67-year-old female with PMH of HTN and HLD who is a transfer from OSH for an ICH. She presented to OSH ED  with AMS, tachycardia, left-sided facial droop.  on arrival to OSH, patient stop taking all her medications few months ago because she doesn't believe in doctors. CTH with cervicomedullary junction ICH. She was also noted to be in afib with RVR. CTA head/neck without cerebrovascular abnormalities. MRI W/WO brain and cervical spine with possible venous anomaly.     Patient will need repeat imaging to evaluate for likely cavernoma. She remains intubated. Gen surg consulted for trach/PEG and will likely go Monday. Discussed with staff and VN to sign off at this time. Thank you for your consult. Please do not hesitate to contact us if we can be of further assistance.       Antithrombotics for secondary stroke prevention: Antiplatelets: None: Intracerebral Hemorrhage      Aggressive risk factor modification: HTN, HLD     Rehab efforts: The patient has been evaluated by a stroke team provider and the therapy needs have been fully considered based off the presenting complaints and exam findings. The following therapy evaluations are needed: PT evaluate and treat, OT evaluate and treat, SLP evaluate and treat, PM&R evaluate for appropriate placement pending extubation    Diagnostics ordered/pending: Other: repeat CT for any changes in exam    VTE prophylaxis: Mechanical prophylaxis: Place SCDs  None: Reason for No Pharmacological VTE Prophylaxis: History of systemic or intracranial bleeding    BP parameters:SBP<160        Acute cystitis  Completed ceftriaxone on 5/8    Vasogenic cerebral edema  Area of cerebral edema identified when reviewing brain imaging  Continue frequent neuro checks   STAT CT for any exam changes which may signify expansion of the  insult and/or the area of the edema. Such changes may require acute interventions to prevent loss of function and/or death.            Primary hypertension  Stroke risk factor  SBP <160    Paroxysmal atrial fibrillation  New onset, on initial EKG  Not on AC at home  Hold AC in the setting of ICH    Mixed hyperlipidemia  Continue atorvastatin 40  Reported noncompliant by  on admit       5/9 patient drowsy but able to follow commands and nod appropriately, remains intubated, currently undergoing spontaneous vent trials  5/12 Patient is easily aroused and follows commands and communicates by writing. ETT in place. Reviewed imaging and considering possible cavernoma as etio.  5/13/2023 Patient will need repeat imaging to evaluate for possible cavernoma. She remains intubated. Gen surg consulted for trach/PEG and will likely go Monday. Discussed with staff and VN to sign off at this time. Thank you for your consult. Please do not hesitate to contact us if we can be of further assistance.       STROKE DOCUMENTATION        NIH Scale:  1a. Level of Consciousness: 1-->Not alert, but arousable by minor stimulation to obey, answer, or respond  1b. LOC Questions: 0-->Answers both questions correctly  1c. LOC Commands: 0-->Performs both tasks correctly  2. Best Gaze: 0-->Normal  3. Visual: 0-->No visual loss  4. Facial Palsy: 0-->Normal symmetrical movements  5a. Motor Arm, Left: 0-->No drift, limb holds 90 (or 45) degrees for full 10 secs  5b. Motor Arm, Right: 0-->No drift, limb holds 90 (or 45) degrees for full 10 secs  6a. Motor Leg, Left: 0-->No drift, leg holds 30 degree position for full 5 secs  6b. Motor Leg, Right: 0-->No drift, leg holds 30 degree position for full 5 secs  7. Limb Ataxia: 0-->Absent  8. Sensory: 0-->Normal, no sensory loss  9. Best Language: 0-->No aphasia, normal  10. Dysarthria: (UN) Intubated or other physical barrier  11. Extinction and Inattention (formerly Neglect): 0-->No  abnormality  Total (NIH Stroke Scale): 1       Modified [place] Score: 0  [place] Coma Scale:    ABCD2 Score:    [id number] Score:   HAS -BLED Score:   ICH Score:2  Hunt & [place] Classification:      Hemorrhagic change of an Ischemic Stroke: Does this patient have an ischemic stroke with hemorrhagic changes? No     Interval History:   Patient will need repeat imaging to evaluate for likely cavernoma. She remains intubated. Gen surg consulted for trach/PEG and will likely go Monday. Discussed with staff and VN to sign off at this time. Thank you for your consult. Please do not hesitate to contact us if we can be of further assistance.     Review of Systems   Unable to perform ROS: Intubated   HENT:  Positive for sore throat.    Eyes:  Positive for visual disturbance (double vision).   Cardiovascular:  Positive for chest pain.   Gastrointestinal:  Positive for nausea and vomiting.   Musculoskeletal:  Positive for arthralgias and back pain.     Objective:     Vitals:  Temp: 98 °F (36.7 °C)  Pulse: 78  Rhythm: normal sinus rhythm  BP: (!) 154/77  MAP (mmHg): 105  Resp: (!) 29  SpO2: 98 %  Oxygen Concentration (%): 50  Vent Mode: SIMV  Set Rate: 10 BPM  Vt Set: 450 mL  Pressure Support: 5 cmH20  PEEP/CPAP: 5 cmH20  Peak Airway Pressure: 11 cmH20  Mean Airway Pressure: 9 cmH20  Plateau Pressure: 0 cmH20    Temp  Min: 97.8 °F (36.6 °C)  Max: 98.8 °F (37.1 °C)  Pulse  Min: 68  Max: 87  BP  Min: 132/63  Max: 181/77  MAP (mmHg)  Min: 90  Max: 120  Resp  Min: 15  Max: 61  SpO2  Min: 93 %  Max: 98 %  Oxygen Concentration (%)  Min: 50  Max: 50    05/12 0701 - 05/13 0700  In: 610 [I.V.:300]  Out: 800 [Urine:800]   Unmeasured Output  Urine Occurrence: 1  Stool Occurrence: 1  Emesis Occurrence: 1  Pad Count: 2        Physical Exam  Vitals and nursing note reviewed.   Constitutional:       General: She is not in acute distress.     Appearance: She is obese. She is not diaphoretic.   HENT:      Head: Normocephalic and atraumatic.       Right Ear: External ear normal.      Left Ear: External ear normal.      Nose: Nose normal.      Mouth/Throat:      Comments: ET tube and OG tube intact    Eyes:      Extraocular Movements: Extraocular movements intact.   Cardiovascular:      Rate and Rhythm: Normal rate and regular rhythm.      Heart sounds: Normal heart sounds. No murmur heard.  Pulmonary:      Effort: Pulmonary effort is normal.      Comments: intubated  Abdominal:      General: Abdomen is flat.   Musculoskeletal:         General: Normal range of motion.      Cervical back: Normal range of motion.      Right lower leg: No edema.      Left lower leg: No edema.   Skin:     General: Skin is warm.   Neurological:      Mental Status: She is alert.      Comments: Alert, follows commands, and can write to communicate  Cough and gag reflex intact  Moves all extremities symmetrically against gravity     Unable to test orientation, language, memory, judgment, insight, fund of knowledge, coordination, gait due to intubation.       LOC: alert  Attention Span: Good   Language: No aphasia  Articulation: Untestable due to intubation  EOM (CN III, IV, VI): Full/intact  Motor: LISA and AG      Medications:  Continuoussodium chloride 0.9%, Last Rate: 75 mL/hr at 05/13/23 1023    ScheduledamLODIPine, 10 mg, Daily  atorvastatin, 40 mg, Daily  famotidine, 20 mg, BID  heparin (porcine), 5,000 Units, Q8H  lisinopriL, 40 mg, Daily  polyethylene glycol, 17 g, BID  QUEtiapine, 25 mg, QHS  senna-docusate 8.6-50 mg, 1 tablet, BID  silodosin, 4 mg, Daily    PRNacetaminophen, 650 mg, Q6H PRN  bisacodyL, 10 mg, Daily PRN  fentaNYL, 25 mcg, Q4H PRN  hydrALAZINE, 10 mg, Q6H PRN  labetalol, 10 mg, Q4H PRN  magnesium oxide, 800 mg, PRN  magnesium oxide, 800 mg, PRN  ondansetron, 4 mg, Q8H PRN  potassium bicarbonate, 35 mEq, PRN  potassium bicarbonate, 50 mEq, PRN  potassium bicarbonate, 60 mEq, PRN  potassium, sodium phosphates, 2 packet, PRN  potassium, sodium phosphates, 2  packet, PRN  potassium, sodium phosphates, 2 packet, PRN  sodium chloride 0.9%, 10 mL, PRN  sucralfate, 1 g, Q6H PRN      Today I personally reviewed pertinent medications, lines/drains/airways, imaging, cardiology results, laboratory results, microbiology results, notably:    Diet  Diet NPO  Diet NPO         Diagnostic Results      Brain Imaging   CT Head 5/9/2023  Impression:     Stable medullary hemorrhage on the left.  No intraventricular extension or hydrocephalus.     MRI Brain W/WO 5/6/2023  MRI brain: Evolving acute left paramedian parenchymal hemorrhage within the medulla.  There is subtle curvilinear enhancement along the left lateral aspect of this extending to the pial surface configuration concerning for a developmental venous anomaly which raises concern for hemorrhage within a cavernous malformation.  No evidence for nodular enhancement to suggest underlying mass however component of AV shunting cannot be entirely excluded and consideration for further characterization with conventional angiography and follow-up is advised.     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  No evidence for additional hemorrhage or enhancement throughout the cervical spinal cord.     CTH 5/5/2023  Intra-axial hemorrhage at the cervicomedullary junction measuring approximately 1.3 x 1.3 x 1.2 cm in dimension.  A component of extra-axial hemorrhage is difficult to exclude.  There is surrounding edema.     Vessel Imaging   CTA head and neck 5/5/2023  1. Redemonstration of acute hemorrhage involving the medulla/cervicomedullary junction which appears similar in overall size and extent to prior exam.  This appears similar on pre and postcontrast images without no abnormal tangle of adjacent vessels.  Further assessment with conventional angiography or MRI can be performed as warranted.  Consider  appropriate short-term imaging follow-up to evaluate appropriate maturation of blood products.  2. CTA head and neck within normal limits without evidence of hemodynamically significant stenosis or occlusion.  3. No CT evidence of acute cervical spine fracture or traumatic subluxation.  Degenerative change of the cervical spine.  4. Right upper lobe atelectasis with possible interlobular septal thickening.     Cardiac Imaging   TTE 5/7/23   The left ventricle is normal in size with normal systolic function.   The estimated ejection fraction is 65%.   Normal left ventricular diastolic function.   Normal right ventricular size with normal right ventricular systolic function.   Normal central venous pressure (3 mmHg).         Nadira Werner PA-C  [facility]  Department of Vascular Neurology   [facility] - Neuro Critical Care

## 2023-05-13 NOTE — ASSESSMENT & PLAN NOTE
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 with edema surrounding  - MRI brain w/wo (5/6): possible DVA and concern for cavernous malformation  - left medullary hemorrhage vs cavernoma    Plan:  - NSGY following, no acute surgical intervention at this time   - OP f/u w NSGY in 4-6 weeks with a repeat MRI Brain w/wo contrast on 6/12   - Open intervention for cavernoma may be considered if findings are concordant on repeat imaging given first time hemorrhage, but will discuss in OP setting   - DSA likely not beneficial  - Neuro q4, sleep holiday, delirium precautions  - SBP< 160 per NSGY  - intubated for airway protection, copious secretions and weak cough   - continue trials of spontaneous ventilator setting   - failed T-piece trial   - f/u daily ABG's  - Hold AC/AP  - Coags & CBC  - VN following  - PT/OT/SLP  T/P pending

## 2023-05-13 NOTE — SUBJECTIVE & OBJECTIVE
Review of Systems   Unable to perform ROS: Intubated     Objective:     Vitals:  Temp: 98 °F (36.7 °C)  Pulse: 78  Rhythm: normal sinus rhythm  BP: (!) 154/77  MAP (mmHg): 105  Resp: (!) 29  SpO2: 98 %  Oxygen Concentration (%): 50  Vent Mode: SIMV  Set Rate: 10 BPM  Vt Set: 450 mL  Pressure Support: 5 cmH20  PEEP/CPAP: 5 cmH20  Peak Airway Pressure: 11 cmH20  Mean Airway Pressure: 9 cmH20  Plateau Pressure: 0 cmH20    Temp  Min: 97.8 °F (36.6 °C)  Max: 98.8 °F (37.1 °C)  Pulse  Min: 68  Max: 87  BP  Min: 132/63  Max: 181/77  MAP (mmHg)  Min: 90  Max: 120  Resp  Min: 15  Max: 61  SpO2  Min: 93 %  Max: 98 %  Oxygen Concentration (%)  Min: 50  Max: 50    05/12 0701 - 05/13 0700  In: 610 [I.V.:300]  Out: 800 [Urine:800]   Unmeasured Output  Urine Occurrence: 1  Stool Occurrence: 1  Emesis Occurrence: 1  Pad Count: 2        Physical Exam  Vitals and nursing note reviewed.   Constitutional:       General: She is not in acute distress.     Appearance: Normal appearance. She is normal weight.   HENT:      Head: Normocephalic and atraumatic.      Mouth/Throat:      Mouth: Mucous membranes are moist.   Eyes:      Extraocular Movements: Extraocular movements intact.      Conjunctiva/sclera: Conjunctivae normal.      Pupils: Pupils are equal, round, and reactive to light.   Cardiovascular:      Rate and Rhythm: Normal rate and regular rhythm.   Pulmonary:      Effort: Pulmonary effort is normal. No respiratory distress.   Abdominal:      General: Abdomen is flat. There is no distension.      Palpations: Abdomen is soft.      Tenderness: There is no abdominal tenderness. There is no guarding.   Musculoskeletal:         General: No swelling.   Skin:     General: Skin is warm and dry.      Coloration: Skin is not jaundiced.   Neurological:      Mental Status: She is alert.     Neuro   --GCS: E4 Vt1 M6  --Mental Status:  awake, oriented X4, follows commands, nods appropriately  --CN II-XII grossly intact.   --Pupils 3mm, PERRL.    --Corneal reflex, gag, cough intact.  --FLETCHER spont         Medications:  Continuoussodium chloride 0.9%, Last Rate: 75 mL/hr at 05/13/23 1023    ScheduledamLODIPine, 10 mg, Daily  atorvastatin, 40 mg, Daily  famotidine, 20 mg, BID  heparin (porcine), 5,000 Units, Q8H  lisinopriL, 40 mg, Daily  polyethylene glycol, 17 g, BID  QUEtiapine, 25 mg, QHS  senna-docusate 8.6-50 mg, 1 tablet, BID  silodosin, 4 mg, Daily    PRNacetaminophen, 650 mg, Q6H PRN  bisacodyL, 10 mg, Daily PRN  fentaNYL, 25 mcg, Q4H PRN  hydrALAZINE, 10 mg, Q6H PRN  labetalol, 10 mg, Q4H PRN  magnesium oxide, 800 mg, PRN  magnesium oxide, 800 mg, PRN  ondansetron, 4 mg, Q8H PRN  potassium bicarbonate, 35 mEq, PRN  potassium bicarbonate, 50 mEq, PRN  potassium bicarbonate, 60 mEq, PRN  potassium, sodium phosphates, 2 packet, PRN  potassium, sodium phosphates, 2 packet, PRN  potassium, sodium phosphates, 2 packet, PRN  sodium chloride 0.9%, 10 mL, PRN  sucralfate, 1 g, Q6H PRN

## 2023-05-13 NOTE — ASSESSMENT & PLAN NOTE
67-year-old female with PMH of HTN and HLD who is a transfer from OSH for an ICH. She presented to OSH ED  with AMS, tachycardia, left-sided facial droop.  on arrival to OSH, patient stop taking all her medications few months ago because she doesn't believe in doctors. CTH with cervicomedullary junction ICH. She was also noted to be in afib with RVR. CTA head/neck without cerebrovascular abnormalities. MRI W/WO brain and cervical spine with possible venous anomaly.     Patient will need repeat imaging to evaluate for likely cavernoma. She remains intubated. Gen surg consulted for trach/PEG and will likely go Monday. Discussed with staff and VN to sign off at this time. Thank you for your consult. Please do not hesitate to contact us if we can be of further assistance.       Antithrombotics for secondary stroke prevention: Antiplatelets: None: Intracerebral Hemorrhage      Aggressive risk factor modification: HTN, HLD     Rehab efforts: The patient has been evaluated by a stroke team provider and the therapy needs have been fully considered based off the presenting complaints and exam findings. The following therapy evaluations are needed: PT evaluate and treat, OT evaluate and treat, SLP evaluate and treat, PM&R evaluate for appropriate placement pending extubation    Diagnostics ordered/pending: Other: repeat CT for any changes in exam    VTE prophylaxis: Mechanical prophylaxis: Place SCDs  None: Reason for No Pharmacological VTE Prophylaxis: History of systemic or intracranial bleeding    BP parameters:SBP<160

## 2023-05-13 NOTE — PROGRESS NOTES
[facility] - Neuro Critical Care  Neurocritical Care  Progress Note    Admit Date: 5/5/2023  Service Date: 05/13/2023  Length of Stay: 8    Subjective:     Chief Complaint: Nontraumatic intracerebral hemorrhage    History of Present Illness: Susan Pinto is a 67-year-old female with Hx of HTN presenting with altered mental status, tachycardia, left-sided facial droop, admitted for medullary ICH.   Patient started having vomiting that began around 7:00 p.m last night. On EMS arrival was noted to be hypoxic sat's in 80's with AMS, was intubated for airway protection. In ED at OSH CTh was done and was found to have a cervicomedullary junction ICH measuring approximately about 1.3 x 1.3 x 1.2 cm, also had a.fib with RVR Hr in the 150's that responded to BB. Was transferred to [facility] [place] for further care         Hospital Course: 05/06/2023 transient hypotension overnight improved with stopping propofol. MRI with possible DVA, will discuss need for DSA with IR. Endorsed tingling in BLE -> CTH stable, trial of HTS for possible edema. SBT as tolerated. 3 day Abx for UTI. New double vision -> repeat CTH stable.   05/07/2023 CO2 retention noted on ABG yesterday after SBT. Gas normalized with SIMV. Will reassess on spontaneous today. Discussed possibility of trach if she fails to wean off the vent. Still with copious secretions and no cough. Day 2/3 of CTX for GNR UTI. Tube feeds initiated.  05/08/2023 Pt remains on spontaneous vent settings with CO2 in normal range. T-piece trial unsuccessful yesterday. Cough reflex improved today.   05/09/2023 Exam stable. Continue trials of spontaneous ventilation.   05/10/2023 BP better controlled overnight. Will advance bowel regiment for constipation. Pt still not tolerating secretions, remains intubated.   05/11/2023 Patient with N/V overnight and new left shoulder/chest pain, troponin negative and EKG w/o ischemic changes. Constipation resolved.  05/12/2023 ana m, pt agreeable to early  trach/peg  5/13/2023 SARAEO, plan for T/P Monday. Patient awake and following, would like to participate with PT/OT. Start SQH for DVT ppx        Review of Systems   Unable to perform ROS: Intubated     Objective:     Vitals:  Temp: 98 °F (36.7 °C)  Pulse: 78  Rhythm: normal sinus rhythm  BP: (!) 154/77  MAP (mmHg): 105  Resp: (!) 29  SpO2: 98 %  Oxygen Concentration (%): 50  Vent Mode: SIMV  Set Rate: 10 BPM  Vt Set: 450 mL  Pressure Support: 5 cmH20  PEEP/CPAP: 5 cmH20  Peak Airway Pressure: 11 cmH20  Mean Airway Pressure: 9 cmH20  Plateau Pressure: 0 cmH20    Temp  Min: 97.8 °F (36.6 °C)  Max: 98.8 °F (37.1 °C)  Pulse  Min: 68  Max: 87  BP  Min: 132/63  Max: 181/77  MAP (mmHg)  Min: 90  Max: 120  Resp  Min: 15  Max: 61  SpO2  Min: 93 %  Max: 98 %  Oxygen Concentration (%)  Min: 50  Max: 50    05/12 0701 - 05/13 0700  In: 610 [I.V.:300]  Out: 800 [Urine:800]   Unmeasured Output  Urine Occurrence: 1  Stool Occurrence: 1  Emesis Occurrence: 1  Pad Count: 2        Physical Exam  Vitals and nursing note reviewed.   Constitutional:       General: She is not in acute distress.     Appearance: Normal appearance. She is normal weight.   HENT:      Head: Normocephalic and atraumatic.      Mouth/Throat:      Mouth: Mucous membranes are moist.   Eyes:      Extraocular Movements: Extraocular movements intact.      Conjunctiva/sclera: Conjunctivae normal.      Pupils: Pupils are equal, round, and reactive to light.   Cardiovascular:      Rate and Rhythm: Normal rate and regular rhythm.   Pulmonary:      Effort: Pulmonary effort is normal. No respiratory distress.   Abdominal:      General: Abdomen is flat. There is no distension.      Palpations: Abdomen is soft.      Tenderness: There is no abdominal tenderness. There is no guarding.   Musculoskeletal:         General: No swelling.   Skin:     General: Skin is warm and dry.      Coloration: Skin is not jaundiced.   Neurological:      Mental Status: She is alert.     Neuro    --GCS: E4 Vt1 M6  --Mental Status:  awake, oriented X4, follows commands, nods appropriately  --CN II-XII grossly intact.   --Pupils 3mm, PERRL.   --Corneal reflex, gag, cough intact.  --FLETCHER spont         Medications:  Continuoussodium chloride 0.9%, Last Rate: 75 mL/hr at 05/13/23 1023    ScheduledamLODIPine, 10 mg, Daily  atorvastatin, 40 mg, Daily  famotidine, 20 mg, BID  heparin (porcine), 5,000 Units, Q8H  lisinopriL, 40 mg, Daily  polyethylene glycol, 17 g, BID  QUEtiapine, 25 mg, QHS  senna-docusate 8.6-50 mg, 1 tablet, BID  silodosin, 4 mg, Daily    PRNacetaminophen, 650 mg, Q6H PRN  bisacodyL, 10 mg, Daily PRN  fentaNYL, 25 mcg, Q4H PRN  hydrALAZINE, 10 mg, Q6H PRN  labetalol, 10 mg, Q4H PRN  magnesium oxide, 800 mg, PRN  magnesium oxide, 800 mg, PRN  ondansetron, 4 mg, Q8H PRN  potassium bicarbonate, 35 mEq, PRN  potassium bicarbonate, 50 mEq, PRN  potassium bicarbonate, 60 mEq, PRN  potassium, sodium phosphates, 2 packet, PRN  potassium, sodium phosphates, 2 packet, PRN  potassium, sodium phosphates, 2 packet, PRN  sodium chloride 0.9%, 10 mL, PRN  sucralfate, 1 g, Q6H PRN          Assessment/Plan:     Neuro  * Nontraumatic intracerebral hemorrhage  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 with edema surrounding  - MRI brain w/wo (5/6): possible DVA and concern for cavernous malformation  - left medullary hemorrhage vs cavernoma    Plan:  - NSGY following, no acute surgical intervention at this time   - OP f/u w NSGY in 4-6 weeks with a repeat MRI Brain w/wo contrast on 6/12   - Open intervention for cavernoma may be considered if findings are concordant on repeat imaging given first time hemorrhage, but will discuss in OP setting   - DSA likely not beneficial  - Neuro q4, sleep holiday, delirium precautions  - SBP< 160 per NSGY  - intubated for airway protection, copious secretions and weak cough   - continue trials of spontaneous  ventilator setting   - failed T-piece trial   - f/u daily ABG's  - Hold AC/AP  - Coags & CBC  - VN following  - PT/OT/SLP  T/P pending    Vasogenic cerebral edema  See ICH    Pulmonary  On mechanically assisted ventilation  Remains intubated for bulbar weakness   -active vent weaning daily   Vent Mode: SIMV  Oxygen Concentration (%):  [50] 50  Resp Rate Total:  [19 br/min-35 br/min] 23 br/min  Vt Set:  [450 mL] 450 mL  PEEP/CPAP:  [5 cmH20] 5 cmH20  Pressure Support:  [5 cmH20] 5 cmH20  Mean Airway Pressure:  [7.5 cmH20-10 cmH20] 7.5 cmH20      Cardiac/Vascular  Primary hypertension  Home medications: amlodipine 5mg daily, lisinopril 40mg daily, and prn clonidine 0.1mg BID PRN for BP > 150/100    - goal SBP < 160  - continue home amlodipine at higher dose to 10mg daily  - continue home lisinopril 40mg daily  - prn labetalol and hydralazine      Paroxysmal atrial fibrillation  New onset at OSH, responded to BB  - initial EKG with A-fib RVR, repeat showed NSR    EKG  Monitor for now  Hold A/C    Mixed hyperlipidemia  Atorvastatin 40mg daily    Renal/  Urinary retention  Continue silodosin  Prn straight cath    Acute cystitis-resolved as of 5/13/2023  UA with findigns c/f UTI  Urine Cx with E. Coli  S/P 3 day course of CTX (EOC: 5/8)        The patient is being Prophylaxed for:  Venous Thromboembolism with: Chemical  Stress Ulcer with: H2B  Ventilator Pneumonia with: chlorhexidine oral care    Activity Orders            Turn patient starting at 05/05 1800    Elevate HOB starting at 05/05 1626    Diet NPO: NPO starting at 05/05 1626          Full Code    Critical condition in that Patient has a condition that poses threat to life and bodily function: medullary stroke with active vent weaning      37 minutes of Critical care time was spent personally by me on the following activities: development of treatment plan with patient or surrogate and bedside caregivers, discussions with consultants, evaluation of patient's  response to treatment, examination of patient, ordering and performing treatments and interventions, ordering and review of laboratory studies, ordering and review of radiographic studies, pulse oximetry, antibiotic titration if applicable, vasopressor titration if applicable, re-evaluation of patient's condition. This critical care time did not  involve time for any procedures. There is high probability for acute neurological change leading to clinical and possibly life-threatening deterioration requiring highest level of physician preparedness for urgent intervention.         Charlene Petersen PA-C  Neurocritical Care  [facility] - Neuro Critical Care

## 2023-05-14 ENCOUNTER — ANESTHESIA EVENT (OUTPATIENT)
Dept: SURGERY | Facility: HOSPITAL | Age: 68
DRG: 004 | End: 2023-05-14
Payer: MEDICARE

## 2023-05-14 LAB
ALBUMIN SERPL BCP-MCNC: 3.3 G/DL (ref 3.5–5.2)
ALLENS TEST: ABNORMAL
ALP SERPL-CCNC: 64 U/L (ref 55–135)
ALT SERPL W/O P-5'-P-CCNC: 15 U/L (ref 10–44)
ANION GAP SERPL CALC-SCNC: 12 MMOL/L (ref 8–16)
AST SERPL-CCNC: 15 U/L (ref 10–40)
BASOPHILS # BLD AUTO: 0.07 K/UL (ref 0–0.2)
BASOPHILS NFR BLD: 0.6 % (ref 0–1.9)
BILIRUB SERPL-MCNC: 0.5 MG/DL (ref 0.1–1)
BUN SERPL-MCNC: 18 MG/DL (ref 8–23)
CALCIUM SERPL-MCNC: 11 MG/DL (ref 8.7–10.5)
CHLORIDE SERPL-SCNC: 109 MMOL/L (ref 95–110)
CO2 SERPL-SCNC: 23 MMOL/L (ref 23–29)
CREAT SERPL-MCNC: 0.7 MG/DL (ref 0.5–1.4)
DELSYS: ABNORMAL
DIFFERENTIAL METHOD: ABNORMAL
EOSINOPHIL # BLD AUTO: 0.2 K/UL (ref 0–0.5)
EOSINOPHIL NFR BLD: 1.2 % (ref 0–8)
ERYTHROCYTE [DISTWIDTH] IN BLOOD BY AUTOMATED COUNT: 13.6 % (ref 11.5–14.5)
ERYTHROCYTE [SEDIMENTATION RATE] IN BLOOD BY WESTERGREN METHOD: 10 MM/H
EST. GFR  (NO RACE VARIABLE): >60 ML/MIN/1.73 M^2
FIO2: 50
GLUCOSE SERPL-MCNC: 118 MG/DL (ref 70–110)
HCO3 UR-SCNC: 27.5 MMOL/L (ref 24–28)
HCT VFR BLD AUTO: 44.2 % (ref 37–48.5)
HGB BLD-MCNC: 14.2 G/DL (ref 12–16)
IMM GRANULOCYTES # BLD AUTO: 0.07 K/UL (ref 0–0.04)
IMM GRANULOCYTES NFR BLD AUTO: 0.6 % (ref 0–0.5)
LYMPHOCYTES # BLD AUTO: 1.9 K/UL (ref 1–4.8)
LYMPHOCYTES NFR BLD: 15.7 % (ref 18–48)
MAGNESIUM SERPL-MCNC: 2.2 MG/DL (ref 1.6–2.6)
MCH RBC QN AUTO: 29.6 PG (ref 27–31)
MCHC RBC AUTO-ENTMCNC: 32.1 G/DL (ref 32–36)
MCV RBC AUTO: 92 FL (ref 82–98)
MODE: ABNORMAL
MONOCYTES # BLD AUTO: 1.1 K/UL (ref 0.3–1)
MONOCYTES NFR BLD: 8.9 % (ref 4–15)
NEUTROPHILS # BLD AUTO: 9 K/UL (ref 1.8–7.7)
NEUTROPHILS NFR BLD: 73 % (ref 38–73)
NRBC BLD-RTO: 0 /100 WBC
PCO2 BLDA: 40.9 MMHG (ref 35–45)
PEEP: 5
PH SMN: 7.43 [PH] (ref 7.35–7.45)
PHOSPHATE SERPL-MCNC: 2.9 MG/DL (ref 2.7–4.5)
PLATELET # BLD AUTO: 293 K/UL (ref 150–450)
PMV BLD AUTO: 10.8 FL (ref 9.2–12.9)
PO2 BLDA: 80 MMHG (ref 80–100)
POC BE: 3 MMOL/L
POC SATURATED O2: 96 % (ref 95–100)
POC TCO2: 29 MMOL/L (ref 23–27)
POTASSIUM SERPL-SCNC: 3.8 MMOL/L (ref 3.5–5.1)
PROT SERPL-MCNC: 7.8 G/DL (ref 6–8.4)
RBC # BLD AUTO: 4.79 M/UL (ref 4–5.4)
SAMPLE: ABNORMAL
SITE: ABNORMAL
SODIUM SERPL-SCNC: 144 MMOL/L (ref 136–145)
VT: 450
WBC # BLD AUTO: 12.25 K/UL (ref 3.9–12.7)

## 2023-05-14 PROCEDURE — 99900035 HC TECH TIME PER 15 MIN (STAT): Mod: FS

## 2023-05-14 PROCEDURE — 84100 ASSAY OF PHOSPHORUS: CPT | Mod: FS | Performed by: PSYCHIATRY & NEUROLOGY

## 2023-05-14 PROCEDURE — 80053 COMPREHEN METABOLIC PANEL: CPT | Mod: FS | Performed by: PSYCHIATRY & NEUROLOGY

## 2023-05-14 PROCEDURE — 25000003 PHARM REV CODE 250: Mod: FS

## 2023-05-14 PROCEDURE — 63600175 PHARM REV CODE 636 W HCPCS: Mod: FS | Performed by: PHYSICIAN ASSISTANT

## 2023-05-14 PROCEDURE — 25000003 PHARM REV CODE 250: Mod: FS | Performed by: INTERNAL MEDICINE

## 2023-05-14 PROCEDURE — 27200966 HC CLOSED SUCTION SYSTEM: Mod: FS

## 2023-05-14 PROCEDURE — 27000221 HC OXYGEN, UP TO 24 HOURS: Mod: FS

## 2023-05-14 PROCEDURE — 94761 N-INVAS EAR/PLS OXIMETRY MLT: CPT | Mod: FS

## 2023-05-14 PROCEDURE — 25000003 PHARM REV CODE 250: Mod: FS | Performed by: PHYSICIAN ASSISTANT

## 2023-05-14 PROCEDURE — 82800 BLOOD PH: CPT | Mod: FS

## 2023-05-14 PROCEDURE — 85025 COMPLETE CBC W/AUTO DIFF WBC: CPT | Mod: FS | Performed by: PSYCHIATRY & NEUROLOGY

## 2023-05-14 PROCEDURE — 82803 BLOOD GASES ANY COMBINATION: CPT | Mod: FS

## 2023-05-14 PROCEDURE — 99900026 HC AIRWAY MAINTENANCE (STAT): Mod: FS

## 2023-05-14 PROCEDURE — 20000000 HC ICU ROOM: Mod: FS

## 2023-05-14 PROCEDURE — 94003 VENT MGMT INPAT SUBQ DAY: CPT | Mod: FS

## 2023-05-14 PROCEDURE — 99291 CRITICAL CARE FIRST HOUR: CPT | Mod: FS,,, | Performed by: INTERNAL MEDICINE

## 2023-05-14 PROCEDURE — 36600 WITHDRAWAL OF ARTERIAL BLOOD: CPT | Mod: FS

## 2023-05-14 PROCEDURE — 99291 PR CRITICAL CARE, E/M 30-74 MINUTES: ICD-10-PCS | Mod: FS,,, | Performed by: INTERNAL MEDICINE

## 2023-05-14 PROCEDURE — 83735 ASSAY OF MAGNESIUM: CPT | Mod: FS | Performed by: PSYCHIATRY & NEUROLOGY

## 2023-05-14 RX ORDER — CLONIDINE HYDROCHLORIDE 0.1 MG/1
0.1 TABLET ORAL EVERY 8 HOURS PRN
Status: DISCONTINUED | OUTPATIENT
Start: 2023-05-14 | End: 2023-05-17 | Stop reason: HOSPADM

## 2023-05-14 RX ADMIN — LISINOPRIL 40 MG: 20 TABLET ORAL at 08:05

## 2023-05-14 RX ADMIN — FAMOTIDINE 20 MG: 20 TABLET ORAL at 08:05

## 2023-05-14 RX ADMIN — HEPARIN SODIUM 5000 UNITS: 5000 INJECTION INTRAVENOUS; SUBCUTANEOUS at 06:05

## 2023-05-14 RX ADMIN — FAMOTIDINE 20 MG: 20 TABLET ORAL at 09:05

## 2023-05-14 RX ADMIN — HEPARIN SODIUM 5000 UNITS: 5000 INJECTION INTRAVENOUS; SUBCUTANEOUS at 09:05

## 2023-05-14 RX ADMIN — CLONIDINE HYDROCHLORIDE 0.1 MG: 0.1 TABLET ORAL at 09:05

## 2023-05-14 RX ADMIN — POLYETHYLENE GLYCOL 3350 17 G: 17 POWDER, FOR SOLUTION ORAL at 08:05

## 2023-05-14 RX ADMIN — SILODOSIN 4 MG: 4 CAPSULE ORAL at 08:05

## 2023-05-14 RX ADMIN — TOPICAL ANESTHETIC 1 EACH: 200 SPRAY DENTAL; PERIODONTAL at 08:05

## 2023-05-14 RX ADMIN — TOPICAL ANESTHETIC 1 EACH: 200 SPRAY DENTAL; PERIODONTAL at 02:05

## 2023-05-14 RX ADMIN — SENNOSIDES AND DOCUSATE SODIUM 1 TABLET: 50; 8.6 TABLET ORAL at 08:05

## 2023-05-14 RX ADMIN — QUETIAPINE FUMARATE 25 MG: 25 TABLET ORAL at 09:05

## 2023-05-14 RX ADMIN — HEPARIN SODIUM 5000 UNITS: 5000 INJECTION INTRAVENOUS; SUBCUTANEOUS at 01:05

## 2023-05-14 RX ADMIN — AMLODIPINE BESYLATE 10 MG: 10 TABLET ORAL at 01:05

## 2023-05-14 RX ADMIN — ATORVASTATIN CALCIUM 40 MG: 40 TABLET, FILM COATED ORAL at 08:05

## 2023-05-14 RX ADMIN — CLONIDINE HYDROCHLORIDE 0.1 MG: 0.1 TABLET ORAL at 01:05

## 2023-05-14 RX ADMIN — POLYETHYLENE GLYCOL 3350 17 G: 17 POWDER, FOR SOLUTION ORAL at 09:05

## 2023-05-14 RX ADMIN — SENNOSIDES AND DOCUSATE SODIUM 1 TABLET: 50; 8.6 TABLET ORAL at 09:05

## 2023-05-14 NOTE — PLAN OF CARE
[facility] Care Plan    POC reviewed with Susan Pinto and family at 1415. Pt verbalized understanding. Questions and concerns addressed. No acute events today. Pt progressing toward goals. Will continue to monitor. See below and flowsheets for full assessment and VS info.             Is this a stroke patient? yes- Stroke booklet reviewed with patient and family, risk factors identified for patient and stroke booklet remains at bedside for ongoing education.     Neuro:  [place] Coma Scale  Best Eye Response: 4-->(E4) spontaneous  Best Motor Response: 6-->(M6) obeys commands  Best Verbal Response: 1-->(V1) none  [place] Coma Scale Score: 11  Assessment Qualifiers: patient intubated  Pupil PERRLA: yes     24 hr Temp:  [98 °F (36.7 °C)-98.7 °F (37.1 °C)]     CV:   Rhythm: normal sinus rhythm  BP goals:   SBP < 160  MAP > 65    Resp:      Vent Mode: SIMV  Set Rate: 10 BPM  Oxygen Concentration (%): 50  Vt Set: 450 mL  PEEP/CPAP: 5 cmH20  Pressure Support: 5 cmH20    Plan:  Surgical plan for trach and peg placement tomorrow 5/13, PRN PO clonidine written for SBP>160,     GI/:     Diet/Nutrition Received: tube feeding  Last Bowel Movement: 05/13/23  Voiding Characteristics: external catheter    Intake/Output Summary (Last 24 hours) at 5/14/2023 1420  Last data filed at 5/14/2023 0602  Gross per 24 hour   Intake 1691.42 ml   Output 2500 ml   Net -808.58 ml     Unmeasured Output  Urine Occurrence: 1  Stool Occurrence: 1  Emesis Occurrence: 1  Pad Count: 2    Labs/Accuchecks:  Recent Labs   Lab 05/14/23  0241   WBC 12.25   RBC 4.79   HGB 14.2   HCT 44.2         Recent Labs   Lab 05/14/23  0241      K 3.8   CO2 23      BUN 18   CREATININE 0.7   ALKPHOS 64   ALT 15   AST 15   BILITOT 0.5    No results for input(s): PROTIME, INR, APTT, HEPANTIXA in the last 168 hours.   Recent Labs   Lab 05/11/23  0857   TROPONINI <0.006       Electrolytes: N/A - electrolytes WDL  Accuchecks: none    Gtts:   sodium chloride  0.9% 75 mL/hr at 05/14/23 0602       LDA/Wounds:  Lines/Drains/Airways       Drain  Duration                  NG/OG Tube 05/05/23 1445 orogastric 18 Fr. Center mouth 8 days    Female External Urinary Catheter 05/05/23 1718 8 days              Airway  Duration                  Airway - Non-Surgical 05/05/23 1430 Endotracheal Tube 8 days              Peripheral Intravenous Line  Duration                  Peripheral IV - Single Lumen 05/07/23 1043 18 G Posterior;Right Forearm 7 days         Peripheral IV - Single Lumen 05/09/23 1325 20 G Left;Posterior Forearm 5 days                  Wounds: No  Wound care consulted: No

## 2023-05-14 NOTE — ASSESSMENT & PLAN NOTE
Remains intubated for bulbar weakness   -active vent weaning daily   Vent Mode: SIMV  Oxygen Concentration (%):  [50] 50  Resp Rate Total:  [14 br/min-43 br/min] 25 br/min  Vt Set:  [450 mL] 450 mL  PEEP/CPAP:  [5 cmH20] 5 cmH20  Pressure Support:  [5 cmH20] 5 cmH20  Mean Airway Pressure:  [7.5 cmH20-8.9 cmH20] 8.6 cmH20

## 2023-05-14 NOTE — SUBJECTIVE & OBJECTIVE
Review of Systems   Unable to perform ROS: Intubated     Objective:     Vitals:  Temp: 98 °F (36.7 °C)  Pulse: 81  Rhythm: normal sinus rhythm  BP: (!) 163/75  MAP (mmHg): 109  Resp: (!) 28  SpO2: 98 %  Oxygen Concentration (%): 50  Vent Mode: SIMV  Set Rate: 10 BPM  Vt Set: 450 mL  Pressure Support: 5 cmH20  PEEP/CPAP: 5 cmH20  Peak Airway Pressure: 18 cmH20  Mean Airway Pressure: 8.6 cmH20  Plateau Pressure: 0 cmH20    Temp  Min: 98 °F (36.7 °C)  Max: 98.7 °F (37.1 °C)  Pulse  Min: 69  Max: 84  BP  Min: 145/65  Max: 174/79  MAP (mmHg)  Min: 94  Max: 116  Resp  Min: 17  Max: 41  SpO2  Min: 93 %  Max: 99 %  Oxygen Concentration (%)  Min: 50  Max: 50    05/13 0701 - 05/14 0700  In: 1861.4 [I.V.:1471.4]  Out: 2500 [Urine:2500]   Unmeasured Output  Urine Occurrence: 1  Stool Occurrence: 1  Emesis Occurrence: 1  Pad Count: 2        Physical Exam  Vitals and nursing note reviewed.   Constitutional:       General: She is not in acute distress.     Appearance: Normal appearance. She is normal weight.   HENT:      Head: Normocephalic and atraumatic.      Mouth/Throat:      Mouth: Mucous membranes are moist.   Eyes:      Extraocular Movements: Extraocular movements intact.      Conjunctiva/sclera: Conjunctivae normal.      Pupils: Pupils are equal, round, and reactive to light.   Cardiovascular:      Rate and Rhythm: Normal rate and regular rhythm.   Pulmonary:      Effort: Pulmonary effort is normal. No respiratory distress.   Abdominal:      General: Abdomen is flat. There is no distension.      Palpations: Abdomen is soft.      Tenderness: There is no abdominal tenderness. There is no guarding.   Musculoskeletal:         General: No swelling.   Skin:     General: Skin is warm and dry.      Coloration: Skin is not jaundiced.   Neurological:      Mental Status: She is alert.     Neuro   --GCS: E4 Vt1 M6  --Mental Status:  awake, oriented X4, follows commands, nods appropriately  --CN II-XII grossly intact.   --Pupils 3mm,  PERRL.   --Corneal reflex, gag, cough intact.  --FLETCHER spont         Medications:  Continuoussodium chloride 0.9%, Last Rate: 75 mL/hr at 05/14/23 0602    ScheduledamLODIPine, 10 mg, Daily  atorvastatin, 40 mg, Daily  famotidine, 20 mg, BID  heparin (porcine), 5,000 Units, Q8H  lisinopriL, 40 mg, Daily  polyethylene glycol, 17 g, BID  QUEtiapine, 25 mg, QHS  senna-docusate 8.6-50 mg, 1 tablet, BID  silodosin, 4 mg, Daily    PRNacetaminophen, 650 mg, Q6H PRN  benzocaine, , QID PRN  bisacodyL, 10 mg, Daily PRN  cloNIDine, 0.1 mg, Q8H PRN  fentaNYL, 25 mcg, Q4H PRN  hydrALAZINE, 10 mg, Q6H PRN  labetalol, 10 mg, Q4H PRN  magnesium oxide, 800 mg, PRN  magnesium oxide, 800 mg, PRN  ondansetron, 4 mg, Q8H PRN  potassium bicarbonate, 35 mEq, PRN  potassium bicarbonate, 50 mEq, PRN  potassium bicarbonate, 60 mEq, PRN  potassium, sodium phosphates, 2 packet, PRN  potassium, sodium phosphates, 2 packet, PRN  potassium, sodium phosphates, 2 packet, PRN  sodium chloride 0.9%, 10 mL, PRN  sucralfate, 1 g, Q6H PRN

## 2023-05-14 NOTE — ASSESSMENT & PLAN NOTE
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 with edema surrounding  - MRI brain w/wo (5/6): possible DVA and concern for cavernous malformation  - left medullary hemorrhage vs cavernoma    Plan:  - NSGY following, no acute surgical intervention at this time   - OP f/u w NSGY in 4-6 weeks with a repeat MRI Brain w/wo contrast on 6/12   - Open intervention for cavernoma may be considered if findings are concordant on repeat imaging given first time hemorrhage, but will discuss in OP setting   - DSA likely not beneficial  - Neuro q4, sleep holiday, delirium precautions  - SBP< 160 per NSGY  - intubated for airway protection, copious secretions and weak cough   - continue trials of spontaneous ventilator setting   - failed T-piece trial  - Hold AC/AP  - Coags & CBC  - VN following  - PT/OT/SLP  T/P pending tomorrow

## 2023-05-14 NOTE — ASSESSMENT & PLAN NOTE
Home medications: amlodipine 5mg daily, lisinopril 40mg daily, and prn clonidine 0.1mg BID PRN for BP > 150/100    - goal SBP < 160  - continue home amlodipine at higher dose to 10mg daily  - continue home lisinopril 40mg daily  - prn labetalol and hydralazine  -prn clonidine po

## 2023-05-14 NOTE — PLAN OF CARE
[facility] Care Plan    POC reviewed with Susan Pinto and family at 0300. Pt unable to verbalize understanding. Questions and concerns addressed with spouse at bedside. No acute events overnight. Will continue to monitor. See below and flowsheets for full assessment and VS info.     - NS @ 75  - PRN benzocaine spray given for sore throat  - Plan for TREG Monday (5/15)        Is this a stroke patient? yes- Stroke booklet reviewed with patient and family, risk factors identified for patient and stroke booklet remains at bedside for ongoing education.     Neuro:  [place] Coma Scale  Best Eye Response: 4-->(E4) spontaneous  Best Motor Response: 6-->(M6) obeys commands  Best Verbal Response: 1-->(V1) none  [place] Coma Scale Score: 11  Assessment Qualifiers: patient intubated, no eye obstruction present  Pupil PERRLA: yes     24hr Temp:  [98 °F (36.7 °C)-98.7 °F (37.1 °C)]     CV:   Rhythm: normal sinus rhythm  BP goals:   SBP < 160  MAP > 65    Resp:      Vent Mode: SIMV  Set Rate: 10 BPM  Oxygen Concentration (%): 50  Vt Set: 450 mL  PEEP/CPAP: 5 cmH20  Pressure Support: 5 cmH20    Plan: trach/PEG discussions    GI/:     Diet/Nutrition Received: tube feeding  Last Bowel Movement: 05/13/23  Voiding Characteristics: external catheter    Intake/Output Summary (Last 24 hours) at 5/14/2023 0333  Last data filed at 5/14/2023 0302  Gross per 24 hour   Intake 1736.47 ml   Output 2200 ml   Net -463.53 ml     Unmeasured Output  Urine Occurrence: 1  Stool Occurrence: 1  Emesis Occurrence: 1  Pad Count: 2    Labs/Accuchecks:  Recent Labs   Lab 05/14/23  0241   WBC 12.25   RBC 4.79   HGB 14.2   HCT 44.2         Recent Labs   Lab 05/14/23  0241      K 3.8   CO2 23      BUN 18   CREATININE 0.7   ALKPHOS 64   ALT 15   AST 15   BILITOT 0.5    No results for input(s): PROTIME, INR, APTT, HEPANTIXA in the last 168 hours.   Recent Labs   Lab 05/11/23  0857   TROPONINI <0.006       Electrolytes: N/A - electrolytes  WDL  Accuchecks: none    Gtts:   sodium chloride 0.9% 75 mL/hr at 05/14/23 0302       LDA/Wounds:  Lines/Drains/Airways       Drain  Duration                  NG/OG Tube 05/05/23 1445 orogastric 18 Fr. Center mouth 8 days    Female External Urinary Catheter 05/05/23 1718 8 days              Airway  Duration                  Airway - Non-Surgical 05/05/23 1430 Endotracheal Tube 8 days              Peripheral Intravenous Line  Duration                  Peripheral IV - Single Lumen 05/07/23 1043 18 G Posterior;Right Forearm 6 days         Peripheral IV - Single Lumen 05/09/23 1325 20 G Left;Posterior Forearm 4 days                  Wounds: No  Wound care consulted: No

## 2023-05-14 NOTE — ANESTHESIA PREPROCEDURE EVALUATION
[facility]  Anesthesia Pre-Operative Evaluation         Patient Name: Susan Pinto  YOB: 1955  MRN: [id number]    SUBJECTIVE:     Pre-operative evaluation for Procedure(s) (LRB):  TRACHEOTOMY (N/A)  EGD, WITH PEG TUBE INSERTION (N/A)     05/15/2023    Susan Pinto is a 67 y.o. female with a significant medical history of pAfib, Obesity, HTN, and recent medullary ICH v cavernoma who presents for the above procedure. Intubated for airway protection, copious secretions and weak cough.     Consent was obtained at bedside with . Patient in agreement with plan per hand signs.     Vent Mode: SIMV  Oxygen Concentration (%):  [40-50] 50  Resp Rate Total:  [18 br/min-33 br/min] 25 br/min  Vt Set:  [450 mL] 450 mL  PEEP/CPAP:  [5 cmH20] 5 cmH20  Pressure Support:  [5 cmH20-8 cmH20] 5 cmH20  Mean Airway Pressure:  [7.5 cmH20-8.7 cmH20] 8.7 cmH20      LDA:        Peripheral IV - Single Lumen 05/07/23 1043 18 G Posterior;Right Forearm (Active)   Site Assessment Clean;Dry;Intact 05/14/23 1105   Extremity Assessment Distal to IV No abnormal discoloration;No redness 05/14/23 1105   Line Status Flushed 05/14/23 1105   Dressing Status Clean;Dry;Intact 05/14/23 1105   Dressing Intervention Integrity maintained 05/14/23 1105   Dressing Change Due 05/14/23 05/14/23 1105   Site Change Due 05/14/23 05/13/23 1902   Reason Not Rotated Not due 05/14/23 1105   Number of days: 7            Peripheral IV - Single Lumen 05/09/23 1325 20 G Left;Posterior Forearm (Active)   Site Assessment Clean;Dry;Intact 05/14/23 1105   Extremity Assessment Distal to IV No abnormal discoloration 05/14/23 1105   Line Status Flushed;Saline locked 05/14/23 1105   Dressing Status Clean;Dry;Intact 05/14/23 1105   Dressing Intervention Integrity maintained 05/14/23 1105   Dressing Change Due 05/13/23 05/14/23 1105   Site Change Due 05/13/23 05/13/23 1902   Reason Not Rotated Not due 05/14/23 1105   Number of days: 5             NG/OG Tube 05/05/23 1445 orogastric 18 Fr. Center mouth (Active)   Placement Check placement verified by x-ray;placement verified by distal tube length measurement 05/14/23 1105   Tolerance no signs/symptoms of discomfort 05/14/23 1105   Securement secured to commercial device 05/14/23 1105   Clamp Status/Tolerance unclamped;no emesis;no nausea 05/14/23 1105   Suction Setting/Drainage Method suction at the bedside 05/14/23 1105   Insertion Site Appearance no redness, warmth, tenderness, skin breakdown, drainage 05/14/23 1105   Drainage None 05/14/23 1105   Flush/Irrigation flushed w/;water 05/14/23 1105   Feeding Type continuous 05/14/23 1105   Feeding Action feeding restarted 05/14/23 1105   Current Rate (mL/hr) 10 mL/hr 05/13/23 1902   Goal Rate (mL/hr) 45 mL/hr 05/13/23 1902   Intake (mL) 160 mL 05/13/23 1105   Water Bolus (mL) 50 mL 05/08/23 1901   Formula Name impact peptide 05/14/23 1105   Intake (mL) - Formula Tube Feeding 10 05/14/23 0600   Residual Amount (ml) 60 ml 05/10/23 1902   Number of days: 8       Female External Urinary Catheter 05/05/23 1718 (Active)   Skin no redness;no breakdown 05/14/23 1105   Tolerance no signs/symptoms of discomfort 05/14/23 1105   Suction Continuous suction at 50 mmHg 05/13/23 2302   Date of last wick change 05/13/23 05/13/23 1902   Time of last wick change 1900 05/13/23 1902   Output (mL) 400 mL 05/14/23 0600   Number of days: 8       Prev airway: None documented.    Drips:    sodium chloride 0.9% 75 mL/hr at 05/15/23 0801       Patient Active Problem List   Diagnosis    Benign essential HTN    Severe obesity (BMI 35.0-35.9 with comorbidity)    Insomnia    Mixed hyperlipidemia    Obesity (BMI 30.0-34.9)    Nontraumatic intracerebral hemorrhage    Paroxysmal atrial fibrillation    Primary hypertension    Vasogenic cerebral edema    Urinary retention    Hypertriglyceridemia    On mechanically assisted ventilation       Review of patient's allergies  indicates:  No Known Allergies    Current Inpatient Medications:   amLODIPine  10 mg Per OG tube Daily    atorvastatin  40 mg Per OG tube Daily    famotidine  20 mg Per OG tube BID    heparin (porcine)  5,000 Units Subcutaneous Q8H    lisinopriL  40 mg Per OG tube Daily    polyethylene glycol  17 g Per NG tube BID    QUEtiapine  25 mg Per OG tube QHS    senna-docusate 8.6-50 mg  1 tablet Per OG tube BID    silodosin  4 mg Per OG tube Daily       No current facility-administered medications on file prior to encounter.     Current Outpatient Medications on File Prior to Encounter   Medication Sig Dispense Refill    amLODIPine (NORVASC) 5 MG tablet Take 1 tablet (5 mg total) by mouth once daily. 90 tablet 3    aspirin (ECOTRIN) 81 MG EC tablet Take 81 mg by mouth once daily.      atorvastatin (LIPITOR) 20 MG tablet Take 1 tablet (20 mg total) by mouth every evening. 90 tablet 3    cloNIDine (CATAPRES) 0.1 MG tablet Take one as needed every 12 hours if blood pressure exceeds 150/100 60 tablet 2    fish oil-omega-3 fatty acids 300-1,000 mg capsule Take by mouth once daily.      lisinopriL (PRINIVIL,ZESTRIL) 40 MG tablet Take 1 tablet (40 mg total) by mouth once daily. 90 tablet 3    LORazepam (ATIVAN) 0.5 MG tablet TAKE 1 TABLET (=0.5MG) BY MOUTH  NIGHTLY AS NEEDED FOR ANXIETY 30 tablet 2       History reviewed. No pertinent surgical history.    Social History     Socioeconomic History    Marital status:    Tobacco Use    Smoking status: Never    Smokeless tobacco: Never   Substance and Sexual Activity    Alcohol use: No    Drug use: No     Social Determinants of Health     Financial Resource Strain: Low Risk     Difficulty of Paying Living Expenses: Not hard at all   Food Insecurity: Unknown    Worried About Running Out of Food in the Last Year: Never true   Transportation Needs: No Transportation Needs    Lack of Transportation (Medical): No    Lack of Transportation (Non-Medical): No    Stress: No Stress Concern Present    Feeling of Stress : Not at all   Social Connections: Socially Integrated    Frequency of Communication with Friends and Family: More than three times a week    Frequency of Social Gatherings with Friends and Family: More than three times a week    Attends Yarsanism Services: More than 4 times per year    Active Member of Clubs or Organizations: Yes    Attends Club or Organization Meetings: 1 to 4 times per year    Marital Status:    Housing Stability: Unknown    Unable to Pay for Housing in the Last Year: No    Unstable Housing in the Last Year: No       OBJECTIVE:     Vital Signs Range:  Vitals - 1 value per visit 5/15/2023 5/15/2023 5/15/2023   SYSTOLIC - 145 -   DIASTOLIC - 72 -   Pulse 85 75 76   Temp - - -   Resp 22 15 30   SPO2 98 100 98   Weight (lb) - - -   Weight (kg) - - -   Height - - -   BMI (Calculated) - - -   VISIT REPORT - - -   Pain Score  - - -         CBC:   Lab Results   Component Value Date    WBC 11.83 05/15/2023    HGB 13.9 05/15/2023    HCT 46.0 05/15/2023    MCV 96 05/15/2023     05/15/2023         CMP:   Sodium   Date Value Ref Range Status   05/15/2023 147 (H) 136 - 145 mmol/L Final     Potassium   Date Value Ref Range Status   05/15/2023 3.7 3.5 - 5.1 mmol/L Final     Chloride   Date Value Ref Range Status   05/15/2023 113 (H) 95 - 110 mmol/L Final     CO2   Date Value Ref Range Status   05/15/2023 23 23 - 29 mmol/L Final     Glucose   Date Value Ref Range Status   05/15/2023 90 70 - 110 mg/dL Final     BUN   Date Value Ref Range Status   05/15/2023 17 8 - 23 mg/dL Final     Creatinine   Date Value Ref Range Status   05/15/2023 0.7 0.5 - 1.4 mg/dL Final     Calcium   Date Value Ref Range Status   05/15/2023 10.3 8.7 - 10.5 mg/dL Final     Total Protein   Date Value Ref Range Status   05/15/2023 6.7 6.0 - 8.4 g/dL Final     Albumin   Date Value Ref Range Status   05/15/2023 2.8 (L) 3.5 - 5.2 g/dL Final     Total Bilirubin   Date  Value Ref Range Status   05/15/2023 0.5 0.1 - 1.0 mg/dL Final     Comment:     For infants and newborns, interpretation of results should be based  on gestational age, weight and in agreement with clinical  observations.    Premature Infant recommended reference ranges:  Up to 24 hours.............<8.0 mg/dL  Up to 48 hours............<12.0 mg/dL  3-5 days..................<15.0 mg/dL  6-29 days.................<15.0 mg/dL       Alkaline Phosphatase   Date Value Ref Range Status   05/15/2023 59 55 - 135 U/L Final     AST   Date Value Ref Range Status   05/15/2023 14 10 - 40 U/L Final     ALT   Date Value Ref Range Status   05/15/2023 11 10 - 44 U/L Final     Anion Gap   Date Value Ref Range Status   05/15/2023 11 8 - 16 mmol/L Final     eGFR if    Date Value Ref Range Status   04/09/2021 >60.0 >60 mL/min/1.73 m^2 Final     eGFR if non    Date Value Ref Range Status   04/09/2021 58.4 (A) >60 mL/min/1.73 m^2 Final     Comment:     Calculation used to obtain the estimated glomerular filtration  rate (eGFR) is the CKD-EPI equation.          INR:  Lab Results   Component Value Date    INR 1.0 05/06/2023    INR 1.0 05/05/2023       EKG:   Results for orders placed or performed during the hospital encounter of 05/05/23   EKG 12-lead    Collection Time: 05/11/23  7:55 AM    Narrative    Test Reason : R07.9,    Vent. Rate : 084 BPM     Atrial Rate : 084 BPM     P-R Int : 122 ms          QRS Dur : 070 ms      QT Int : 346 ms       P-R-T Axes : 040 028 -28 degrees     QTc Int : 408 ms    Normal sinus rhythm  Minimal voltage criteria for LVH, may be normal variant  ST and T wave abnormality, consider inferior ischemia  Abnormal ECG  When compared with ECG of 05-MAY-2023 16:37,  Non-specific change in ST segment in Lateral leads  T wave inversion now evident in Inferior leads  QT has shortened  Confirmed by Roland VELAZQUEZ MD ([id number]) on 5/11/2023 4:37:30 PM    Referred By: DANY GASTELUM            Confirmed By:Roland VELAZQUEZ MD        2D ECHO:   Results for orders placed during the hospital encounter of 05/05/23    Echo    Interpretation Summary  · The left ventricle is normal in size with normal systolic function.  · The estimated ejection fraction is 65%.  · Normal left ventricular diastolic function.  · Normal right ventricular size with normal right ventricular systolic function.  · Normal central venous pressure (3 mmHg).         ASSESSMENT/PLAN:       Pre-op Assessment    I have reviewed the Patient Summary Reports.     I have reviewed the Nursing Notes. I have reviewed the NPO Status.   I have reviewed the Medications.     Review of Systems  Anesthesia Hx:  No problems with previous Anesthesia  Denies Family Hx of Anesthesia complications.   Denies Personal Hx of Anesthesia complications.   Hematology/Oncology:  Hematology Normal   Oncology Normal     Cardiovascular:   Hypertension hyperlipidemia    Pulmonary:   Respiratory failure, unable to wean due to bulbar dysfunction    Hepatic/GI:   Tube feeds, presenting for PEG    Neurological:   CVA    Endocrine:  Obesity / BMI > 30      Physical Exam  General: Cooperative and Alert    Airway:  Mallampati: unable to assess   Pre-Existing Airway: Oral Endotracheal tube        Anesthesia Plan  Type of Anesthesia, risks & benefits discussed:    Anesthesia Type: Gen ETT  Intra-op Monitoring Plan: Standard ASA Monitors  Post Op Pain Control Plan: multimodal analgesia and IV/PO Opioids PRN  Induction:  IV  Airway Plan: Via Tracheostomy  Informed Consent: Informed consent signed with the Patient representative and all parties understand the risks and agree with anesthesia plan.  All questions answered.   ASA Score: 4  Day of Surgery Review of History & Physical: H&P Update referred to the surgeon/provider.    Ready For Surgery From Anesthesia Perspective.     .

## 2023-05-14 NOTE — PROGRESS NOTES
[facility]  Neurocritical Care  Progress Note    Admit Date: 5/5/2023  Service Date: 05/14/2023  Length of Stay: 9    Subjective:     Chief Complaint: Nontraumatic intracerebral hemorrhage    History of Present Illness: Susan Pinto is a 67-year-old female with Hx of HTN presenting with altered mental status, tachycardia, left-sided facial droop, admitted for medullary ICH.   Patient started having vomiting that began around 7:00 p.m last night. On EMS arrival was noted to be hypoxic sat's in 80's with AMS, was intubated for airway protection. In ED at OSH CTh was done and was found to have a cervicomedullary junction ICH measuring approximately about 1.3 x 1.3 x 1.2 cm, also had a.fib with RVR Hr in the 150's that responded to BB. [place] [facility] [place]         Hospital Course: 05/06/2023 transient hypotension overnight improved with stopping propofol. MRI with possible DVA, will discuss need for DSA with IR. Endorsed tingling in BLE -> CTH stable, trial of HTS for possible edema. SBT as tolerated. 3 day Abx for UTI. New double vision -> repeat CTH stable.   05/07/2023 CO2 retention noted on ABG yesterday after SBT. Gas normalized with SIMV. Will reassess on spontaneous today. Discussed possibility of trach if she fails to wean off the vent. Still with copious secretions and no cough. Day 2/3 of CTX for GNR UTI. Tube feeds initiated.  05/08/2023 Pt remains on spontaneous vent settings with CO2 in normal range. T-piece trial unsuccessful yesterday. Cough reflex improved today.   05/09/2023 Exam stable. Continue trials of spontaneous ventilation.   05/10/2023 BP better controlled overnight. Will advance bowel regiment for constipation. Pt still not tolerating secretions, remains intubated.   05/11/2023 Patient with N/V overnight and new left shoulder/chest pain, troponin negative and EKG w/o ischemic changes. Constipation resolved.  05/12/2023 ana m, pt agreeable to early  trach/peg  5/13/2023 NAEO, plan for T/P Monday. Patient awake and following, would like to participate with PT/OT. Start SQH for DVT ppx  5/14/2023 NAEO, T/P tomorrow. Patient with elevated BP will add her home clonidine prn for SBP >160. Continue SIMV and SBT trials.       Review of Systems   Unable to perform ROS: Intubated     Objective:     Vitals:  Temp: 98 °F (36.7 °C)  Pulse: 81  Rhythm: normal sinus rhythm  BP: (!) 163/75  MAP (mmHg): 109  Resp: (!) 28  SpO2: 98 %  Oxygen Concentration (%): 50  Vent Mode: SIMV  Set Rate: 10 BPM  Vt Set: 450 mL  Pressure Support: 5 cmH20  PEEP/CPAP: 5 cmH20  Peak Airway Pressure: 18 cmH20  Mean Airway Pressure: 8.6 cmH20  Plateau Pressure: 0 cmH20    Temp  Min: 98 °F (36.7 °C)  Max: 98.7 °F (37.1 °C)  Pulse  Min: 69  Max: 84  BP  Min: 145/65  Max: 174/79  MAP (mmHg)  Min: 94  Max: 116  Resp  Min: 17  Max: 41  SpO2  Min: 93 %  Max: 99 %  Oxygen Concentration (%)  Min: 50  Max: 50    05/13 0701 - 05/14 0700  In: 1861.4 [I.V.:1471.4]  Out: 2500 [Urine:2500]   Unmeasured Output  Urine Occurrence: 1  Stool Occurrence: 1  Emesis Occurrence: 1  Pad Count: 2        Physical Exam  Vitals and nursing note reviewed.   Constitutional:       General: She is not in acute distress.     Appearance: Normal appearance. She is normal weight.   HENT:      Head: Normocephalic and atraumatic.      Mouth/Throat:      Mouth: Mucous membranes are moist.   Eyes:      Extraocular Movements: Extraocular movements intact.      Conjunctiva/sclera: Conjunctivae normal.      Pupils: Pupils are equal, round, and reactive to light.   Cardiovascular:      Rate and Rhythm: Normal rate and regular rhythm.   Pulmonary:      Effort: Pulmonary effort is normal. No respiratory distress.   Abdominal:      General: Abdomen is flat. There is no distension.      Palpations: Abdomen is soft.      Tenderness: There is no abdominal tenderness. There is no guarding.   Musculoskeletal:         General: No swelling.   Skin:      General: Skin is warm and dry.      Coloration: Skin is not jaundiced.   Neurological:      Mental Status: She is alert.     Neuro   --GCS: E4 Vt1 M6  --Mental Status:  awake, oriented X4, follows commands, nods appropriately  --CN II-XII grossly intact.   --Pupils 3mm, PERRL.   --Corneal reflex, gag, cough intact.  --FLETCHER spont         Medications:  Continuoussodium chloride 0.9%, Last Rate: 75 mL/hr at 05/14/23 0602    ScheduledamLODIPine, 10 mg, Daily  atorvastatin, 40 mg, Daily  famotidine, 20 mg, BID  heparin (porcine), 5,000 Units, Q8H  lisinopriL, 40 mg, Daily  polyethylene glycol, 17 g, BID  QUEtiapine, 25 mg, QHS  senna-docusate 8.6-50 mg, 1 tablet, BID  silodosin, 4 mg, Daily    PRNacetaminophen, 650 mg, Q6H PRN  benzocaine, , QID PRN  bisacodyL, 10 mg, Daily PRN  cloNIDine, 0.1 mg, Q8H PRN  fentaNYL, 25 mcg, Q4H PRN  hydrALAZINE, 10 mg, Q6H PRN  labetalol, 10 mg, Q4H PRN  magnesium oxide, 800 mg, PRN  magnesium oxide, 800 mg, PRN  ondansetron, 4 mg, Q8H PRN  potassium bicarbonate, 35 mEq, PRN  potassium bicarbonate, 50 mEq, PRN  potassium bicarbonate, 60 mEq, PRN  potassium, sodium phosphates, 2 packet, PRN  potassium, sodium phosphates, 2 packet, PRN  potassium, sodium phosphates, 2 packet, PRN  sodium chloride 0.9%, 10 mL, PRN  sucralfate, 1 g, Q6H PRN          Assessment/Plan:     Neuro  * Nontraumatic intracerebral hemorrhage  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 with edema surrounding  - MRI brain w/wo (5/6): possible DVA and concern for cavernous malformation  - left medullary hemorrhage vs cavernoma    Plan:  - NSGY following, no acute surgical intervention at this time   - OP f/u w NSGY in 4-6 weeks with a repeat MRI Brain w/wo contrast on 6/12   - Open intervention for cavernoma may be considered if findings are concordant on repeat imaging given first time hemorrhage, but will discuss in OP setting   - DSA likely not  beneficial  - Neuro q4, sleep holiday, delirium precautions  - SBP< 160 per NSGY  - intubated for airway protection, copious secretions and weak cough   - continue trials of spontaneous ventilator setting   - failed T-piece trial  - Hold AC/AP  - Coags & CBC  - VN following  - PT/OT/SLP  T/P pending tomorrow    Vasogenic cerebral edema  See ICH    Pulmonary  On mechanically assisted ventilation  Remains intubated for bulbar weakness   -active vent weaning daily   Vent Mode: SIMV  Oxygen Concentration (%):  [50] 50  Resp Rate Total:  [14 br/min-43 br/min] 25 br/min  Vt Set:  [450 mL] 450 mL  PEEP/CPAP:  [5 cmH20] 5 cmH20  Pressure Support:  [5 cmH20] 5 cmH20  Mean Airway Pressure:  [7.5 cmH20-8.9 cmH20] 8.6 cmH20      Cardiac/Vascular  Primary hypertension  Home medications: amlodipine 5mg daily, lisinopril 40mg daily, and prn clonidine 0.1mg BID PRN for BP > 150/100    - goal SBP < 160  - continue home amlodipine at higher dose to 10mg daily  - continue home lisinopril 40mg daily  - prn labetalol and hydralazine  -prn clonidine po     Paroxysmal atrial fibrillation  New onset at OSH, responded to BB  - initial EKG with A-fib RVR, repeat showed NSR    EKG  Monitor for now  Hold A/C    Mixed hyperlipidemia  Atorvastatin 40mg daily    Renal/  Urinary retention  Continue silodosin  Prn straight cath          The patient is being Prophylaxed for:  Venous Thromboembolism with: Chemical  Stress Ulcer with: H2B  Ventilator Pneumonia with: chlorhexidine oral care    Activity Orders          Diet NPO: NPO starting at 05/15 0001    Turn patient starting at 05/05 1800    Elevate HOB starting at 05/05 1626    Diet NPO: NPO starting at 05/05 1626        Full Code    Critical condition in that Patient has a condition that poses threat to life and bodily function: medullary stroke with active vent weaning      35 minutes of Critical care time was spent personally by me on the following activities: development of treatment plan  with patient or surrogate and bedside caregivers, discussions with consultants, evaluation of patient's response to treatment, examination of patient, ordering and performing treatments and interventions, ordering and review of laboratory studies, ordering and review of radiographic studies, pulse oximetry, antibiotic titration if applicable, vasopressor titration if applicable, re-evaluation of patient's condition. This critical care time did not  involve time for any procedures. There is high probability for acute neurological change leading to clinical and possibly life-threatening deterioration requiring highest level of physician preparedness for urgent intervention.    Charlene Petersen PA-C  Neurocritical Care  [facility]

## 2023-05-15 ENCOUNTER — ANESTHESIA (OUTPATIENT)
Dept: SURGERY | Facility: HOSPITAL | Age: 68
DRG: 004 | End: 2023-05-15
Payer: MEDICARE

## 2023-05-15 LAB
ABO + RH BLD: NORMAL
ALBUMIN SERPL BCP-MCNC: 2.8 G/DL (ref 3.5–5.2)
ALLENS TEST: ABNORMAL
ALP SERPL-CCNC: 59 U/L (ref 55–135)
ALT SERPL W/O P-5'-P-CCNC: 11 U/L (ref 10–44)
ANION GAP SERPL CALC-SCNC: 11 MMOL/L (ref 8–16)
AST SERPL-CCNC: 14 U/L (ref 10–40)
BASOPHILS # BLD AUTO: 0.05 K/UL (ref 0–0.2)
BASOPHILS NFR BLD: 0.4 % (ref 0–1.9)
BILIRUB SERPL-MCNC: 0.5 MG/DL (ref 0.1–1)
BLD GP AB SCN CELLS X3 SERPL QL: NORMAL
BUN SERPL-MCNC: 17 MG/DL (ref 8–23)
CALCIUM SERPL-MCNC: 10.3 MG/DL (ref 8.7–10.5)
CHLORIDE SERPL-SCNC: 113 MMOL/L (ref 95–110)
CO2 SERPL-SCNC: 23 MMOL/L (ref 23–29)
CREAT SERPL-MCNC: 0.7 MG/DL (ref 0.5–1.4)
DELSYS: ABNORMAL
DIFFERENTIAL METHOD: ABNORMAL
EOSINOPHIL # BLD AUTO: 0.2 K/UL (ref 0–0.5)
EOSINOPHIL NFR BLD: 1.7 % (ref 0–8)
ERYTHROCYTE [DISTWIDTH] IN BLOOD BY AUTOMATED COUNT: 13.3 % (ref 11.5–14.5)
ERYTHROCYTE [SEDIMENTATION RATE] IN BLOOD BY WESTERGREN METHOD: 10 MM/H
EST. GFR  (NO RACE VARIABLE): >60 ML/MIN/1.73 M^2
FIO2: 40
GLUCOSE SERPL-MCNC: 90 MG/DL (ref 70–110)
HCO3 UR-SCNC: 25.2 MMOL/L (ref 24–28)
HCT VFR BLD AUTO: 46 % (ref 37–48.5)
HGB BLD-MCNC: 13.9 G/DL (ref 12–16)
IMM GRANULOCYTES # BLD AUTO: 0.06 K/UL (ref 0–0.04)
IMM GRANULOCYTES NFR BLD AUTO: 0.5 % (ref 0–0.5)
LYMPHOCYTES # BLD AUTO: 2.1 K/UL (ref 1–4.8)
LYMPHOCYTES NFR BLD: 17.4 % (ref 18–48)
MAGNESIUM SERPL-MCNC: 1.9 MG/DL (ref 1.6–2.6)
MCH RBC QN AUTO: 29 PG (ref 27–31)
MCHC RBC AUTO-ENTMCNC: 30.2 G/DL (ref 32–36)
MCV RBC AUTO: 96 FL (ref 82–98)
MODE: ABNORMAL
MONOCYTES # BLD AUTO: 0.9 K/UL (ref 0.3–1)
MONOCYTES NFR BLD: 7.9 % (ref 4–15)
NEUTROPHILS # BLD AUTO: 8.5 K/UL (ref 1.8–7.7)
NEUTROPHILS NFR BLD: 72.1 % (ref 38–73)
NRBC BLD-RTO: 0 /100 WBC
PCO2 BLDA: 41 MMHG (ref 35–45)
PEEP: 5
PH SMN: 7.4 [PH] (ref 7.35–7.45)
PHOSPHATE SERPL-MCNC: 2.8 MG/DL (ref 2.7–4.5)
PLATELET # BLD AUTO: 256 K/UL (ref 150–450)
PMV BLD AUTO: 11.5 FL (ref 9.2–12.9)
PO2 BLDA: 109 MMHG (ref 80–100)
POC BE: 0 MMOL/L
POC SATURATED O2: 98 % (ref 95–100)
POC TCO2: 26 MMOL/L (ref 23–27)
POTASSIUM SERPL-SCNC: 3.7 MMOL/L (ref 3.5–5.1)
PROT SERPL-MCNC: 6.7 G/DL (ref 6–8.4)
PS: 5
RBC # BLD AUTO: 4.79 M/UL (ref 4–5.4)
SAMPLE: ABNORMAL
SITE: ABNORMAL
SODIUM SERPL-SCNC: 147 MMOL/L (ref 136–145)
SPECIMEN OUTDATE: NORMAL
VT: 450
WBC # BLD AUTO: 11.83 K/UL (ref 3.9–12.7)

## 2023-05-15 PROCEDURE — 82803 BLOOD GASES ANY COMBINATION: CPT | Mod: FS

## 2023-05-15 PROCEDURE — 99232 SBSQ HOSP IP/OBS MODERATE 35: CPT | Mod: FS,25,, | Performed by: STUDENT IN AN ORGANIZED HEALTH CARE EDUCATION/TRAINING PROGRAM

## 2023-05-15 PROCEDURE — 86900 BLOOD TYPING SEROLOGIC ABO: CPT | Mod: FS

## 2023-05-15 PROCEDURE — 99233 SBSQ HOSP IP/OBS HIGH 50: CPT | Mod: FS,,, | Performed by: PSYCHIATRY & NEUROLOGY

## 2023-05-15 PROCEDURE — D9220A PRA ANESTHESIA: Mod: CRNA,,, | Performed by: NURSE ANESTHETIST, CERTIFIED REGISTERED

## 2023-05-15 PROCEDURE — 99900035 HC TECH TIME PER 15 MIN (STAT): Mod: FS

## 2023-05-15 PROCEDURE — 27201423 OPTIME MED/SURG SUP & DEVICES STERILE SUPPLY: Mod: FS | Performed by: SURGERY

## 2023-05-15 PROCEDURE — 63600175 PHARM REV CODE 636 W HCPCS: Mod: FS

## 2023-05-15 PROCEDURE — 94003 VENT MGMT INPAT SUBQ DAY: CPT | Mod: FS

## 2023-05-15 PROCEDURE — 25000003 PHARM REV CODE 250: Performed by: NURSE ANESTHETIST, CERTIFIED REGISTERED

## 2023-05-15 PROCEDURE — 80053 COMPREHEN METABOLIC PANEL: CPT | Mod: FS | Performed by: PSYCHIATRY & NEUROLOGY

## 2023-05-15 PROCEDURE — 63600175 PHARM REV CODE 636 W HCPCS: Mod: FS | Performed by: PHYSICIAN ASSISTANT

## 2023-05-15 PROCEDURE — 43246 EGD PLACE GASTROSTOMY TUBE: CPT | Mod: 51,FS,, | Performed by: SURGERY

## 2023-05-15 PROCEDURE — 84100 ASSAY OF PHOSPHORUS: CPT | Mod: FS | Performed by: PSYCHIATRY & NEUROLOGY

## 2023-05-15 PROCEDURE — 20000000 HC ICU ROOM: Mod: FS

## 2023-05-15 PROCEDURE — 99233 PR SUBSEQUENT HOSPITAL CARE,LEVL III: ICD-10-PCS | Mod: FS,,, | Performed by: PSYCHIATRY & NEUROLOGY

## 2023-05-15 PROCEDURE — 63600175 PHARM REV CODE 636 W HCPCS: Performed by: NURSE ANESTHETIST, CERTIFIED REGISTERED

## 2023-05-15 PROCEDURE — 82800 BLOOD PH: CPT | Mod: FS

## 2023-05-15 PROCEDURE — D9220A PRA ANESTHESIA: ICD-10-PCS | Mod: ANES,,, | Performed by: STUDENT IN AN ORGANIZED HEALTH CARE EDUCATION/TRAINING PROGRAM

## 2023-05-15 PROCEDURE — 25000003 PHARM REV CODE 250: Mod: FS | Performed by: PSYCHIATRY & NEUROLOGY

## 2023-05-15 PROCEDURE — 36000707: Mod: FS | Performed by: SURGERY

## 2023-05-15 PROCEDURE — 31600 PR TRACHEOSTOMY, PLANNED: ICD-10-PCS | Mod: FS,,, | Performed by: SURGERY

## 2023-05-15 PROCEDURE — 27200966 HC CLOSED SUCTION SYSTEM: Mod: FS

## 2023-05-15 PROCEDURE — 99232 PR SUBSEQUENT HOSPITAL CARE,LEVL II: ICD-10-PCS | Mod: FS,25,, | Performed by: STUDENT IN AN ORGANIZED HEALTH CARE EDUCATION/TRAINING PROGRAM

## 2023-05-15 PROCEDURE — 83735 ASSAY OF MAGNESIUM: CPT | Mod: FS | Performed by: PSYCHIATRY & NEUROLOGY

## 2023-05-15 PROCEDURE — 25000003 PHARM REV CODE 250: Mod: FS

## 2023-05-15 PROCEDURE — 27000221 HC OXYGEN, UP TO 24 HOURS: Mod: FS

## 2023-05-15 PROCEDURE — D9220A PRA ANESTHESIA: ICD-10-PCS | Mod: CRNA,,, | Performed by: NURSE ANESTHETIST, CERTIFIED REGISTERED

## 2023-05-15 PROCEDURE — 99900026 HC AIRWAY MAINTENANCE (STAT): Mod: FS

## 2023-05-15 PROCEDURE — 31600 PLANNED TRACHEOSTOMY: CPT | Mod: FS,,, | Performed by: SURGERY

## 2023-05-15 PROCEDURE — 36000706: Mod: FS | Performed by: SURGERY

## 2023-05-15 PROCEDURE — D9220A PRA ANESTHESIA: Mod: ANES,,, | Performed by: STUDENT IN AN ORGANIZED HEALTH CARE EDUCATION/TRAINING PROGRAM

## 2023-05-15 PROCEDURE — 43246 PR EGD, FLEX, W/PLCMT, GASTROSTOMY TUBE: ICD-10-PCS | Mod: 51,FS,, | Performed by: SURGERY

## 2023-05-15 PROCEDURE — 25000003 PHARM REV CODE 250: Mod: FS | Performed by: PHYSICIAN ASSISTANT

## 2023-05-15 PROCEDURE — 36600 WITHDRAWAL OF ARTERIAL BLOOD: CPT | Mod: FS

## 2023-05-15 PROCEDURE — 85025 COMPLETE CBC W/AUTO DIFF WBC: CPT | Mod: FS | Performed by: PSYCHIATRY & NEUROLOGY

## 2023-05-15 PROCEDURE — 37000009 HC ANESTHESIA EA ADD 15 MINS: Mod: FS | Performed by: SURGERY

## 2023-05-15 PROCEDURE — 94761 N-INVAS EAR/PLS OXIMETRY MLT: CPT | Mod: FS

## 2023-05-15 PROCEDURE — 37000008 HC ANESTHESIA 1ST 15 MINUTES: Mod: FS | Performed by: SURGERY

## 2023-05-15 RX ORDER — MIDAZOLAM HYDROCHLORIDE 1 MG/ML
INJECTION, SOLUTION INTRAMUSCULAR; INTRAVENOUS
Status: DISCONTINUED | OUTPATIENT
Start: 2023-05-15 | End: 2023-05-15

## 2023-05-15 RX ORDER — FENTANYL CITRATE 50 UG/ML
25 INJECTION, SOLUTION INTRAMUSCULAR; INTRAVENOUS ONCE
Status: COMPLETED | OUTPATIENT
Start: 2023-05-15 | End: 2023-05-15

## 2023-05-15 RX ORDER — ACETAMINOPHEN 325 MG/1
650 TABLET ORAL EVERY 6 HOURS PRN
Status: DISCONTINUED | OUTPATIENT
Start: 2023-05-15 | End: 2023-05-15

## 2023-05-15 RX ORDER — ACETAMINOPHEN 500 MG
1000 TABLET ORAL EVERY 8 HOURS PRN
Status: DISCONTINUED | OUTPATIENT
Start: 2023-05-15 | End: 2023-05-17 | Stop reason: HOSPADM

## 2023-05-15 RX ORDER — PHENYLEPHRINE HYDROCHLORIDE 10 MG/ML
INJECTION INTRAVENOUS
Status: DISCONTINUED | OUTPATIENT
Start: 2023-05-15 | End: 2023-05-15

## 2023-05-15 RX ORDER — ROCURONIUM BROMIDE 10 MG/ML
INJECTION, SOLUTION INTRAVENOUS
Status: DISCONTINUED | OUTPATIENT
Start: 2023-05-15 | End: 2023-05-15

## 2023-05-15 RX ORDER — OXYCODONE HYDROCHLORIDE 5 MG/1
5 TABLET ORAL EVERY 6 HOURS PRN
Status: DISCONTINUED | OUTPATIENT
Start: 2023-05-15 | End: 2023-05-17 | Stop reason: HOSPADM

## 2023-05-15 RX ORDER — OXYCODONE HYDROCHLORIDE 10 MG/1
10 TABLET ORAL EVERY 6 HOURS PRN
Status: DISCONTINUED | OUTPATIENT
Start: 2023-05-15 | End: 2023-05-17 | Stop reason: HOSPADM

## 2023-05-15 RX ORDER — FENTANYL CITRATE 50 UG/ML
INJECTION, SOLUTION INTRAMUSCULAR; INTRAVENOUS
Status: DISCONTINUED | OUTPATIENT
Start: 2023-05-15 | End: 2023-05-15

## 2023-05-15 RX ADMIN — FAMOTIDINE 20 MG: 20 TABLET ORAL at 08:05

## 2023-05-15 RX ADMIN — LABETALOL HYDROCHLORIDE 10 MG: 5 INJECTION, SOLUTION INTRAVENOUS at 04:05

## 2023-05-15 RX ADMIN — HEPARIN SODIUM 5000 UNITS: 5000 INJECTION INTRAVENOUS; SUBCUTANEOUS at 09:05

## 2023-05-15 RX ADMIN — FENTANYL CITRATE 25 MCG: 50 INJECTION, SOLUTION INTRAMUSCULAR; INTRAVENOUS at 06:05

## 2023-05-15 RX ADMIN — MIDAZOLAM HYDROCHLORIDE 2 MG: 1 INJECTION, SOLUTION INTRAMUSCULAR; INTRAVENOUS at 09:05

## 2023-05-15 RX ADMIN — FENTANYL CITRATE 25 MCG: 50 INJECTION INTRAMUSCULAR; INTRAVENOUS at 04:05

## 2023-05-15 RX ADMIN — FENTANYL CITRATE 25 MCG: 50 INJECTION INTRAMUSCULAR; INTRAVENOUS at 11:05

## 2023-05-15 RX ADMIN — ROCURONIUM BROMIDE 30 MG: 10 INJECTION INTRAVENOUS at 09:05

## 2023-05-15 RX ADMIN — OXYCODONE HYDROCHLORIDE 10 MG: 10 TABLET ORAL at 06:05

## 2023-05-15 RX ADMIN — PHENYLEPHRINE HYDROCHLORIDE 200 MCG: 10 INJECTION INTRAVENOUS at 10:05

## 2023-05-15 RX ADMIN — FENTANYL CITRATE 100 MCG: 50 INJECTION, SOLUTION INTRAMUSCULAR; INTRAVENOUS at 09:05

## 2023-05-15 RX ADMIN — ACETAMINOPHEN 1000 MG: 500 TABLET ORAL at 08:05

## 2023-05-15 RX ADMIN — QUETIAPINE FUMARATE 25 MG: 25 TABLET ORAL at 08:05

## 2023-05-15 RX ADMIN — FENTANYL CITRATE 25 MCG: 50 INJECTION INTRAMUSCULAR; INTRAVENOUS at 08:05

## 2023-05-15 RX ADMIN — SODIUM CHLORIDE, SODIUM ACETATE ANHYDROUS, SODIUM GLUCONATE, POTASSIUM CHLORIDE, AND MAGNESIUM CHLORIDE: 526; 222; 502; 37; 30 INJECTION, SOLUTION INTRAVENOUS at 09:05

## 2023-05-15 RX ADMIN — CLONIDINE HYDROCHLORIDE 0.1 MG: 0.1 TABLET ORAL at 08:05

## 2023-05-15 RX ADMIN — SUGAMMADEX 200 MG: 100 INJECTION, SOLUTION INTRAVENOUS at 10:05

## 2023-05-15 RX ADMIN — PHENYLEPHRINE HYDROCHLORIDE 100 MCG: 10 INJECTION INTRAVENOUS at 09:05

## 2023-05-15 NOTE — OP NOTE
Date of procedure - 05/15/2023  Preoperative diagnosis - intracranial hemorrhage  Postoperative diagnosis - same  Procedure - tracheotomy and percutaneous endoscopic gastrostomy with EGD  Surgeon - Anayeli  Assist - Skip  Anesthesia - general  Blood loss - minimal  Indications - unfortunate 67-year-old patient has suffered intracerebral hemorrhage chronic vent dependency and requires enteral access for nutrition  Operative report in detail - patient brought to the operating room placed in supine position prepped and draped in sterile fashion after satisfactory general anesthesia was induced  3 cm vertical incision was made just above the suprasternal notch in the midline  Dissection was carried down into the trachea was visualized  Just below the isthmus of the thyroid was the cricoid cartilage a trach hook was inserted in this area  Trachea was opened between the 2nd and 3rd rings an additional cut was made in the 2nd ring and the midline  An 8 Shiley was inserted without difficulty and respirations were considered normal with end-tidal CO2 and normal ventilation  The trach was secured with sutures and a trach tie  Next the abdominal wall was prepped and draped in sterile fashion an EGD was inserted through the mouth down the esophagus into the stomach transillumination demonstrated satisfactory spot for access  Needle was placed into the stomach and a wire was inserted through the needle which was grasped with a snare and withdrawn through the endoscope  Snare was then attached to the PEG tube which was then brought back down the GI tract and exteriorized through a small incision at the original access site  Tube was secured at the skin at approximately 4.5 cm and was considered excellent position when it was re-evaluated with the EGD scope  Insufflated air was aspirated the scope was removed and the PEG tube was secured  Patient was stable throughout the procedure

## 2023-05-15 NOTE — ASSESSMENT & PLAN NOTE
Remains intubated for bulbar weakness, s/p trach and PEG on 5/15  -active vent weaning daily   Vent Mode: SIMV  Oxygen Concentration (%):  [40-50] 50  Resp Rate Total:  [18 br/min-33 br/min] 31 br/min  Vt Set:  [450 mL] 450 mL  PEEP/CPAP:  [5 cmH20] 5 cmH20  Pressure Support:  [5 cmH20-8 cmH20] 5 cmH20  Mean Airway Pressure:  [6.9 cmH20-9.6 cmH20] 8.6 cmH20

## 2023-05-15 NOTE — TRANSFER OF CARE
"Anesthesia Transfer of Care Note    Patient: Susan Pinto    Procedure(s) Performed: Procedure(s) (LRB):  TRACHEOTOMY (N/A)  EGD, WITH PEG TUBE INSERTION (N/A)    Patient location: ICU    Anesthesia Type: general    Transport from OR: Transported from OR intubated on 100% O2  with adequate ventilation controlled by transport ventilator. Continuous ECG monitoring in transport. Continuous SpO2 monitoring in transport. Upon arrival to PACU/ICU, patient attached to ventilator and auscultated to confirm bilateral breath sounds and adequate TV    Post pain: adequate analgesia    Post assessment: no apparent anesthetic complications    Post vital signs: stable    Level of consciousness: sedated    Nausea/Vomiting: no nausea/vomiting    Complications: none    Transfer of care protocol was followed      Last vitals:   Visit Vitals  BP (!) 146/92   Pulse 76   Temp 36.7 °C (98 °F) (Axillary)   Resp (!) 30   Ht 5' 4" (1.626 m)   Wt 95.3 kg (210 lb)   SpO2 98%   BMI 36.05 kg/m²     "

## 2023-05-15 NOTE — CARE UPDATE
Pt trached with size 8.0 shiley. Trach is secured and patent. Size 6 and 8 trach at bedside. Obturator at [facility] and extra trach supplies also at bedside. No respiratory distress noted. Will continue to monitor.

## 2023-05-15 NOTE — PROGRESS NOTES
[facility]  General Surgery  Progress Note    Subjective:     History of Present Illness:  67-year-old female with multiple medical co-morbidities who presents with medullary ICH requiring prolonged intubation. Surgery consulted for tracheostomy and PEG tube placement.    ET tube 7.5  No head/neck surgeries  No upper abdominal surgeries  Receiving tube feeds  No anticoagulation  Vent 5/50        Post-Op Info:  Procedure(s) (LRB):  TRACHEOTOMY (N/A)  EGD, WITH PEG TUBE INSERTION (N/A)   Day of Surgery     Interval History: NAEON. Afebrile, HDS. Minimal vent settings. To OR today for trach, PEG    Medications:  Continuous Infusions:   sodium chloride 0.9% 75 mL/hr at 05/15/23 0600     Scheduled Meds:   amLODIPine  10 mg Per OG tube Daily    atorvastatin  40 mg Per OG tube Daily    famotidine  20 mg Per OG tube BID    heparin (porcine)  5,000 Units Subcutaneous Q8H    lisinopriL  40 mg Per OG tube Daily    polyethylene glycol  17 g Per NG tube BID    QUEtiapine  25 mg Per OG tube QHS    senna-docusate 8.6-50 mg  1 tablet Per OG tube BID    silodosin  4 mg Per OG tube Daily     PRN Meds:acetaminophen, benzocaine, bisacodyL, cloNIDine, fentaNYL, hydrALAZINE, labetalol, magnesium oxide, magnesium oxide, ondansetron, potassium bicarbonate, potassium bicarbonate, potassium bicarbonate, potassium, sodium phosphates, potassium, sodium phosphates, potassium, sodium phosphates, sodium chloride 0.9%, sucralfate     Review of patient's allergies indicates:  No Known Allergies  Objective:     Vital Signs (Most Recent):  Temp: 98 °F (36.7 °C) (05/15/23 0701)  Pulse: 75 (05/15/23 0701)  Resp: 19 (05/15/23 0701)  BP: (!) 156/73 (05/15/23 0701)  SpO2: 99 % (05/15/23 0701) Vital Signs (24h Range):  Temp:  [97.9 °F (36.6 °C)-98.6 °F (37 °C)] 98 °F (36.7 °C)  Pulse:  [68-84] 75  Resp:  [18-35] 19  SpO2:  [96 %-99 %] 99 %  BP: (113-174)/() 156/73     Weight: 95.3 kg (210 lb)  Body mass index is 36.05  kg/m².    Intake/Output - Last 3 Shifts         05/13 0700  05/14 0659 05/14 0700  05/15 0659 05/15 0700  05/16 0659    I.V. (mL/kg) 1471.4 (15.4) 1789.9 (18.8)     NG/ 460     Total Intake(mL/kg) 1861.4 (19.5) 2249.9 (23.6)     Urine (mL/kg/hr) 2500 (1.1) 900 (0.4)     Other  0     Stool  0     Total Output 2500 900     Net -638.6 +1349.9            Stool Occurrence  1 x              Physical Exam  Vitals and nursing note reviewed.   Constitutional:       General: She is not in acute distress.     Appearance: She is ill-appearing.   HENT:      Nose: Nose normal.   Cardiovascular:      Rate and Rhythm: Normal rate.   Pulmonary:      Effort: Pulmonary effort is normal. No respiratory distress.      Comments: Vent Mode: SIMV  Oxygen Concentration (%):  [40-50] 50  Resp Rate Total:  [14 br/min-32 br/min] 21 br/min  Vt Set:  [450 mL] 450 mL  PEEP/CPAP:  [5 cmH20] 5 cmH20  Pressure Support:  [5 cmH20-8 cmH20] 5 cmH20  Mean Airway Pressure:  [7.5 cmH20-8.9 cmH20] 8.3 cmH20  Abdominal:      General: There is no distension.      Palpations: Abdomen is soft.      Tenderness: There is no abdominal tenderness. There is no guarding or rebound.   Musculoskeletal:         General: No deformity.   Skin:     General: Skin is warm and dry.   Neurological:      Comments: Answers yes or no questions while intubated, seems appropriate.          Significant Labs:  I have reviewed all pertinent lab results within the past 24 hours.    Significant Diagnostics:  I have reviewed all pertinent imaging results/findings within the past 24 hours.    Assessment/Plan:     * Nontraumatic intracerebral hemorrhage  Patient is a 67-year-old female with multiple medical co-morbidities presents with ICH now in need of trach/peg. 7.5 ET tube.  Minimal vent settings 50/5. No abdominal surgeries. No pressors. Seems to be good candidate.     - NPO  - Hold tube feeds  - TO OR today for trach, PEG  - Consented  - Remainder of care per primary team  -  Please contact general surgery with any questions, concerns, or clinical status changes    Routine Post-Op PEG Care:   · Please keep tube to gravity for the next 24 hours.  · May administer meds after 6 hours and clamp for 30 minutes after medication administration. Prefer elixirs or liquids but well crushed meds are also okay  · We will give further recs about when to restart tube feeds.   · Please notify General Surgery with any significant changes in patient's vital signs within the first 24 hours after PEG placement.  · Please call with questions about PEG tube.       Case discussed with Dr. Guadalupe.       Wilman Butt PA-C  General Surgery  [facility]

## 2023-05-15 NOTE — SUBJECTIVE & OBJECTIVE
Interval History: NAEON. Afebrile, HDS. Minimal vent settings. To OR today for trach, PEG    Medications:  Continuous Infusions:   sodium chloride 0.9% 75 mL/hr at 05/15/23 0600     Scheduled Meds:   amLODIPine  10 mg Per OG tube Daily    atorvastatin  40 mg Per OG tube Daily    famotidine  20 mg Per OG tube BID    heparin (porcine)  5,000 Units Subcutaneous Q8H    lisinopriL  40 mg Per OG tube Daily    polyethylene glycol  17 g Per NG tube BID    QUEtiapine  25 mg Per OG tube QHS    senna-docusate 8.6-50 mg  1 tablet Per OG tube BID    silodosin  4 mg Per OG tube Daily     PRN Meds:acetaminophen, benzocaine, bisacodyL, cloNIDine, fentaNYL, hydrALAZINE, labetalol, magnesium oxide, magnesium oxide, ondansetron, potassium bicarbonate, potassium bicarbonate, potassium bicarbonate, potassium, sodium phosphates, potassium, sodium phosphates, potassium, sodium phosphates, sodium chloride 0.9%, sucralfate     Review of patient's allergies indicates:  No Known Allergies  Objective:     Vital Signs (Most Recent):  Temp: 98 °F (36.7 °C) (05/15/23 0701)  Pulse: 75 (05/15/23 0701)  Resp: 19 (05/15/23 0701)  BP: (!) 156/73 (05/15/23 0701)  SpO2: 99 % (05/15/23 0701) Vital Signs (24h Range):  Temp:  [97.9 °F (36.6 °C)-98.6 °F (37 °C)] 98 °F (36.7 °C)  Pulse:  [68-84] 75  Resp:  [18-35] 19  SpO2:  [96 %-99 %] 99 %  BP: (113-174)/() 156/73     Weight: 95.3 kg (210 lb)  Body mass index is 36.05 kg/m².    Intake/Output - Last 3 Shifts         05/13 0700  05/14 0659 05/14 0700  05/15 0659 05/15 0700  05/16 0659    I.V. (mL/kg) 1471.4 (15.4) 1789.9 (18.8)     NG/ 460     Total Intake(mL/kg) 1861.4 (19.5) 2249.9 (23.6)     Urine (mL/kg/hr) 2500 (1.1) 900 (0.4)     Other  0     Stool  0     Total Output 2500 900     Net -638.6 +1349.9            Stool Occurrence  1 x              Physical Exam  Vitals and nursing note reviewed.   Constitutional:       General: She is not in acute distress.     Appearance: She is  ill-appearing.   HENT:      Nose: Nose normal.   Cardiovascular:      Rate and Rhythm: Normal rate.   Pulmonary:      Effort: Pulmonary effort is normal. No respiratory distress.      Comments: Vent Mode: SIMV  Oxygen Concentration (%):  [40-50] 50  Resp Rate Total:  [14 br/min-32 br/min] 21 br/min  Vt Set:  [450 mL] 450 mL  PEEP/CPAP:  [5 cmH20] 5 cmH20  Pressure Support:  [5 cmH20-8 cmH20] 5 cmH20  Mean Airway Pressure:  [7.5 cmH20-8.9 cmH20] 8.3 cmH20  Abdominal:      General: There is no distension.      Palpations: Abdomen is soft.      Tenderness: There is no abdominal tenderness. There is no guarding or rebound.   Musculoskeletal:         General: No deformity.   Skin:     General: Skin is warm and dry.   Neurological:      Comments: Answers yes or no questions while intubated, seems appropriate.          Significant Labs:  I have reviewed all pertinent lab results within the past 24 hours.    Significant Diagnostics:  I have reviewed all pertinent imaging results/findings within the past 24 hours.

## 2023-05-15 NOTE — PLAN OF CARE
[facility]  Discharge Reassessment    Primary Care Provider: Court Champion NP    Expected Discharge Date: 5/19/2023    Patient S/p trach and peg today with General Surgery.  Patient not medically ready for discharge.       Reassessment (most recent)       Discharge Reassessment - 05/15/23 1638          Discharge Reassessment    Assessment Type Discharge Planning Reassessment     Did the patient's condition or plan change since previous assessment? No     Communicated ANITHA with patient/caregiver Date not available/Unable to determine     Discharge Plan A Long-term acute care facility (LTAC)     Discharge Plan B Skilled Nursing Facility     DME Needed Upon Discharge  none     Transition of Care Barriers None     Why the patient remains in the hospital Requires continued medical care                     Sarai Cardoso RN, CCRN-K, [facility]  Neuro-Critical Care   [phone]

## 2023-05-15 NOTE — CARE UPDATE
Routine Post-Op PEG Care:     Please keep tube to gravity for the next 4 hours.  May administer meds after 6 hours and clamp for 30 minutes after medication administration.  If output remains low, can clamp and begin clears. Diet ok as tolerated. Ok for tube feeding starting 24hrs after placement     Please call with any other questions.

## 2023-05-15 NOTE — PLAN OF CARE
[facility] Care Plan    POC reviewed with Susan Pinto and family at 0300. Pt unable to verbalize understanding. Questions and concerns addressed. No acute events overnight. Will continue to monitor. See below and flowsheets for full assessment and VS info.     - plan for TREG today  - NPO @ midnight  - NS @ 75  - PRN Clonidine given x1        Is this a stroke patient? yes- Stroke booklet reviewed with patient and family, risk factors identified for patient and stroke booklet remains at bedside for ongoing education.     Neuro:  [place] Coma Scale  Best Eye Response: 4-->(E4) spontaneous  Best Motor Response: 6-->(M6) obeys commands  Best Verbal Response: 1-->(V1) none  N[place] Coma Scale Score: 11  Assessment Qualifiers: patient intubated, no eye obstruction present  Pupil PERRLA: yes     24hr Temp:  [97.9 °F (36.6 °C)-98.6 °F (37 °C)]     CV:   Rhythm: normal sinus rhythm  BP goals:   SBP < 160  MAP > 65    Resp:      Vent Mode: SIMV  Set Rate: 10 BPM  Oxygen Concentration (%): 50  Vt Set: 450 mL  PEEP/CPAP: 5 cmH20  Pressure Support: 5 cmH20    Plan: trach/PEG discussions    GI/:     Diet/Nutrition Received: NPO  Last Bowel Movement: 05/13/23  Voiding Characteristics: external catheter    Intake/Output Summary (Last 24 hours) at 5/15/2023 0338  Last data filed at 5/15/2023 0302  Gross per 24 hour   Intake 2282.41 ml   Output 400 ml   Net 1882.41 ml     Unmeasured Output  Urine Occurrence: 1  Stool Occurrence: 1  Emesis Occurrence: 1  Pad Count: 2    Labs/Accuchecks:  Recent Labs   Lab 05/15/23  0204   WBC 11.83   RBC 4.79   HGB 13.9   HCT 46.0         Recent Labs   Lab 05/15/23  0204   *   K 3.7   CO2 23   *   BUN 17   CREATININE 0.7   ALKPHOS 59   ALT 11   AST 14   BILITOT 0.5    No results for input(s): PROTIME, INR, APTT, HEPANTIXA in the last 168 hours.   Recent Labs   Lab 05/11/23  0857   TROPONINI <0.006       Electrolytes: N/A - electrolytes WDL  Accuchecks: none    Gtts:   sodium  chloride 0.9% 75 mL/hr at 05/15/23 0302       LDA/Wounds:  Lines/Drains/Airways       Drain  Duration                  NG/OG Tube 05/05/23 1445 orogastric 18 Fr. Center mouth 9 days    Female External Urinary Catheter 05/05/23 1718 9 days              Airway  Duration                  Airway - Non-Surgical 05/05/23 1430 Endotracheal Tube 9 days              Peripheral Intravenous Line  Duration                  Peripheral IV - Single Lumen 05/15/23 0300 18 G Anterior;Right Forearm <1 day         Peripheral IV - Single Lumen 05/15/23 0300 20 G Anterior;Left Forearm <1 day                  Wounds: No  Wound care consulted: No

## 2023-05-15 NOTE — SUBJECTIVE & OBJECTIVE
Interval History: NAEO. Minimal settings. TF held. OR today. Consent obtained.     Medications:  Continuous Infusions:   sodium chloride 0.9% 75 mL/hr at 05/15/23 0701     Scheduled Meds:   amLODIPine  10 mg Per OG tube Daily    atorvastatin  40 mg Per OG tube Daily    famotidine  20 mg Per OG tube BID    heparin (porcine)  5,000 Units Subcutaneous Q8H    lisinopriL  40 mg Per OG tube Daily    polyethylene glycol  17 g Per NG tube BID    QUEtiapine  25 mg Per OG tube QHS    senna-docusate 8.6-50 mg  1 tablet Per OG tube BID    silodosin  4 mg Per OG tube Daily     PRN Meds:acetaminophen, benzocaine, bisacodyL, cloNIDine, fentaNYL, hydrALAZINE, labetalol, magnesium oxide, magnesium oxide, ondansetron, potassium bicarbonate, potassium bicarbonate, potassium bicarbonate, potassium, sodium phosphates, potassium, sodium phosphates, potassium, sodium phosphates, sodium chloride 0.9%, sucralfate     Review of patient's allergies indicates:  No Known Allergies  Objective:     Vital Signs (Most Recent):  Temp: 98 °F (36.7 °C) (05/15/23 0701)  Pulse: 75 (05/15/23 0701)  Resp: 19 (05/15/23 0701)  BP: (!) 156/73 (05/15/23 0701)  SpO2: 99 % (05/15/23 0701) Vital Signs (24h Range):  Temp:  [97.9 °F (36.6 °C)-98.6 °F (37 °C)] 98 °F (36.7 °C)  Pulse:  [68-84] 75  Resp:  [18-35] 19  SpO2:  [96 %-99 %] 99 %  BP: (113-174)/() 156/73     Weight: 95.3 kg (210 lb)  Body mass index is 36.05 kg/m².    Intake/Output - Last 3 Shifts         05/13 0700  05/14 0659 05/14 0700  05/15 0659 05/15 0700  05/16 0659    I.V. (mL/kg) 1471.4 (15.4) 1789.9 (18.8) 76.2 (0.8)    NG/ 460 0    Total Intake(mL/kg) 1861.4 (19.5) 2249.9 (23.6) 76.2 (0.8)    Urine (mL/kg/hr) 2500 (1.1) 900 (0.4)     Other  0     Stool  0     Total Output 2500 900     Net -638.6 +1349.9 +76.2           Stool Occurrence  1 x              Physical Exam  Vitals reviewed.   Constitutional:       General: She is not in acute distress.  HENT:      Head: Normocephalic and  atraumatic.      Nose: Nose normal.   Eyes:      General:         Right eye: No discharge.         Left eye: No discharge.   Cardiovascular:      Rate and Rhythm: Normal rate and regular rhythm.   Pulmonary:      Effort: Pulmonary effort is normal. No respiratory distress.      Breath sounds: No stridor.      Comments: Intubated minimal settings   Abdominal:      Comments: Tolerating TF   Musculoskeletal:         General: Normal range of motion.      Cervical back: Normal range of motion.   Skin:     General: Skin is warm and dry.      Capillary Refill: Capillary refill takes 2 to 3 seconds.   Neurological:      Mental Status: She is alert.      Comments: Following commands    Psychiatric:         Mood and Affect: Mood normal.         Behavior: Behavior normal.        Significant Labs:  I have reviewed all pertinent lab results within the past 24 hours.    Significant Diagnostics:  I have reviewed all pertinent imaging results/findings within the past 24 hours.

## 2023-05-15 NOTE — ANESTHESIA POSTPROCEDURE EVALUATION
Anesthesia Post Evaluation    Patient: Susan Pinto    Procedure(s) Performed: Procedure(s) (LRB):  TRACHEOTOMY (N/A)  EGD, WITH PEG TUBE INSERTION (N/A)    Final Anesthesia Type: general      Patient location during evaluation: ICU  Patient participation: No - Unable to Participate, Intubation  Level of consciousness: sedated  Post-procedure vital signs: reviewed and stable  Pain management: adequate  Airway patency: patent    PONV status at discharge: No PONV  Anesthetic complications: no      Cardiovascular status: hemodynamically stable  Respiratory status: ventilator and Tracheostomy  Hydration status: euvolemic  Follow-up not needed.  Comments: Transported from ICU fully monitored.  Oxygen provided through ventilator without issues.  Vital signs stable during transport from OR.     Pt received neuromuscular blockade reversal at end of the case.             Vitals Value Taken Time   /70 05/15/23 1102   Temp 98.1 05/15/23 1048   Pulse 82 05/15/23 1107   Resp 27 05/15/23 1107   SpO2 98 % 05/15/23 1107   Vitals shown include unvalidated device data.      No case tracking events are documented in the log.      Pain/Tino Score: No data recorded

## 2023-05-15 NOTE — BRIEF OP NOTE
[facility]  Brief Operative Note    SUMMARY     Surgery Date: 5/15/2023     Surgeon(s) and Role:     * Mark Guadalupe MD - Primary     * Magdiel Benites MD - Resident - Assisting     * Larry Chahal MD - Resident - Assisting        Pre-op Diagnosis:  ICH (intracerebral hemorrhage) [I61.9]    Post-op Diagnosis:  Post-Op Diagnosis Codes:     * ICH (intracerebral hemorrhage) [I61.9]    Procedure(s) (LRB):  TRACHEOTOMY (N/A)  EGD, WITH PEG TUBE INSERTION (N/A)    Anesthesia: General    Operative Findings: Open tracheostomy performed. 8 Shiley trach.    PEG tube placed without apparent complication. 5cm at the skin.    Estimated Blood Loss: 10mL    Estimated Blood Loss has been documented.         Specimens:   Specimen (24h ago, onward)      None            [id number]

## 2023-05-15 NOTE — ASSESSMENT & PLAN NOTE
Patient is a 67-year-old female with multiple medical co-morbidities presents with ICH now in need of trach/peg. 7.5 ET tube.  Minimal vent settings 50/5. No abdominal surgeries. No pressors. Seems to be good candidate.     - NPO  - Hold tube feeds  - TO OR today for trach, PEG  - Consented  - Remainder of care per primary team  - Please contact general surgery with any questions, concerns, or clinical status changes    Routine Post-Op PEG Care:   · Please keep tube to gravity for the next 24 hours.  · May administer meds after 6 hours and clamp for 30 minutes after medication administration. Prefer elixirs or liquids but well crushed meds are also okay  · We will give further recs about when to restart tube feeds.   · Please notify General Surgery with any significant changes in patient's vital signs within the first 24 hours after PEG placement.  · Please call with questions about PEG tube.

## 2023-05-15 NOTE — PROGRESS NOTES
[facility] - Neuro Critical Care  Neurocritical Care  Progress Note    Admit Date: 5/5/2023  Service Date: 05/15/2023  Length of Stay: 10    Subjective:     Chief Complaint: Nontraumatic intracerebral hemorrhage    History of Present Illness: Susan Pinto is a 67-year-old female with Hx of HTN presenting with altered mental status, tachycardia, left-sided facial droop, admitted for medullary ICH.   Patient started having vomiting that began around 7:00 p.m last night. On EMS arrival was noted to be hypoxic sat's in 80's with AMS, was intubated for airway protection. In ED at OSH CTh was done and was found to have a cervicomedullary junction ICH measuring approximately about 1.3 x 1.3 x 1.2 cm, also had a.fib with RVR Hr in the 150's that responded to BB. Was transferred to [facility] for further care         Hospital Course: 05/06/2023 transient hypotension overnight improved with stopping propofol. MRI with possible DVA, will discuss need for DSA with IR. Endorsed tingling in BLE -> CTH stable, trial of HTS for possible edema. SBT as tolerated. 3 day Abx for UTI. New double vision -> repeat CTH stable.   05/07/2023 CO2 retention noted on ABG yesterday after SBT. Gas normalized with SIMV. Will reassess on spontaneous today. Discussed possibility of trach if she fails to wean off the vent. Still with copious secretions and no cough. Day 2/3 of CTX for GNR UTI. Tube feeds initiated.  05/08/2023 Pt remains on spontaneous vent settings with CO2 in normal range. T-piece trial unsuccessful yesterday. Cough reflex improved today.   05/09/2023 Exam stable. Continue trials of spontaneous ventilation.   05/10/2023 BP better controlled overnight. Will advance bowel regiment for constipation. Pt still not tolerating secretions, remains intubated.   05/11/2023 Patient with N/V overnight and new left shoulder/chest pain, troponin negative and EKG w/o ischemic changes. Constipation resolved.  05/12/2023 ana m, pt agreeable to early  trach/peg  5/13/2023 NAEO, plan for T/P Monday. Patient awake and following, would like to participate with PT/OT. Start SQH for DVT ppx  5/14/2023 NAEO, T/P tomorrow. Patient with elevated BP will add her home clonidine prn for SBP >160. Continue SIMV and SBT trials.   05/15/2023 T/P today. Will resume tube feeds via G-tube 24hrs post procedure.      Review of Systems   Unable to perform ROS: Intubated     Objective:     Vitals:  Temp: 98.1 °F (36.7 °C)  Pulse: 83  Rhythm: normal sinus rhythm  BP: (!) 162/77  MAP (mmHg): 107  Resp: (!) 32  SpO2: 99 %  Oxygen Concentration (%): 50  Vent Mode: SIMV  Set Rate: 19 BPM  Vt Set: 450 mL  Pressure Support: 5 cmH20  PEEP/CPAP: 5 cmH20  Peak Airway Pressure: 12 cmH20  Mean Airway Pressure: 8.6 cmH20  Plateau Pressure: 0 cmH20    Temp  Min: 97.9 °F (36.6 °C)  Max: 98.6 °F (37 °C)  Pulse  Min: 68  Max: 87  BP  Min: 113/59  Max: 172/76  MAP (mmHg)  Min: 77  Max: 114  Resp  Min: 15  Max: 32  SpO2  Min: 96 %  Max: 100 %  Oxygen Concentration (%)  Min: 40  Max: 50    05/14 0701 - 05/15 0700  In: 2249.9 [I.V.:1789.9]  Out: 900 [Urine:900]   Unmeasured Output  Urine Occurrence: 1  Stool Occurrence: 1  Emesis Occurrence: 1  Pad Count: 1        Physical Exam  Vitals and nursing note reviewed.   Constitutional:       General: She is not in acute distress.     Appearance: Normal appearance. She is normal weight.   HENT:      Head: Normocephalic and atraumatic.      Mouth/Throat:      Mouth: Mucous membranes are moist.   Eyes:      Extraocular Movements: Extraocular movements intact.      Conjunctiva/sclera: Conjunctivae normal.      Pupils: Pupils are equal, round, and reactive to light.   Cardiovascular:      Rate and Rhythm: Normal rate and regular rhythm.   Pulmonary:      Effort: Pulmonary effort is normal. No respiratory distress.   Abdominal:      General: Abdomen is flat. There is no distension.      Palpations: Abdomen is soft.      Tenderness: There is no abdominal tenderness.  There is no guarding.   Musculoskeletal:         General: No swelling.   Skin:     General: Skin is warm and dry.      Coloration: Skin is not jaundiced.   Neurological:      Mental Status: She is alert.     Neuro   --GCS: E4 Vt1 M6  --Mental Status:  awake, oriented X4, follows commands, nods appropriately  --CN II-XII grossly intact.   --Pupils 3mm, PERRL.   --Corneal reflex, gag, cough intact.  --FLETCHER spont         Medications:  Continuoussodium chloride 0.9%, Last Rate: 75 mL/hr at 05/15/23 0901    ScheduledamLODIPine, 10 mg, Daily  atorvastatin, 40 mg, Daily  famotidine, 20 mg, BID  fentaNYL, 25 mcg, Once  heparin (porcine), 5,000 Units, Q8H  lisinopriL, 40 mg, Daily  polyethylene glycol, 17 g, BID  QUEtiapine, 25 mg, QHS  senna-docusate 8.6-50 mg, 1 tablet, BID  silodosin, 4 mg, Daily    PRNacetaminophen, 1,000 mg, Q8H PRN  benzocaine, , QID PRN  bisacodyL, 10 mg, Daily PRN  cloNIDine, 0.1 mg, Q8H PRN  fentaNYL, 25 mcg, Q4H PRN  hydrALAZINE, 10 mg, Q6H PRN  labetalol, 10 mg, Q4H PRN  magnesium oxide, 800 mg, PRN  magnesium oxide, 800 mg, PRN  ondansetron, 4 mg, Q8H PRN  oxyCODONE, 5 mg, Q6H PRN  oxyCODONE, 10 mg, Q6H PRN  potassium bicarbonate, 35 mEq, PRN  potassium bicarbonate, 50 mEq, PRN  potassium bicarbonate, 60 mEq, PRN  potassium, sodium phosphates, 2 packet, PRN  potassium, sodium phosphates, 2 packet, PRN  potassium, sodium phosphates, 2 packet, PRN  sodium chloride 0.9%, 10 mL, PRN  sucralfate, 1 g, Q6H PRN        Assessment/Plan:     Neuro  * Nontraumatic intracerebral hemorrhage  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 with edema surrounding  - MRI brain w/wo (5/6): possible DVA and concern for cavernous malformation  - left medullary hemorrhage vs cavernoma    Plan:  - NSGY following, no acute surgical intervention at this time   - OP f/u w NSGY in 4-6 weeks with a repeat MRI Brain w/wo contrast on 6/12   - Open intervention for  cavernoma may be considered if findings are concordant on repeat imaging given first time hemorrhage, but will discuss in OP setting   - DSA likely not beneficial  - Neuro q4, sleep holiday, delirium precautions  - SBP< 160 per NSGY  - intubated for airway protection, copious secretions and weak cough   - continue trials of spontaneous ventilator setting   - failed T-piece trial  - Hold AC/AP  - Coags & CBC  - VN following  - PT/OT/SLP  - s/p T/P today 5/15    Vasogenic cerebral edema  See ICH    Pulmonary  On mechanically assisted ventilation  Remains intubated for bulbar weakness, s/p trach and PEG on 5/15  -active vent weaning daily   Vent Mode: SIMV  Oxygen Concentration (%):  [40-50] 50  Resp Rate Total:  [18 br/min-33 br/min] 31 br/min  Vt Set:  [450 mL] 450 mL  PEEP/CPAP:  [5 cmH20] 5 cmH20  Pressure Support:  [5 cmH20-8 cmH20] 5 cmH20  Mean Airway Pressure:  [6.9 cmH20-9.6 cmH20] 8.6 cmH20      Cardiac/Vascular  Hypertriglyceridemia  Triglycerides elevated to 929 on admission, started on niacin and fenofibrate  Likely 2/2 propofol infusion as TGL have since normalized    - discontinue niacin  - discontinue fenofibrate  - discontinue TGL draws  - follow up with PCP outpatient on discharge     RESOLVED     Primary hypertension  Home medications: amlodipine 5mg daily, lisinopril 40mg daily, and prn clonidine 0.1mg BID PRN for BP > 150/100    - goal SBP < 160  - continue home amlodipine at higher dose to 10mg daily  - continue home lisinopril 40mg daily  - prn labetalol and hydralazine  -prn clonidine po     Paroxysmal atrial fibrillation  New onset at OSH, responded to BB  - initial EKG with A-fib RVR, repeat showed NSR    EKG  Monitor for now  Hold A/C    Mixed hyperlipidemia  Atorvastatin 40mg daily    Renal/  Urinary retention  Continue silodosin  Prn straight cath          The patient is being Prophylaxed for:  Venous Thromboembolism with: Mechanical or Chemical  Stress Ulcer with: PPI  Ventilator  Pneumonia with: chlorhexidine oral care    Activity Orders          Elevate HOB starting at 05/15 1411    Diet NPO: NPO starting at 05/15 0001    Turn patient starting at 05/05 1800    Elevate HOB starting at 05/05 1626        Full Code    Gilma Morris MD  Neurocritical Care  [facility] - Neuro Critical Care

## 2023-05-15 NOTE — ASSESSMENT & PLAN NOTE
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 with edema surrounding  - MRI brain w/wo (5/6): possible DVA and concern for cavernous malformation  - left medullary hemorrhage vs cavernoma    Plan:  - NSGY following, no acute surgical intervention at this time   - OP f/u w NSGY in 4-6 weeks with a repeat MRI Brain w/wo contrast on 6/12   - Open intervention for cavernoma may be considered if findings are concordant on repeat imaging given first time hemorrhage, but will discuss in OP setting   - DSA likely not beneficial  - Neuro q4, sleep holiday, delirium precautions  - SBP< 160 per NSGY  - intubated for airway protection, copious secretions and weak cough   - continue trials of spontaneous ventilator setting   - failed T-piece trial  - Hold AC/AP  - Coags & CBC  - VN following  - PT/OT/SLP  - s/p T/P today 5/15

## 2023-05-15 NOTE — SUBJECTIVE & OBJECTIVE
Review of Systems   Unable to perform ROS: Intubated     Objective:     Vitals:  Temp: 98.1 °F (36.7 °C)  Pulse: 83  Rhythm: normal sinus rhythm  BP: (!) 162/77  MAP (mmHg): 107  Resp: (!) 32  SpO2: 99 %  Oxygen Concentration (%): 50  Vent Mode: SIMV  Set Rate: 19 BPM  Vt Set: 450 mL  Pressure Support: 5 cmH20  PEEP/CPAP: 5 cmH20  Peak Airway Pressure: 12 cmH20  Mean Airway Pressure: 8.6 cmH20  Plateau Pressure: 0 cmH20    Temp  Min: 97.9 °F (36.6 °C)  Max: 98.6 °F (37 °C)  Pulse  Min: 68  Max: 87  BP  Min: 113/59  Max: 172/76  MAP (mmHg)  Min: 77  Max: 114  Resp  Min: 15  Max: 32  SpO2  Min: 96 %  Max: 100 %  Oxygen Concentration (%)  Min: 40  Max: 50    05/14 0701 - 05/15 0700  In: 2249.9 [I.V.:1789.9]  Out: 900 [Urine:900]   Unmeasured Output  Urine Occurrence: 1  Stool Occurrence: 1  Emesis Occurrence: 1  Pad Count: 1        Physical Exam  Vitals and nursing note reviewed.   Constitutional:       General: She is not in acute distress.     Appearance: Normal appearance. She is normal weight.   HENT:      Head: Normocephalic and atraumatic.      Mouth/Throat:      Mouth: Mucous membranes are moist.   Eyes:      Extraocular Movements: Extraocular movements intact.      Conjunctiva/sclera: Conjunctivae normal.      Pupils: Pupils are equal, round, and reactive to light.   Cardiovascular:      Rate and Rhythm: Normal rate and regular rhythm.   Pulmonary:      Effort: Pulmonary effort is normal. No respiratory distress.   Abdominal:      General: Abdomen is flat. There is no distension.      Palpations: Abdomen is soft.      Tenderness: There is no abdominal tenderness. There is no guarding.   Musculoskeletal:         General: No swelling.   Skin:     General: Skin is warm and dry.      Coloration: Skin is not jaundiced.   Neurological:      Mental Status: She is alert.     Neuro   --GCS: E4 Vt1 M6  --Mental Status:  awake, oriented X4, follows commands, nods appropriately  --CN II-XII grossly intact.   --Pupils 3mm,  PERRL.   --Corneal reflex, gag, cough intact.  --FLETCHER spont         Medications:  Continuoussodium chloride 0.9%, Last Rate: 75 mL/hr at 05/15/23 0901    ScheduledamLODIPine, 10 mg, Daily  atorvastatin, 40 mg, Daily  famotidine, 20 mg, BID  fentaNYL, 25 mcg, Once  heparin (porcine), 5,000 Units, Q8H  lisinopriL, 40 mg, Daily  polyethylene glycol, 17 g, BID  QUEtiapine, 25 mg, QHS  senna-docusate 8.6-50 mg, 1 tablet, BID  silodosin, 4 mg, Daily    PRNacetaminophen, 1,000 mg, Q8H PRN  benzocaine, , QID PRN  bisacodyL, 10 mg, Daily PRN  cloNIDine, 0.1 mg, Q8H PRN  fentaNYL, 25 mcg, Q4H PRN  hydrALAZINE, 10 mg, Q6H PRN  labetalol, 10 mg, Q4H PRN  magnesium oxide, 800 mg, PRN  magnesium oxide, 800 mg, PRN  ondansetron, 4 mg, Q8H PRN  oxyCODONE, 5 mg, Q6H PRN  oxyCODONE, 10 mg, Q6H PRN  potassium bicarbonate, 35 mEq, PRN  potassium bicarbonate, 50 mEq, PRN  potassium bicarbonate, 60 mEq, PRN  potassium, sodium phosphates, 2 packet, PRN  potassium, sodium phosphates, 2 packet, PRN  potassium, sodium phosphates, 2 packet, PRN  sodium chloride 0.9%, 10 mL, PRN  sucralfate, 1 g, Q6H PRN

## 2023-05-16 LAB
ALBUMIN SERPL BCP-MCNC: 2.8 G/DL (ref 3.5–5.2)
ALLENS TEST: ABNORMAL
ALP SERPL-CCNC: 67 U/L (ref 55–135)
ALT SERPL W/O P-5'-P-CCNC: 11 U/L (ref 10–44)
ANION GAP SERPL CALC-SCNC: 15 MMOL/L (ref 8–16)
AST SERPL-CCNC: 14 U/L (ref 10–40)
BASOPHILS # BLD AUTO: 0.09 K/UL (ref 0–0.2)
BASOPHILS NFR BLD: 0.7 % (ref 0–1.9)
BILIRUB SERPL-MCNC: 0.5 MG/DL (ref 0.1–1)
BUN SERPL-MCNC: 16 MG/DL (ref 8–23)
CALCIUM SERPL-MCNC: 10.6 MG/DL (ref 8.7–10.5)
CHLORIDE SERPL-SCNC: 113 MMOL/L (ref 95–110)
CO2 SERPL-SCNC: 19 MMOL/L (ref 23–29)
CREAT SERPL-MCNC: 0.7 MG/DL (ref 0.5–1.4)
DIFFERENTIAL METHOD: ABNORMAL
EOSINOPHIL # BLD AUTO: 0.2 K/UL (ref 0–0.5)
EOSINOPHIL NFR BLD: 1.2 % (ref 0–8)
ERYTHROCYTE [DISTWIDTH] IN BLOOD BY AUTOMATED COUNT: 13.3 % (ref 11.5–14.5)
EST. GFR  (NO RACE VARIABLE): >60 ML/MIN/1.73 M^2
GLUCOSE SERPL-MCNC: 71 MG/DL (ref 70–110)
HCO3 UR-SCNC: 26.4 MMOL/L (ref 24–28)
HCT VFR BLD AUTO: 43.8 % (ref 37–48.5)
HGB BLD-MCNC: 13.6 G/DL (ref 12–16)
IMM GRANULOCYTES # BLD AUTO: 0.07 K/UL (ref 0–0.04)
IMM GRANULOCYTES NFR BLD AUTO: 0.5 % (ref 0–0.5)
LYMPHOCYTES # BLD AUTO: 2.2 K/UL (ref 1–4.8)
LYMPHOCYTES NFR BLD: 16.3 % (ref 18–48)
MAGNESIUM SERPL-MCNC: 2 MG/DL (ref 1.6–2.6)
MCH RBC QN AUTO: 29.2 PG (ref 27–31)
MCHC RBC AUTO-ENTMCNC: 31.1 G/DL (ref 32–36)
MCV RBC AUTO: 94 FL (ref 82–98)
MONOCYTES # BLD AUTO: 1 K/UL (ref 0.3–1)
MONOCYTES NFR BLD: 7.2 % (ref 4–15)
NEUTROPHILS # BLD AUTO: 9.9 K/UL (ref 1.8–7.7)
NEUTROPHILS NFR BLD: 74.1 % (ref 38–73)
NRBC BLD-RTO: 0 /100 WBC
PCO2 BLDA: 31 MMHG (ref 35–45)
PH SMN: 7.54 [PH] (ref 7.35–7.45)
PHOSPHATE SERPL-MCNC: 2.7 MG/DL (ref 2.7–4.5)
PLATELET # BLD AUTO: 275 K/UL (ref 150–450)
PMV BLD AUTO: 11.7 FL (ref 9.2–12.9)
PO2 BLDA: 82 MMHG (ref 80–100)
POC BE: 4 MMOL/L
POC SATURATED O2: 97 % (ref 95–100)
POC TCO2: 27 MMOL/L (ref 23–27)
POTASSIUM SERPL-SCNC: 3.9 MMOL/L (ref 3.5–5.1)
PROT SERPL-MCNC: 6.9 G/DL (ref 6–8.4)
RBC # BLD AUTO: 4.66 M/UL (ref 4–5.4)
SAMPLE: ABNORMAL
SITE: ABNORMAL
SODIUM SERPL-SCNC: 147 MMOL/L (ref 136–145)
WBC # BLD AUTO: 13.37 K/UL (ref 3.9–12.7)

## 2023-05-16 PROCEDURE — 82803 BLOOD GASES ANY COMBINATION: CPT | Mod: FS

## 2023-05-16 PROCEDURE — 80053 COMPREHEN METABOLIC PANEL: CPT | Mod: FS | Performed by: PSYCHIATRY & NEUROLOGY

## 2023-05-16 PROCEDURE — 27000221 HC OXYGEN, UP TO 24 HOURS: Mod: FS

## 2023-05-16 PROCEDURE — 63600175 PHARM REV CODE 636 W HCPCS: Mod: FS | Performed by: PSYCHIATRY & NEUROLOGY

## 2023-05-16 PROCEDURE — 99233 PR SUBSEQUENT HOSPITAL CARE,LEVL III: ICD-10-PCS | Mod: ,,, | Performed by: PSYCHIATRY & NEUROLOGY

## 2023-05-16 PROCEDURE — 83735 ASSAY OF MAGNESIUM: CPT | Mod: FS | Performed by: PSYCHIATRY & NEUROLOGY

## 2023-05-16 PROCEDURE — 84100 ASSAY OF PHOSPHORUS: CPT | Mod: FS | Performed by: PSYCHIATRY & NEUROLOGY

## 2023-05-16 PROCEDURE — 25000003 PHARM REV CODE 250: Mod: FS

## 2023-05-16 PROCEDURE — 27200966 HC CLOSED SUCTION SYSTEM: Mod: FS

## 2023-05-16 PROCEDURE — 63600175 PHARM REV CODE 636 W HCPCS: Mod: FS | Performed by: PHYSICIAN ASSISTANT

## 2023-05-16 PROCEDURE — 36600 WITHDRAWAL OF ARTERIAL BLOOD: CPT | Mod: FS

## 2023-05-16 PROCEDURE — 99900035 HC TECH TIME PER 15 MIN (STAT): Mod: FS

## 2023-05-16 PROCEDURE — 99233 SBSQ HOSP IP/OBS HIGH 50: CPT | Mod: ,,, | Performed by: PSYCHIATRY & NEUROLOGY

## 2023-05-16 PROCEDURE — 85025 COMPLETE CBC W/AUTO DIFF WBC: CPT | Mod: FS | Performed by: PSYCHIATRY & NEUROLOGY

## 2023-05-16 PROCEDURE — 94003 VENT MGMT INPAT SUBQ DAY: CPT | Mod: FS

## 2023-05-16 PROCEDURE — 99900026 HC AIRWAY MAINTENANCE (STAT): Mod: FS

## 2023-05-16 PROCEDURE — 94761 N-INVAS EAR/PLS OXIMETRY MLT: CPT | Mod: FS

## 2023-05-16 PROCEDURE — 20000000 HC ICU ROOM: Mod: FS

## 2023-05-16 PROCEDURE — 97112 NEUROMUSCULAR REEDUCATION: CPT | Mod: FS

## 2023-05-16 RX ADMIN — SILODOSIN 4 MG: 4 CAPSULE ORAL at 09:05

## 2023-05-16 RX ADMIN — POLYETHYLENE GLYCOL 3350 17 G: 17 POWDER, FOR SOLUTION ORAL at 09:05

## 2023-05-16 RX ADMIN — OXYCODONE HYDROCHLORIDE 5 MG: 5 TABLET ORAL at 09:05

## 2023-05-16 RX ADMIN — AMLODIPINE BESYLATE 10 MG: 10 TABLET ORAL at 02:05

## 2023-05-16 RX ADMIN — OXYCODONE HYDROCHLORIDE 10 MG: 10 TABLET ORAL at 05:05

## 2023-05-16 RX ADMIN — SENNOSIDES AND DOCUSATE SODIUM 1 TABLET: 50; 8.6 TABLET ORAL at 09:05

## 2023-05-16 RX ADMIN — HEPARIN SODIUM 5000 UNITS: 5000 INJECTION INTRAVENOUS; SUBCUTANEOUS at 06:05

## 2023-05-16 RX ADMIN — FAMOTIDINE 20 MG: 20 TABLET ORAL at 09:05

## 2023-05-16 RX ADMIN — HEPARIN SODIUM 5000 UNITS: 5000 INJECTION INTRAVENOUS; SUBCUTANEOUS at 02:05

## 2023-05-16 RX ADMIN — ATORVASTATIN CALCIUM 40 MG: 40 TABLET, FILM COATED ORAL at 09:05

## 2023-05-16 RX ADMIN — HEPARIN SODIUM 5000 UNITS: 5000 INJECTION INTRAVENOUS; SUBCUTANEOUS at 09:05

## 2023-05-16 RX ADMIN — FENTANYL CITRATE 25 MCG: 50 INJECTION INTRAMUSCULAR; INTRAVENOUS at 03:05

## 2023-05-16 RX ADMIN — FENTANYL CITRATE 25 MCG: 50 INJECTION INTRAMUSCULAR; INTRAVENOUS at 12:05

## 2023-05-16 RX ADMIN — ACETAMINOPHEN 1000 MG: 500 TABLET ORAL at 09:05

## 2023-05-16 RX ADMIN — LISINOPRIL 40 MG: 20 TABLET ORAL at 09:05

## 2023-05-16 RX ADMIN — ONDANSETRON 4 MG: 2 INJECTION INTRAMUSCULAR; INTRAVENOUS at 05:05

## 2023-05-16 RX ADMIN — QUETIAPINE FUMARATE 25 MG: 25 TABLET ORAL at 09:05

## 2023-05-16 NOTE — PROGRESS NOTES
Patient was reporting pain at PEG site. PRN oxycodone given at 1720. Shortly after at 1738, patient vomited x2. PRN zofran given. TF held. PEG to low intermittent suction. Team aware and instructed to hold TF overnight, keep to low intermittent suction.

## 2023-05-16 NOTE — PLAN OF CARE
[facility] Care Plan    POC reviewed with Susan Pinto and family at 1400. Pt unable to verbalizednderstanding. Questions and concerns addressed with patient's spouse. No acute events today. Pt progressing toward goals. Will continue to monitor. See below and flowsheets for full assessment and VS info.     - trach/peg today  - difficulty controlling BP with PRNs. Pain related. Given PRN fentanyl with additional IVP per MAR  - straight cath x1      Is this a stroke patient? yes- Stroke booklet reviewed with patient and family, risk factors identified for patient and stroke booklet remains at bedside for ongoing education.     Neuro:  Naldo Coma Scale  Best Eye Response: 4-->(E4) spontaneous  Best Motor Response: 6-->(M6) obeys commands  Best Verbal Response: 1-->(V1) none  Naldo Coma Scale Score: 11  Assessment Qualifiers: patient intubated, no eye obstruction present  Pupil PERRLA: yes     24 hr Temp:  [97.9 °F (36.6 °C)-98.6 °F (37 °C)]     CV:   Rhythm: normal sinus rhythm  BP goals:   SBP < 160  MAP > 65    Resp:      Vent Mode: SIMV  Set Rate: 19 BPM  Oxygen Concentration (%): 50  Vt Set: 450 mL  PEEP/CPAP: 5 cmH20  Pressure Support: 5 cmH20    Plan: trach in place    GI/:     Diet/Nutrition Received: NPO  Last Bowel Movement: 05/15/23  Voiding Characteristics: external catheter    Intake/Output Summary (Last 24 hours) at 5/15/2023 1900  Last data filed at 5/15/2023 1036  Gross per 24 hour   Intake 2876.01 ml   Output 900 ml   Net 1976.01 ml     Unmeasured Output  Urine Occurrence: 1  Stool Occurrence: 1  Emesis Occurrence: 1  Pad Count: 1    Labs/Accuchecks:  Recent Labs   Lab 05/15/23  0204   WBC 11.83   RBC 4.79   HGB 13.9   HCT 46.0         Recent Labs   Lab 05/15/23  0204   *   K 3.7   CO2 23   *   BUN 17   CREATININE 0.7   ALKPHOS 59   ALT 11   AST 14   BILITOT 0.5    No results for input(s): PROTIME, INR, APTT, HEPANTIXA in the last 168 hours.   Recent Labs   Lab 05/11/23  0812    TROPONINI <0.006       Electrolytes: N/A - electrolytes WDL  Accuchecks: none    Gtts:   sodium chloride 0.9% 75 mL/hr at 05/15/23 0901       LDA/Wounds:  Lines/Drains/Airways       Drain  Duration                  NG/OG Tube 05/05/23 1445 orogastric 18 Fr. Center mouth 10 days    Female External Urinary Catheter 05/05/23 1718 10 days              Airway  Duration             Adult Surgical Airway 05/15/23 1105 Shiley Cuffed 8.0 / 85 mm <1 day              Peripheral Intravenous Line  Duration                  Peripheral IV - Single Lumen 05/15/23 0300 20 G Anterior;Left Forearm <1 day         Peripheral IV - Single Lumen 05/15/23 0933 20 G Right Hand <1 day                  Wounds: No  Wound care consulted: No

## 2023-05-16 NOTE — PT/OT/SLP PROGRESS
Occupational Therapy   Treatment    Name: Susan Pinto  MRN: [id number]  Admitting Diagnosis:  Nontraumatic intracerebral hemorrhage  1 Day Post-Op    Recommendations:     Discharge Recommendations:  (pending extubation)  Discharge Equipment Recommendations:   (pending extubation)  Barriers to discharge:  None    Assessment:     Susan Pinto is a 67 y.o. female with a medical diagnosis of Nontraumatic intracerebral hemorrhage.  She presents with performance deficits affecting function are weakness, impaired endurance, impaired self care skills, impaired functional mobility, impaired balance, decreased coordination.     Rehab Prognosis:  Good; patient would benefit from acute skilled OT services to address these deficits and reach maximum level of function.       Plan:     Patient to be seen 3 x/week to address the above listed problems via self-care/home management, therapeutic activities, therapeutic exercises, neuromuscular re-education  Plan of Care Expires: 06/03/23  Plan of Care Reviewed with: patient    Subjective     Patient:  Communicating via head nods.  Pain/Comfort:  Pain Rating 1: 0/10  Pain Rating Post-Intervention 1: 0/10    Objective:     Communicated with: Nurse prior to session.  Patient found supine with Tracheostomy, pulse ox (continuous), peripheral IV, PEG Tube, PureWick, bed alarm, blood pressure cuff upon OT entry to room.    General Precautions: Standard, aspiration, fall, NPO    Orthopedic Precautions:N/A  Braces: N/A  Respiratory Status:  trach     Occupational Performance:     Bed Mobility:    Patient completed Rolling/Turning to Left with  moderate assistance  Patient completed Rolling/Turning to Right with moderate assistance     Activities of Daily Living:  Feeding:  NPO    Grooming: moderate assistance while upright in bed    [facility] 6 Click ADL: 7    Treatment & Education:  Daily orientation provided.  KRISTENOM performed bilateral UE/LEs one set x 10 rep in all planes of motion.  Provided multi-sensory stimulation to prevent sensory deprivation and improve clinical outcomes.  Positioning provided for midline orientation with bilateral UEs elevated and heels lifted off mattress.  Patient alert and able to follow 4/4 one step commands.  Patient attentive and interactive throughout the session.        Patient left supine with all lines intact, call button in reach, and bed alarm on    GOALS:   Multidisciplinary Problems       Occupational Therapy Goals          Problem: Occupational Therapy    Goal Priority Disciplines Outcome Interventions   Occupational Therapy Goal     OT, PT/OT Ongoing, Progressing    Description: Goals set 5/6 to be addressed for 14 days with expiration date, 5/20:  Patient will increase functional independence with ADLs by performing:    Patient will demonstrate rolling to the right with SBA.  Not met   Patient will demonstrate rolling to the left with SBA.   Not met  Patient will demonstrate supine -sit with SBA.   Not met  Patient will demonstrate stand pivot transfers with SBA.   Not met  Patient will demonstrate grooming while standing with SBA.   Not met  Patient will demonstrate upper body dressing with SBA while seated EOB.   Not met  Patient will demonstrate lower body dressing with min assist while seated EOB.   Not met  Patient will demonstrate toileting with CGA.   Not met  Patient will demonstrate bathing while seated EOB with CGA.   Not met  Patient's family / caregiver will demonstrate independence and safety with assisting patient with self-care skills and functional mobility.     Not met  Patient and/or patient's family will verbalize understanding of stroke prevention guidelines, personal risk factors and stroke warning signs via teachback method.  Not met                                  Time Tracking:     OT Date of Treatment: 05/16/23  OT Start Time: 0426  OT Stop Time: 0449  OT Total Time (min): 23 min    Billable Minutes:Neuromuscular Re-education  23    OT/PEYTON: OT          5/16/2023

## 2023-05-16 NOTE — TREATMENT PLAN
General Surgery Update:   Patient seen and examined. Some oozing from tracheostomy overnight but this seems to be controlled now. Doing well on vent.     -no changes with trach for time being, let surgery know if continued bleeding  -PEG can be used for meds and feeds today  -surgery signing off, call with questions    Magdiel Benites, PGY V  Surgery

## 2023-05-16 NOTE — PLAN OF CARE
05/16/23 1427   Post-Acute Status   Post-Acute Authorization Placement   Post-Acute Placement Status Referrals Sent  (LTAC)     SW observed that the Pt received a trach/peg yesterday, 5/15. SW met with Pt  and another family member at bedside. Discussed LTAC as possible option and provided list. Discussed need to send referrals to obtain the accepting options from the list provided. Pt  agreeable and will review list for preferences asap.     SW sent referrals via [facility] to [facility], [facility], and [facility].     JADA advised by Lety with [facility] they have accepted this Pt pending preference and medical stability.     Ekta Arora, SHANIKA  Neurocritical Care   [facility]  [id number]

## 2023-05-16 NOTE — ASSESSMENT & PLAN NOTE
Remains intubated for bulbar weakness, s/p trach and PEG on 5/15  -active vent weaning daily   Vent Mode: Spont  Oxygen Concentration (%):  [40-50] 50  Resp Rate Total:  [19 br/min-40 br/min] 19 br/min  Vt Set:  [420 mL-450 mL] 420 mL  PEEP/CPAP:  [5 cmH20] 5 cmH20  Pressure Support:  [5 cmH20-8 cmH20] 8 cmH20  Mean Airway Pressure:  [6.9 toD03-31 cmH20] 13 cmH20

## 2023-05-16 NOTE — PLAN OF CARE
[facility] Care Plan    POC reviewed with Susan Pinto and family. Pt unable to verbalize understanding at this time related to tracheostomy. Questions and concerns addressed with patient's . No acute events today. Pt progressing toward goals. Will continue to monitor. See below and flowsheets for full assessment and VS info.     - Trach collar trial  - TF restarted  - 500 mL free water bolus given x1 per order  - 250 mL free water boluses added x4 daily      Is this a stroke patient? yes- Stroke booklet reviewed with patient and family, risk factors identified for patient and stroke booklet remains at bedside for ongoing education.     Neuro:  Spencer Coma Scale  Best Eye Response: 4-->(E4) spontaneous  Best Motor Response: 6-->(M6) obeys commands  Best Verbal Response: 1-->(V1) none  Spencer Coma Scale Score: 11  Assessment Qualifiers: patient intubated, no eye obstruction present  Pupil PERRLA: yes     24 hr Temp:  [98 °F (36.7 °C)-98.7 °F (37.1 °C)]     CV:   Rhythm: normal sinus rhythm  BP goals:   SBP < 160  MAP > 65    Resp:      Vent Mode: Spont  Set Rate: 12 BPM  Oxygen Concentration (%): 50  Vt Set: 420 mL  PEEP/CPAP: 5 cmH20  Pressure Support: 8 cmH20    Plan: trach in place    GI/:     Diet/Nutrition Received: tube feeding, NPO  Last Bowel Movement: 05/15/23  Voiding Characteristics: external catheter    Intake/Output Summary (Last 24 hours) at 5/16/2023 1156  Last data filed at 5/16/2023 1101  Gross per 24 hour   Intake 500 ml   Output 500 ml   Net 0 ml     Unmeasured Output  Urine Occurrence: 1  Stool Occurrence: 1  Emesis Occurrence: 1  Pad Count: 1    Labs/Accuchecks:  Recent Labs   Lab 05/16/23  0339   WBC 13.37*   RBC 4.66   HGB 13.6   HCT 43.8         Recent Labs   Lab 05/16/23  0339   *   K 3.9   CO2 19*   *   BUN 16   CREATININE 0.7   ALKPHOS 67   ALT 11   AST 14   BILITOT 0.5    No results for input(s): PROTIME, INR, APTT, HEPANTIXA in the last 168 hours.   Recent  Labs   Lab 05/11/23  0857   TROPONINI <0.006       Electrolytes: N/A - electrolytes WDL  Accuchecks: none    Gtts:      LDA/Wounds:  Lines/Drains/Airways       Drain  Duration             Female External Urinary Catheter 05/05/23 1718 10 days              Airway  Duration             Adult Surgical Airway 05/15/23 1105 Shiley Cuffed 8.0 / 85 mm 1 day              Peripheral Intravenous Line  Duration                  Peripheral IV - Single Lumen 05/15/23 0300 20 G Anterior;Left Forearm 1 day         Peripheral IV - Single Lumen 05/15/23 0933 20 G Right Hand 1 day                  Wounds: No  Wound care consulted: No

## 2023-05-16 NOTE — ASSESSMENT & PLAN NOTE
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 with edema surrounding  - MRI brain w/wo (5/6): possible DVA and concern for cavernous malformation  - left medullary hemorrhage vs cavernoma    Plan:  - NSGY following, no acute surgical intervention at this time   - OP f/u w NSGY in 4-6 weeks with a repeat MRI Brain w/wo contrast on 6/12   - Open intervention for cavernoma may be considered if findings are concordant on repeat imaging given first time hemorrhage, but will discuss in OP setting   - DSA likely not beneficial  - Neuro q4, sleep holiday, delirium precautions  - SBP< 160 per NSGY  - intubated for airway protection 2/2 copious secretions and weak cough, s/p T/P on 5/15  - Hold AC/AP  - Coags & CBC  - VN following  - PT/OT/SLP

## 2023-05-16 NOTE — PROGRESS NOTES
[facility] Neuro Critical [facility]  Neurocritical Care  Progress Note    Admit Date: 5/5/2023  Service Date: 05/16/2023  Length of Stay: 11    Subjective:     Chief Complaint: Nontraumatic intracerebral hemorrhage    History of Present Illness: Susan Pinto is a 67-year-old female with Hx of HTN presenting with altered mental status, tachycardia, left-sided facial droop, admitted for medullary ICH.   Patient started having vomiting that began around 7:00 p.m last night. On EMS arrival was noted to be hypoxic sat's in 80's with AMS, was intubated for airway protection. In ED at OSH CTh was done and was found to have a cervicomedullary junction ICH measuring approximately about 1.3 x 1.3 x 1.2 cm, also had a.fib with RVR Hr in the 150's that responded to BB. Was transferred to [facility] [place] care         Hospital Course: 05/06/2023 transient hypotension overnight improved with stopping propofol. MRI with possible DVA, will discuss need for DSA with IR. Endorsed tingling in BLE -> CTH stable, trial of HTS for possible edema. SBT as tolerated. 3 day Abx for UTI. New double vision -> repeat CTH stable.   05/07/2023 CO2 retention noted on ABG yesterday after SBT. Gas normalized with SIMV. Will reassess on spontaneous today. Discussed possibility of trach if she fails to wean off the vent. Still with copious secretions and no cough. Day 2/3 of CTX for GNR UTI. Tube feeds initiated.  05/08/2023 Pt remains on spontaneous vent settings with CO2 in normal range. T-piece trial unsuccessful yesterday. Cough reflex improved today.   05/09/2023 Exam stable. Continue trials of spontaneous ventilation.   05/10/2023 BP better controlled overnight. Will advance bowel regiment for constipation. Pt still not tolerating secretions, remains intubated.   05/11/2023 Patient with N/V overnight and new left shoulder/chest pain, troponin negative and EKG w/o ischemic changes. Constipation resolved.  05/12/2023 ana m, pt agreeable to early  trach/peg  5/13/2023 NAEO, plan for T/P Monday. Patient awake and following, would like to participate with PT/OT. Start SQH for DVT ppx  5/14/2023 NAEO, T/P tomorrow. Patient with elevated BP will add her home clonidine prn for SBP >160. Continue SIMV and SBT trials.   05/15/2023 T/P today. Will resume tube feeds via G-tube 24hrs post procedure.  05/16/2023 Some oozing blood from trach site overnight, clotted over by morning. Pain around PEG site improving.      Review of Systems   Unable to perform ROS: Other (tach connected to ventilator)     Objective:     Vitals:  Temp: 98.3 °F (36.8 °C)  Pulse: 88  Rhythm: normal sinus rhythm  BP: (!) 149/72  MAP (mmHg): 103  Resp: (!) 34  SpO2: 95 %  Oxygen Concentration (%): 50  Vent Mode: Spont  Set Rate: 12 BPM  Vt Set: 420 mL  Pressure Support: 8 cmH20  PEEP/CPAP: 5 cmH20  Peak Airway Pressure: 14 cmH20  Mean Airway Pressure: 13 cmH20  Plateau Pressure: 0 cmH20    Temp  Min: 98 °F (36.7 °C)  Max: 98.7 °F (37.1 °C)  Pulse  Min: 61  Max: 88  BP  Min: 94/55  Max: 169/76  MAP (mmHg)  Min: 68  Max: 115  Resp  Min: 19  Max: 38  SpO2  Min: 94 %  Max: 100 %  Oxygen Concentration (%)  Min: 40  Max: 50    05/15 0701 - 05/16 0700  In: 926.1 [I.V.:926.1]  Out: -    Unmeasured Output  Urine Occurrence: 1  Stool Occurrence: 1  Emesis Occurrence: 1  Pad Count: 1        Physical Exam  Vitals and nursing note reviewed.   Constitutional:       General: She is not in acute distress.     Appearance: Normal appearance. She is normal weight.   HENT:      Head: Normocephalic and atraumatic.      Mouth/Throat:      Mouth: Mucous membranes are moist.   Eyes:      Extraocular Movements: Extraocular movements intact.      Conjunctiva/sclera: Conjunctivae normal.      Pupils: Pupils are equal, round, and reactive to light.   Neck:      Comments: Tracheostomy with dried blood  Cardiovascular:      Rate and Rhythm: Normal rate and regular rhythm.   Pulmonary:      Effort: Pulmonary effort is normal.  No respiratory distress.   Abdominal:      General: Abdomen is flat. Bowel sounds are normal. There is no distension.      Palpations: Abdomen is soft.      Tenderness: There is abdominal tenderness (LUQ around PEG). There is no guarding.      Comments: PEG tube intact to LUQ, TTP, C/D/I   Musculoskeletal:         General: No swelling.   Skin:     General: Skin is warm and dry.      Coloration: Skin is not jaundiced.   Neurological:      Mental Status: She is alert.     Neuro   --GCS: E4 Vt1 M6  --Mental Status:  awake, oriented X4, follows commands, nods appropriately  --CN II-XII grossly intact.   --Pupils 3mm, PERRL.   --Corneal reflex, gag, cough intact.  --FLETCHER spont         Medications:  Continuous   ScheduledamLODIPine, 10 mg, Daily  atorvastatin, 40 mg, Daily  famotidine, 20 mg, BID  heparin (porcine), 5,000 Units, Q8H  lisinopriL, 40 mg, Daily  polyethylene glycol, 17 g, BID  QUEtiapine, 25 mg, QHS  senna-docusate 8.6-50 mg, 1 tablet, BID  silodosin, 4 mg, Daily    PRNacetaminophen, 1,000 mg, Q8H PRN  benzocaine, , QID PRN  bisacodyL, 10 mg, Daily PRN  cloNIDine, 0.1 mg, Q8H PRN  fentaNYL, 25 mcg, Q4H PRN  hydrALAZINE, 10 mg, Q6H PRN  labetalol, 10 mg, Q4H PRN  magnesium oxide, 800 mg, PRN  magnesium oxide, 800 mg, PRN  ondansetron, 4 mg, Q8H PRN  oxyCODONE, 5 mg, Q6H PRN  oxyCODONE, 10 mg, Q6H PRN  potassium bicarbonate, 35 mEq, PRN  potassium bicarbonate, 50 mEq, PRN  potassium bicarbonate, 60 mEq, PRN  potassium, sodium phosphates, 2 packet, PRN  potassium, sodium phosphates, 2 packet, PRN  potassium, sodium phosphates, 2 packet, PRN  sodium chloride 0.9%, 10 mL, PRN  sucralfate, 1 g, Q6H PRN      Review of Systems   Unable to perform ROS: Other (tach connected to ventilator)     Assessment/Plan:     Neuro  * Nontraumatic intracerebral hemorrhage  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 with edema surrounding  - MRI brain w/wo (5/6): possible  DVA and concern for cavernous malformation  - left medullary hemorrhage vs cavernoma    Plan:  - NSGY following, no acute surgical intervention at this time   - OP f/u w NSGY in 4-6 weeks with a repeat MRI Brain w/wo contrast on 6/12   - Open intervention for cavernoma may be considered if findings are concordant on repeat imaging given first time hemorrhage, but will discuss in OP setting   - DSA likely not beneficial  - Neuro q4, sleep holiday, delirium precautions  - SBP< 160 per NSGY  - intubated for airway protection 2/2 copious secretions and weak cough, s/p T/P on 5/15  - Hold AC/AP  - Coags & CBC  - VN following  - PT/OT/SLP    Vasogenic cerebral edema  See ICH    Pulmonary  On mechanically assisted ventilation  Remains intubated for bulbar weakness, s/p trach and PEG on 5/15  -active vent weaning daily   Vent Mode: Spont  Oxygen Concentration (%):  [40-50] 50  Resp Rate Total:  [19 br/min-40 br/min] 19 br/min  Vt Set:  [420 mL-450 mL] 420 mL  PEEP/CPAP:  [5 cmH20] 5 cmH20  Pressure Support:  [5 cmH20-8 cmH20] 8 cmH20  Mean Airway Pressure:  [6.9 qpW68-02 cmH20] 13 cmH20      Cardiac/Vascular  Hypertriglyceridemia  Triglycerides elevated to 929 on admission, started on niacin and fenofibrate  Likely 2/2 propofol infusion as TGL have since normalized    - discontinue niacin  - discontinue fenofibrate  - discontinue TGL draws  - follow up with PCP outpatient on discharge     RESOLVED     Primary hypertension  Home medications: amlodipine 5mg daily, lisinopril 40mg daily, and prn clonidine 0.1mg BID PRN for BP > 150/100    - goal SBP < 160  - continue home amlodipine at higher dose to 10mg daily  - continue home lisinopril 40mg daily  - prn labetalol and hydralazine  -prn clonidine po     Paroxysmal atrial fibrillation  New onset at OSH, responded to BB  - initial EKG with A-fib RVR, repeat showed NSR    EKG  Monitor for now  Hold A/C    Mixed hyperlipidemia  Atorvastatin 40mg daily    Renal/  Urinary  retention  Continue silodosin  Prn straight cath          The patient is being Prophylaxed for:  Venous Thromboembolism with: Mechanical or Chemical  Stress Ulcer with: H2B  Ventilator Pneumonia with: chlorhexidine oral care    Activity Orders          Elevate HOB starting at 05/15 1411    Diet NPO: NPO starting at 05/15 0001    Turn patient starting at 05/05 1800        Full Code    Gilma Morris MD  Neurocritical Care  [facility] - Neuro Critical Care

## 2023-05-16 NOTE — PLAN OF CARE
[facility] Care Plan    POC reviewed with Susan Pinto and family at 0300. Pt verbalized understanding. Questions and concerns addressed. No acute events overnight. Pt progressing toward goals. Will continue to monitor. See below and flowsheets for full assessment and VS info.     -Trach/ peg in place   - Trach site clotted, trach care avoided, see note  - Peg to drainage  - Linens changed      Is this a stroke patient? yes- Stroke booklet reviewed with patient and family, risk factors identified for patient and stroke booklet remains at bedside for ongoing education.     Neuro:  Naldo Coma Scale  Best Eye Response: 4-->(E4) spontaneous  Best Motor Response: 6-->(M6) obeys commands  Best Verbal Response: 1-->(V1) none  Naldo Coma Scale Score: 11  Assessment Qualifiers: patient intubated, no eye obstruction present  Pupil PERRLA: yes     24hr Temp:  [98 °F (36.7 °C)-98.7 °F (37.1 °C)]     CV:   Rhythm: normal sinus rhythm  BP goals:   SBP < 160  MAP > 65    Resp:      Vent Mode: SIMV  Set Rate: 12 BPM  Oxygen Concentration (%): 50  Vt Set: 420 mL  PEEP/CPAP: 5 cmH20  Pressure Support: 5 cmH20    Plan: trach in place    GI/:     Diet/Nutrition Received: tube feeding, NPO  Last Bowel Movement: 05/15/23  Voiding Characteristics: external catheter    Intake/Output Summary (Last 24 hours) at 5/16/2023 0601  Last data filed at 5/15/2023 1036  Gross per 24 hour   Intake 926.1 ml   Output --   Net 926.1 ml     Unmeasured Output  Urine Occurrence: 1  Stool Occurrence: 1  Emesis Occurrence: 1  Pad Count: 1    Labs/Accuchecks:  Recent Labs   Lab 05/15/23  0204   WBC 11.83   RBC 4.79   HGB 13.9   HCT 46.0         Recent Labs   Lab 05/15/23  0204   *   K 3.7   CO2 23   *   BUN 17   CREATININE 0.7   ALKPHOS 59   ALT 11   AST 14   BILITOT 0.5    No results for input(s): PROTIME, INR, APTT, HEPANTIXA in the last 168 hours.   Recent Labs   Lab 05/11/23  0857   TROPONINI <0.006       Electrolytes: N/A -  electrolytes WDL  Accuchecks: none    Gtts:   sodium chloride 0.9% 75 mL/hr at 05/15/23 0901       LDA/Wounds:  Lines/Drains/Airways       Drain  Duration             Female External Urinary Catheter 05/05/23 1718 10 days              Airway  Duration             Adult Surgical Airway 05/15/23 1105 Shiley Cuffed 8.0 / 85 mm <1 day              Peripheral Intravenous Line  Duration                  Peripheral IV - Single Lumen 05/15/23 0300 20 G Anterior;Left Forearm 1 day         Peripheral IV - Single Lumen 05/15/23 0933 20 G Right Hand <1 day                  Wounds: Yes  Wound care consulted: No

## 2023-05-16 NOTE — SUBJECTIVE & OBJECTIVE
Review of Systems   Unable to perform ROS: Other (tach connected to ventilator)     Objective:     Vitals:  Temp: 98.3 °F (36.8 °C)  Pulse: 88  Rhythm: normal sinus rhythm  BP: (!) 149/72  MAP (mmHg): 103  Resp: (!) 34  SpO2: 95 %  Oxygen Concentration (%): 50  Vent Mode: Spont  Set Rate: 12 BPM  Vt Set: 420 mL  Pressure Support: 8 cmH20  PEEP/CPAP: 5 cmH20  Peak Airway Pressure: 14 cmH20  Mean Airway Pressure: 13 cmH20  Plateau Pressure: 0 cmH20    Temp  Min: 98 °F (36.7 °C)  Max: 98.7 °F (37.1 °C)  Pulse  Min: 61  Max: 88  BP  Min: 94/55  Max: 169/76  MAP (mmHg)  Min: 68  Max: 115  Resp  Min: 19  Max: 38  SpO2  Min: 94 %  Max: 100 %  Oxygen Concentration (%)  Min: 40  Max: 50    05/15 0701 - 05/16 0700  In: 926.1 [I.V.:926.1]  Out: -    Unmeasured Output  Urine Occurrence: 1  Stool Occurrence: 1  Emesis Occurrence: 1  Pad Count: 1        Physical Exam  Vitals and nursing note reviewed.   Constitutional:       General: She is not in acute distress.     Appearance: Normal appearance. She is normal weight.   HENT:      Head: Normocephalic and atraumatic.      Mouth/Throat:      Mouth: Mucous membranes are moist.   Eyes:      Extraocular Movements: Extraocular movements intact.      Conjunctiva/sclera: Conjunctivae normal.      Pupils: Pupils are equal, round, and reactive to light.   Neck:      Comments: Tracheostomy with dried blood  Cardiovascular:      Rate and Rhythm: Normal rate and regular rhythm.   Pulmonary:      Effort: Pulmonary effort is normal. No respiratory distress.   Abdominal:      General: Abdomen is flat. Bowel sounds are normal. There is no distension.      Palpations: Abdomen is soft.      Tenderness: There is abdominal tenderness (LUQ around PEG). There is no guarding.      Comments: PEG tube intact to LUQ, TTP, C/D/I   Musculoskeletal:         General: No swelling.   Skin:     General: Skin is warm and dry.      Coloration: Skin is not jaundiced.   Neurological:      Mental Status: She is  alert.     Neuro   --GCS: E4 Vt1 M6  --Mental Status:  awake, oriented X4, follows commands, nods appropriately  --CN II-XII grossly intact.   --Pupils 3mm, PERRL.   --Corneal reflex, gag, cough intact.  --FLETCHER spont         Medications:  Continuous   ScheduledamLODIPine, 10 mg, Daily  atorvastatin, 40 mg, Daily  famotidine, 20 mg, BID  heparin (porcine), 5,000 Units, Q8H  lisinopriL, 40 mg, Daily  polyethylene glycol, 17 g, BID  QUEtiapine, 25 mg, QHS  senna-docusate 8.6-50 mg, 1 tablet, BID  silodosin, 4 mg, Daily    PRNacetaminophen, 1,000 mg, Q8H PRN  benzocaine, , QID PRN  bisacodyL, 10 mg, Daily PRN  cloNIDine, 0.1 mg, Q8H PRN  fentaNYL, 25 mcg, Q4H PRN  hydrALAZINE, 10 mg, Q6H PRN  labetalol, 10 mg, Q4H PRN  magnesium oxide, 800 mg, PRN  magnesium oxide, 800 mg, PRN  ondansetron, 4 mg, Q8H PRN  oxyCODONE, 5 mg, Q6H PRN  oxyCODONE, 10 mg, Q6H PRN  potassium bicarbonate, 35 mEq, PRN  potassium bicarbonate, 50 mEq, PRN  potassium bicarbonate, 60 mEq, PRN  potassium, sodium phosphates, 2 packet, PRN  potassium, sodium phosphates, 2 packet, PRN  potassium, sodium phosphates, 2 packet, PRN  sodium chloride 0.9%, 10 mL, PRN  sucralfate, 1 g, Q6H PRN      Review of Systems   Unable to perform ROS: Other (tach connected to ventilator)

## 2023-05-16 NOTE — RESPIRATORY THERAPY
Trach bleeding through out shift. Clot forming and bleeding subsiding. Per Sheard NP no dressing change or cleaning until mid day.

## 2023-05-17 ENCOUNTER — TELEPHONE (OUTPATIENT)
Dept: NEUROSURGERY | Facility: CLINIC | Age: 68
End: 2023-05-17
Payer: MEDICARE

## 2023-05-17 VITALS
OXYGEN SATURATION: 100 % | RESPIRATION RATE: 29 BRPM | TEMPERATURE: 99 F | BODY MASS INDEX: 35.85 KG/M2 | WEIGHT: 210 LBS | HEART RATE: 86 BPM | HEIGHT: 64 IN | SYSTOLIC BLOOD PRESSURE: 157 MMHG | DIASTOLIC BLOOD PRESSURE: 74 MMHG

## 2023-05-17 LAB
ALBUMIN SERPL BCP-MCNC: 2.8 G/DL (ref 3.5–5.2)
ALLENS TEST: ABNORMAL
ALP SERPL-CCNC: 64 U/L (ref 55–135)
ALT SERPL W/O P-5'-P-CCNC: 11 U/L (ref 10–44)
ANION GAP SERPL CALC-SCNC: 16 MMOL/L (ref 8–16)
AST SERPL-CCNC: 15 U/L (ref 10–40)
BASOPHILS # BLD AUTO: 0.05 K/UL (ref 0–0.2)
BASOPHILS # BLD AUTO: 0.05 K/UL (ref 0–0.2)
BASOPHILS NFR BLD: 0.3 % (ref 0–1.9)
BASOPHILS NFR BLD: 0.3 % (ref 0–1.9)
BILIRUB SERPL-MCNC: 0.5 MG/DL (ref 0.1–1)
BUN SERPL-MCNC: 19 MG/DL (ref 8–23)
CALCIUM SERPL-MCNC: 10.8 MG/DL (ref 8.7–10.5)
CHLORIDE SERPL-SCNC: 110 MMOL/L (ref 95–110)
CO2 SERPL-SCNC: 21 MMOL/L (ref 23–29)
CREAT SERPL-MCNC: 0.8 MG/DL (ref 0.5–1.4)
DELSYS: ABNORMAL
DIFFERENTIAL METHOD: ABNORMAL
DIFFERENTIAL METHOD: ABNORMAL
EOSINOPHIL # BLD AUTO: 0 K/UL (ref 0–0.5)
EOSINOPHIL # BLD AUTO: 0.1 K/UL (ref 0–0.5)
EOSINOPHIL NFR BLD: 0.3 % (ref 0–8)
EOSINOPHIL NFR BLD: 0.4 % (ref 0–8)
ERYTHROCYTE [DISTWIDTH] IN BLOOD BY AUTOMATED COUNT: 13.4 % (ref 11.5–14.5)
ERYTHROCYTE [DISTWIDTH] IN BLOOD BY AUTOMATED COUNT: 13.5 % (ref 11.5–14.5)
EST. GFR  (NO RACE VARIABLE): >60 ML/MIN/1.73 M^2
FIO2: 50
GLUCOSE SERPL-MCNC: 99 MG/DL (ref 70–110)
HCO3 UR-SCNC: 26 MMOL/L (ref 24–28)
HCT VFR BLD AUTO: 41.8 % (ref 37–48.5)
HCT VFR BLD AUTO: 44.1 % (ref 37–48.5)
HGB BLD-MCNC: 12.9 G/DL (ref 12–16)
HGB BLD-MCNC: 13.5 G/DL (ref 12–16)
IMM GRANULOCYTES # BLD AUTO: 0.08 K/UL (ref 0–0.04)
IMM GRANULOCYTES # BLD AUTO: 0.08 K/UL (ref 0–0.04)
IMM GRANULOCYTES NFR BLD AUTO: 0.5 % (ref 0–0.5)
IMM GRANULOCYTES NFR BLD AUTO: 0.6 % (ref 0–0.5)
LYMPHOCYTES # BLD AUTO: 1.5 K/UL (ref 1–4.8)
LYMPHOCYTES # BLD AUTO: 1.8 K/UL (ref 1–4.8)
LYMPHOCYTES NFR BLD: 12.4 % (ref 18–48)
LYMPHOCYTES NFR BLD: 9 % (ref 18–48)
MAGNESIUM SERPL-MCNC: 2 MG/DL (ref 1.6–2.6)
MCH RBC QN AUTO: 28.5 PG (ref 27–31)
MCH RBC QN AUTO: 28.7 PG (ref 27–31)
MCHC RBC AUTO-ENTMCNC: 30.6 G/DL (ref 32–36)
MCHC RBC AUTO-ENTMCNC: 30.9 G/DL (ref 32–36)
MCV RBC AUTO: 93 FL (ref 82–98)
MCV RBC AUTO: 93 FL (ref 82–98)
MODE: ABNORMAL
MONOCYTES # BLD AUTO: 1.1 K/UL (ref 0.3–1)
MONOCYTES # BLD AUTO: 1.1 K/UL (ref 0.3–1)
MONOCYTES NFR BLD: 6.6 % (ref 4–15)
MONOCYTES NFR BLD: 7.6 % (ref 4–15)
NEUTROPHILS # BLD AUTO: 11.4 K/UL (ref 1.8–7.7)
NEUTROPHILS # BLD AUTO: 14 K/UL (ref 1.8–7.7)
NEUTROPHILS NFR BLD: 78.8 % (ref 38–73)
NEUTROPHILS NFR BLD: 83.2 % (ref 38–73)
NRBC BLD-RTO: 0 /100 WBC
NRBC BLD-RTO: 0 /100 WBC
PCO2 BLDA: 40.6 MMHG (ref 35–45)
PEEP: 50
PH SMN: 7.41 [PH] (ref 7.35–7.45)
PHOSPHATE SERPL-MCNC: 3.5 MG/DL (ref 2.7–4.5)
PLATELET # BLD AUTO: 281 K/UL (ref 150–450)
PLATELET # BLD AUTO: 285 K/UL (ref 150–450)
PMV BLD AUTO: 10.8 FL (ref 9.2–12.9)
PMV BLD AUTO: 11.4 FL (ref 9.2–12.9)
PO2 BLDA: 106 MMHG (ref 80–100)
POC BE: 1 MMOL/L
POC SATURATED O2: 98 % (ref 95–100)
POC TCO2: 27 MMOL/L (ref 23–27)
POTASSIUM SERPL-SCNC: 3.5 MMOL/L (ref 3.5–5.1)
PROT SERPL-MCNC: 6.9 G/DL (ref 6–8.4)
PS: 8
RBC # BLD AUTO: 4.49 M/UL (ref 4–5.4)
RBC # BLD AUTO: 4.73 M/UL (ref 4–5.4)
SAMPLE: ABNORMAL
SITE: ABNORMAL
SODIUM SERPL-SCNC: 147 MMOL/L (ref 136–145)
SP02: 100
SPONT RATE: 12
WBC # BLD AUTO: 14.41 K/UL (ref 3.9–12.7)
WBC # BLD AUTO: 16.85 K/UL (ref 3.9–12.7)

## 2023-05-17 PROCEDURE — 36600 WITHDRAWAL OF ARTERIAL BLOOD: CPT | Mod: FS

## 2023-05-17 PROCEDURE — 25000003 PHARM REV CODE 250: Mod: FS

## 2023-05-17 PROCEDURE — 99239 PR HOSPITAL DISCHARGE DAY,>30 MIN: ICD-10-PCS | Mod: ,,, | Performed by: PSYCHIATRY & NEUROLOGY

## 2023-05-17 PROCEDURE — 99900035 HC TECH TIME PER 15 MIN (STAT): Mod: FS

## 2023-05-17 PROCEDURE — 80053 COMPREHEN METABOLIC PANEL: CPT | Mod: FS | Performed by: PSYCHIATRY & NEUROLOGY

## 2023-05-17 PROCEDURE — 25000003 PHARM REV CODE 250: Mod: FS | Performed by: PSYCHIATRY & NEUROLOGY

## 2023-05-17 PROCEDURE — 94003 VENT MGMT INPAT SUBQ DAY: CPT | Mod: FS

## 2023-05-17 PROCEDURE — 83735 ASSAY OF MAGNESIUM: CPT | Mod: FS | Performed by: PSYCHIATRY & NEUROLOGY

## 2023-05-17 PROCEDURE — 27200966 HC CLOSED SUCTION SYSTEM: Mod: FS

## 2023-05-17 PROCEDURE — 63600175 PHARM REV CODE 636 W HCPCS: Mod: FS | Performed by: PHYSICIAN ASSISTANT

## 2023-05-17 PROCEDURE — 82803 BLOOD GASES ANY COMBINATION: CPT | Mod: FS

## 2023-05-17 PROCEDURE — 84100 ASSAY OF PHOSPHORUS: CPT | Mod: FS | Performed by: PSYCHIATRY & NEUROLOGY

## 2023-05-17 PROCEDURE — 85025 COMPLETE CBC W/AUTO DIFF WBC: CPT | Mod: 91 | Performed by: NURSE PRACTITIONER

## 2023-05-17 PROCEDURE — 99900026 HC AIRWAY MAINTENANCE (STAT)

## 2023-05-17 PROCEDURE — 99239 HOSP IP/OBS DSCHRG MGMT >30: CPT | Mod: ,,, | Performed by: PSYCHIATRY & NEUROLOGY

## 2023-05-17 PROCEDURE — 94761 N-INVAS EAR/PLS OXIMETRY MLT: CPT | Mod: FS

## 2023-05-17 PROCEDURE — 85025 COMPLETE CBC W/AUTO DIFF WBC: CPT | Mod: FS | Performed by: PSYCHIATRY & NEUROLOGY

## 2023-05-17 PROCEDURE — 63600175 PHARM REV CODE 636 W HCPCS: Mod: FS | Performed by: PSYCHIATRY & NEUROLOGY

## 2023-05-17 PROCEDURE — 27000221 HC OXYGEN, UP TO 24 HOURS: Mod: FS

## 2023-05-17 PROCEDURE — 97535 SELF CARE MNGMENT TRAINING: CPT | Mod: FS

## 2023-05-17 PROCEDURE — 97112 NEUROMUSCULAR REEDUCATION: CPT | Mod: FS

## 2023-05-17 RX ORDER — LISINOPRIL 40 MG/1
40 TABLET ORAL DAILY
Qty: 90 TABLET | Refills: 3 | Status: SHIPPED | OUTPATIENT
Start: 2023-05-18 | End: 2023-07-25

## 2023-05-17 RX ORDER — AMLODIPINE BESYLATE 10 MG/1
10 TABLET ORAL DAILY
Qty: 30 TABLET | Refills: 11
Start: 2023-05-17 | End: 2023-07-25

## 2023-05-17 RX ORDER — ATORVASTATIN CALCIUM 40 MG/1
40 TABLET, FILM COATED ORAL DAILY
Qty: 90 TABLET | Refills: 3
Start: 2023-05-18 | End: 2023-07-25

## 2023-05-17 RX ORDER — CLONIDINE HYDROCHLORIDE 0.1 MG/1
0.1 TABLET ORAL EVERY 8 HOURS PRN
Start: 2023-05-17 | End: 2023-07-25

## 2023-05-17 RX ORDER — FAMOTIDINE 20 MG/1
20 TABLET, FILM COATED ORAL 2 TIMES DAILY
Qty: 60 TABLET | Refills: 11
Start: 2023-05-17 | End: 2023-07-25

## 2023-05-17 RX ORDER — QUETIAPINE FUMARATE 25 MG/1
25 TABLET, FILM COATED ORAL NIGHTLY
Qty: 30 TABLET | Refills: 11
Start: 2023-05-17 | End: 2023-07-25

## 2023-05-17 RX ADMIN — POTASSIUM BICARBONATE 50 MEQ: 978 TABLET, EFFERVESCENT ORAL at 06:05

## 2023-05-17 RX ADMIN — HEPARIN SODIUM 5000 UNITS: 5000 INJECTION INTRAVENOUS; SUBCUTANEOUS at 06:05

## 2023-05-17 RX ADMIN — FENTANYL CITRATE 25 MCG: 50 INJECTION INTRAMUSCULAR; INTRAVENOUS at 02:05

## 2023-05-17 RX ADMIN — ONDANSETRON 4 MG: 2 INJECTION INTRAMUSCULAR; INTRAVENOUS at 11:05

## 2023-05-17 RX ADMIN — FENTANYL CITRATE 25 MCG: 50 INJECTION INTRAMUSCULAR; INTRAVENOUS at 06:05

## 2023-05-17 RX ADMIN — FAMOTIDINE 20 MG: 20 TABLET ORAL at 08:05

## 2023-05-17 RX ADMIN — ACETAMINOPHEN 1000 MG: 500 TABLET ORAL at 06:05

## 2023-05-17 RX ADMIN — HEPARIN SODIUM 5000 UNITS: 5000 INJECTION INTRAVENOUS; SUBCUTANEOUS at 01:05

## 2023-05-17 RX ADMIN — LISINOPRIL 40 MG: 20 TABLET ORAL at 08:05

## 2023-05-17 RX ADMIN — SILODOSIN 4 MG: 4 CAPSULE ORAL at 08:05

## 2023-05-17 RX ADMIN — SENNOSIDES AND DOCUSATE SODIUM 1 TABLET: 50; 8.6 TABLET ORAL at 08:05

## 2023-05-17 RX ADMIN — ONDANSETRON 4 MG: 2 INJECTION INTRAMUSCULAR; INTRAVENOUS at 06:05

## 2023-05-17 RX ADMIN — POLYETHYLENE GLYCOL 3350 17 G: 17 POWDER, FOR SOLUTION ORAL at 08:05

## 2023-05-17 RX ADMIN — AMLODIPINE BESYLATE 10 MG: 10 TABLET ORAL at 01:05

## 2023-05-17 RX ADMIN — ATORVASTATIN CALCIUM 40 MG: 40 TABLET, FILM COATED ORAL at 08:05

## 2023-05-17 NOTE — PT/OT/SLP PROGRESS
"Occupational Therapy   Treatment    Name: Susan Pinto  MRN: [id number]  Admitting Diagnosis:  Nontraumatic intracerebral hemorrhage  2 Days Post-Op    Recommendations:     Discharge Recommendations: LTACH (long-term acute care hospital)  Discharge Equipment Recommendations:  hospital bed  Barriers to discharge:  None    Assessment:     Susan Pinto is a 67 y.o. female with a medical diagnosis of Nontraumatic intracerebral hemorrhage.  She presents with performance deficits affecting function are weakness, impaired endurance, impaired self care skills, impaired functional mobility, impaired balance, decreased coordination, decreased upper extremity function, decreased ROM, impaired coordination.     Rehab Prognosis:  Good; patient would benefit from acute skilled OT services to address these deficits and reach maximum level of function.       Plan:     Patient to be seen 3 x/week to address the above listed problems via self-care/home management, therapeutic activities, therapeutic exercises, neuromuscular re-education, sensory integration  Plan of Care Expires: 05/22/23  Plan of Care Reviewed with: patient    Subjective     Patient:  Patient communicating via writing:  "I have been laying for a long time."  Pain/Comfort:  Pain Rating 1: 0/10  Pain Rating Post-Intervention 1: 0/10    Objective:     Communicated with: Nurse prior to session.  Patient found supine with Tracheostomy, telemetry, pulse ox (continuous), PEG Tube upon OT entry to room.    General Precautions: Standard, aspiration, fall, NPO    Orthopedic Precautions:N/A  Braces: N/A  Respiratory Status:  trach     Occupational Performance:     Bed Mobility:    Patient completed Rolling/Turning to Left with  maximal assistance  Patient completed Rolling/Turning to Right with maximal assistance  Patient completed Supine to Sit with max assistance  Patient completed Sit to Supine with maximal assistance     Activities of Daily Living:  Feeding:  NPO "    Grooming: maximal assistance while seated EOB    [facility] 6 Click ADL: 7    Treatment & Education:  Daily orientation provided.  AAROM performed bilateral UE/LEs one set x 10 rep in all planes of motion. Max assist with postural control while seated EOB.  Provided multi-sensory stimulation to prevent sensory deprivation and improve clinical outcomes.  Positioning provided for midline orientation with bilateral UEs elevated and heels lifted off mattress.  Patient alert and able to follow 4/4 one step commands.  Patient attentive and interactive throughout the session.        Patient left supine with all lines intact, call button in reach, and bed alarm on    GOALS:   Multidisciplinary Problems       Occupational Therapy Goals          Problem: Occupational Therapy    Goal Priority Disciplines Outcome Interventions   Occupational Therapy Goal     OT, PT/OT Ongoing, Progressing    Description: Goals set 5/6 to be addressed for 14 days with expiration date, 5/20:  Patient will increase functional independence with ADLs by performing:    Patient will demonstrate rolling to the right with SBA.  Not met   Patient will demonstrate rolling to the left with SBA.   Not met  Patient will demonstrate supine -sit with SBA.   Not met  Patient will demonstrate stand pivot transfers with SBA.   Not met  Patient will demonstrate grooming while standing with SBA.   Not met  Patient will demonstrate upper body dressing with SBA while seated EOB.   Not met  Patient will demonstrate lower body dressing with min assist while seated EOB.   Not met  Patient will demonstrate toileting with CGA.   Not met  Patient will demonstrate bathing while seated EOB with CGA.   Not met  Patient's family / caregiver will demonstrate independence and safety with assisting patient with self-care skills and functional mobility.     Not met  Patient and/or patient's family will verbalize understanding of stroke prevention guidelines, personal risk factors  and stroke warning signs via teachback method.  Not met                                  Time Tracking:     OT Date of Treatment: 05/17/23  OT Start Time: 0525  OT Stop Time: 0549  OT Total Time (min): 24 min    Billable Minutes:Self Care/Home Management 12  Neuromuscular Re-education 12    OT/PEYTON: OT          5/17/2023

## 2023-05-17 NOTE — PLAN OF CARE
JADA advised by Rosio with [facility] that they have accepted this Pt and are on their way to [facility] to meet with Pt and Pt .    JADA advised by MD team Pt is ready for placement today. Per , he wants [facility].     JADA advised Lety with [facility] of family preference, then contacted Rosio to report. They can accept her and admit today.     Ekta Arora LCSW  Neurocritical Care   [facility]  [id number]

## 2023-05-17 NOTE — PLAN OF CARE
Per Valerie with [facility] [phone], the patient is accepted to their facility and can go today if medically ready.  Per Tea with [facility], they will round on the patient and let CM know if patient is ready today.     Sarai Cardoso RN, CCRN-K, [facility]  Neuro-Critical Care   [phone]

## 2023-05-17 NOTE — NURSING
"Right after giving 75 ml of water bolus ordered, pt had all of a sudden nausea and emesis via mouth and trach. Tube feeds held. Residual from PEG only 10ml. [facility] Tea NP at bedside assessing pt. Pt's saturations remains 98%. Pt reports relief of nausea after emesis mouthing that it "burns." STAT KUB ordered. WCTM.     1134- [facility] Gilma LICONA notified that pt continues to have some tube feed like coming from trach site. Decision made to place pt back on ventilator. Zofran ordered and given. VSS. Pt reporting that its the oral care that likely makes her nauseated. WCTM.   "

## 2023-05-17 NOTE — PLAN OF CARE
[facility] - Neuro Critical Care  Discharge Final Note    Primary Care Provider: Court Champion NP    Expected Discharge Date: 5/19/2023    MD team placed orders for LTAC. Per Rosio with [facility], they can accept Pt. She will go to room 109 and RN can call report to: [phone] at 3pm. Advised RN at bedside and provided [place] envelope. Advised Pt  at bedside. PFC orders placed.     Final Discharge Note (most recent)       Final Note - 05/17/23 1431          Final Note    Assessment Type Final Discharge Note (P)      Anticipated Discharge Disposition Long Term Care (P)    [facility] Resources/Appts/Education Provided Post-Acute resouces added to AVS (P)         Post-Acute Status    Post-Acute Authorization Placement (P)      Post-Acute Placement Status Set-up Complete/Auth obtained (P)    [facility]    Discharge Delays None known at this time (P)                      Important Message from Medicare             Contact Info       [facility] - Neurosurgery [address]th Fl   Specialty: Neurosurgery    [address]  [facility] [address]   Phone: [phone]       Next Steps: Follow up          Ekta Arora LCSW  Neurocritical Care   [facility]  [phone]

## 2023-05-17 NOTE — NURSING
Attempted to call report [facility] ([phone]). They stated they would have to call me back. JERILYN.

## 2023-05-17 NOTE — NURSING
Patient with increased work of breathing and tachypnea. Patient nodding appropriately to wanting to be put back on the vent to rest for the night. RT notified and NSCC. Will continue to monitor.

## 2023-05-17 NOTE — ASSESSMENT & PLAN NOTE
Remains intubated for bulbar weakness, s/p trach and PEG on 5/15  -active vent weaning daily   Vent Mode: Spont  Oxygen Concentration (%):  [50] 50  Resp Rate Total:  [10 br/min-33 br/min] 28 br/min  PEEP/CPAP:  [5 cmH20] 5 cmH20  Pressure Support:  [8 cmH20] 8 cmH20  Mean Airway Pressure:  [6.5 ptT17-09 cmH20] 8.5 cmH20

## 2023-05-17 NOTE — PLAN OF CARE
[facility] Care Plan    POC reviewed with Susan Pinto and family at 1700. Pt nodded appropriately. Pt  verbalized understanding. Questions and concerns addressed.  Pt progressing toward goals. Will continue to monitor. See below and flowsheets for full assessment and VS info.   -[facility]waiting [facility]   -See nursing notes     Is this a stroke patient? yes- Stroke booklet reviewed with family, risk factors identified for patient and stroke booklet remains at bedside for ongoing education.     Neuro:  [place] Coma Scale  Best Eye Response: 4-->(E4) spontaneous  Best Motor Response: 6-->(M6) obeys commands  Best Verbal Response: 1-->(V1) none  Day[place] Coma Scale Score: 11  Assessment Qualifiers: patient intubated  Pupil PERRLA: yes     24 hr Temp:  [98.2 °F (36.8 °C)-99.2 °F (37.3 °C)]     CV:   Rhythm: normal sinus rhythm  BP goals:   SBP < 160  MAP > 65    Resp:      Vent Mode: Spont  Set Rate: 12 BPM  Oxygen Concentration (%): 50  Vt Set: 420 mL  PEEP/CPAP: 5 cmH20  Pressure Support: 8 cmH20    Plan: trach in place    GI/:     Diet/Nutrition Received: NPO, tube feeding  Last Bowel Movement: 05/17/23  Voiding Characteristics: external catheter    Intake/Output Summary (Last 24 hours) at 5/17/2023 1746  Last data filed at 5/17/2023 1414  Gross per 24 hour   Intake 715 ml   Output 1050 ml   Net -335 ml     Unmeasured Output  Urine Occurrence: 1  Stool Occurrence: 1  Emesis Occurrence: 1  Pad Count: 1    Labs/Accuchecks:  Recent Labs   Lab 05/17/23  1510   WBC 16.85*   RBC 4.73   HGB 13.5   HCT 44.1         Recent Labs   Lab 05/17/23  0246   *   K 3.5   CO2 21*      BUN 19   CREATININE 0.8   ALKPHOS 64   ALT 11   AST 15   BILITOT 0.5    No results for input(s): PROTIME, INR, APTT, HEPANTIXA in the last 168 hours.   Recent Labs   Lab 05/11/23  0857   TROPONINI <0.006       Electrolytes: Electrolytes replaced  Accuchecks:  none    Gtts:      LDA/Wounds:  Lines/Drains/Airways       Drain  Duration             Female External Urinary Catheter 05/05/23 1718 12 days         Gastrostomy/Enterostomy 05/15/23 1105 LUQ 2 days              Airway  Duration             Adult Surgical Airway 05/15/23 1105 Shiley Cuffed 8.0 / 85 mm 2 days              Peripheral Intravenous Line  Duration                  Peripheral IV - Single Lumen 05/15/23 0300 20 G Anterior;Left Forearm 2 days         Peripheral IV - Single Lumen 05/15/23 0933 20 G Right Hand 2 days                  Wounds: No  Wound care consulted: No

## 2023-05-17 NOTE — NURSING
Patient vomiting 30 minutes after medication administration and water bolus. Zofran administered, patient O2 sat 100%, suction provided and gown changed. Will continue to monitor.

## 2023-05-17 NOTE — DISCHARGE SUMMARY
[facility] - Neuro Critical Care  Neurocritical Care  Discharge Summary    Admit Date: 5/5/2023    Service Date: 05/17/2023    Discharge Date: 05/17/2023    Length of Stay: 12    Final Active Diagnoses:    Diagnosis Date Noted POA    PRINCIPAL PROBLEM:  Nontraumatic intracerebral hemorrhage [I61.9] 05/05/2023 Yes    On mechanically assisted ventilation [Z99.11] 05/13/2023 Not Applicable    Urinary retention [R33.9] 05/07/2023 Yes    Hypertriglyceridemia [E78.1] 05/07/2023 Unknown    Paroxysmal atrial fibrillation [I48.0] 05/05/2023 Yes    Primary hypertension [I10] 05/05/2023 Yes    Vasogenic cerebral edema [G93.6] 05/05/2023 Yes    Mixed hyperlipidemia [E78.2] 05/23/2017 Yes      Problems Resolved During this Admission:    Diagnosis Date Noted Date Resolved POA    Acute cystitis [N30.00] 05/07/2023 05/13/2023 Unknown      History of Present Illness: Susan Pinto is a 67-year-old female with Hx of HTN presenting with altered mental status, tachycardia, left-sided facial droop, admitted for medullary ICH.   Patient started having vomiting that began around 7:00 p.m last night. On EMS arrival was noted to be hypoxic sat's in 80's with AMS, was intubated for airway protection. In ED at OSH CTh was done and was found to have a cervicomedullary junction ICH measuring approximately about 1.3 x 1.3 x 1.2 cm, also had a.fib with RVR Hr in the 150's that responded to BB. Was transferred to [facility] for further care         Hospital Course by Event: 05/06/2023 transient hypotension overnight improved with stopping propofol. MRI with possible DVA, will discuss need for DSA with IR. Endorsed tingling in BLE -> CTH stable, trial of HTS for possible edema. SBT as tolerated. 3 day Abx for UTI. New double vision -> repeat CTH stable.   05/07/2023 CO2 retention noted on ABG yesterday after SBT. Gas normalized with SIMV. Will reassess on spontaneous today. Discussed possibility of trach if she fails to wean off the vent. Still with  copious secretions and no cough. Day 2/3 of CTX for GNR UTI. Tube feeds initiated.  05/08/2023 Pt remains on spontaneous vent settings with CO2 in normal range. T-piece trial unsuccessful yesterday. Cough reflex improved today.   05/09/2023 Exam stable. Continue trials of spontaneous ventilation.   05/10/2023 BP better controlled overnight. Will advance bowel regiment for constipation. Pt still not tolerating secretions, remains intubated.   05/11/2023 Patient with N/V overnight and new left shoulder/chest pain, troponin negative and EKG w/o ischemic changes. Constipation resolved.  05/12/2023 naeon, pt agreeable to early trach/peg  5/13/2023 NAEO, plan for T/P Monday. Patient awake and following, would like to participate with PT/OT. Start SQH for DVT ppx  5/14/2023 NAEO, T/P tomorrow. Patient with elevated BP will add her home clonidine prn for SBP >160. Continue SIMV and SBT trials.   05/15/2023 T/P today. Will resume tube feeds via G-tube 24hrs post procedure.  05/16/2023 Some oozing blood from trach site overnight, clotted over by morning. Pain around PEG site improving.  05/17/2023 Patient stable for transfer to LTAC today. Outpatient MRI and NSGY follow up scheduled.     Review of Systems   Unable to perform ROS: Other (tach connected to ventilator)     Objective:     Vitals:  Temp: 99.2 °F (37.3 °C)  Pulse: 90  Rhythm: normal sinus rhythm  BP: 134/68  MAP (mmHg): 95  Resp: (!) 29  SpO2: 100 %  Oxygen Concentration (%): 50  Vent Mode: Spont  Pressure Support: 8 cmH20  PEEP/CPAP: 5 cmH20  Peak Airway Pressure: 14 cmH20  Mean Airway Pressure: 8.5 cmH20  Plateau Pressure: 0 cmH20    Temp  Min: 98.2 °F (36.8 °C)  Max: 99.2 °F (37.3 °C)  Pulse  Min: 65  Max: 90  BP  Min: 107/55  Max: 165/77  MAP (mmHg)  Min: 74  Max: 113  Resp  Min: 16  Max: 36  SpO2  Min: 92 %  Max: 100 %  Oxygen Concentration (%)  Min: 50  Max: 50    05/16 0701 - 05/17 0700  In: 1260 [P.O.:750]  Out: 1000 [Urine:1000]   Unmeasured Output  Urine  Occurrence: 1  Stool Occurrence: 1  Emesis Occurrence: 1  Pad Count: 1        Physical Exam  Vitals and nursing note reviewed.   Constitutional:       General: She is not in acute distress.     Appearance: Normal appearance. She is normal weight.   HENT:      Head: Normocephalic and atraumatic.      Mouth/Throat:      Mouth: Mucous membranes are moist.   Eyes:      Extraocular Movements: Extraocular movements intact.      Conjunctiva/sclera: Conjunctivae normal.      Pupils: Pupils are equal, round, and reactive to light.   Neck:      Comments: Tracheostomy with thick mucoid secretions  Cardiovascular:      Rate and Rhythm: Normal rate and regular rhythm.   Pulmonary:      Effort: Pulmonary effort is normal. No respiratory distress.   Abdominal:      General: Abdomen is flat. Bowel sounds are normal. There is no distension.      Palpations: Abdomen is soft.      Tenderness: There is abdominal tenderness (LUQ around PEG). There is no guarding.      Comments: PEG tube intact to LUQ, TTP, C/D/I   Musculoskeletal:         General: No swelling.   Skin:     General: Skin is warm and dry.      Coloration: Skin is not jaundiced.   Neurological:      Mental Status: She is alert.     Neuro   --GCS: E4 Vt1 M6  --Mental Status:  awake, oriented X4, follows commands, nods appropriately  --CN II-XII grossly intact.   --Pupils 3mm, PERRL.   --Corneal reflex, gag, cough intact.  --FLETCHER spont      Medications:  Continuous   ScheduledamLODIPine, 10 mg, Daily  atorvastatin, 40 mg, Daily  famotidine, 20 mg, BID  heparin (porcine), 5,000 Units, Q8H  lisinopriL, 40 mg, Daily  polyethylene glycol, 17 g, BID  QUEtiapine, 25 mg, QHS  senna-docusate 8.6-50 mg, 1 tablet, BID  silodosin, 4 mg, Daily    PRNacetaminophen, 1,000 mg, Q8H PRN  benzocaine, , QID PRN  bisacodyL, 10 mg, Daily PRN  cloNIDine, 0.1 mg, Q8H PRN  fentaNYL, 25 mcg, Q4H PRN  hydrALAZINE, 10 mg, Q6H PRN  labetalol, 10 mg, Q4H PRN  magnesium oxide, 800 mg, PRN  magnesium oxide,  800 mg, PRN  ondansetron, 4 mg, Q8H PRN  oxyCODONE, 5 mg, Q6H PRN  oxyCODONE, 10 mg, Q6H PRN  potassium bicarbonate, 35 mEq, PRN  potassium bicarbonate, 50 mEq, PRN  potassium bicarbonate, 60 mEq, PRN  potassium, sodium phosphates, 2 packet, PRN  potassium, sodium phosphates, 2 packet, PRN  potassium, sodium phosphates, 2 packet, PRN  sodium chloride 0.9%, 10 mL, PRN  sucralfate, 1 g, Q6H PRN      Hospital Course by Problem:   * Nontraumatic intracerebral hemorrhage  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 with edema surrounding  - MRI brain w/wo (5/6): possible DVA and concern for cavernous malformation  - left medullary hemorrhage vs cavernoma    Plan:  - NSGY following, no acute surgical intervention at this time   - OP f/u w NSGY in 4-6 weeks with a repeat MRI Brain w/wo contrast on 6/12   - Open intervention for cavernoma may be considered if findings are concordant on repeat imaging given first time hemorrhage, but will discuss in OP setting   - DSA likely not beneficial  - Neuro q4, sleep holiday, delirium precautions  - SBP< 160 per NSGY  - intubated for airway protection 2/2 copious secretions and weak cough, s/p T/P on 5/15  - Hold AC/AP  - Coags & CBC  - VN following  - PT/OT/SLP    On mechanically assisted ventilation  Remains intubated for bulbar weakness, s/p trach and PEG on 5/15  -active vent weaning daily   Vent Mode: Spont  Oxygen Concentration (%):  [50] 50  Resp Rate Total:  [10 br/min-33 br/min] 28 br/min  PEEP/CPAP:  [5 cmH20] 5 cmH20  Pressure Support:  [8 cmH20] 8 cmH20  Mean Airway Pressure:  [6.5 vhL88-70 cmH20] 8.5 cmH20      Hypertriglyceridemia  Triglycerides elevated to 929 on admission, started on niacin and fenofibrate  Likely 2/2 propofol infusion as TGL have since normalized    - discontinue niacin  - discontinue fenofibrate  - discontinue TGL draws  - follow up with PCP outpatient on discharge     RESOLVED     Urinary  retention  Continue silodosin  Prn straight cath    Vasogenic cerebral edema  See ICH    Primary hypertension  Home medications: amlodipine 5mg daily, lisinopril 40mg daily, and prn clonidine 0.1mg BID PRN for BP > 150/100    - goal SBP < 160  - continue home amlodipine at higher dose to 10mg daily  - continue home lisinopril 40mg daily  - prn labetalol and hydralazine  -prn clonidine po     Paroxysmal atrial fibrillation  New onset at OSH, responded to BB  - initial EKG with A-fib RVR, repeat showed NSR    EKG  Monitor for now  Hold A/C    Mixed hyperlipidemia  Atorvastatin 40mg daily        Goals of Care Treatment Preferences:  Code Status: Full Code      Laboratory:  Lab Results   Component Value Date    HGBA1C 5.2 05/05/2023    HGBA1C 5.2 05/05/2023    CHOL 257 (H) 05/05/2023    CHOL 257 (H) 05/05/2023    HDL 74 05/05/2023    HDL 74 05/05/2023    LDLCALC Invalid, Trig>400.0 05/05/2023    LDLCALC Invalid, Trig>400.0 05/05/2023    TRIG 61 05/09/2023    TSH 0.866 05/05/2023       Consultations:  IP CONSULT TO VASCULAR (STROKE) NEUROLOGY  IP CONSULT TO PHYSICAL MEDICINE REHAB  IP CONSULT TO REGISTERED DIETITIAN/NUTRITIONIST  IP CONSULT TO SOCIAL WORK/CASE MANAGEMENT  IP CONSULT TO GENERAL SURGERY    Procedures:   Procedure(s) (LRB):  TRACHEOTOMY (N/A)  EGD, WITH PEG TUBE INSERTION (N/A) by Mark Guadalupe MD.    Medications:      Medication List        START taking these medications      famotidine 20 MG tablet  Commonly known as: PEPCID  1 tablet (20 mg total) by Per G Tube route 2 (two) times daily.     QUEtiapine 25 MG Tab  Commonly known as: SEROQUEL  1 tablet (25 mg total) by Per G Tube route every evening.            CHANGE how you take these medications      amLODIPine 10 MG tablet  Commonly known as: NORVASC  1 tablet (10 mg total) by Per G Tube route once daily.  What changed:   medication strength  how much to take  how to take this     atorvastatin 40 MG tablet  Commonly known as: LIPITOR  1 tablet (40  mg total) by Per G Tube route once daily.  Start taking on: May 18, 2023  What changed:   medication strength  how much to take  how to take this  when to take this     cloNIDine 0.1 MG tablet  Commonly known as: CATAPRES  1 tablet (0.1 mg total) by Per G Tube route every 8 (eight) hours as needed (>160).  What changed:   how much to take  how to take this  when to take this  reasons to take this  additional instructions     lisinopriL 40 MG tablet  Commonly known as: PRINIVIL,ZESTRIL  1 tablet (40 mg total) by Per G Tube route once daily.  Start taking on: May 18, 2023  What changed: how to take this            CONTINUE taking these medications      fish oil-omega-3 fatty acids 300-1,000 mg capsule     LORazepam 0.5 MG tablet  Commonly known as: ATIVAN  TAKE 1 TABLET (=0.5MG) BY MOUTH  NIGHTLY AS NEEDED FOR ANXIETY            STOP taking these medications      aspirin 81 MG EC tablet  Commonly known as: ECOTRIN               Where to Get Your Medications        These medications were sent to [facility] - [address] [address]      Phone: [phone]   lisinopriL 40 MG tablet       Information about where to get these medications is not yet available    Ask your nurse or doctor about these medications  amLODIPine 10 MG tablet  atorvastatin 40 MG tablet  cloNIDine 0.1 MG tablet  famotidine 20 MG tablet  QUEtiapine 25 MG Tab       Diet: NPO, on tube feeds    Activity: As tolerated.    Disposition: Discharged to LTAC in stable condition.    Follow Up Plan:  - NSGY follow up  - outpatient MRI    This discharge took more than 30 minutes to complete.    Gilma Morris MD  Neurocritical Care  [facility] - Neuro Critical Care

## 2023-05-17 NOTE — PLAN OF CARE
[facility]    FACILITY TRANSFER ORDERS      Patient Name: Susan Pinto  YOB: 1955    PCP: Court Champion NP   PCP Address: [facility] [address]  [place] [address]  PCP Phone Number: [phone]  PCP Fax: [phone]    Encounter Date: 05/17/2023    Admit to: [facility]    Vital Signs:  Routine    Diagnoses:   Active Hospital Problems    Diagnosis  POA    *Nontraumatic intracerebral hemorrhage [I61.9]  Yes    On mechanically assisted ventilation [Z99.11]  Not Applicable    Urinary retention [R33.9]  Yes    Hypertriglyceridemia [E78.1]  Unknown    Paroxysmal atrial fibrillation [I48.0]  Yes    Primary hypertension [I10]  Yes    Vasogenic cerebral edema [G93.6]  Yes    Mixed hyperlipidemia [E78.2]  Yes      Resolved Hospital Problems    Diagnosis Date Resolved POA    Acute cystitis [N30.00] 05/13/2023 Unknown       Allergies:Review of patient's allergies indicates:  No Known Allergies    Ventilator:   Vent Mode: Spont  Oxygen Concentration (%):  [50] 50  Resp Rate Total:  [10 br/min-31 br/min] 16 br/min  PEEP/CPAP:  [5 cmH20] 5 cmH20  Pressure Support:  [8 cmH20] 8 cmH20  Mean Airway Pressure:  [6.5 weD80-74 cmH20] 11 cmH20    Diet:  NPO  Tube feeds via PEG: Osmolite @ goal rate of 45 mL/hr- provides 1661 kcals, 80 g pro, and 756 mL fluid    Activities: Activity as tolerated    Goals of Care Treatment Preferences:  Code Status: Full Code    Nursing: per facility     Labs: per facility    CONSULTS:    Physical Therapy to evaluate and treat. , Occupational Therapy to evaluate and treat., and Speech Therapy to evaluate and treat for Language and Swallowing.    MISCELLANEOUS CARE:  PEG Care: Clean site every 24 hours.     WOUND CARE ORDERS  None    Medications: Review discharge medications with patient and family and provide education.      Current Discharge Medication List        START taking these medications    Details   famotidine (PEPCID) 20 MG tablet 1 tablet (20 mg total)  by Per G Tube route 2 (two) times daily.  Qty: 60 tablet, Refills: 11      QUEtiapine (SEROQUEL) 25 MG Tab 1 tablet (25 mg total) by Per G Tube route every evening.  Qty: 30 tablet, Refills: 11           CONTINUE these medications which have CHANGED    Details   amLODIPine (NORVASC) 10 MG tablet 1 tablet (10 mg total) by Per G Tube route once daily.  Qty: 30 tablet, Refills: 11    Comments: .      atorvastatin (LIPITOR) 40 MG tablet 1 tablet (40 mg total) by Per G Tube route once daily.  Qty: 90 tablet, Refills: 3      cloNIDine (CATAPRES) 0.1 MG tablet 1 tablet (0.1 mg total) by Per G Tube route every 8 (eight) hours as needed (>160).    Comments: .      lisinopriL (PRINIVIL,ZESTRIL) 40 MG tablet 1 tablet (40 mg total) by Per G Tube route once daily.  Qty: 90 tablet, Refills: 3    Comments: .           CONTINUE these medications which have NOT CHANGED    Details   fish oil-omega-3 fatty acids 300-1,000 mg capsule Take by mouth once daily.      LORazepam (ATIVAN) 0.5 MG tablet TAKE 1 TABLET (=0.5MG) BY MOUTH  NIGHTLY AS NEEDED FOR ANXIETY  Qty: 30 tablet, Refills: 2           STOP taking these medications       aspirin (ECOTRIN) 81 MG EC tablet Comments:   Reason for Stopping:              Can administer 0.5 mg Dilaudid IV q6h as needed for pain.    Immunizations Administered as of 5/17/2023       No immunizations on file.            Unknown    Some patients may experience side effects after vaccination.  These may include fever, headache, muscle or joint aches.  Most symptoms resolve with 24-48 hours and do not require urgent medical evaluation unless they persist for more than 72 hours or symptoms are concerning for an unrelated medical condition.          _________________________________  Gilma Morris MD  05/17/2023

## 2023-05-17 NOTE — NURSING
[facility] Tea NP stated to try giving 75ml of water bolus q2h to see if she tolerates it. Restart tube feeds at 5ml/hr. WCTM.

## 2023-05-17 NOTE — NURSING
Pt having dark brown emesis coming from mouth, not a lot. SpO2 maintains >98%. Pt denies feeling nauseated. No precipitating factors to emesis. [facility] team notified. Sending stat CBC. WCTM.     1515- Residual from PEG 15ml of dark brown bile. [facility] MD Luis at bedside to examine. Holding tube feeds for now. Gave 50ml water bolus. Ok to call report to LTAC per [facility] team. WCTM.

## 2023-05-17 NOTE — PLAN OF CARE
[facility] Care Plan    POC reviewed with Susan Pinto at 0300. Pt nodded understanding. No acute events overnight. Pt progressing toward goals. Will continue to monitor. See below and flowsheets for full assessment and VS info.     - No acute neuro changes overnight. Q4hr assessments in place.  - Bath completed, linen changed, gown changed.  - Tylenol x 2, Oxycodone x 1 and fentanyl x 2 administered for pain.  - TF restarted at 10 cc/hr  - Potassium replaced  - Emesis episode x 1, see note.     Is this a stroke patient? yes- Stroke booklet reviewed with patient, risk factors identified for patient and stroke booklet remains at bedside for ongoing education.     Neuro:  [place] Coma Scale  Best Eye Response: 4-->(E4) spontaneous  Best Motor Response: 6-->(M6) obeys commands  Best Verbal Response: 1-->(V1) none  Naldo Coma Scale Score: 11  Assessment Qualifiers: patient intubated  Pupil PERRLA: yes     24hr Temp:  [98.2 °F (36.8 °C)-98.6 °F (37 °C)]     CV:   Rhythm: normal sinus rhythm  BP goals:   SBP < 160  MAP > 65    Resp:      Vent Mode: Spont  Set Rate: 12 BPM  Oxygen Concentration (%): 50  Vt Set: 420 mL  PEEP/CPAP: 5 cmH20  Pressure Support: 8 cmH20    Plan: trach in place    GI/:     Diet/Nutrition Received: tube feeding, NPO  Last Bowel Movement: 05/16/23  Voiding Characteristics: external catheter    Intake/Output Summary (Last 24 hours) at 5/17/2023 0432  Last data filed at 5/16/2023 2202  Gross per 24 hour   Intake 1000 ml   Output 700 ml   Net 300 ml     Unmeasured Output  Urine Occurrence: 1  Stool Occurrence: 1  Emesis Occurrence: 1  Pad Count: 1    Labs/Accuchecks:  Recent Labs   Lab 05/17/23  0246   WBC 14.41*   RBC 4.49   HGB 12.9   HCT 41.8         Recent Labs   Lab 05/17/23  0246   *   K 3.5   CO2 21*      BUN 19   CREATININE 0.8   ALKPHOS 64   ALT 11   AST 15   BILITOT 0.5    No results for input(s): PROTIME, INR, APTT, HEPANTIXA in the last 168 hours.   Recent Labs   Lab  05/11/23  0857   TROPONINI <0.006       Electrolytes: Electrolytes replaced  Accuchecks: none    Gtts:      LDA/Wounds:  Lines/Drains/Airways       Drain  Duration             Female External Urinary Catheter 05/05/23 1718 11 days         Gastrostomy/Enterostomy 05/15/23 1105 LUQ 1 day              Airway  Duration             Adult Surgical Airway 05/15/23 1105 Shiley Cuffed 8.0 / 85 mm 1 day              Peripheral Intravenous Line  Duration                  Peripheral IV - Single Lumen 05/15/23 0300 20 G Anterior;Left Forearm 2 days         Peripheral IV - Single Lumen 05/15/23 0933 20 G Right Hand 1 day                  Wounds: No  Wound care consulted: No

## 2023-05-17 NOTE — SUBJECTIVE & OBJECTIVE
Recommended artificial tears to use: 1 drop 4x a day in both eyes. Review of Systems   Unable to perform ROS: Other (tach connected to ventilator)     Objective:     Vitals:  Temp: 99.2 °F (37.3 °C)  Pulse: 90  Rhythm: normal sinus rhythm  BP: 134/68  MAP (mmHg): 95  Resp: (!) 29  SpO2: 100 %  Oxygen Concentration (%): 50  Vent Mode: Spont  Pressure Support: 8 cmH20  PEEP/CPAP: 5 cmH20  Peak Airway Pressure: 14 cmH20  Mean Airway Pressure: 8.5 cmH20  Plateau Pressure: 0 cmH20    Temp  Min: 98.2 °F (36.8 °C)  Max: 99.2 °F (37.3 °C)  Pulse  Min: 65  Max: 90  BP  Min: 107/55  Max: 165/77  MAP (mmHg)  Min: 74  Max: 113  Resp  Min: 16  Max: 36  SpO2  Min: 92 %  Max: 100 %  Oxygen Concentration (%)  Min: 50  Max: 50    05/16 0701 - 05/17 0700  In: 1260 [P.O.:750]  Out: 1000 [Urine:1000]   Unmeasured Output  Urine Occurrence: 1  Stool Occurrence: 1  Emesis Occurrence: 1  Pad Count: 1        Physical Exam  Vitals and nursing note reviewed.   Constitutional:       General: She is not in acute distress.     Appearance: Normal appearance. She is normal weight.   HENT:      Head: Normocephalic and atraumatic.      Mouth/Throat:      Mouth: Mucous membranes are moist.   Eyes:      Extraocular Movements: Extraocular movements intact.      Conjunctiva/sclera: Conjunctivae normal.      Pupils: Pupils are equal, round, and reactive to light.   Neck:      Comments: Tracheostomy with thick mucoid secretions  Cardiovascular:      Rate and Rhythm: Normal rate and regular rhythm.   Pulmonary:      Effort: Pulmonary effort is normal. No respiratory distress.   Abdominal:      General: Abdomen is flat. Bowel sounds are normal. There is no distension.      Palpations: Abdomen is soft.      Tenderness: There is abdominal tenderness (LUQ around PEG). There is no guarding.      Comments: PEG tube intact to LUQ, TTP, C/D/I   Musculoskeletal:         General: No swelling.   Skin:     General: Skin is warm and dry.      Coloration: Skin is not jaundiced.   Neurological:      Mental Status: She is alert.      Neuro   --GCS: E4 Vt1 M6  --Mental Status:  awake, oriented X4, follows commands, nods appropriately  --CN II-XII grossly intact.   --Pupils 3mm, PERRL.   --Corneal reflex, gag, cough intact.  --FLETCHER spont         Medications:  Continuous   ScheduledamLODIPine, 10 mg, Daily  atorvastatin, 40 mg, Daily  famotidine, 20 mg, BID  heparin (porcine), 5,000 Units, Q8H  lisinopriL, 40 mg, Daily  polyethylene glycol, 17 g, BID  QUEtiapine, 25 mg, QHS  senna-docusate 8.6-50 mg, 1 tablet, BID  silodosin, 4 mg, Daily    PRNacetaminophen, 1,000 mg, Q8H PRN  benzocaine, , QID PRN  bisacodyL, 10 mg, Daily PRN  cloNIDine, 0.1 mg, Q8H PRN  fentaNYL, 25 mcg, Q4H PRN  hydrALAZINE, 10 mg, Q6H PRN  labetalol, 10 mg, Q4H PRN  magnesium oxide, 800 mg, PRN  magnesium oxide, 800 mg, PRN  ondansetron, 4 mg, Q8H PRN  oxyCODONE, 5 mg, Q6H PRN  oxyCODONE, 10 mg, Q6H PRN  potassium bicarbonate, 35 mEq, PRN  potassium bicarbonate, 50 mEq, PRN  potassium bicarbonate, 60 mEq, PRN  potassium, sodium phosphates, 2 packet, PRN  potassium, sodium phosphates, 2 packet, PRN  potassium, sodium phosphates, 2 packet, PRN  sodium chloride 0.9%, 10 mL, PRN  sucralfate, 1 g, Q6H PRN      Review of Systems   Unable to perform ROS: Other (tach connected to ventilator)

## 2023-05-18 NOTE — PLAN OF CARE
[facility] Care Plan    POC reviewed with Susan Pinto and family at 0300. Pt demostrated understanding. Questions and concerns addressed. No acute events overnight. Pt progressing toward goals. Will continue to monitor. See below and flowsheets for full assessment and VS info.     -DC to [facility] to be transferred to LTAC        Is this a stroke patient? yes- Stroke booklet reviewed with patient and family, risk factors identified for patient and stroke booklet remains at bedside for ongoing education.     Neuro:  [place] Coma Scale  Best Eye Response: 4-->(E4) spontaneous  Best Motor Response: 6-->(M6) obeys commands  Best Verbal Response: 1-->(V1) none  [place] Coma Scale Score: 11  Assessment Qualifiers: patient intubated, no eye obstruction present  Pupil PERRLA: yes     24hr Temp:  [98.2 °F (36.8 °C)-99.2 °F (37.3 °C)]     CV:   Rhythm: normal sinus rhythm  BP goals:   SBP < 160  MAP > 65    Resp:      Vent Mode: Spont  Set Rate: 12 BPM  Oxygen Concentration (%): 50  Vt Set: 420 mL  PEEP/CPAP: 5 cmH20  Pressure Support: 8 cmH20    Plan: Trach in place      GI/:     Diet/Nutrition Received: NPO  Last Bowel Movement: 05/17/23  Voiding Characteristics: external catheter    Intake/Output Summary (Last 24 hours) at 5/17/2023 2039  Last data filed at 5/17/2023 1705  Gross per 24 hour   Intake 840 ml   Output 850 ml   Net -10 ml     Unmeasured Output  Urine Occurrence: 1  Stool Occurrence: 0  Emesis Occurrence: 1  Pad Count: 1    Labs/Accuchecks:  Recent Labs   Lab 05/17/23  1510   WBC 16.85*   RBC 4.73   HGB 13.5   HCT 44.1         Recent Labs   Lab 05/17/23  0246   *   K 3.5   CO2 21*      BUN 19   CREATININE 0.8   ALKPHOS 64   ALT 11   AST 15   BILITOT 0.5    No results for input(s): PROTIME, INR, APTT, HEPANTIXA in the last 168 hours.   Recent Labs   Lab 05/11/23  0857   TROPONINI <0.006       Electrolytes: N/A - electrolytes WDL  Accuchecks:  none    Gtts:      LDA/Wounds:  Lines/Drains/Airways       Drain  Duration             Female External Urinary Catheter 05/05/23 1718 12 days         Gastrostomy/Enterostomy 05/15/23 1105 LUQ 2 days              Airway  Duration             Adult Surgical Airway 05/15/23 1105 Shiley Cuffed 8.0 / 85 mm 2 days              Peripheral Intravenous Line  Duration                  Peripheral IV - Single Lumen 05/15/23 0300 20 G Anterior;Left Forearm 2 days         Peripheral IV - Single Lumen 05/15/23 0933 20 G Right Hand 2 days                  Wounds: Yes  Wound care consulted: Yes

## 2023-06-12 ENCOUNTER — TELEPHONE (OUTPATIENT)
Dept: NEUROSURGERY | Facility: CLINIC | Age: 68
End: 2023-06-12

## 2023-06-12 NOTE — TELEPHONE ENCOUNTER
Called pt. Pt. Spoke to pt . Pt is in the hospital in [place] with plans to be admitted to [facility].

## 2023-07-10 ENCOUNTER — PATIENT MESSAGE (OUTPATIENT)
Dept: ADMINISTRATIVE | Facility: HOSPITAL | Age: 68
End: 2023-07-10
Payer: MEDICARE

## 2023-07-25 ENCOUNTER — OFFICE VISIT (OUTPATIENT)
Dept: INTERNAL MEDICINE | Facility: CLINIC | Age: 68
End: 2023-07-25
Payer: MEDICARE

## 2023-07-25 VITALS
OXYGEN SATURATION: 97 % | RESPIRATION RATE: 18 BRPM | SYSTOLIC BLOOD PRESSURE: 124 MMHG | HEART RATE: 94 BPM | DIASTOLIC BLOOD PRESSURE: 84 MMHG

## 2023-07-25 DIAGNOSIS — I10 PRIMARY HYPERTENSION: ICD-10-CM

## 2023-07-25 DIAGNOSIS — E78.2 MIXED HYPERLIPIDEMIA: ICD-10-CM

## 2023-07-25 DIAGNOSIS — R60.9 EDEMA, UNSPECIFIED TYPE: ICD-10-CM

## 2023-07-25 DIAGNOSIS — I61.3 LEFT-SIDED NONTRAUMATIC INTRACEREBRAL HEMORRHAGE OF BRAINSTEM: Primary | ICD-10-CM

## 2023-07-25 DIAGNOSIS — E66.01 SEVERE OBESITY (BMI 35.0-35.9 WITH COMORBIDITY): ICD-10-CM

## 2023-07-25 DIAGNOSIS — E78.1 HYPERTRIGLYCERIDEMIA: ICD-10-CM

## 2023-07-25 DIAGNOSIS — F32.9 REACTIVE DEPRESSION: ICD-10-CM

## 2023-07-25 DIAGNOSIS — F41.9 ANXIETY: ICD-10-CM

## 2023-07-25 PROCEDURE — 99215 PR OFFICE/OUTPT VISIT, EST, LEVL V, 40-54 MIN: ICD-10-PCS | Mod: S$PBB,,, | Performed by: NURSE PRACTITIONER

## 2023-07-25 PROCEDURE — 99999 PR PBB SHADOW E&M-EST. PATIENT-LVL III: ICD-10-PCS | Mod: PBBFAC,,, | Performed by: NURSE PRACTITIONER

## 2023-07-25 PROCEDURE — 99999 PR PBB SHADOW E&M-EST. PATIENT-LVL III: CPT | Mod: PBBFAC,,, | Performed by: NURSE PRACTITIONER

## 2023-07-25 PROCEDURE — 99213 OFFICE O/P EST LOW 20 MIN: CPT | Mod: PBBFAC,PN | Performed by: NURSE PRACTITIONER

## 2023-07-25 PROCEDURE — 99215 OFFICE O/P EST HI 40 MIN: CPT | Mod: S$PBB,,, | Performed by: NURSE PRACTITIONER

## 2023-07-25 RX ORDER — HYDROCHLOROTHIAZIDE 12.5 MG/1
12.5 TABLET ORAL DAILY
Qty: 30 TABLET | Refills: 2 | Status: SHIPPED | OUTPATIENT
Start: 2023-07-25 | End: 2023-09-20

## 2023-07-25 RX ORDER — ASPIRIN 81 MG/1
81 TABLET ORAL DAILY
COMMUNITY

## 2023-07-25 RX ORDER — MIDODRINE HYDROCHLORIDE 2.5 MG/1
1 TABLET ORAL EVERY MORNING
COMMUNITY
Start: 2023-07-07 | End: 2023-08-03 | Stop reason: SDUPTHER

## 2023-07-25 RX ORDER — SERTRALINE HYDROCHLORIDE 25 MG/1
25 TABLET, FILM COATED ORAL DAILY
Qty: 30 TABLET | Refills: 2 | Status: SHIPPED | OUTPATIENT
Start: 2023-07-25 | End: 2023-09-20

## 2023-07-27 ENCOUNTER — TELEPHONE (OUTPATIENT)
Dept: NEUROLOGY | Facility: CLINIC | Age: 68
End: 2023-07-27
Payer: MEDICARE

## 2023-07-27 NOTE — PROGRESS NOTES
Subjective:       Patient ID: Susan Pinto is a 67 y.o. female.    Chief Complaint: Follow-up (X hospital - )    Patient is known, to me and presents with   Chief Complaint   Patient presents with    Follow-up     X hospital -    .  Denies chest pain and shortness of breath.  Patient who is well known to me presents with post CVA. Patient was discharged from rehab on the 7/7. She is home and has home health along with therapy services. Patient had a left sided nontraumatic intracerebral hemorrhage of the brainstem. She had stopped taking her blood pressure meds for sometime when this occurred. She is in a wheelchair and has home therapy that is trying to get her back to ambulating without any weakness. She is still having deficits to left side arm and leg.  She cries during visit due to her situation. No sihi noted.  She would like to be on something to help with her anxiety and depression. She also complains of some swelling to arms and legs.    Follow-up  Associated symptoms include fatigue and weakness. Pertinent negatives include no arthralgias, chest pain, congestion, coughing, fever, headaches, joint swelling, neck pain or numbness.   Review of Systems   Constitutional:  Positive for activity change, appetite change, fatigue and unexpected weight change. Negative for fever.   HENT:  Negative for congestion, ear discharge, ear pain, hearing loss, postnasal drip and tinnitus.    Eyes:  Negative for photophobia, pain and visual disturbance.   Respiratory:  Negative for cough, shortness of breath, wheezing and stridor.    Cardiovascular:  Positive for leg swelling. Negative for chest pain and palpitations.   Gastrointestinal:  Negative for abdominal distention.   Genitourinary:  Negative for difficulty urinating, dysuria, frequency, hematuria and urgency.   Musculoskeletal:  Positive for gait problem. Negative for arthralgias, back pain, joint swelling and neck pain.   Skin: Negative.    Neurological:   Positive for weakness. Negative for dizziness, seizures, syncope, light-headedness, numbness and headaches.   Hematological:  Negative for adenopathy. Does not bruise/bleed easily.   Psychiatric/Behavioral:  Positive for dysphoric mood. Negative for behavioral problems, confusion, hallucinations, sleep disturbance and suicidal ideas. The patient is nervous/anxious.      Objective:      Physical Exam  Constitutional:       General: She is not in acute distress.     Appearance: She is well-developed. She is obese. She is not ill-appearing, toxic-appearing or diaphoretic.   HENT:      Head: Normocephalic and atraumatic.      Right Ear: Tympanic membrane and external ear normal.      Left Ear: Tympanic membrane and external ear normal.      Nose: Nose normal.      Mouth/Throat:      Mouth: Mucous membranes are moist.      Pharynx: No oropharyngeal exudate.   Eyes:      General: No scleral icterus.        Right eye: No discharge.         Left eye: No discharge.      Conjunctiva/sclera: Conjunctivae normal.      Pupils: Pupils are equal, round, and reactive to light.   Neck:      Thyroid: No thyromegaly.      Vascular: No JVD.   Cardiovascular:      Rate and Rhythm: Normal rate and regular rhythm.      Heart sounds: Normal heart sounds. No murmur heard.    No friction rub. No gallop.   Pulmonary:      Effort: Pulmonary effort is normal. No respiratory distress.      Breath sounds: Normal breath sounds. No stridor. No wheezing, rhonchi or rales.   Chest:      Chest wall: No tenderness.   Abdominal:      General: Bowel sounds are normal. There is no distension.      Palpations: Abdomen is soft. There is no mass.      Tenderness: There is no abdominal tenderness. There is no right CVA tenderness, left CVA tenderness, guarding or rebound.      Hernia: No hernia is present.   Musculoskeletal:         General: No swelling, tenderness, deformity or signs of injury. Normal range of motion.      Cervical back: Normal range of  motion and neck supple.      Right lower leg: Edema present.      Left lower leg: Edema present.      Comments: Non pitting edema to lower legs. No evidence of dvt negative joe's sign to both legs   Lymphadenopathy:      Cervical: No cervical adenopathy.   Skin:     General: Skin is warm and dry.      Capillary Refill: Capillary refill takes less than 2 seconds.      Coloration: Skin is not jaundiced or pale.      Findings: No bruising, erythema, lesion or rash.   Neurological:      General: No focal deficit present.      Mental Status: She is alert and oriented to person, place, and time.      Cranial Nerves: No cranial nerve deficit.      Motor: Weakness present. No abnormal muscle tone.      Coordination: Coordination abnormal.      Gait: Gait abnormal.      Deep Tendon Reflexes: Reflexes are normal and symmetric.   Psychiatric:         Attention and Perception: Attention and perception normal.         Mood and Affect: Mood is anxious and depressed. Affect is tearful.         Speech: Speech normal.         Behavior: Behavior normal. Behavior is cooperative.         Thought Content: Thought content normal. Thought content is not paranoid or delusional. Thought content does not include homicidal or suicidal ideation. Thought content does not include homicidal or suicidal plan.         Cognition and Memory: Cognition and memory normal.         Judgment: Judgment normal.       Assessment:       1. Left-sided nontraumatic intracerebral hemorrhage of brainstem    2. Edema, unspecified type    3. Primary hypertension    4. Hypertriglyceridemia    5. Mixed hyperlipidemia    6. Anxiety    7. Reactive depression    8. Severe obesity (BMI 35.0-35.9 with comorbidity)        Plan:   1. Left-sided nontraumatic intracerebral hemorrhage of brainstem  -     Ambulatory referral/consult to Neurology; Future; Expected date: 08/01/2023    2. Edema, unspecified type  -     hydroCHLOROthiazide (HYDRODIURIL) 12.5 MG Tab; Take 1 tablet  "(12.5 mg total) by mouth once daily.  Dispense: 30 tablet; Refill: 2    3. Primary hypertension    4. Hypertriglyceridemia    5. Mixed hyperlipidemia    6. Anxiety  -     sertraline (ZOLOFT) 25 MG tablet; Take 1 tablet (25 mg total) by mouth once daily.  Dispense: 30 tablet; Refill: 2    7. Reactive depression  -     sertraline (ZOLOFT) 25 MG tablet; Take 1 tablet (25 mg total) by mouth once daily.  Dispense: 30 tablet; Refill: 2    8. Severe obesity (BMI 35.0-35.9 with comorbidity)    "This note will not be shared with the patient."   Will need to follow up with neuro  I've explained to her that drugs of the SSRI class can have side effects such as weight gain, sexual dysfunction, insomnia, headache, nausea. These medications are generally effective at alleviating symptoms of anxiety and/or depression. Let me know if significant side effects do occur.   If any thoughts of sihi occur on zoloft call 911 and stop medication  Also will add hctz   Rtc as scheduled   "

## 2023-07-27 NOTE — TELEPHONE ENCOUNTER
----- Message from Naida León sent at 2023 10:09 AM CDT -----  Contact: pt  Susan Pinto  MRN: [id number]  : 1955  PCP: Court Champion  Home Phone      [phone]  Work Phone      Not on file.  Mobile          [phone]      MESSAGE:     Pt was referred by Court Champion she is needing to come in for a Hospital f/u due to a stroke on . Pt is trying to set appt up soon.      Please advise  [phone]

## 2023-08-01 ENCOUNTER — PATIENT MESSAGE (OUTPATIENT)
Dept: ADMINISTRATIVE | Facility: HOSPITAL | Age: 68
End: 2023-08-01
Payer: MEDICARE

## 2023-08-03 ENCOUNTER — TELEPHONE (OUTPATIENT)
Dept: INTERNAL MEDICINE | Facility: CLINIC | Age: 68
End: 2023-08-03
Payer: MEDICARE

## 2023-08-03 RX ORDER — MIDODRINE HYDROCHLORIDE 2.5 MG/1
2.5 TABLET ORAL EVERY MORNING
Qty: 30 TABLET | Refills: 2 | Status: SHIPPED | OUTPATIENT
Start: 2023-08-03

## 2023-08-03 NOTE — TELEPHONE ENCOUNTER
----- Message from Alysha Fragoso MA sent at 8/3/2023 10:20 AM CDT -----  Susan Pinto  MRN: [id number]  : 1955  PCP: Court Champion  Home Phone      [phone]  Work Phone      Not on file.  Mobile          [phone]      MESSAGE:     Patient is out of Midodrine.  Does she need to stay on this,  She thinks its for her BP.    If so,  She needs a refill.  Send to [facility]

## 2023-08-10 ENCOUNTER — EXTERNAL HOME HEALTH (OUTPATIENT)
Dept: HOME HEALTH SERVICES | Facility: HOSPITAL | Age: 68
End: 2023-08-10
Payer: MEDICARE

## 2023-08-15 ENCOUNTER — OFFICE VISIT (OUTPATIENT)
Dept: CARDIOLOGY | Facility: CLINIC | Age: 68
End: 2023-08-15
Payer: MEDICARE

## 2023-08-15 ENCOUNTER — OFFICE VISIT (OUTPATIENT)
Dept: NEUROLOGY | Facility: CLINIC | Age: 68
End: 2023-08-15
Payer: MEDICARE

## 2023-08-15 ENCOUNTER — LAB VISIT (OUTPATIENT)
Dept: LAB | Facility: HOSPITAL | Age: 68
End: 2023-08-15
Attending: NURSE PRACTITIONER
Payer: MEDICARE

## 2023-08-15 ENCOUNTER — TELEPHONE (OUTPATIENT)
Dept: NEUROSURGERY | Facility: CLINIC | Age: 68
End: 2023-08-15
Payer: MEDICARE

## 2023-08-15 ENCOUNTER — TELEPHONE (OUTPATIENT)
Dept: NEUROLOGY | Facility: CLINIC | Age: 68
End: 2023-08-15

## 2023-08-15 VITALS
DIASTOLIC BLOOD PRESSURE: 80 MMHG | HEART RATE: 88 BPM | WEIGHT: 195 LBS | HEIGHT: 64 IN | OXYGEN SATURATION: 98 % | SYSTOLIC BLOOD PRESSURE: 118 MMHG | RESPIRATION RATE: 18 BRPM | BODY MASS INDEX: 33.29 KG/M2

## 2023-08-15 VITALS
SYSTOLIC BLOOD PRESSURE: 146 MMHG | HEIGHT: 64 IN | BODY MASS INDEX: 36.05 KG/M2 | RESPIRATION RATE: 14 BRPM | DIASTOLIC BLOOD PRESSURE: 100 MMHG | HEART RATE: 90 BPM

## 2023-08-15 DIAGNOSIS — I10 BENIGN ESSENTIAL HTN: Primary | ICD-10-CM

## 2023-08-15 DIAGNOSIS — I10 PRIMARY HYPERTENSION: ICD-10-CM

## 2023-08-15 DIAGNOSIS — E78.1 HYPERTRIGLYCERIDEMIA: ICD-10-CM

## 2023-08-15 DIAGNOSIS — I48.0 PAROXYSMAL ATRIAL FIBRILLATION: ICD-10-CM

## 2023-08-15 DIAGNOSIS — E66.9 OBESITY (BMI 30.0-34.9): ICD-10-CM

## 2023-08-15 DIAGNOSIS — E78.5 DYSLIPIDEMIA: ICD-10-CM

## 2023-08-15 DIAGNOSIS — I48.0 PAROXYSMAL ATRIAL FIBRILLATION: Primary | ICD-10-CM

## 2023-08-15 DIAGNOSIS — I61.3 LEFT-SIDED NONTRAUMATIC INTRACEREBRAL HEMORRHAGE OF BRAINSTEM: ICD-10-CM

## 2023-08-15 DIAGNOSIS — E78.2 MIXED HYPERLIPIDEMIA: ICD-10-CM

## 2023-08-15 LAB
CHOLEST SERPL-MCNC: 186 MG/DL (ref 120–199)
CHOLEST/HDLC SERPL: 4.3 {RATIO} (ref 2–5)
HDLC SERPL-MCNC: 43 MG/DL (ref 40–75)
HDLC SERPL: 23.1 % (ref 20–50)
LDLC SERPL CALC-MCNC: 120.4 MG/DL (ref 63–159)
NONHDLC SERPL-MCNC: 143 MG/DL
TRIGL SERPL-MCNC: 113 MG/DL (ref 30–150)

## 2023-08-15 PROCEDURE — 99214 OFFICE O/P EST MOD 30 MIN: CPT | Mod: PBBFAC | Performed by: PSYCHIATRY & NEUROLOGY

## 2023-08-15 PROCEDURE — 99204 OFFICE O/P NEW MOD 45 MIN: CPT | Mod: S$PBB | Performed by: NURSE PRACTITIONER

## 2023-08-15 PROCEDURE — 99213 OFFICE O/P EST LOW 20 MIN: CPT | Mod: PBBFAC,27 | Performed by: NURSE PRACTITIONER

## 2023-08-15 PROCEDURE — 36415 COLL VENOUS BLD VENIPUNCTURE: CPT | Performed by: NURSE PRACTITIONER

## 2023-08-15 PROCEDURE — 99999 PR PBB SHADOW E&M-EST. PATIENT-LVL III: CPT | Mod: PBBFAC,,, | Performed by: NURSE PRACTITIONER

## 2023-08-15 PROCEDURE — 80061 LIPID PANEL: CPT | Performed by: NURSE PRACTITIONER

## 2023-08-15 PROCEDURE — 99999 PR PBB SHADOW E&M-EST. PATIENT-LVL IV: ICD-10-PCS | Mod: PBBFAC,,, | Performed by: PSYCHIATRY & NEUROLOGY

## 2023-08-15 PROCEDURE — 99999 PR PBB SHADOW E&M-EST. PATIENT-LVL IV: CPT | Mod: PBBFAC,,, | Performed by: PSYCHIATRY & NEUROLOGY

## 2023-08-15 PROCEDURE — 99215 OFFICE O/P EST HI 40 MIN: CPT | Mod: S$PBB | Performed by: PSYCHIATRY & NEUROLOGY

## 2023-08-15 PROCEDURE — 99999 PR STA SHADOW: CPT | Mod: PBBFAC,,, | Performed by: NURSE PRACTITIONER

## 2023-08-15 PROCEDURE — 99999 PR STA SHADOW: CPT | Mod: PBBFAC,,, | Performed by: PSYCHIATRY & NEUROLOGY

## 2023-08-15 PROCEDURE — 99999 PR STA SHADOW: ICD-10-PCS | Mod: PBBFAC,,, | Performed by: NURSE PRACTITIONER

## 2023-08-15 RX ORDER — LANOLIN ALCOHOL/MO/W.PET/CERES
1000 CREAM (GRAM) TOPICAL DAILY
COMMUNITY

## 2023-08-15 NOTE — PROGRESS NOTES
"Consult from CARLTON Champion NP    HPI: Susan Pinto is a 67 y.o. 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    She did improve and was extubated later and has been on a regular diet    MRI showed possible DVA and concern for cavernous malformation    She was supposed to follow up with [facility]      She was discharged from rehab at [facility] on July 7th.    She was also at [facility] prior to that.     Her residual symptoms include weakness on the left side with tolerable pain and numbness- this is mostly the arm (not the face) and her left leg is weak    She can walk with a walker (upright walker) with difficulty with balance    She is on home health with PT and OT    Prior to this she had hard to control HTN        She was d/c'ed from [facility] on clonidine PRN    However, she had syncope in rehab with severe hypotension.     Now she is on HCTZ and midodrine    She is taking Midodrine once a day    No more fainting in over a month. BP gets no lower than 101 systolic at home    Mood is improving with better movements/ treatment by PCP    She is here with her  today          Review of Systems   Constitutional:  Negative for fever.   HENT:  Negative for nosebleeds.    Eyes:  Negative for double vision.   Respiratory:  Negative for hemoptysis.    Cardiovascular:  Negative for leg swelling.   Gastrointestinal:  Negative for blood in stool.   Genitourinary:  Negative for hematuria.   Musculoskeletal:  Negative for neck pain.   Skin:  Negative for rash.   Neurological:  Negative for headaches.   Psychiatric/Behavioral:  Negative for memory loss.          I have reviewed all of " this patient's past medical and surgical histories as well as family and social histories and active allergies and medications as documented in the electronic medical record.        Exam:  Gen Appearance, well developed/nourished in no apparent distress  CV: 2+ distal pulses with no edema or swelling  Neuro:  MS: Awake, alert,  Sustains attention. Recent/remote memory intact, Language is full to spontaneous speech/repetition/naming/comprehension. Fund of Knowledge is full  CN: Optic discs are flat with normal vasculature, PERRL, Extraoccular movements and visual fields are full. Normal facial sensation and strength, Hearing symmetric, Tongue and Palate are midline and strong. Shoulder Shrug symmetric and strong.  Motor: Normal bulk, tone, no abnormal movements but left pronator drift. 5/5 strength bilateral upper/lower extremities except 4/5 in the left arm distally and left leg distally, with 1+ reflexes and no clonus  Sensory: symmetric to light touch, pain, temp, and vibration, . Romberg not done  Cerebellar: Finger-nose,Heal-shin, Rapid alternating movements slightly dysmetric on the left  Gait: Not done, In a wheelchair today (can't walk without upright walker)    Imagin/16/2023 CT head: 1. Sagittal subcentimeter ovoid area of slightly increased density in the left side of the cervicomedullary junction is presumed to be the previously diagnosed site of the intraparenchymal hemorrhage.   2. In the dorsal right paramidline at the same level, there is a 3-4 mm linear intraparenchymal increased density focus that could be early dystrophic calcification or possibly a thrombosed vessel.   3. Diffuse ventriculomegaly may be due to central involutional changes versus other etiologies.   4. Scattered white matter microvascular ischemic changes.      2023: MRI brain; MRI brain: Evolving acute left paramedian parenchymal hemorrhage within the medulla.  There is subtle curvilinear enhancement along the left  lateral aspect of this extending to the pial surface configuration concerning for a developmental venous anomaly which raises concern for hemorrhage within a cavernous malformation.  No evidence for nodular enhancement to suggest underlying mass however component of AV shunting cannot be entirely excluded and consideration for further characterization with conventional angiography and follow-up is advised.     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  No evidence for additional hemorrhage or enhancement throughout the cervical spinal cord.       Labs:LDL invalid 2023  A1C normal      Assessment/Plan: Susan Pinto is a 67 y.o. female with HTN and nontraumatic cervicomedullary junction ICH in 5/2023  I recommend:       * Left sided nontraumatic intracerebral hemorrhage of the brainstem  - MRI brain w/wo (5/2023): possible DVA and concern for cavernous malformation  - left medullary hemorrhage from HTN vs cavernoma   -PLAN was for [facility] follow up NOW to further look at this area/ will place consult per orders  - SBP< 160 per NS[facility]. Had Clonidine PRN at d/c. However, she suffered hypotension and syncope in rehab. She is currently on HCTZ and midodrine. Given her BP today, I advised she hold  midodrine unless BP is less than 90/50.        - has a history of new onset afib during this time but holding NOAC. Consult cardiology per orders for afib and HTN management  -rehab is with home health with home PT and OT for her residual left sided weakness and numbness and she will benefit outpatient therapy (Notify for consult when needed)           Mixed hyperlipidemia  Was on Atorvastatin 40mg daily. Not sure why this was d/c'ed. Will defer to cardiology          RTC given    CC: CARLTON Champion NP    Total time spent on DOS including chart review 41 minutes

## 2023-08-15 NOTE — PROGRESS NOTES
[facility]    CC: Establish care     Patient ID: Susan Pinto is a 67 y.o. female with a past medical history of HTN, HLD, PAF, hypertriglyceridemia     HPI  Referred to establish care for afib/ HTN mgt    HTN; /88  Was on midodrine for hypotension in past; no longer takes  Takes HCTZ 12.5mg   She has concerns her BP drops still; SBP 100s    HLD; was on atorvastatin; she does not take  unsure why stopped    PAF w/ CVA hx; discharged 7/7/2023; follows neuro; [facility] discharged 8/7/2023  Has residual left sided pain and numbness to arm/leg  Had syncope in rehab; resolved; took midodrine but recently held given elevated BP  Prior difficult HTN prior   Receives home health currently   Hx new onset afib while intubated with hypoxia but holding DOAC; 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   Mom has afib  Brother recently diagnosed     Normal EF  Nonsmoker  No CAD  Ambulates with walker since CVA; compliant with ASA       Past Medical History:   Diagnosis Date    Stroke      Past Surgical History:   Procedure Laterality Date    ESOPHAGOGASTRODUODENOSCOPY W/ PEG N/A 5/15/2023    Procedure: EGD, WITH PEG TUBE INSERTION;  Surgeon: Mark Guadalupe MD;  Location: [facility] OR [address] [place] [address];  Service: General;  Laterality: N/A;    TRACHEOTOMY N/A 5/15/2023    Procedure: TRACHEOTOMY;  Surgeon: Mark Guadalupe MD;  Location: [facility] OR [address] [place] [address];  Service: General;  Laterality: N/A;     Social History     Socioeconomic History    Marital status:    Tobacco Use    Smoking status: Never    Smokeless tobacco: Never   Substance and Sexual Activity    Alcohol use: No    Drug use: No     Social Determinants of Health     Financial Resource Strain: Low Risk  (5/6/2023)    Overall Financial Resource Strain (CARDIA)     Difficulty of Paying Living Expenses: Not hard at all   Food Insecurity: Unknown (5/6/2023)    Hunger Vital Sign     Worried About Running Out of Food in the Last Year: Never true  "  Transportation Needs: No Transportation Needs (5/6/2023)    PRAPARE - Transportation     Lack of Transportation (Medical): No     Lack of Transportation (Non-Medical): No   Stress: No Stress Concern Present (5/6/2023)    [employer] - Occupational Stress Questionnaire     Feeling of Stress : Not at all   Social Connections: Socially Integrated (5/6/2023)    Social Connection and Isolation Panel [NHANES]     Frequency of Communication with Friends and Family: More than three times a week     Frequency of Social Gatherings with Friends and Family: More than three times a week     Attends Ep[employer] Services: More than 4 times per year     Active Member of Clubs or Organizations: Yes     Attends Club or Organization Meetings: 1 to 4 times per year     Marital Status:    Housing Stability: Unknown (5/6/2023)    Housing Stability Vital Sign     Unable to Pay for Housing in the Last Year: No     Unstable Housing in the Last Year: No     Family History   Problem Relation Age of Onset    Hypertension Mother        Review of patient's allergies indicates:  No Known Allergies    Medication List with Changes/Refills   Current Medications    ASPIRIN (ECOTRIN) 81 MG EC TABLET    Take 81 mg by mouth once daily.    CYANOCOBALAMIN (VITAMIN B-12) 1000 MCG TABLET    Take 1,000 mcg by mouth once daily.    HYDROCHLOROTHIAZIDE (HYDRODIURIL) 12.5 MG TAB    Take 1 tablet (12.5 mg total) by mouth once daily.    MIDODRINE (PROAMATINE) 2.5 MG TAB    Take 1 tablet (2.5 mg total) by mouth every morning.    SERTRALINE (ZOLOFT) 25 MG TABLET    Take 1 tablet (25 mg total) by mouth once daily.           Review of Systems   Constitutional: Negative.   Cardiovascular: Negative.    Skin: Negative.    Musculoskeletal: Negative.        Vitals:    08/15/23 0934 08/15/23 1036 08/15/23 1037   BP: (!) 140/88 122/84 118/80   Pulse: 88     Resp: 18     SpO2: 98%     Weight: 88.5 kg (195 lb)     Height: 5' 4" (1.626 m)   "          Physical Exam      Labs:  Most Recent Data  CBC:   Lab Results   Component Value Date    WBC 26-50 (A) 06/15/2023    HGB 13.5 05/17/2023    HCT 44.1 05/17/2023     05/17/2023    MCV 93 05/17/2023    RDW 13.5 05/17/2023     BMP:   Lab Results   Component Value Date     06/26/2023    K 4.1 06/26/2023     05/17/2023    CO2 25 06/26/2023    BUN 8.0 (L) 06/26/2023    CREATININE 0.67 06/26/2023    GLU 99 05/17/2023    CALCIUM 10.4 06/26/2023    MG 2.0 05/17/2023    PHOS 3.5 05/17/2023     LFTS;   Lab Results   Component Value Date    PROT 6.9 05/17/2023    ALBUMIN 3.7 06/26/2023    BILITOT 0.4 06/26/2023    AST 12 06/26/2023    ALKPHOS 64 05/17/2023    ALT 12 06/26/2023     COAGS:   Lab Results   Component Value Date    INR 1.0 05/06/2023     FLP:   Lab Results   Component Value Date    CHOL 186 08/15/2023    HDL 43 08/15/2023    LDLCALC 120.4 08/15/2023    TRIG 113 08/15/2023    CHOLHDL 23.1 08/15/2023     CARDIAC:   Lab Results   Component Value Date    TROPONINI <0.006 05/11/2023     (H) 05/05/2023       Assessment/Plan:  Problem List Items Addressed This Visit          Cardiac/Vascular    Benign essential HTN - Primary    Mixed hyperlipidemia    Paroxysmal atrial fibrillation    Primary hypertension    Hypertriglyceridemia       Endocrine    Obesity (BMI 30.0-34.9)     Other Visit Diagnoses       Dyslipidemia              EKG today NSR  HTN with SBP 140s today but on midodrine; stopped today  Monitor BP at home; if consistently >130, please notify clinic  Reports SBP 100s at home    Possible familial afib; mom and brother has it   Check cardiac event monitor given the above; further recs after monitor  Check lipids; statin to be resumed thereafter; update atorvastatin 40mg to be resumed     Follow up 6-8 weeks    Total duration of face to face visit time 30 minutes.  Total time spent counseling greater than fifty percent of total visit time.  Counseling included discussion regarding  imaging findings, diagnosis, possibilities, treatment options, risks and benefits.  The patient had many questions regarding the options and long-term effects.    Madhuri Green, CHIDI-C  Cardiology Clinic  [facility]

## 2023-08-15 NOTE — TELEPHONE ENCOUNTER
In-basket referral message sent to [facility] Neurosurgery 8th Floor Clinical Support Staff and [facility] Neurosurgery Clinical Support to contact patient to schedule appointment.

## 2023-08-22 ENCOUNTER — OFFICE VISIT (OUTPATIENT)
Dept: INTERNAL MEDICINE | Facility: CLINIC | Age: 68
End: 2023-08-22
Payer: MEDICARE

## 2023-08-22 VITALS
RESPIRATION RATE: 20 BRPM | OXYGEN SATURATION: 98 % | HEART RATE: 102 BPM | TEMPERATURE: 100 F | SYSTOLIC BLOOD PRESSURE: 118 MMHG | DIASTOLIC BLOOD PRESSURE: 84 MMHG

## 2023-08-22 DIAGNOSIS — L02.211 ABSCESS OF ABDOMINAL WALL: Primary | ICD-10-CM

## 2023-08-22 PROCEDURE — 99213 OFFICE O/P EST LOW 20 MIN: CPT | Mod: PBBFAC,PN | Performed by: NURSE PRACTITIONER

## 2023-08-22 PROCEDURE — 99213 OFFICE O/P EST LOW 20 MIN: CPT | Mod: S$PBB,,, | Performed by: NURSE PRACTITIONER

## 2023-08-22 PROCEDURE — 99999 PR PBB SHADOW E&M-EST. PATIENT-LVL III: ICD-10-PCS | Mod: PBBFAC,,, | Performed by: NURSE PRACTITIONER

## 2023-08-22 PROCEDURE — 99999 PR PBB SHADOW E&M-EST. PATIENT-LVL III: CPT | Mod: PBBFAC,,, | Performed by: NURSE PRACTITIONER

## 2023-08-22 PROCEDURE — 99213 PR OFFICE/OUTPT VISIT, EST, LEVL III, 20-29 MIN: ICD-10-PCS | Mod: S$PBB,,, | Performed by: NURSE PRACTITIONER

## 2023-08-22 RX ORDER — CLINDAMYCIN HYDROCHLORIDE 300 MG/1
300 CAPSULE ORAL EVERY 8 HOURS
Qty: 21 CAPSULE | Refills: 0 | Status: SHIPPED | OUTPATIENT
Start: 2023-08-22 | End: 2023-08-29

## 2023-08-22 NOTE — PROGRESS NOTES
Subjective:       Patient ID: Susan Pinto is a 67 y.o. female.    Chief Complaint: Follow-up (Wound check)    Patient is known, to me and presents with   Chief Complaint   Patient presents with    Follow-up     Wound check   .  Denies chest pain and shortness of breath.  Patient presents with abscess formation where peg tube used to be to left abdominal region. Drained and no longer with swelling and drainage. States that it had a pustule then it drained. Feeling and looking much better. Just draining bloody drainage. She did say the the pus did have an order  Follow-up  Pertinent negatives include no rash.     Review of Systems   Constitutional: Negative.    Respiratory: Negative.     Cardiovascular: Negative.    Skin:  Positive for wound. Negative for color change, pallor and rash.       Objective:      Physical Exam  Constitutional:       General: She is not in acute distress.     Appearance: Normal appearance. She is obese. She is not ill-appearing, toxic-appearing or diaphoretic.   Cardiovascular:      Rate and Rhythm: Normal rate and regular rhythm.      Heart sounds: Normal heart sounds. No murmur heard.  Pulmonary:      Effort: Pulmonary effort is normal. No respiratory distress.      Breath sounds: Normal breath sounds. No stridor. No wheezing, rhonchi or rales.   Chest:      Chest wall: No tenderness.   Skin:     General: Skin is warm and dry.      Capillary Refill: Capillary refill takes less than 2 seconds.      Coloration: Skin is not jaundiced or pale.      Findings: Wound present. No bruising, erythema, lesion or rash.             Comments: No longer draining purulent drainage. Blood drainage noted to dressing. Healing well at site. No induration and no erythema noted    Neurological:      Mental Status: She is alert.         Assessment:       1. Abscess of abdominal wall        Plan:   1. Abscess of abdominal wall  -     clindamycin (CLEOCIN) 300 MG capsule; Take 1 capsule (300 mg total) by  "mouth every 8 (eight) hours. for 7 days  Dispense: 21 capsule; Refill: 0       "This note will not be shared with the patient."  Home health nurse will contact me in regards to update on wound  Keep clean with soap and water  Apply ointment to affected site  Rtc as scheduled  "

## 2023-09-05 ENCOUNTER — DOCUMENT SCAN (OUTPATIENT)
Dept: HOME HEALTH SERVICES | Facility: HOSPITAL | Age: 68
End: 2023-09-05
Payer: MEDICARE

## 2023-09-08 PROCEDURE — G0179 PR HOME HEALTH MD RECERTIFICATION: ICD-10-PCS | Mod: ,,, | Performed by: NURSE PRACTITIONER

## 2023-09-08 PROCEDURE — G0179 MD RECERTIFICATION HHA PT: HCPCS | Mod: ,,, | Performed by: NURSE PRACTITIONER

## 2023-09-11 RX ORDER — ATORVASTATIN CALCIUM 40 MG/1
40 TABLET, FILM COATED ORAL DAILY
Qty: 90 TABLET | Refills: 3 | Status: SHIPPED | OUTPATIENT
Start: 2023-09-11 | End: 2024-09-10

## 2023-09-18 ENCOUNTER — OFFICE VISIT (OUTPATIENT)
Dept: NEUROSURGERY | Facility: CLINIC | Age: 68
End: 2023-09-18
Payer: MEDICARE

## 2023-09-18 VITALS
HEIGHT: 64 IN | BODY MASS INDEX: 33.31 KG/M2 | SYSTOLIC BLOOD PRESSURE: 148 MMHG | DIASTOLIC BLOOD PRESSURE: 82 MMHG | HEART RATE: 87 BPM | WEIGHT: 195.13 LBS

## 2023-09-18 DIAGNOSIS — I61.3 LEFT-SIDED NONTRAUMATIC INTRACEREBRAL HEMORRHAGE OF BRAINSTEM: ICD-10-CM

## 2023-09-18 PROCEDURE — 99213 OFFICE O/P EST LOW 20 MIN: CPT | Mod: PBBFAC | Performed by: PHYSICIAN ASSISTANT

## 2023-09-18 PROCEDURE — 99215 PR OFFICE/OUTPT VISIT, EST, LEVL V, 40-54 MIN: ICD-10-PCS | Mod: S$PBB,,, | Performed by: PHYSICIAN ASSISTANT

## 2023-09-18 PROCEDURE — 99215 OFFICE O/P EST HI 40 MIN: CPT | Mod: S$PBB,,, | Performed by: PHYSICIAN ASSISTANT

## 2023-09-18 PROCEDURE — 99999 PR PBB SHADOW E&M-EST. PATIENT-LVL III: ICD-10-PCS | Mod: PBBFAC,,, | Performed by: PHYSICIAN ASSISTANT

## 2023-09-18 PROCEDURE — 99999 PR PBB SHADOW E&M-EST. PATIENT-LVL III: CPT | Mod: PBBFAC,,, | Performed by: PHYSICIAN ASSISTANT

## 2023-09-18 NOTE — PROGRESS NOTES
Neurosurgery  Established Patient    SUBJECTIVE:     History of Present Illness:  67 y.o. female presents to clinic for hospital f/u. 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 Evaluated by NSGY in hospital and discharged to LTAC for rehab and eventual outpatient follow-up after ICH was found to be non-operative.     Pt reports poor balance, poor overall mobility, intermittent numbness and swelling throughout L arm, and intermittent pain and weakness in hands over the past few months. Pt states that she has remained active with Home Health PT/PT sinces she was discharged home, and has noticed some gradual improvements in her symptoms. She will be transitioning to outpatient PT soon, and is hopeful that she will recieve more benefit from a more intensive program. Pt endorses that she she initially developed diplopia and peripheral field loss when she initially suffered the bleed, but states that these symptoms have since improved. Endorses that overall her vision still seems worse than prior to the bleed.     Pt reports that she has been urged to follow-up with her cardiologist to further investigate factors that could have contributed to her spontaneous bleed, but wanted to complete this appointment first before she took more tasks upon herself. Denies any prior hx of strokes or family Hx of strokes.    Review of patient's allergies indicates:  No Known Allergies    Current Outpatient Medications   Medication Sig Dispense Refill    cyanocobalamin (VITAMIN B-12) 1000 MCG tablet Take 1,000 mcg by mouth once daily.      hydroCHLOROthiazide (HYDRODIURIL) 12.5 MG Tab Take 1 tablet (12.5 mg total) by mouth once daily. 30 tablet 2    midodrine (PROAMATINE) 2.5 MG Tab Take 1 tablet (2.5 mg total) by mouth every morning. 30 tablet 2    sertraline (ZOLOFT) 25 MG tablet Take 1 tablet (25 mg total) by mouth once  daily. 30 tablet 2    aspirin (ECOTRIN) 81 MG EC tablet Take 81 mg by mouth once daily.      atorvastatin (LIPITOR) 40 MG tablet Take 1 tablet (40 mg total) by mouth once daily. (Patient not taking: Reported on 9/18/2023) 90 tablet 3     No current facility-administered medications for this visit.       Past Medical History:   Diagnosis Date    Stroke      Past Surgical History:   Procedure Laterality Date    ESOPHAGOGASTRODUODENOSCOPY W/ PEG N/A 5/15/2023    Procedure: EGD, WITH PEG TUBE INSERTION;  Surgeon: Mark Guadalupe MD;  Location: [address];  Service: General;  Laterality: N/A;    TRACHEOTOMY N/A 5/15/2023    Procedure: TRACHEOTOMY;  Surgeon: Mark Guadalupe MD;  Location: [facility] OR [address] [place] [address];  Service: General;  Laterality: N/A;     Family History       Problem Relation (Age of Onset)    Hypertension Mother          Social History     Socioeconomic History    Marital status:    Tobacco Use    Smoking status: Never    Smokeless tobacco: Never   Substance and Sexual Activity    Alcohol use: No    Drug use: No     Social Determinants of Health     Financial Resource Strain: Low Risk  (5/6/2023)    Overall Financial Resource Strain (CARDIA)     Difficulty of Paying Living Expenses: Not hard at all   Food Insecurity: Unknown (5/6/2023)    Hunger Vital Sign     Worried About Running Out of Food in the Last Year: Never true   Transportation Needs: No Transportation Needs (5/6/2023)    PRAPARE - Transportation     Lack of Transportation (Medical): No     Lack of Transportation (Non-Medical): No   Stress: No Stress Concern Present (5/6/2023)    [employer] - Occupational Stress Questionnaire     Feeling of Stress : Not at all   Social Connections: Socially Integrated (5/6/2023)    Social Connection and Isolation Panel [NHANES]     Frequency of Communication with Friends and Family: More than three times a week     Frequency of Social Gatherings with Friends and Family:  "More than three times a week     Attends Latter day Services: More than 4 times per year     Active Member of Clubs or Organizations: Yes     Attends Club or Organization Meetings: 1 to 4 times per year     Marital Status:    Housing Stability: Unknown (5/6/2023)    Housing Stability Vital Sign     Unable to Pay for Housing in the Last Year: No     Unstable Housing in the Last Year: No       Review of Systems    OBJECTIVE:     Vital Signs  Pulse: 87  BP: (!) 148/82  Pain Score: 0-No pain  Height: 5' 4" (162.6 cm)  Weight: 88.5 kg (195 lb 1.7 oz)  Body mass index is 33.49 kg/m².    Neurosurgery Physical Exam  General: well developed, well nourished, no distress.   Head: normocephalic, atraumatic  Neurologic: Alert and oriented. Thought content appropriate.  GCS: Motor: 6/Verbal: 5/Eyes: 4 GCS Total: 15  Mental Status: Awake, Alert, Oriented x 4  Language: No aphasia  Speech: No dysarthria  Cranial nerves: face symmetric, tongue midline, CN II-XII grossly intact.   Eyes: pupils equal, round, reactive to light with accommodation, EOMI.   Pulmonary: normal respirations, no signs of respiratory distress  Abdomen: soft, non-distended, not tender to palpation  Skin: Skin is warm, dry and intact. Non-pitting edema throughout LUE.  Sensory: intact to light touch throughout    Motor Strength: Moves all extremities spontaneously with good tone. LUE 4+/5 strength. All other extremities 5/5.    Reflexes:   DTR: 2+ symmetrically throughout.     Cerebellar:   Finger-to-nose: intact bilaterally     Diagnostic Results:  MRI Brain W WO 9/15/2023 reveals T2 changes at L cervicomedullary junction, consistent with known Hx of prior hemorrhage. No evidence of ongoing bleed or acute intracranial abnormality.    ASSESSMENT/PLAN:   Susan Pinto is a 67 y.o. female who presents to clinic for follow-up evaluation of spontaneous L cervicomedullary ICH. 9/15/2023 MRI Brain stable without evidence of recurrent bleed or acute " abnormality. Given her presentation in clinic today in addition to lack of new findings on her repeat MRI, I have encouraged her to continue to follow-up with cardiology and neurology. She may contact the clinic with any questions or concerns, and follow-up with Neurosurgery as needed.          Note dictated with voice recognition software, please excuse any grammatical errors.

## 2023-09-19 ENCOUNTER — TELEPHONE (OUTPATIENT)
Dept: CARDIOLOGY | Facility: CLINIC | Age: 68
End: 2023-09-19
Payer: MEDICARE

## 2023-09-19 NOTE — TELEPHONE ENCOUNTER
----- Message from Madhuri Green NP sent at 9/11/2023  1:26 PM CDT -----  Please notify this patient that atorvastatin was sent over to her pharmacy. Her lipid panel resulted and it is advised to continue given her stroke history.     Thanks

## 2023-09-20 DIAGNOSIS — F32.9 REACTIVE DEPRESSION: ICD-10-CM

## 2023-09-20 DIAGNOSIS — F41.9 ANXIETY: ICD-10-CM

## 2023-09-20 DIAGNOSIS — R60.9 EDEMA, UNSPECIFIED TYPE: ICD-10-CM

## 2023-09-20 RX ORDER — SERTRALINE HYDROCHLORIDE 25 MG/1
25 TABLET, FILM COATED ORAL DAILY
Qty: 30 TABLET | Refills: 2 | Status: SHIPPED | OUTPATIENT
Start: 2023-09-20 | End: 2023-10-25 | Stop reason: SDUPTHER

## 2023-09-20 RX ORDER — HYDROCHLOROTHIAZIDE 12.5 MG/1
12.5 TABLET ORAL DAILY
Qty: 30 TABLET | Refills: 2 | Status: SHIPPED | OUTPATIENT
Start: 2023-09-20 | End: 2023-10-25 | Stop reason: SDUPTHER

## 2023-09-27 ENCOUNTER — TELEPHONE (OUTPATIENT)
Dept: INTERNAL MEDICINE | Facility: CLINIC | Age: 68
End: 2023-09-27
Payer: MEDICARE

## 2023-09-27 ENCOUNTER — PATIENT OUTREACH (OUTPATIENT)
Dept: ADMINISTRATIVE | Facility: HOSPITAL | Age: 68
End: 2023-09-27
Payer: MEDICARE

## 2023-09-27 VITALS — DIASTOLIC BLOOD PRESSURE: 82 MMHG | SYSTOLIC BLOOD PRESSURE: 125 MMHG

## 2023-09-29 ENCOUNTER — DOCUMENT SCAN (OUTPATIENT)
Dept: HOME HEALTH SERVICES | Facility: HOSPITAL | Age: 68
End: 2023-09-29
Payer: MEDICARE

## 2023-09-29 ENCOUNTER — TELEPHONE (OUTPATIENT)
Dept: INTERNAL MEDICINE | Facility: CLINIC | Age: 68
End: 2023-09-29

## 2023-09-29 DIAGNOSIS — I61.3 LEFT-SIDED NONTRAUMATIC INTRACEREBRAL HEMORRHAGE OF BRAINSTEM: Primary | ICD-10-CM

## 2023-09-29 NOTE — TELEPHONE ENCOUNTER
----- Message from Stephanie Dhaliwal MA sent at 2023  1:05 PM CDT -----    ----- Message -----  From: Alysha Fragoso MA  Sent: 2023  12:52 PM CDT  To: [facility] Staff    Susan Pinto  MRN: [id number]  : 1955  PCP: Court Champion.  Home Phone      [phone]  Work Phone      Not on file.  Mobile          [phone]      MESSAGE:     Home Health Nurse is requesting an order for OT/PT.  Fax order to [facility])

## 2023-10-04 ENCOUNTER — DOCUMENT SCAN (OUTPATIENT)
Dept: HOME HEALTH SERVICES | Facility: HOSPITAL | Age: 68
End: 2023-10-04
Payer: MEDICARE

## 2023-10-09 ENCOUNTER — TELEPHONE (OUTPATIENT)
Dept: INTERNAL MEDICINE | Facility: CLINIC | Age: 68
End: 2023-10-09
Payer: MEDICARE

## 2023-10-09 DIAGNOSIS — L02.91 ABSCESS: Primary | ICD-10-CM

## 2023-10-09 RX ORDER — CLINDAMYCIN HYDROCHLORIDE 300 MG/1
300 CAPSULE ORAL EVERY 8 HOURS
Qty: 21 CAPSULE | Refills: 0 | Status: SHIPPED | OUTPATIENT
Start: 2023-10-09 | End: 2023-10-16

## 2023-10-12 ENCOUNTER — HOSPITAL ENCOUNTER (EMERGENCY)
Facility: HOSPITAL | Age: 68
Discharge: ANOTHER HOSPITAL | End: 2023-10-13
Attending: SURGERY
Payer: MEDICARE

## 2023-10-12 DIAGNOSIS — K65.1 INTRA-ABDOMINAL ABSCESS: Primary | ICD-10-CM

## 2023-10-12 LAB
ALBUMIN SERPL BCP-MCNC: 3.1 G/DL (ref 3.5–5.2)
ALP SERPL-CCNC: 62 U/L (ref 55–135)
ALT SERPL W/O P-5'-P-CCNC: 7 U/L (ref 10–44)
ANION GAP SERPL CALC-SCNC: 13 MMOL/L (ref 8–16)
AST SERPL-CCNC: 12 U/L (ref 10–40)
BASOPHILS # BLD AUTO: 0.06 K/UL (ref 0–0.2)
BASOPHILS NFR BLD: 0.5 % (ref 0–1.9)
BILIRUB SERPL-MCNC: 0.6 MG/DL (ref 0.1–1)
BUN SERPL-MCNC: 10 MG/DL (ref 8–23)
CALCIUM SERPL-MCNC: 11.1 MG/DL (ref 8.7–10.5)
CHLORIDE SERPL-SCNC: 99 MMOL/L (ref 95–110)
CO2 SERPL-SCNC: 26 MMOL/L (ref 23–29)
CREAT SERPL-MCNC: 0.9 MG/DL (ref 0.5–1.4)
DIFFERENTIAL METHOD: ABNORMAL
EOSINOPHIL # BLD AUTO: 0.1 K/UL (ref 0–0.5)
EOSINOPHIL NFR BLD: 0.8 % (ref 0–8)
ERYTHROCYTE [DISTWIDTH] IN BLOOD BY AUTOMATED COUNT: 14.3 % (ref 11.5–14.5)
EST. GFR  (NO RACE VARIABLE): >60 ML/MIN/1.73 M^2
GLUCOSE SERPL-MCNC: 125 MG/DL (ref 70–110)
HCT VFR BLD AUTO: 40.1 % (ref 37–48.5)
HGB BLD-MCNC: 12.8 G/DL (ref 12–16)
IMM GRANULOCYTES # BLD AUTO: 0.07 K/UL (ref 0–0.04)
IMM GRANULOCYTES NFR BLD AUTO: 0.6 % (ref 0–0.5)
LACTATE SERPL-SCNC: 1.3 MMOL/L (ref 0.5–2.2)
LYMPHOCYTES # BLD AUTO: 2.4 K/UL (ref 1–4.8)
LYMPHOCYTES NFR BLD: 20 % (ref 18–48)
MCH RBC QN AUTO: 27.1 PG (ref 27–31)
MCHC RBC AUTO-ENTMCNC: 31.9 G/DL (ref 32–36)
MCV RBC AUTO: 85 FL (ref 82–98)
MONOCYTES # BLD AUTO: 0.9 K/UL (ref 0.3–1)
MONOCYTES NFR BLD: 7.8 % (ref 4–15)
NEUTROPHILS # BLD AUTO: 8.4 K/UL (ref 1.8–7.7)
NEUTROPHILS NFR BLD: 70.3 % (ref 38–73)
NRBC BLD-RTO: 0 /100 WBC
PLATELET # BLD AUTO: 406 K/UL (ref 150–450)
PMV BLD AUTO: 9.9 FL (ref 9.2–12.9)
POTASSIUM SERPL-SCNC: 3.2 MMOL/L (ref 3.5–5.1)
PROT SERPL-MCNC: 8.2 G/DL (ref 6–8.4)
RBC # BLD AUTO: 4.73 M/UL (ref 4–5.4)
SODIUM SERPL-SCNC: 138 MMOL/L (ref 136–145)
WBC # BLD AUTO: 11.97 K/UL (ref 3.9–12.7)

## 2023-10-12 PROCEDURE — 25500020 PHARM REV CODE 255: Performed by: SURGERY

## 2023-10-12 PROCEDURE — 96365 THER/PROPH/DIAG IV INF INIT: CPT | Mod: 59

## 2023-10-12 PROCEDURE — 99285 EMERGENCY DEPT VISIT HI MDM: CPT | Mod: 25

## 2023-10-12 PROCEDURE — 96366 THER/PROPH/DIAG IV INF ADDON: CPT

## 2023-10-12 PROCEDURE — 83605 ASSAY OF LACTIC ACID: CPT | Performed by: SURGERY

## 2023-10-12 PROCEDURE — 96361 HYDRATE IV INFUSION ADD-ON: CPT

## 2023-10-12 PROCEDURE — 96367 TX/PROPH/DG ADDL SEQ IV INF: CPT

## 2023-10-12 PROCEDURE — 63600175 PHARM REV CODE 636 W HCPCS: Performed by: SURGERY

## 2023-10-12 PROCEDURE — 80053 COMPREHEN METABOLIC PANEL: CPT | Performed by: SURGERY

## 2023-10-12 PROCEDURE — 25000003 PHARM REV CODE 250: Performed by: SURGERY

## 2023-10-12 PROCEDURE — 85025 COMPLETE CBC W/AUTO DIFF WBC: CPT | Performed by: SURGERY

## 2023-10-12 PROCEDURE — 36415 COLL VENOUS BLD VENIPUNCTURE: CPT | Performed by: SURGERY

## 2023-10-12 PROCEDURE — 87040 BLOOD CULTURE FOR BACTERIA: CPT | Performed by: SURGERY

## 2023-10-12 RX ADMIN — VANCOMYCIN HYDROCHLORIDE 1250 MG: 1.25 INJECTION, POWDER, LYOPHILIZED, FOR SOLUTION INTRAVENOUS at 09:10

## 2023-10-12 RX ADMIN — VANCOMYCIN HYDROCHLORIDE 1000 MG: 1 INJECTION, POWDER, LYOPHILIZED, FOR SOLUTION INTRAVENOUS at 08:10

## 2023-10-12 RX ADMIN — PIPERACILLIN AND TAZOBACTAM 4.5 G: 4; .5 INJECTION, POWDER, LYOPHILIZED, FOR SOLUTION INTRAVENOUS; PARENTERAL at 07:10

## 2023-10-12 RX ADMIN — IOHEXOL 30 ML: 350 INJECTION, SOLUTION INTRAVENOUS at 06:10

## 2023-10-12 RX ADMIN — IOHEXOL 100 ML: 350 INJECTION, SOLUTION INTRAVENOUS at 06:10

## 2023-10-12 RX ADMIN — SODIUM CHLORIDE 1000 ML: 9 INJECTION, SOLUTION INTRAVENOUS at 05:10

## 2023-10-12 NOTE — ED PROVIDER NOTES
Encounter Date: 10/12/2023       History     Chief Complaint   Patient presents with    Abscess     Pt arrives to the ed with c/o abscess to abd, pt was sent from pcp office for I&D. Redness/ and swelling noted. One abscess pressure dressing. Clindamycin was prescribed.     Susan Pinto is a 67 y.o. female that presents with significant abscess  Patient had a stroke earlier this year with a PEG tube placement after the CVA  Patient was discharged to rehab facility with problems with the PEG tube there  Was removed but could not be replaced despite several attempts by the MD  Patient developed erythema to the area & a proximally early August 2023  She is been on multiple rounds of clindamycin, presented to the NP today  Noted to have significant abdominal abscess & was sent to the ER today    The history is provided by the patient.     Review of patient's allergies indicates:  No Known Allergies  Past Medical History:   Diagnosis Date    Stroke      Past Surgical History:   Procedure Laterality Date    ESOPHAGOGASTRODUODENOSCOPY W/ PEG N/A 5/15/2023    Procedure: EGD, WITH PEG TUBE INSERTION;  Surgeon: Mark Guadalupe MD;  Location: [address];  Service: General;  Laterality: N/A;    TRACHEOTOMY N/A 5/15/2023    Procedure: TRACHEOTOMY;  Surgeon: Mark Guadalupe MD;  Location: [address];  Service: General;  Laterality: N/A;     Family History   Problem Relation Age of Onset    Hypertension Mother      Social History     Tobacco Use    Smoking status: Never    Smokeless tobacco: Never   Substance Use Topics    Alcohol use: No    Drug use: No     Review of Systems   Constitutional: Negative.    HENT: Negative.     Eyes: Negative.    Respiratory: Negative.     Cardiovascular: Negative.    Gastrointestinal: Negative.    Genitourinary: Negative.    Musculoskeletal: Negative.    Skin:  Positive for wound.   Neurological: Negative.    Psychiatric/Behavioral: Negative.         Physical Exam     Initial  Vitals [10/12/23 1548]   BP Pulse Resp Temp SpO2   (!) 143/68 96 18 97.6 °F (36.4 °C) 97 %      MAP       --         Physical Exam    Nursing note and vitals reviewed.  Constitutional: Vital signs are normal. She appears well-developed and well-nourished. She is cooperative.   HENT:   Head: Normocephalic and atraumatic.   Eyes: Conjunctivae, EOM and lids are normal. Pupils are equal, round, and reactive to light.   Neck: Trachea normal and phonation normal. Neck supple. No JVD present.   Normal range of motion.   Full passive range of motion without pain.     Cardiovascular:  Normal rate, regular rhythm, S1 normal, S2 normal, normal heart sounds, intact distal pulses and normal pulses.           Pulmonary/Chest: Effort normal and breath sounds normal.   Abdominal: Abdomen is soft and flat. Bowel sounds are normal.   Musculoskeletal:         General: Normal range of motion.      Cervical back: Full passive range of motion without pain, normal range of motion and neck supple.     Neurological: She is alert and oriented to person, place, and time. She has normal strength.   Skin: Skin is intact. Capillary refill takes less than 2 seconds.   (+) erythema to the left abdominal wall with 2 abscess opening  Multiple areas of purulence, erythema to the left abdominal wall         ED Course   Procedures  Labs Reviewed   CBC W/ AUTO DIFFERENTIAL - Abnormal; Notable for the following components:       Result Value    MCHC 31.9 (*)     Immature Granulocytes 0.6 (*)     Gran # (ANC) 8.4 (*)     Immature Grans (Abs) 0.07 (*)     All other components within normal limits   COMPREHENSIVE METABOLIC PANEL - Abnormal; Notable for the following components:    Potassium 3.2 (*)     Glucose 125 (*)     Calcium 11.1 (*)     Albumin 3.1 (*)     ALT 7 (*)     All other components within normal limits   CULTURE, BLOOD   CULTURE, BLOOD   LACTIC ACID, PLASMA          Imaging Results              CT Abdomen Pelvis With Contrast (Final result)   Result time 10/12/23 18:34:48      Final result by Jules Child MD (10/12/23 18:34:48)                   Impression:      Gastrostomy tube tract in the left upper quadrant with associated large (15.0 x 13.3 x 9.4) multiloculated soft tissue abscess.  Components of the abscess are superficial and deep to the left rectus abdominis musculature.  Diffuse subcutaneous infectious/inflammatory fat stranding with overlying skin thickening.  No intraperitoneal communication. No enterocutaneous fistula.      Electronically signed by: Jules Child  Date:    10/12/2023  Time:    18:34               Narrative:    EXAMINATION:  CT ABDOMEN PELVIS WITH CONTRAST    CLINICAL HISTORY:  Abdominal abscess/infection suspected;    TECHNIQUE:  Low dose axial images, sagittal and coronal reformations were obtained from the lung bases to the pubic symphysis following the IV administration of 100 mL of Omnipaque 350 and the oral administration of 30 ml of Omnipaque 350.    COMPARISON:  CT 06/16/2023    FINDINGS:  Abdomen:    - Lower thorax:Unremarkable.    - Lung bases: Bibasilar dependent atelectasis.    - Liver: Fatty liver.  No discrete mass.    - Gallbladder: No calcified gallstones.    - Bile Ducts: No evidence of intra or extra hepatic biliary ductal dilation.    - Spleen: Negative.    - Kidneys: Small bilateral renal cysts.  No hydronephrosis.    - Adrenals: Unremarkable.    - Pancreas: No mass or peripancreatic fat stranding.    - Retroperitoneum:  No significant adenopathy.    - Vascular: No abdominal aortic aneurysm.    - Abdominal wall:  Gastrostomy tube tract in the left upper quadrant with associated large (15.0 x 13.3 x 9.4) multiloculated soft tissue abscess.  Components of the abscess are superficial and deep to the left rectus abdominis musculature.  Diffuse subcutaneous  infectious/inflammatory fat stranding with overlying skin thickening.  No intraperitoneal communication.  No enterocutaneous  fistula.    Pelvis:    No pelvic mass, adenopathy, or free fluid.    Bowel/Mesentery:    Large amount of stool throughout the colon and rectum.  Colonic diverticulosis, without diverticulitis.  No small bowel obstruction.  Normal appendix.    Bones:  No acute osseous abnormality and no suspicious lytic or blastic lesion.                                       Medications   piperacillin-tazobactam (ZOSYN) 4.5 g in dextrose 5 % in water (D5W) 100 mL IVPB (MB+) (has no administration in time range)   vancomycin - pharmacy to dose (has no administration in time range)   sodium chloride 0.9% bolus 1,000 mL 1,000 mL (1,000 mLs Intravenous New Bag 10/12/23 1700)   iohexoL (OMNIPAQUE 350) injection 100 mL (100 mLs Intravenous Given 10/12/23 1801)   iohexoL (OMNIPAQUE 350) injection 30 mL (30 mLs Oral Given 10/12/23 1800)     Medical Decision Making  67-year-old female with intra-abdominal abscess on CT scan today  Differential includes intra-abdominal abscess versus cellulitis versus fistula    Problems Addressed:  Intra-abdominal abscess: complicated acute illness or injury    Amount and/or Complexity of Data Reviewed  Labs: ordered. Decision-making details documented in ED Course.  Radiology: ordered and independent interpretation performed.    ED Management & Risk of Complications, Morbidity, Mortality:  Lab work within normal limits, CT shows large abscess  Likely a connection between the gastric PEG tube site in the abdominal wall  IV antibiotics given, IV fluids given, discussed with surgeon Jhonny fu  This is a complex surgical case that needs to go to [facility]    Critical Care ED Physician Time (minutes):  -- Performed by: Magdiel Byers M.D.  -- Date/Time: 6:43 PM 10/12/2023   -- Direct Patient Care (Face Time): 5  -- Additional History from Records or Taking Additional History: 5  -- Ordering, Reviewing, and Interpreting Diagnostic Studies: 5  -- Total Time in Documentation: 11  -- Consultation with Other  Physicians: 5  -- Consultation with Family Related to Condition: 0  -- Total Critical Care Time: 31  -- Critical care was necessary to treat intra-abdominal abscess  -- Critical care was time spent personally by me on the following activities:   -- discussions with consultants regarding treatment plan today  -- development of treatment plan with patient & their family  -- examination of patient, ordering and performing treatments   -- review of radiographic studies, re-evaluation of pt's condition  -- review of labs and evaluation of response to treatment          Clinical Impression:   Final diagnoses:  [K65.1] Intra-abdominal abscess (Primary)        ED Disposition Condition    Transfer to Another Facility Magdiel Velarde MD  10/12/23 6667

## 2023-10-13 ENCOUNTER — DOCUMENT SCAN (OUTPATIENT)
Dept: HOME HEALTH SERVICES | Facility: HOSPITAL | Age: 68
End: 2023-10-13
Payer: MEDICARE

## 2023-10-13 ENCOUNTER — EXTERNAL HOME HEALTH (OUTPATIENT)
Dept: HOME HEALTH SERVICES | Facility: HOSPITAL | Age: 68
End: 2023-10-13
Payer: MEDICARE

## 2023-10-13 ENCOUNTER — ANESTHESIA EVENT (OUTPATIENT)
Dept: SURGERY | Facility: HOSPITAL | Age: 68
DRG: 603 | End: 2023-10-13
Payer: MEDICARE

## 2023-10-13 ENCOUNTER — HOSPITAL ENCOUNTER (INPATIENT)
Facility: HOSPITAL | Age: 68
LOS: 2 days | Discharge: HOME HEALTH | DRG: 603 | End: 2023-10-15
Attending: HOSPITALIST | Admitting: HOSPITALIST
Payer: MEDICARE

## 2023-10-13 ENCOUNTER — ANESTHESIA (OUTPATIENT)
Dept: SURGERY | Facility: HOSPITAL | Age: 68
DRG: 603 | End: 2023-10-13
Payer: MEDICARE

## 2023-10-13 VITALS
DIASTOLIC BLOOD PRESSURE: 69 MMHG | BODY MASS INDEX: 33.31 KG/M2 | HEIGHT: 64 IN | TEMPERATURE: 98 F | SYSTOLIC BLOOD PRESSURE: 157 MMHG | OXYGEN SATURATION: 92 % | RESPIRATION RATE: 19 BRPM | HEART RATE: 75 BPM | WEIGHT: 195.13 LBS

## 2023-10-13 DIAGNOSIS — L02.211 ABDOMINAL WALL ABSCESS: Primary | ICD-10-CM

## 2023-10-13 DIAGNOSIS — E87.6 HYPOKALEMIA: ICD-10-CM

## 2023-10-13 DIAGNOSIS — R07.9 CHEST PAIN: ICD-10-CM

## 2023-10-13 LAB
ALBUMIN SERPL BCP-MCNC: 2.7 G/DL (ref 3.5–5.2)
ALP SERPL-CCNC: 53 U/L (ref 55–135)
ALT SERPL W/O P-5'-P-CCNC: 7 U/L (ref 10–44)
ANION GAP SERPL CALC-SCNC: 11 MMOL/L (ref 8–16)
AST SERPL-CCNC: 12 U/L (ref 10–40)
BASOPHILS # BLD AUTO: 0.09 K/UL (ref 0–0.2)
BASOPHILS NFR BLD: 0.8 % (ref 0–1.9)
BILIRUB SERPL-MCNC: 0.6 MG/DL (ref 0.1–1)
BUN SERPL-MCNC: 8 MG/DL (ref 8–23)
CALCIUM SERPL-MCNC: 10.4 MG/DL (ref 8.7–10.5)
CHLORIDE SERPL-SCNC: 99 MMOL/L (ref 95–110)
CO2 SERPL-SCNC: 25 MMOL/L (ref 23–29)
CREAT SERPL-MCNC: 0.7 MG/DL (ref 0.5–1.4)
DIFFERENTIAL METHOD: ABNORMAL
EOSINOPHIL # BLD AUTO: 0.2 K/UL (ref 0–0.5)
EOSINOPHIL NFR BLD: 1.8 % (ref 0–8)
ERYTHROCYTE [DISTWIDTH] IN BLOOD BY AUTOMATED COUNT: 14.3 % (ref 11.5–14.5)
EST. GFR  (NO RACE VARIABLE): >60 ML/MIN/1.73 M^2
GLUCOSE SERPL-MCNC: 122 MG/DL (ref 70–110)
HCT VFR BLD AUTO: 38 % (ref 37–48.5)
HGB BLD-MCNC: 12.5 G/DL (ref 12–16)
IMM GRANULOCYTES # BLD AUTO: 0.09 K/UL (ref 0–0.04)
IMM GRANULOCYTES NFR BLD AUTO: 0.8 % (ref 0–0.5)
LACTATE SERPL-SCNC: 0.9 MMOL/L (ref 0.5–2.2)
LYMPHOCYTES # BLD AUTO: 1.4 K/UL (ref 1–4.8)
LYMPHOCYTES NFR BLD: 12 % (ref 18–48)
MAGNESIUM SERPL-MCNC: 1.5 MG/DL (ref 1.6–2.6)
MCH RBC QN AUTO: 27.5 PG (ref 27–31)
MCHC RBC AUTO-ENTMCNC: 32.9 G/DL (ref 32–36)
MCV RBC AUTO: 84 FL (ref 82–98)
MONOCYTES # BLD AUTO: 0.9 K/UL (ref 0.3–1)
MONOCYTES NFR BLD: 7.7 % (ref 4–15)
NEUTROPHILS # BLD AUTO: 8.7 K/UL (ref 1.8–7.7)
NEUTROPHILS NFR BLD: 76.9 % (ref 38–73)
NRBC BLD-RTO: 0 /100 WBC
PLATELET # BLD AUTO: 385 K/UL (ref 150–450)
PMV BLD AUTO: 10.4 FL (ref 9.2–12.9)
POTASSIUM SERPL-SCNC: 3.3 MMOL/L (ref 3.5–5.1)
PROT SERPL-MCNC: 7.2 G/DL (ref 6–8.4)
RBC # BLD AUTO: 4.54 M/UL (ref 4–5.4)
SODIUM SERPL-SCNC: 135 MMOL/L (ref 136–145)
WBC # BLD AUTO: 11.38 K/UL (ref 3.9–12.7)

## 2023-10-13 PROCEDURE — 25000003 PHARM REV CODE 250: Performed by: STUDENT IN AN ORGANIZED HEALTH CARE EDUCATION/TRAINING PROGRAM

## 2023-10-13 PROCEDURE — 36000704 HC OR TIME LEV I 1ST 15 MIN: Performed by: STUDENT IN AN ORGANIZED HEALTH CARE EDUCATION/TRAINING PROGRAM

## 2023-10-13 PROCEDURE — 71000033 HC RECOVERY, INTIAL HOUR: Performed by: STUDENT IN AN ORGANIZED HEALTH CARE EDUCATION/TRAINING PROGRAM

## 2023-10-13 PROCEDURE — 87070 CULTURE OTHR SPECIMN AEROBIC: CPT | Performed by: STUDENT IN AN ORGANIZED HEALTH CARE EDUCATION/TRAINING PROGRAM

## 2023-10-13 PROCEDURE — 87186 SC STD MICRODIL/AGAR DIL: CPT | Performed by: STUDENT IN AN ORGANIZED HEALTH CARE EDUCATION/TRAINING PROGRAM

## 2023-10-13 PROCEDURE — 87116 MYCOBACTERIA CULTURE: CPT | Performed by: STUDENT IN AN ORGANIZED HEALTH CARE EDUCATION/TRAINING PROGRAM

## 2023-10-13 PROCEDURE — 37000009 HC ANESTHESIA EA ADD 15 MINS: Performed by: STUDENT IN AN ORGANIZED HEALTH CARE EDUCATION/TRAINING PROGRAM

## 2023-10-13 PROCEDURE — 36000705 HC OR TIME LEV I EA ADD 15 MIN: Performed by: STUDENT IN AN ORGANIZED HEALTH CARE EDUCATION/TRAINING PROGRAM

## 2023-10-13 PROCEDURE — 99222 1ST HOSP IP/OBS MODERATE 55: CPT | Mod: ,,, | Performed by: STUDENT IN AN ORGANIZED HEALTH CARE EDUCATION/TRAINING PROGRAM

## 2023-10-13 PROCEDURE — D9220A PRA ANESTHESIA: Mod: ANES,,, | Performed by: STUDENT IN AN ORGANIZED HEALTH CARE EDUCATION/TRAINING PROGRAM

## 2023-10-13 PROCEDURE — 87077 CULTURE AEROBIC IDENTIFY: CPT | Performed by: STUDENT IN AN ORGANIZED HEALTH CARE EDUCATION/TRAINING PROGRAM

## 2023-10-13 PROCEDURE — 63600175 PHARM REV CODE 636 W HCPCS: Performed by: INTERNAL MEDICINE

## 2023-10-13 PROCEDURE — 63600175 PHARM REV CODE 636 W HCPCS: Performed by: STUDENT IN AN ORGANIZED HEALTH CARE EDUCATION/TRAINING PROGRAM

## 2023-10-13 PROCEDURE — 85025 COMPLETE CBC W/AUTO DIFF WBC: CPT | Performed by: INTERNAL MEDICINE

## 2023-10-13 PROCEDURE — D9220A PRA ANESTHESIA: Mod: CRNA,,, | Performed by: STUDENT IN AN ORGANIZED HEALTH CARE EDUCATION/TRAINING PROGRAM

## 2023-10-13 PROCEDURE — 10061 PR DRAIN SKIN ABSCESS COMPLIC: ICD-10-PCS | Mod: ,,, | Performed by: STUDENT IN AN ORGANIZED HEALTH CARE EDUCATION/TRAINING PROGRAM

## 2023-10-13 PROCEDURE — 80053 COMPREHEN METABOLIC PANEL: CPT | Performed by: INTERNAL MEDICINE

## 2023-10-13 PROCEDURE — 87102 FUNGUS ISOLATION CULTURE: CPT | Performed by: STUDENT IN AN ORGANIZED HEALTH CARE EDUCATION/TRAINING PROGRAM

## 2023-10-13 PROCEDURE — 83735 ASSAY OF MAGNESIUM: CPT | Performed by: INTERNAL MEDICINE

## 2023-10-13 PROCEDURE — 11000001 HC ACUTE MED/SURG PRIVATE ROOM

## 2023-10-13 PROCEDURE — 25000003 PHARM REV CODE 250: Performed by: FAMILY MEDICINE

## 2023-10-13 PROCEDURE — 87206 SMEAR FLUORESCENT/ACID STAI: CPT | Performed by: STUDENT IN AN ORGANIZED HEALTH CARE EDUCATION/TRAINING PROGRAM

## 2023-10-13 PROCEDURE — 36415 COLL VENOUS BLD VENIPUNCTURE: CPT | Performed by: INTERNAL MEDICINE

## 2023-10-13 PROCEDURE — 87075 CULTR BACTERIA EXCEPT BLOOD: CPT | Performed by: STUDENT IN AN ORGANIZED HEALTH CARE EDUCATION/TRAINING PROGRAM

## 2023-10-13 PROCEDURE — 25000003 PHARM REV CODE 250: Performed by: INTERNAL MEDICINE

## 2023-10-13 PROCEDURE — 37000008 HC ANESTHESIA 1ST 15 MINUTES: Performed by: STUDENT IN AN ORGANIZED HEALTH CARE EDUCATION/TRAINING PROGRAM

## 2023-10-13 PROCEDURE — 87015 SPECIMEN INFECT AGNT CONCNTJ: CPT | Performed by: STUDENT IN AN ORGANIZED HEALTH CARE EDUCATION/TRAINING PROGRAM

## 2023-10-13 PROCEDURE — 83605 ASSAY OF LACTIC ACID: CPT | Performed by: INTERNAL MEDICINE

## 2023-10-13 PROCEDURE — 10061 I&D ABSCESS COMP/MULTIPLE: CPT | Mod: ,,, | Performed by: STUDENT IN AN ORGANIZED HEALTH CARE EDUCATION/TRAINING PROGRAM

## 2023-10-13 PROCEDURE — D9220A PRA ANESTHESIA: ICD-10-PCS | Mod: ANES,,, | Performed by: STUDENT IN AN ORGANIZED HEALTH CARE EDUCATION/TRAINING PROGRAM

## 2023-10-13 PROCEDURE — 63600175 PHARM REV CODE 636 W HCPCS: Performed by: FAMILY MEDICINE

## 2023-10-13 PROCEDURE — 27201423 OPTIME MED/SURG SUP & DEVICES STERILE SUPPLY: Performed by: STUDENT IN AN ORGANIZED HEALTH CARE EDUCATION/TRAINING PROGRAM

## 2023-10-13 PROCEDURE — 99222 PR INITIAL HOSPITAL CARE,LEVL II: ICD-10-PCS | Mod: ,,, | Performed by: STUDENT IN AN ORGANIZED HEALTH CARE EDUCATION/TRAINING PROGRAM

## 2023-10-13 PROCEDURE — D9220A PRA ANESTHESIA: ICD-10-PCS | Mod: CRNA,,, | Performed by: STUDENT IN AN ORGANIZED HEALTH CARE EDUCATION/TRAINING PROGRAM

## 2023-10-13 RX ORDER — IBUPROFEN 200 MG
16 TABLET ORAL
Status: DISCONTINUED | OUTPATIENT
Start: 2023-10-13 | End: 2023-10-15 | Stop reason: HOSPADM

## 2023-10-13 RX ORDER — PROPOFOL 10 MG/ML
VIAL (ML) INTRAVENOUS
Status: DISCONTINUED | OUTPATIENT
Start: 2023-10-13 | End: 2023-10-13

## 2023-10-13 RX ORDER — SODIUM CHLORIDE 9 MG/ML
INJECTION, SOLUTION INTRAVENOUS CONTINUOUS
Status: DISCONTINUED | OUTPATIENT
Start: 2023-10-13 | End: 2023-10-15 | Stop reason: HOSPADM

## 2023-10-13 RX ORDER — ROCURONIUM BROMIDE 10 MG/ML
INJECTION, SOLUTION INTRAVENOUS
Status: DISCONTINUED | OUTPATIENT
Start: 2023-10-13 | End: 2023-10-13

## 2023-10-13 RX ORDER — DEXAMETHASONE SODIUM PHOSPHATE 4 MG/ML
INJECTION, SOLUTION INTRA-ARTICULAR; INTRALESIONAL; INTRAMUSCULAR; INTRAVENOUS; SOFT TISSUE
Status: DISCONTINUED | OUTPATIENT
Start: 2023-10-13 | End: 2023-10-13

## 2023-10-13 RX ORDER — POTASSIUM CHLORIDE 7.45 MG/ML
10 INJECTION INTRAVENOUS
Status: COMPLETED | OUTPATIENT
Start: 2023-10-13 | End: 2023-10-13

## 2023-10-13 RX ORDER — ATORVASTATIN CALCIUM 40 MG/1
40 TABLET, FILM COATED ORAL DAILY
Status: DISCONTINUED | OUTPATIENT
Start: 2023-10-13 | End: 2023-10-15 | Stop reason: HOSPADM

## 2023-10-13 RX ORDER — NALOXONE HCL 0.4 MG/ML
0.02 VIAL (ML) INJECTION
Status: DISCONTINUED | OUTPATIENT
Start: 2023-10-13 | End: 2023-10-15 | Stop reason: HOSPADM

## 2023-10-13 RX ORDER — GLUCAGON 1 MG
1 KIT INJECTION
Status: DISCONTINUED | OUTPATIENT
Start: 2023-10-13 | End: 2023-10-15 | Stop reason: HOSPADM

## 2023-10-13 RX ORDER — SODIUM CHLORIDE 0.9 % (FLUSH) 0.9 %
10 SYRINGE (ML) INJECTION EVERY 12 HOURS PRN
Status: DISCONTINUED | OUTPATIENT
Start: 2023-10-13 | End: 2023-10-15 | Stop reason: HOSPADM

## 2023-10-13 RX ORDER — IBUPROFEN 200 MG
24 TABLET ORAL
Status: DISCONTINUED | OUTPATIENT
Start: 2023-10-13 | End: 2023-10-15 | Stop reason: HOSPADM

## 2023-10-13 RX ORDER — ONDANSETRON 2 MG/ML
4 INJECTION INTRAMUSCULAR; INTRAVENOUS DAILY PRN
Status: DISCONTINUED | OUTPATIENT
Start: 2023-10-13 | End: 2023-10-15 | Stop reason: HOSPADM

## 2023-10-13 RX ORDER — HYDROMORPHONE HYDROCHLORIDE 2 MG/ML
0.2 INJECTION, SOLUTION INTRAMUSCULAR; INTRAVENOUS; SUBCUTANEOUS EVERY 5 MIN PRN
Status: DISCONTINUED | OUTPATIENT
Start: 2023-10-13 | End: 2023-10-15 | Stop reason: HOSPADM

## 2023-10-13 RX ORDER — LIDOCAINE HYDROCHLORIDE 20 MG/ML
INJECTION INTRAVENOUS
Status: DISCONTINUED | OUTPATIENT
Start: 2023-10-13 | End: 2023-10-13

## 2023-10-13 RX ORDER — OXYCODONE HYDROCHLORIDE 5 MG/1
5 TABLET ORAL
Status: DISCONTINUED | OUTPATIENT
Start: 2023-10-13 | End: 2023-10-15 | Stop reason: HOSPADM

## 2023-10-13 RX ORDER — FENTANYL CITRATE 50 UG/ML
INJECTION, SOLUTION INTRAMUSCULAR; INTRAVENOUS
Status: DISCONTINUED | OUTPATIENT
Start: 2023-10-13 | End: 2023-10-13

## 2023-10-13 RX ORDER — PHENYLEPHRINE HYDROCHLORIDE 10 MG/ML
INJECTION INTRAVENOUS
Status: DISCONTINUED | OUTPATIENT
Start: 2023-10-13 | End: 2023-10-13

## 2023-10-13 RX ORDER — DEXMEDETOMIDINE HYDROCHLORIDE 100 UG/ML
INJECTION, SOLUTION INTRAVENOUS
Status: DISCONTINUED | OUTPATIENT
Start: 2023-10-13 | End: 2023-10-13

## 2023-10-13 RX ADMIN — GLYCOPYRROLATE 0.2 MG: 0.2 INJECTION, SOLUTION INTRAMUSCULAR; INTRAVITREAL at 05:10

## 2023-10-13 RX ADMIN — HYDROMORPHONE HYDROCHLORIDE 0.2 MG: 2 INJECTION, SOLUTION INTRAMUSCULAR; INTRAVENOUS; SUBCUTANEOUS at 07:10

## 2023-10-13 RX ADMIN — FENTANYL CITRATE 50 MCG: 50 INJECTION, SOLUTION INTRAMUSCULAR; INTRAVENOUS at 05:10

## 2023-10-13 RX ADMIN — LIDOCAINE HYDROCHLORIDE 100 MG: 20 INJECTION, SOLUTION INTRAVENOUS at 05:10

## 2023-10-13 RX ADMIN — PROPOFOL 150 MG: 10 INJECTION, EMULSION INTRAVENOUS at 05:10

## 2023-10-13 RX ADMIN — SODIUM CHLORIDE: 9 INJECTION, SOLUTION INTRAVENOUS at 03:10

## 2023-10-13 RX ADMIN — VANCOMYCIN HYDROCHLORIDE 1000 MG: 1 INJECTION, POWDER, LYOPHILIZED, FOR SOLUTION INTRAVENOUS at 03:10

## 2023-10-13 RX ADMIN — POTASSIUM CHLORIDE 10 MEQ: 7.46 INJECTION, SOLUTION INTRAVENOUS at 05:10

## 2023-10-13 RX ADMIN — ATORVASTATIN CALCIUM 40 MG: 40 TABLET, FILM COATED ORAL at 08:10

## 2023-10-13 RX ADMIN — POTASSIUM CHLORIDE 10 MEQ: 7.46 INJECTION, SOLUTION INTRAVENOUS at 04:10

## 2023-10-13 RX ADMIN — SODIUM CHLORIDE, SODIUM LACTATE, POTASSIUM CHLORIDE, AND CALCIUM CHLORIDE: .6; .31; .03; .02 INJECTION, SOLUTION INTRAVENOUS at 05:10

## 2023-10-13 RX ADMIN — DEXAMETHASONE SODIUM PHOSPHATE 8 MG: 4 INJECTION, SOLUTION INTRA-ARTICULAR; INTRALESIONAL; INTRAMUSCULAR; INTRAVENOUS; SOFT TISSUE at 06:10

## 2023-10-13 RX ADMIN — PIPERACILLIN AND TAZOBACTAM 4.5 G: 4; .5 INJECTION, POWDER, LYOPHILIZED, FOR SOLUTION INTRAVENOUS; PARENTERAL at 01:10

## 2023-10-13 RX ADMIN — PIPERACILLIN AND TAZOBACTAM 4.5 G: 4; .5 INJECTION, POWDER, LYOPHILIZED, FOR SOLUTION INTRAVENOUS; PARENTERAL at 08:10

## 2023-10-13 RX ADMIN — ROCURONIUM BROMIDE 50 MG: 10 INJECTION, SOLUTION INTRAVENOUS at 05:10

## 2023-10-13 RX ADMIN — OXYCODONE HYDROCHLORIDE 5 MG: 5 TABLET ORAL at 07:10

## 2023-10-13 RX ADMIN — PIPERACILLIN AND TAZOBACTAM 4.5 G: 4; .5 INJECTION, POWDER, LYOPHILIZED, FOR SOLUTION INTRAVENOUS; PARENTERAL at 04:10

## 2023-10-13 RX ADMIN — DEXMEDETOMIDINE HCL 16 MCG: 100 INJECTION INTRAVENOUS at 05:10

## 2023-10-13 RX ADMIN — PHENYLEPHRINE HYDROCHLORIDE 100 MCG: 10 INJECTION INTRAVENOUS at 06:10

## 2023-10-13 NOTE — PROGRESS NOTES
"Pharmacokinetic Initial Assessment: IV Vancomycin    Assessment/Plan:    Initiate intravenous vancomycin with loading dose of 2250 mg once followed by a maintenance dose of vancomycin 2250 mg IV every 24 hours  Desired empiric serum trough concentration is 10 to 15 mcg/mL  Draw vancomycin trough level 60 min prior to third dose on 10/14/23 at approximately 1900  Pharmacy will continue to follow and monitor vancomycin.      Please contact pharmacy at extension [phone] with any questions regarding this assessment.     Thank you for the consult,   Syeda Cortez       Patient brief summary:  Susan Pinto is a 67 y.o. female initiated on antimicrobial therapy with IV Vancomycin for treatment of suspected skin & soft tissue infection    Drug Allergies:   Review of patient's allergies indicates:  No Known Allergies    Actual Body Weight:   88.5 kg    Renal Function:   Estimated Creatinine Clearance: 63.5 mL/min (based on SCr of 0.9 mg/dL).,     Dialysis Method (if applicable):  N/A    CBC (last 72 hours):  Recent Labs   Lab Result Units 10/12/23  1642   WBC K/uL 11.97   Hemoglobin g/dL 12.8   Hematocrit % 40.1   Platelets K/uL 406   Gran % % 70.3   Lymph % % 20.0   Mono % % 7.8   Eosinophil % % 0.8   Basophil % % 0.5   Differential Method  Automated       Metabolic Panel (last 72 hours):  Recent Labs   Lab Result Units 10/12/23  1642   Sodium mmol/L 138   Potassium mmol/L 3.2*   Chloride mmol/L 99   CO2 mmol/L 26   Glucose mg/dL 125*   BUN mg/dL 10   Creatinine mg/dL 0.9   Albumin g/dL 3.1*   Total Bilirubin mg/dL 0.6   Alkaline Phosphatase U/L 62   AST U/L 12   ALT U/L 7*       Drug levels (last 3 results):  No results for input(s): "VANCOMYCINRA", "VANCORANDOM", "VANCOMYCINPE", "VANCOPEAK", "VANCOMYCINTR", "VANCOTROUGH" in the last 72 hours.    Microbiologic Results:  Microbiology Results (last 7 days)       Procedure Component Value Units Date/Time    Blood culture [[id number]] Collected: 10/12/23 1642    Order Status: " Sent Specimen: Blood Updated: 10/12/23 1642    Blood culture [[id number]] Collected: 10/12/23 1642    Order Status: Sent Specimen: Blood from Antecubital, Left Arm Updated: 10/12/23 1642

## 2023-10-13 NOTE — ASSESSMENT & PLAN NOTE
"CT A&P showed "Gastrostomy tube tract in the left upper quadrant with associated large (15.0 x 13.3 x 9.4) multiloculated soft tissue abscess.  Components of the abscess are superficial and deep to the left rectus abdominis musculature.  Diffuse subcutaneous infectious/inflammatory fat stranding with overlying skin thickening.  No intraperitoneal communication. No enterocutaneous fistula."    -blood cultures obtained   -will get lactate  -IV fluid   -IV ceftriaxone and azithromycin   -consult to general surgery for evaluation and recommendations  "

## 2023-10-13 NOTE — H&P
[facility]  History & Physical    Patient Name: Susan Pinto  MRN: [id number]  Patient Class: IP- Inpatient  Admission Date: 10/13/2023  Attending Physician: Mike Armendariz,*   Primary Care Provider: Court Champion, NP         Patient information was obtained from patient and ER records.     Subjective:     Principal Problem:Abdominal wall abscess    Chief Complaint: No chief complaint on file.       HPI: 67 years old female with PMH significant for hypertension, hyperlipidemia, CVA was sent from [facility] due to abdominal wall abscess and for surgery consult.  Patient reported she went to see her PCP yesterday who found erythema and drainage from skin around PEG insertion site and advised patient to go to ER.  Patient reported her PEG tube was removed in June.  Patient denied abdominal pain nausea vomiting diarrhea fever chills cough chest pain shortness of breath or dizziness. CT A&P showed ''Gastrostomy tube tract in the left upper quadrant with associated large (15.0 x 13.3 x 9.4) multiloculated soft tissue abscess.  Components of the abscess are superficial and deep to the left rectus abdominis musculature.  Diffuse subcutaneous infectious/inflammatory fat stranding with overlying skin thickening.  No intraperitoneal communication. No enterocutaneous fistula.'' Lab workup showed WBC 11.9, creatinine 0.9, potassium 3.2 and lactate 1.3. Blood cultures obtained, patient received IV fluid, vancomycin and Zosyn in ER.  Patient is hemodynamically stable.      Past Medical History:   Diagnosis Date    Stroke        Past Surgical History:   Procedure Laterality Date    ESOPHAGOGASTRODUODENOSCOPY W/ PEG N/A 5/15/2023    Procedure: EGD, WITH PEG TUBE INSERTION;  Surgeon: Mark Guadalupe MD;  Location: [facility] OR [address] [place] [address];  Service: General;  Laterality: N/A;    TRACHEOTOMY N/A 5/15/2023    Procedure: TRACHEOTOMY;  Surgeon: Mark Guadalupe MD;  Location: [facility] OR  2ND FLR;  Service: General;  Laterality: N/A;       Review of patient's allergies indicates:  No Known Allergies    Current Facility-Administered Medications on File Prior to Encounter   Medication    [COMPLETED] iohexoL (OMNIPAQUE 350) injection 100 mL    [COMPLETED] iohexoL (OMNIPAQUE 350) injection 30 mL    [COMPLETED] piperacillin-tazobactam (ZOSYN) 4.5 g in dextrose 5 % in water (D5W) 100 mL IVPB (MB+)    [COMPLETED] sodium chloride 0.9% bolus 1,000 mL 1,000 mL    [COMPLETED] vancomycin (VANCOCIN) 1,000 mg in dextrose 5 % (D5W) 250 mL IVPB (Vial-Mate)    Followed by    [COMPLETED] vancomycin 1,250 mg in dextrose 5 % (D5W) 250 mL IVPB (Vial-Mate)    [DISCONTINUED] vancomycin (VANCOCIN) 2,250 mg in dextrose 5 % (D5W) 500 mL IVPB    [DISCONTINUED] vancomycin - pharmacy to dose     Current Outpatient Medications on File Prior to Encounter   Medication Sig    aspirin (ECOTRIN) 81 MG EC tablet Take 81 mg by mouth once daily.    atorvastatin (LIPITOR) 40 MG tablet Take 1 tablet (40 mg total) by mouth once daily.    clindamycin (CLEOCIN) 300 MG capsule Take 1 capsule (300 mg total) by mouth every 8 (eight) hours. for 7 days    cyanocobalamin (VITAMIN B-12) 1000 MCG tablet Take 1,000 mcg by mouth once daily.    hydroCHLOROthiazide (HYDRODIURIL) 12.5 MG Tab TAKE 1 TABLET (12.5 MG TOTAL) BY MOUTH ONCE DAILY.    midodrine (PROAMATINE) 2.5 MG Tab Take 1 tablet (2.5 mg total) by mouth every morning.    sertraline (ZOLOFT) 25 MG tablet TAKE 1 TABLET (25 MG TOTAL) BY MOUTH ONCE DAILY.     Family History       Problem Relation (Age of Onset)    Hypertension Mother          Tobacco Use    Smoking status: Never    Smokeless tobacco: Never   Substance and Sexual Activity    Alcohol use: No    Drug use: No    Sexual activity: Not on file     Review of Systems   Constitutional:  Positive for fatigue. Negative for fever.   HENT:  Negative for congestion.    Eyes:  Negative for visual disturbance.   Respiratory:   Negative for cough and shortness of breath.    Cardiovascular:  Negative for chest pain and leg swelling.   Gastrointestinal:  Negative for abdominal pain, diarrhea, nausea and vomiting.   Musculoskeletal:  Negative for back pain.   Skin:         Erythema and drainage from skin around PEG insertion site   Neurological:  Negative for syncope.   Psychiatric/Behavioral:  Negative for agitation.      Objective:     Vital Signs (Most Recent):  Temp: 98 °F (36.7 °C) (10/13/23 0237)  Pulse: 78 (10/13/23 0237)  Resp: 18 (10/13/23 0237)  BP: (!) 143/69 (10/13/23 0237)  SpO2: (!) 93 % (10/13/23 0237) Vital Signs (24h Range):  Temp:  [97.6 °F (36.4 °C)-98.4 °F (36.9 °C)] 98 °F (36.7 °C)  Pulse:  [70-96] 78  Resp:  [18-20] 18  SpO2:  [92 %-98 %] 93 %  BP: (143-178)/(68-82) 143/69     Weight: 83.7 kg (184 lb 8.4 oz)  Body mass index is 31.67 kg/m².     Physical Exam  Constitutional:       General: She is not in acute distress.  HENT:      Head: Normocephalic.      Nose: Nose normal.      Mouth/Throat:      Mouth: Mucous membranes are moist.   Eyes:      Extraocular Movements: Extraocular movements intact.   Cardiovascular:      Rate and Rhythm: Normal rate.   Pulmonary:      Effort: No respiratory distress.      Breath sounds: No wheezing.   Abdominal:      Palpations: Abdomen is soft.      Tenderness: There is abdominal tenderness.      Comments: Central abdomen covered with bandage   Musculoskeletal:         General: No swelling.      Cervical back: Normal range of motion.   Skin:     General: Skin is warm.      Capillary Refill: Capillary refill takes less than 2 seconds.   Neurological:      Mental Status: She is alert and oriented to person, place, and time.   Psychiatric:         Behavior: Behavior normal.                Significant Labs: All pertinent labs within the past 24 hours have been reviewed.    Significant Imaging: I have reviewed all pertinent imaging results/findings within the past 24  "hours.    Assessment/Plan:     * Abdominal wall abscess  CT A&P showed "Gastrostomy tube tract in the left upper quadrant with associated large (15.0 x 13.3 x 9.4) multiloculated soft tissue abscess.  Components of the abscess are superficial and deep to the left rectus abdominis musculature.  Diffuse subcutaneous infectious/inflammatory fat stranding with overlying skin thickening.  No intraperitoneal communication. No enterocutaneous fistula."    -blood cultures obtained   -will get lactate  -IV fluid   -IV ceftriaxone and azithromycin   -consult to general surgery for evaluation and recommendations    Hypokalemia  -replace K  -follow up with repeat BMP      Obesity (BMI 30.0-34.9)  Lifestyle modifications advised    Mixed hyperlipidemia  Resume home meds once med rec is done      Benign essential HTN  Resume home meds once med rec is done  BP monitoring        VTE Risk Mitigation (From admission, onward)         Ordered     IP VTE HIGH RISK PATIENT  Once         10/13/23 0327     Place sequential compression device  Until discontinued         10/13/23 0327                           Gurpreet Gutiérrez MD  Department of Hospital Medicine  [facility] - Med Surg  "

## 2023-10-13 NOTE — ANESTHESIA PREPROCEDURE EVALUATION
10/13/2023  Susan Pinto is a 67 y.o., female.      Pre-op Assessment    I have reviewed the Patient Summary Reports.    I have reviewed the NPO Status.   I have reviewed the Medications.     Review of Systems  Anesthesia Hx:  No problems with previous Anesthesia               Denies Personal Hx of Anesthesia complications.                    Social:  Non-Smoker       Cardiovascular:     Hypertension           hyperlipidemia                             Pulmonary:  Pulmonary Normal                       Renal/:  Renal/ Normal                 Hepatic/GI:  Hepatic/GI Normal                 Neurological:   CVA, residual symptoms        Residual left sided weakness                             Endocrine:  Endocrine Normal                Physical Exam  General: Well nourished and Cooperative    Airway:  Mallampati: II   Mouth Opening: Normal  TM Distance: Normal  Tongue: Normal  Neck ROM: Normal ROM    Dental:  Intact        Anesthesia Plan  Type of Anesthesia, risks & benefits discussed:    Anesthesia Type: Gen ETT  Intra-op Monitoring Plan: Standard ASA Monitors  Post Op Pain Control Plan: multimodal analgesia and IV/PO Opioids PRN  Induction:  IV  Informed Consent: Informed consent signed with the Patient and all parties understand the risks and agree with anesthesia plan.  All questions answered.   ASA Score: 3  Day of Surgery Review of History & Physical: H&P Update referred to the surgeon/provider.    Ready For Surgery From Anesthesia Perspective.     .

## 2023-10-13 NOTE — ANESTHESIA PROCEDURE NOTES
Intubation    Date/Time: 10/13/2023 6:01 PM    Performed by: Ian Rubio CRNA  Authorized by: Neri Manley MD    Intubation:     Induction:  Intravenous    Intubated:  Postinduction    Mask Ventilation:  Easy with oral airway    Attempts:  1    Attempted By:  CRNA    Method of Intubation:  Direct    Blade:  Paredes 2    Laryngeal View Grade: Grade I - full view of cords      Difficult Airway Encountered?: No      Complications:  None    Airway Device:  Oral endotracheal tube    Airway Device Size:  7.5    Style/Cuff Inflation:  Cuffed (inflated to minimal occlusive pressure)    Inflation Amount (mL):  7    Tube secured:  22    Secured at:  The teeth    Placement Verified By:  Capnometry and Revisualization with laryngoscopy    Complicating Factors:  None    Findings Post-Intubation:  BS equal bilateral and atraumatic/condition of teeth unchanged

## 2023-10-13 NOTE — OP NOTE
[facility] Surg  General Surgery  Operative Note    SUMMARY     Date of Procedure: 10/13/2023     Procedure: Procedure(s) (LRB):  INCISION AND DRAINAGE, ABSCESS (N/A)       Surgeon(s) and Role:     * Dylan Javed MD - Primary    Assisting Surgeon: Tirso Colmenares PGY3    Pre-Operative Diagnosis: Abdominal wall abscess [L02.211]    Post-Operative Diagnosis: Post-Op Diagnosis Codes:     * Abdominal wall abscess [L02.211]    Anesthesia: General    Operative Findings (including complications, if any):   Large cavity left lateral and inferior to previous PEG site  Two left sided areas of fluctuance opened, purulent material and blood obtained  Very hard lining of abscess cavity, chronic  Packed with betadine soaked gauze      Description of Technical Procedures:   Brought into the OR and placed supine. Her abdomen was prepped and draped. The left abdominal area of drainage was opened using a 15 blade. Purulent material was obtained and sent for cultures. This cavity was entered and irrigated. The medial drainage site was also opened using a 15 blade and irrigated. The cavity was like by a hard layer of fibrous tissue, likely chronic inflammation and very friable tissue. There was no evidence of gastric contents or communication with the intra-abdominal cavity. The wounds were irrigated and packed with betadine soaked gauze.       Significant Surgical Tasks Conducted by the Assistant(s), if Applicable:     Estimated Blood Loss (EBL): *50ml           Implants: * No implants in log *    Specimens:   Specimen (24h ago, onward)      None                    Condition: Stable    Disposition: PACU - hemodynamically stable.    Attestation: I was present and scrubbed for the entire procedure.

## 2023-10-13 NOTE — SUBJECTIVE & OBJECTIVE
Past Medical History:   Diagnosis Date    Stroke        Past Surgical History:   Procedure Laterality Date    ESOPHAGOGASTRODUODENOSCOPY W/ PEG N/A 5/15/2023    Procedure: EGD, WITH PEG TUBE INSERTION;  Surgeon: Mark Guadalupe MD;  Location: [address];  Service: General;  Laterality: N/A;    TRACHEOTOMY N/A 5/15/2023    Procedure: TRACHEOTOMY;  Surgeon: Mark Guadalupe MD;  Location: [address];  Service: General;  Laterality: N/A;       Review of patient's allergies indicates:  No Known Allergies    Current Facility-Administered Medications on File Prior to Encounter   Medication    [COMPLETED] iohexoL (OMNIPAQUE 350) injection 100 mL    [COMPLETED] iohexoL (OMNIPAQUE 350) injection 30 mL    [COMPLETED] piperacillin-tazobactam (ZOSYN) 4.5 g in dextrose 5 % in water (D5W) 100 mL IVPB (MB+)    [COMPLETED] sodium chloride 0.9% bolus 1,000 mL 1,000 mL    [COMPLETED] vancomycin (VANCOCIN) 1,000 mg in dextrose 5 % (D5W) 250 mL IVPB (Vial-Mate)    Followed by    [COMPLETED] vancomycin 1,250 mg in dextrose 5 % (D5W) 250 mL IVPB (Vial-Mate)    [DISCONTINUED] vancomycin (VANCOCIN) 2,250 mg in dextrose 5 % (D5W) 500 mL IVPB    [DISCONTINUED] vancomycin - pharmacy to dose     Current Outpatient Medications on File Prior to Encounter   Medication Sig    aspirin (ECOTRIN) 81 MG EC tablet Take 81 mg by mouth once daily.    atorvastatin (LIPITOR) 40 MG tablet Take 1 tablet (40 mg total) by mouth once daily.    clindamycin (CLEOCIN) 300 MG capsule Take 1 capsule (300 mg total) by mouth every 8 (eight) hours. for 7 days    cyanocobalamin (VITAMIN B-12) 1000 MCG tablet Take 1,000 mcg by mouth once daily.    hydroCHLOROthiazide (HYDRODIURIL) 12.5 MG Tab TAKE 1 TABLET (12.5 MG TOTAL) BY MOUTH ONCE DAILY.    midodrine (PROAMATINE) 2.5 MG Tab Take 1 tablet (2.5 mg total) by mouth every morning.    sertraline (ZOLOFT) 25 MG tablet TAKE 1 TABLET (25 MG TOTAL) BY MOUTH ONCE DAILY.     Family History       Problem Relation  (Age of Onset)    Hypertension Mother          Tobacco Use    Smoking status: Never    Smokeless tobacco: Never   Substance and Sexual Activity    Alcohol use: No    Drug use: No    Sexual activity: Not on file     Review of Systems   Constitutional:  Positive for fatigue. Negative for fever.   HENT:  Negative for congestion.    Eyes:  Negative for visual disturbance.   Respiratory:  Negative for cough and shortness of breath.    Cardiovascular:  Negative for chest pain and leg swelling.   Gastrointestinal:  Negative for abdominal pain, diarrhea, nausea and vomiting.   Musculoskeletal:  Negative for back pain.   Skin:         Erythema and drainage from skin around PEG insertion site   Neurological:  Negative for syncope.   Psychiatric/Behavioral:  Negative for agitation.      Objective:     Vital Signs (Most Recent):  Temp: 98 °F (36.7 °C) (10/13/23 0237)  Pulse: 78 (10/13/23 0237)  Resp: 18 (10/13/23 0237)  BP: (!) 143/69 (10/13/23 0237)  SpO2: (!) 93 % (10/13/23 0237) Vital Signs (24h Range):  Temp:  [97.6 °F (36.4 °C)-98.4 °F (36.9 °C)] 98 °F (36.7 °C)  Pulse:  [70-96] 78  Resp:  [18-20] 18  SpO2:  [92 %-98 %] 93 %  BP: (143-178)/(68-82) 143/69     Weight: 83.7 kg (184 lb 8.4 oz)  Body mass index is 31.67 kg/m².     Physical Exam  Constitutional:       General: She is not in acute distress.  HENT:      Head: Normocephalic.      Nose: Nose normal.      Mouth/Throat:      Mouth: Mucous membranes are moist.   Eyes:      Extraocular Movements: Extraocular movements intact.   Cardiovascular:      Rate and Rhythm: Normal rate.   Pulmonary:      Effort: No respiratory distress.      Breath sounds: No wheezing.   Abdominal:      Palpations: Abdomen is soft.      Tenderness: There is abdominal tenderness.      Comments: Central abdomen covered with bandage   Musculoskeletal:         General: No swelling.      Cervical back: Normal range of motion.   Skin:     General: Skin is warm.      Capillary Refill: Capillary refill  takes less than 2 seconds.   Neurological:      Mental Status: She is alert and oriented to person, place, and time.   Psychiatric:         Behavior: Behavior normal.                Significant Labs: All pertinent labs within the past 24 hours have been reviewed.    Significant Imaging: I have reviewed all pertinent imaging results/findings within the past 24 hours.

## 2023-10-13 NOTE — HPI
67 years old female with PMH significant for hypertension, hyperlipidemia, CVA was sent from [facility] due to abdominal wall abscess and for surgery consult.  Patient reported she went to see her PCP yesterday who found erythema and drainage from skin around PEG insertion site and advised patient to go to ER.  Patient reported her PEG tube was removed in June.  Patient denied abdominal pain nausea vomiting diarrhea fever chills cough chest pain shortness of breath or dizziness. CT A&P showed ''Gastrostomy tube tract in the left upper quadrant with associated large (15.0 x 13.3 x 9.4) multiloculated soft tissue abscess.  Components of the abscess are superficial and deep to the left rectus abdominis musculature.  Diffuse subcutaneous infectious/inflammatory fat stranding with overlying skin thickening.  No intraperitoneal communication. No enterocutaneous fistula.'' Lab workup showed WBC 11.9, creatinine 0.9, potassium 3.2 and lactate 1.3. Blood cultures obtained, patient received IV fluid, vancomycin and Zosyn in ER.  Patient is hemodynamically stable.

## 2023-10-13 NOTE — SUBJECTIVE & OBJECTIVE
Interval History: .  Still having drainage    Review of Systems   All other systems reviewed and are negative.    Objective:     Vital Signs (Most Recent):  Temp: 97.7 °F (36.5 °C) (10/13/23 1100)  Pulse: 66 (10/13/23 1100)  Resp: 18 (10/13/23 1100)  BP: 131/78 (10/13/23 1100)  SpO2: 98 % (10/13/23 1100) Vital Signs (24h Range):  Temp:  [97.7 °F (36.5 °C)-98.4 °F (36.9 °C)] 97.7 °F (36.5 °C)  Pulse:  [63-78] 66  Resp:  [18-19] 18  SpO2:  [92 %-98 %] 98 %  BP: (109-178)/(59-82) 131/78     Weight: 83.7 kg (184 lb 8.4 oz)  Body mass index is 31.67 kg/m².    Intake/Output Summary (Last 24 hours) at 10/13/2023 1712  Last data filed at 10/13/2023 0914  Gross per 24 hour   Intake --   Output 300 ml   Net -300 ml         Physical Exam  Vitals and nursing note reviewed.   Constitutional:       Appearance: She is well-developed.   HENT:      Head: Normocephalic and atraumatic.      Nose: Nose normal.   Eyes:      Conjunctiva/sclera: Conjunctivae normal.   Cardiovascular:      Rate and Rhythm: Normal rate and regular rhythm.      Heart sounds: Normal heart sounds.   Pulmonary:      Effort: Pulmonary effort is normal.      Breath sounds: Normal breath sounds. No wheezing.   Abdominal:      General: Bowel sounds are normal.      Palpations: Abdomen is soft. There is no mass.      Tenderness: There is no abdominal tenderness. There is no guarding or rebound.      Comments: Abdominal abscess with active drainage and erythema   Musculoskeletal:         General: No tenderness. Normal range of motion.      Cervical back: Normal range of motion and neck supple.   Skin:     General: Skin is warm.      Findings: No rash.   Neurological:      Mental Status: She is alert and oriented to person, place, and time.      Cranial Nerves: No cranial nerve deficit.   Psychiatric:         Behavior: Behavior normal.         Thought Content: Thought content normal.             Significant Labs: All pertinent labs within the past 24 hours have been  reviewed.  BMP:   Recent Labs   Lab 10/13/23  0437   *   *   K 3.3*   CL 99   CO2 25   BUN 8   CREATININE 0.7   CALCIUM 10.4   MG 1.5*     CBC:   Recent Labs   Lab 10/12/23  1642 10/13/23  0437   WBC 11.97 11.38   HGB 12.8 12.5   HCT 40.1 38.0    385       Significant Imaging: I have reviewed all pertinent imaging results/findings within the past 24 hours.  I have reviewed and interpreted all pertinent imaging results/findings within the past 24 hours.

## 2023-10-13 NOTE — PLAN OF CARE
10/13/23 0319   Admission   Initial VN Admission Questions Complete   Communication Issues? None   Shift   Virtual Nurse - Rounding Complete   Pain Management Interventions relaxation techniques promoted;quiet environment facilitated   Virtual Nurse - Patient Verbalized Approval Of VN Rounding;Camera Use   Type of Frequent Check   Type Patient Rounds   Safety/Activity   Patient Rounds bed in low position;bed wheels locked;call light in patient/parent reach;clutter free environment maintained;ID band on;visualized patient;placement of personal items at bedside   Safety Promotion/Fall Prevention assistive device/personal item within reach;bed alarm set;nonskid shoes/socks when out of bed;room near unit station;side rails raised x 2;instructed to call staff for mobility   Safety Precautions emergency equipment at bedside   Safety Bands on Patient Fall Risk Band   Activity Management Rolling - L1   Positioning   Body Position position changed independently   Positioning/Transfer Devices pillows;in use   Pain/Comfort/Sleep   Sleep/Rest/Relaxation no problem identified      VN cued into room to complete admit assessment. VIP model introduced; VN working alongside bedside treatment team.  Plan of care reviewed with patient. Patient informed of fall risk, fall precautions, call light within reach, side rails x2 elevated. Patient notified to ask staff for assistance. Patient verbalized complete understanding. Time allowed for questions. Will continue to monitor and intervene as needed.

## 2023-10-13 NOTE — ASSESSMENT & PLAN NOTE
"CT A&P showed "Gastrostomy tube tract in the left upper quadrant with associated large (15.0 x 13.3 x 9.4) multiloculated soft tissue abscess.  Components of the abscess are superficial and deep to the left rectus abdominis musculature.  Diffuse subcutaneous infectious/inflammatory fat stranding with overlying skin thickening.  No intraperitoneal communication. No enterocutaneous fistula."    -blood cultures obtained   -IV fluid   -IV Vanc and zosyn  -consulted to general surgery for evaluation and recommendations, plans for I&D  "

## 2023-10-13 NOTE — PROGRESS NOTES
[facility] Medicine  Progress Note    Patient Name: Susan Pinto  MRN: [id number]  Patient Class: IP- Inpatient   Admission Date: 10/13/2023  Length of Stay: 0 days  Attending Physician: Ayanna William MD  Primary Care Provider: Court Champion NP        Subjective:     Principal Problem:Abdominal wall abscess        HPI:  67 years old female with PMH significant for hypertension, hyperlipidemia, CVA was sent from [facility] due to abdominal wall abscess and for surgery consult.  Patient reported she went to see her PCP yesterday who found erythema and drainage from skin around PEG insertion site and advised patient to go to ER.  Patient reported her PEG tube was removed in June.  Patient denied abdominal pain nausea vomiting diarrhea fever chills cough chest pain shortness of breath or dizziness. CT A&P showed ''Gastrostomy tube tract in the left upper quadrant with associated large (15.0 x 13.3 x 9.4) multiloculated soft tissue abscess.  Components of the abscess are superficial and deep to the left rectus abdominis musculature.  Diffuse subcutaneous infectious/inflammatory fat stranding with overlying skin thickening.  No intraperitoneal communication. No enterocutaneous fistula.'' Lab workup showed WBC 11.9, creatinine 0.9, potassium 3.2 and lactate 1.3. Blood cultures obtained, patient received IV fluid, vancomycin and Zosyn in ER.  Patient is hemodynamically stable.      Overview/Hospital Course:  10/13/23: abdominal abscess with active drainage. Surgery consulted with plans for I&D today      Interval History: .  Still having drainage    Review of Systems   All other systems reviewed and are negative.    Objective:     Vital Signs (Most Recent):  Temp: 97.7 °F (36.5 °C) (10/13/23 1100)  Pulse: 66 (10/13/23 1100)  Resp: 18 (10/13/23 1100)  BP: 131/78 (10/13/23 1100)  SpO2: 98 % (10/13/23 1100) Vital Signs (24h Range):  Temp:  [97.7 °F (36.5 °C)-98.4 °F (36.9 °C)] 97.7  °F (36.5 °C)  Pulse:  [63-78] 66  Resp:  [18-19] 18  SpO2:  [92 %-98 %] 98 %  BP: (109-178)/(59-82) 131/78     Weight: 83.7 kg (184 lb 8.4 oz)  Body mass index is 31.67 kg/m².    Intake/Output Summary (Last 24 hours) at 10/13/2023 1713  Last data filed at 10/13/2023 0914  Gross per 24 hour   Intake --   Output 300 ml   Net -300 ml         Physical Exam  Vitals and nursing note reviewed.   Constitutional:       Appearance: She is well-developed.   HENT:      Head: Normocephalic and atraumatic.      Nose: Nose normal.   Eyes:      Conjunctiva/sclera: Conjunctivae normal.   Cardiovascular:      Rate and Rhythm: Normal rate and regular rhythm.      Heart sounds: Normal heart sounds.   Pulmonary:      Effort: Pulmonary effort is normal.      Breath sounds: Normal breath sounds. No wheezing.   Abdominal:      General: Bowel sounds are normal.      Palpations: Abdomen is soft. There is no mass.      Tenderness: There is no abdominal tenderness. There is no guarding or rebound.      Comments: Abdominal abscess with active drainage and erythema   Musculoskeletal:         General: No tenderness. Normal range of motion.      Cervical back: Normal range of motion and neck supple.   Skin:     General: Skin is warm.      Findings: No rash.   Neurological:      Mental Status: She is alert and oriented to person, place, and time.      Cranial Nerves: No cranial nerve deficit.   Psychiatric:         Behavior: Behavior normal.         Thought Content: Thought content normal.             Significant Labs: All pertinent labs within the past 24 hours have been reviewed.  BMP:   Recent Labs   Lab 10/13/23  0437   *   *   K 3.3*   CL 99   CO2 25   BUN 8   CREATININE 0.7   CALCIUM 10.4   MG 1.5*     CBC:   Recent Labs   Lab 10/12/23  1642 10/13/23  0437   WBC 11.97 11.38   HGB 12.8 12.5   HCT 40.1 38.0    385       Significant Imaging: I have reviewed all pertinent imaging results/findings within the past 24 hours.  I  "have reviewed and interpreted all pertinent imaging results/findings within the past 24 hours.      Assessment/Plan:      * Abdominal wall abscess  CT A&P showed "Gastrostomy tube tract in the left upper quadrant with associated large (15.0 x 13.3 x 9.4) multiloculated soft tissue abscess.  Components of the abscess are superficial and deep to the left rectus abdominis musculature.  Diffuse subcutaneous infectious/inflammatory fat stranding with overlying skin thickening.  No intraperitoneal communication. No enterocutaneous fistula."    -blood cultures obtained   -IV fluid   -IV Vanc and zosyn  -consulted to general surgery for evaluation and recommendations, plans for I&D    Hypokalemia  -replace K  -follow up with repeat BMP      Obesity (BMI 30.0-34.9)  Lifestyle modifications advised    Mixed hyperlipidemia  Resume home meds once med rec is done      Benign essential HTN  Resume home meds once med rec is done  BP monitoring        VTE Risk Mitigation (From admission, onward)         Ordered     IP VTE HIGH RISK PATIENT  Once         10/13/23 0327     Place sequential compression device  Until discontinued         10/13/23 0327                Discharge Planning   ANITHA:      Code Status: Full Code   Is the patient medically ready for discharge?:     Reason for patient still in hospital (select all that apply): Treatment  Discharge Plan A: Home Health                  Ayanna William MD  Department of Hospital Medicine   [facility] Surg  "

## 2023-10-13 NOTE — ED NOTES
Patients abdominal wound cleansed with sterile normal saline and dressed with sterile gauze. Patient tolerated well.

## 2023-10-13 NOTE — PROGRESS NOTES
"Pharmacokinetic Initial Assessment: IV Vancomycin    Assessment/Plan:    Based on renal function, will change vancomycin [id number] to vancomycin 1000mg IV q12h  Desired empiric serum trough concentration is 10 to 15 mcg/mL  Draw vancomycin trough level 60 min prior to fourth dose on 10/14 at approximately 1400  Pharmacy will continue to follow and monitor vancomycin.      Please contact pharmacy at extension [phone] with any questions regarding this assessment.     Thank you for the consult,   Familia William       Patient brief summary:  Susan Pinto is a 67 y.o. female initiated on antimicrobial therapy with IV Vancomycin for treatment of suspected intra-abdominal infection    Drug Allergies:   Review of patient's allergies indicates:  No Known Allergies    Actual Body Weight:       Renal Function:   Estimated Creatinine Clearance: 79.4 mL/min (based on SCr of 0.7 mg/dL).,     Dialysis Method (if applicable):    CBC (last 72 hours):  Recent Labs   Lab Result Units 10/12/23  1642 10/13/23  0437   WBC K/uL 11.97 11.38   Hemoglobin g/dL 12.8 12.5   Hematocrit % 40.1 38.0   Platelets K/uL 406 385   Gran % % 70.3 76.9*   Lymph % % 20.0 12.0*   Mono % % 7.8 7.7   Eosinophil % % 0.8 1.8   Basophil % % 0.5 0.8   Differential Method  Automated Automated       Metabolic Panel (last 72 hours):  Recent Labs   Lab Result Units 10/12/23  1642 10/13/23  0437   Sodium mmol/L 138 135*   Potassium mmol/L 3.2* 3.3*   Chloride mmol/L 99 99   CO2 mmol/L 26 25   Glucose mg/dL 125* 122*   BUN mg/dL 10 8   Creatinine mg/dL 0.9 0.7   Albumin g/dL 3.1* 2.7*   Total Bilirubin mg/dL 0.6 0.6   Alkaline Phosphatase U/L 62 53*   AST U/L 12 12   ALT U/L 7* 7*   Magnesium mg/dL  --  1.5*       Drug levels (last 3 results):  No results for input(s): "VANCOMYCINRA", "VANCORANDOM", "VANCOMYCINPE", "VANCOPEAK", "VANCOMYCINTR", "VANCOTROUGH" in the last 72 hours.    Microbiologic Results:  Microbiology Results (last 7 days)       ** No results " found for the last 168 hours. **

## 2023-10-13 NOTE — PROGRESS NOTES
"Pharmacokinetic Initial Assessment: IV Vancomycin    Assessment/Plan:    Initiate intravenous vancomycin with loading dose of 2250 mg once followed by a maintenance dose of vancomycin 2250mg IV every 24 hours  Desired empiric serum trough concentration is 10 to 15 mcg/mL  Draw vancomycin trough level 60 min prior to third dose on 10/14 at approximately 1900  Pharmacy will continue to follow and monitor vancomycin.      Please contact pharmacy at extension [phone] with any questions regarding this assessment.     Thank you for the consult,   Kyledeidra Miller       Patient brief summary:  Susan Pinto is a 67 y.o. female initiated on antimicrobial therapy with IV Vancomycin for treatment of suspected skin & soft tissue infection    Drug Allergies:   Review of patient's allergies indicates:  No Known Allergies    Actual Body Weight:   83kg    Renal Function:   Estimated Creatinine Clearance: 61.7 mL/min (based on SCr of 0.9 mg/dL).,     Dialysis Method (if applicable):  N/A    CBC (last 72 hours):  Recent Labs   Lab Result Units 10/12/23  1642   WBC K/uL 11.97   Hemoglobin g/dL 12.8   Hematocrit % 40.1   Platelets K/uL 406   Gran % % 70.3   Lymph % % 20.0   Mono % % 7.8   Eosinophil % % 0.8   Basophil % % 0.5   Differential Method  Automated       Metabolic Panel (last 72 hours):  Recent Labs   Lab Result Units 10/12/23  1642   Sodium mmol/L 138   Potassium mmol/L 3.2*   Chloride mmol/L 99   CO2 mmol/L 26   Glucose mg/dL 125*   BUN mg/dL 10   Creatinine mg/dL 0.9   Albumin g/dL 3.1*   Total Bilirubin mg/dL 0.6   Alkaline Phosphatase U/L 62   AST U/L 12   ALT U/L 7*       Drug levels (last 3 results):  No results for input(s): "VANCOMYCINRA", "VANCORANDOM", "VANCOMYCINPE", "VANCOPEAK", "VANCOMYCINTR", "VANCOTROUGH" in the last 72 hours.    Microbiologic Results:  Microbiology Results (last 7 days)       ** No results found for the last 168 hours. **            "

## 2023-10-13 NOTE — PLAN OF CARE
The sw faxed the pt's info to her preferred hh agency [facility] via [employer] b/c she used them recently and wants to use them again.        10/13/23 1036   Post-Acute Status   Post-Acute Authorization Home Health   Home Health Status Referrals Sent   Discharge Plan   Discharge Plan A Home Health   Discharge Plan B Other  (TBD)

## 2023-10-13 NOTE — TRANSFER OF CARE
"Anesthesia Transfer of Care Note    Patient: Susan Pinto    Procedure(s) Performed: Procedure(s) (LRB):  INCISION AND DRAINAGE, ABSCESS (N/A)    Patient location: PACU    Anesthesia Type: general    Transport from OR: Transported from OR on 6-10 L/min O2 by face mask with adequate spontaneous ventilation    Post pain: adequate analgesia    Post assessment: no apparent anesthetic complications and tolerated procedure well    Post vital signs: stable    Level of consciousness: awake and responds to stimulation    Nausea/Vomiting: no nausea/vomiting    Complications: none    Transfer of care protocol was followed      Last vitals: Visit Vitals  /78   Pulse 66   Temp 36.5 °C (97.7 °F)   Resp 18   Ht 5' 4" (1.626 m)   Wt 83.7 kg (184 lb 8.4 oz)   SpO2 98%   BMI 31.67 kg/m²     "

## 2023-10-13 NOTE — PLAN OF CARE
Pt is AAOX4. Scheduled meds were given per MAR. No C/O pain or N/V. Family is not present at the bedside. NPO status maintained. IV fluids initiated. Pt on RA sats 93%. Bed in lowest position. Bed alarm is set. Call light within reach.   Problem: Adult Inpatient Plan of Care  Goal: Plan of Care Review  Outcome: Ongoing, Progressing     Problem: Hypertension Comorbidity  Goal: Blood Pressure in Desired Range  Outcome: Ongoing, Progressing     Problem: Skin or Soft Tissue Infection  Goal: Absence of Infection Signs and Symptoms  Outcome: Ongoing, Progressing     Problem: Skin Injury Risk Increased  Goal: Skin Health and Integrity  Outcome: Ongoing, Progressing

## 2023-10-13 NOTE — CONSULTS
Patient ID: Susan Pinto is a 67 y.o. female.    Chief Complaint: No chief complaint on file.      HPI:  HPI  67F with hx of stroke earlier this year underwent PEG/trach in May. PEG was removed not long after but she developed a small infection at the PEG site. She was treated with abx and has been doing well. Eating on her own. Trying to work with PT to regain mobility. A few weeks ago began noticing pain, swelling left abdomen. Went to ED and was found to have large ab wall abscess. Transferred here from [facility].     Review of Systems   Constitutional:  Negative for fever.   HENT:  Negative for trouble swallowing.    Respiratory:  Negative for shortness of breath.    Cardiovascular:  Negative for chest pain.   Gastrointestinal:  Positive for abdominal pain. Negative for blood in stool, nausea and vomiting.   Genitourinary:  Negative for dysuria.   Musculoskeletal:  Negative for gait problem.   Skin:  Negative for rash and wound.   Allergic/Immunologic: Negative for immunocompromised state.   Neurological:  Negative for weakness.   Hematological:  Does not bruise/bleed easily.   Psychiatric/Behavioral:  Negative for agitation.        Current Facility-Administered Medications   Medication Dose Route Frequency Provider Last Rate Last Admin    0.9%  NaCl infusion   Intravenous Continuous Gurpreet Gutiérrez  mL/hr at 10/13/23 0359 New Bag at 10/13/23 0359    atorvastatin tablet 40 mg  40 mg Oral Daily Gurpreet Gutiérrez MD        dextrose 10% bolus 125 mL 125 mL  12.5 g Intravenous PRN Gurpreet Gutiérrez MD        dextrose 10% bolus 250 mL 250 mL  25 g Intravenous PRN Gurpreet Gutiérrez MD        glucagon (human recombinant) injection 1 mg  1 mg Intramuscular PRN Gurpreet Gutiérrez MD        glucose chewable tablet 16 g  16 g Oral PRN Gurpreet Gutiérrez MD        glucose chewable tablet 24 g  24 g Oral PRN Gurpreet Gutiérrez MD        naloxone 0.4 mg/mL injection 0.02 mg  0.02 mg Intravenous PRN  Gurpreet Gutiérrez MD        piperacillin-tazobactam (ZOSYN) 4.5 g in dextrose 5 % in water (D5W) 100 mL IVPB (MB+)  4.5 g Intravenous Q8H Gurpreet Gutiérrez MD 25 mL/hr at 10/13/23 0407 4.5 g at 10/13/23 0407    sodium chloride 0.9% flush 10 mL  10 mL Intravenous Q12H PRN Gurpreet Gutiérrez MD        vancomycin (VANCOCIN) 2,250 mg in dextrose 5 % (D5W) 500 mL IVPB  2,250 mg Intravenous Q24H Mike Armendariz MD        vancomycin - pharmacy to dose   Intravenous pharmacy to manage frequency Gurpreet Gutiérrez MD           Review of patient's allergies indicates:  No Known Allergies    Past Medical History:   Diagnosis Date    Stroke        Past Surgical History:   Procedure Laterality Date    ESOPHAGOGASTRODUODENOSCOPY W/ PEG N/A 5/15/2023    Procedure: EGD, WITH PEG TUBE INSERTION;  Surgeon: Mark Guadalupe MD;  Location: [facility] OR [address] [place] [address];  Service: General;  Laterality: N/A;    TRACHEOTOMY N/A 5/15/2023    Procedure: TRACHEOTOMY;  Surgeon: Mark Guadalupe MD;  Location: [facility] OR [address] [place] [address];  Service: General;  Laterality: N/A;       Family History   Problem Relation Age of Onset    Hypertension Mother        Social History     Socioeconomic History    Marital status:    Tobacco Use    Smoking status: Never    Smokeless tobacco: Never   Substance and Sexual Activity    Alcohol use: No    Drug use: No     Social Determinants of Health     Financial Resource Strain: Low Risk  (5/6/2023)    Overall Financial Resource Strain (CARDIA)     Difficulty of Paying Living Expenses: Not hard at all   Food Insecurity: Unknown (5/6/2023)    Hunger Vital Sign     Worried About Running Out of Food in the Last Year: Never true   Transportation Needs: No Transportation Needs (5/6/2023)    PRAPARE - Transportation     Lack of Transportation (Medical): No     Lack of Transportation (Non-Medical): No   Stress: No Stress Concern Present (5/6/2023)    [employer] [place] [employer] - Occupational Stress  Questionnaire     Feeling of Stress : Not at all   Social Connections: Socially Integrated (5/6/2023)    Social Connection and Isolation Panel [NHANES]     Frequency of Communication with Friends and Family: More than three times a week     Frequency of Social Gatherings with Friends and Family: More than three times a week     Attends Christian Services: More than 4 times per year     Active Member of Clubs or Organizations: Yes     Attends Club or Organization Meetings: 1 to 4 times per year     Marital Status:    Housing Stability: Unknown (5/6/2023)    Housing Stability Vital Sign     Unable to Pay for Housing in the Last Year: No     Unstable Housing in the Last Year: No       Vitals:    10/13/23 0545   BP: (!) 109/59   Pulse: 67   Resp: 19   Temp: 97.9 °F (36.6 °C)       Physical Exam  Constitutional:       General: She is not in acute distress.     Appearance: She is well-developed.   HENT:      Head: Normocephalic and atraumatic.   Eyes:      General: No scleral icterus.  Cardiovascular:      Rate and Rhythm: Normal rate.   Pulmonary:      Effort: Pulmonary effort is normal.      Breath sounds: No stridor.   Abdominal:      General: There is no distension.      Palpations: Abdomen is soft.      Tenderness: There is no abdominal tenderness.       Lymphadenopathy:      Cervical: No cervical adenopathy.   Skin:     General: Skin is warm.      Findings: No erythema.   Neurological:      Mental Status: She is alert and oriented to person, place, and time.   Psychiatric:         Behavior: Behavior normal.     Body mass index is 31.67 kg/m².  CT shows 15cm ab wall abscess. No evidence of fistula on scan. No intra-abdominal component  WBC 11  LA normal    Assessment & Plan:  67F with ab wall abscess  No signs of sepsis or toxicity  Needs I&D. Should be fairly quick procedure but unsure when can go due to availability. Probably late this afternoon sometime  NPO

## 2023-10-13 NOTE — PLAN OF CARE
The sw met with the pt and Ivan Pinto Sr.(spouse)[phone] who was at bedside during the assessment. The pt is a transfer from [facility] in [place]. The pt and her spouse live in [place]. The pt's independent with her ADL's but her spouse is always near to assist as needed. The pt has a dme listed above. The pt doesn't drive so her spouse transports her on errands and to dr rand. He will transport the pt home at d/c. The pt states she recently used [facility] for hh and want to use them again if hh is recommended. The sw completed the white board in the pt's room with her name and contact info. The sw encouraged her to call if she has any further questions or concerns. The sw will continue to follow the pt throughout her transitions of care and will assist with any d/c needs.     [facility] - Med Surg  Initial Discharge Assessment       Primary Care Provider: Court Champion NP    Admission Diagnosis: Abdominal wall abscess [L02.211]    Admission Date: 10/13/2023  Expected Discharge Date:     Transition of Care Barriers: (P) None    Payor: MEDICARE / Plan: MEDICARE PART A & B / Product Type: Government /     Extended Emergency Contact Information  Primary Emergency Contact: Ivan Pinto  Address: [address] [facility]  Mobile Phone: [phone]  Relation: Spouse    Discharge Plan A: (P) Home Health  Discharge Plan B: (P) Other (TBD)      [facility] - [address] [address]  Phone: [phone] Fax: [phone]      Initial Assessment (most recent)       Adult Discharge Assessment - 10/13/23 1004          Discharge Assessment    Assessment Type Discharge Planning Assessment (P)      Confirmed/corrected address, phone number and insurance Yes (P)      Confirmed Demographics Correct on Facesheet (P)      Source of Information patient (P)      When was your last doctors appointment?  10/12/23 (P)      Communicated ANITHA with patient/caregiver Date not available/Unable to determine (P)      Reason For Admission Addominal wall abscess (P)      People in Home spouse (P)      Do you expect to return to your current living situation? Yes (P)      Do you have help at home or someone to help you manage your care at home? Yes (P)      Who are your caregiver(s) and their phone number(s)? Ivan Pinto Sr.(spouse)[phone] (P)      Prior to hospitilization cognitive status: Alert/Oriented (P)      Current cognitive status: Alert/Oriented (P)      Walking or Climbing Stairs ambulation difficulty, requires equipment;stair climbing difficulty, requires equipment;transferring difficulty, requires equipment (P)      Dressing/Bathing bathing difficulty, requires equipment (P)      Home Accessibility wheelchair accessible;stairs within home (P)      Number of Stairs, Within Home, Primary ten (P)      Equipment Currently Used at Home walker, rolling;walker, meera;bedside commode;shower chair (P)      Readmission within 30 days? No (P)      Patient currently being followed by outpatient case management? No (P)      Do you currently have service(s) that help you manage your care at home? No (P)      Do you take prescription medications? Yes (P)      Do you have prescription coverage? Yes (P)      Coverage Medicare A/B,[employer] Medicare Supplement (P)      Do you have any problems affording any of your prescribed medications? No (P)      Is the patient taking medications as prescribed? yes (P)      Who is going to help you get home at discharge? Ivan Pinto Sr.(spouse)[phone] (P)      How do you get to doctors appointments? family or friend will provide (P)      Are you on dialysis? No (P)      Do you take coumadin? No (P)      DME Needed Upon Discharge  other (see comments) (P)    TBD    Discharge Plan discussed with: Spouse/sig other;Patient (P)      Name(s) and Number(s) Ivan Pinto  Sr.(spouse)[phone] (P)      Transition of Care Barriers None (P)      Discharge Plan A Home Health (P)      Discharge Plan B Other (P)    TBD       Physical Activity    On average, how many days per week do you engage in moderate to strenuous exercise (like a brisk walk)? 0 days (P)      On average, how many minutes do you engage in exercise at this level? 0 min (P)         Financial Resource Strain    How hard is it for you to pay for the very basics like food, housing, medical care, and heating? Not hard at all (P)         Housing Stability    In the last 12 months, was there a time when you were not able to pay the mortgage or rent on time? No (P)      In the last 12 months, how many places have you lived? 1 (P)      In the last 12 months, was there a time when you did not have a steady place to sleep or slept in a shelter (including now)? No (P)         Food Insecurity    Within the past 12 months, you worried that your food would run out before you got the money to buy more. Never true (P)      Within the past 12 months, the food you bought just didn't last and you didn't have money to get more. Never true (P)         Stress    Do you feel stress - tense, restless, nervous, or anxious, or unable to sleep at night because your mind is troubled all the time - these days? Very much (P)         Social Connections    In a typical week, how many times do you talk on the phone with family, friends, or neighbors? More than three times a week (P)      How often do you get together with friends or relatives? More than three times a week (P)      How often do you attend Druze or Faith services? More than 4 times per year (P)      Do you belong to any clubs or organizations such as Druze groups, unions, fraternal or athletic groups, or school groups? No (P)      How often do you attend meetings of the clubs or organizations you belong to? Never (P)      Are you , , , , never  , or living with a partner?  (P)         Alcohol Use    Q1: How often do you have a drink containing alcohol? Never (P)      Q2: How many drinks containing alcohol do you have on a typical day when you are drinking? Patient does not drink (P)      Q3: How often do you have six or more drinks on one occasion? Never (P)         OTHER    Name(s) of People in Home Ivan Pinto Sr.(spouse)[phone] (P)

## 2023-10-13 NOTE — PROVIDER TRANSFER
(Physician in Lead of Transfers)   Outside Transfer Acceptance Note / Regional Referral Center    Referring facility: [facility]   Referring provider: DANY GASTELUM NAMIR WOLFORT, RYAN M.  Accepting facility: [facility]  OUTSIDE FACILITY  [facility]  Accepting provider: MAMADOU SORIA  Reason for transfer:  Higher level of care  Transfer diagnosis: abdominal wall abscess   Transfer specialty requested: Neurosurgery  General Surgery  Transfer specialty notified: Yes  Transfer level: 2  Isolation status: No active isolations   Admission class or status: IP- Inpatient  IP- Inpatient      Narrative     Patient is a 67 y.o. female who has a past medical history of Stroke presented with erythema and drainage from skin around PEG insertion site. See below labs and imaging results. CT scan of abdomen showed gastrostomy tube tract in the left upper quadrant with associated large (15.0 x 13.3 x 9.4) multiloculated soft tissue abscess, components of the abscess are superficial and deep to the left rectus abdominis musculature, diffuse subcutaneous infectious/inflammatory fat stranding with overlying skin thickening. Patient started on IV Vanc/Zosyn. Hemodynamically stable. Seeking transfer for general surgery. Surgery connected to referring who has accepted patient transfer. Stable for transfer.     Objective     Vitals: Temp: 98.3 °F (36.8 °C) (10/12/23 1755)  Pulse: 73 (10/12/23 1755)  Resp: 18 (10/12/23 1755)  BP: (!) 168/75 (10/12/23 1755)  SpO2: 98 % (10/12/23 1755)    Recent Labs: see EPIC  CBC:  Recent Labs   Lab 10/12/23  1642   WBC 11.97   HGB 12.8   HCT 40.1        CMP:  Recent Labs   Lab 10/12/23  1642   CALCIUM 11.1*   ALBUMIN 3.1*   PROT 8.2      K 3.2*   CO2 26   CL 99   BUN 10   CREATININE 0.9   ALKPHOS 62   ALT 7*   AST 12   BILITOT 0.6     Recent Labs   Lab 10/12/23  1642   LACTATE 1.3  "    No results for input(s): "CPK", "CPKMB", "TROPONINI", "MB" in the last 168 hours.  BNP  No results for input(s): "BNP", "BNPTRIAGEBLO" in the last 168 hours.  ABG  No results for input(s): "PH", "PO2", "PCO2", "HCO3", "BE" in the last 168 hours.   Lab Results   Component Value Date    INR 1.0 05/06/2023    INR 1.0 05/05/2023       Recent imaging:   CT Abdomen Pelvis With Contrast  Narrative: EXAMINATION:  CT ABDOMEN PELVIS WITH CONTRAST    CLINICAL HISTORY:  Abdominal abscess/infection suspected;    TECHNIQUE:  Low dose axial images, sagittal and coronal reformations were obtained from the lung bases to the pubic symphysis following the IV administration of 100 mL of Omnipaque 350 and the oral administration of 30 ml of Omnipaque 350.    COMPARISON:  CT 06/16/2023    FINDINGS:  Abdomen:    - Lower thorax:Unremarkable.    - Lung bases: Bibasilar dependent atelectasis.    - Liver: Fatty liver.  No discrete mass.    - Gallbladder: No calcified gallstones.    - Bile Ducts: No evidence of intra or extra hepatic biliary ductal dilation.    - Spleen: Negative.    - Kidneys: Small bilateral renal cysts.  No hydronephrosis.    - Adrenals: Unremarkable.    - Pancreas: No mass or peripancreatic fat stranding.    - Retroperitoneum:  No significant adenopathy.    - Vascular: No abdominal aortic aneurysm.    - Abdominal wall:  Gastrostomy tube tract in the left upper quadrant with associated large (15.0 x 13.3 x 9.4) multiloculated soft tissue abscess.  Components of the abscess are superficial and deep to the left rectus abdominis musculature.  Diffuse subcutaneous  infectious/inflammatory fat stranding with overlying skin thickening.  No intraperitoneal communication.  No enterocutaneous fistula.    Pelvis:    No pelvic mass, adenopathy, or free fluid.    Bowel/Mesentery:    Large amount of stool throughout the colon and rectum.  Colonic diverticulosis, without diverticulitis.  No small bowel obstruction.  Normal " appendix.    Bones:  No acute osseous abnormality and no suspicious lytic or blastic lesion.  Impression: Gastrostomy tube tract in the left upper quadrant with associated large (15.0 x 13.3 x 9.4) multiloculated soft tissue abscess.  Components of the abscess are superficial and deep to the left rectus abdominis musculature.  Diffuse subcutaneous infectious/inflammatory fat stranding with overlying skin thickening.  No intraperitoneal communication. No enterocutaneous fistula.    Electronically signed by: Jules Child  Date:    10/12/2023  Time:    18:34      Airway:   Adult Surgical Airway 05/15/23 1105 Shiley Cuffed 8.0 / 85 mm (Active)      Vent settings:         IV access:        Peripheral IV - Single Lumen 10/12/23 1652 20 G Left Forearm (Active)   Site Assessment Clean;Dry;Intact;No redness;No swelling 10/12/23 1653   Extremity Assessment Distal to IV No swelling;No warmth;No redness;No abnormal discoloration 10/12/23 1653   Line Status Blood return noted;Flushed;Saline locked 10/12/23 1653   Dressing Status Clean;Dry;Intact 10/12/23 1653   Dressing Intervention First dressing 10/12/23 1653     Allergies: Review of patient's allergies indicates:  No Known Allergies   NPO: No    Anticoagulation:   Anticoagulants       None             Instructions      [facility]  Admit to Hospital Medicine  Upon patient arrival to floor, please contact Hospital Medicine on call.

## 2023-10-14 LAB — VANCOMYCIN TROUGH SERPL-MCNC: 23.2 UG/ML (ref 10–22)

## 2023-10-14 PROCEDURE — 94761 N-INVAS EAR/PLS OXIMETRY MLT: CPT

## 2023-10-14 PROCEDURE — 25000003 PHARM REV CODE 250: Performed by: FAMILY MEDICINE

## 2023-10-14 PROCEDURE — 11000001 HC ACUTE MED/SURG PRIVATE ROOM

## 2023-10-14 PROCEDURE — 63600175 PHARM REV CODE 636 W HCPCS: Performed by: INTERNAL MEDICINE

## 2023-10-14 PROCEDURE — 25000003 PHARM REV CODE 250: Performed by: INTERNAL MEDICINE

## 2023-10-14 PROCEDURE — 80202 ASSAY OF VANCOMYCIN: CPT | Performed by: FAMILY MEDICINE

## 2023-10-14 PROCEDURE — 63600175 PHARM REV CODE 636 W HCPCS: Performed by: FAMILY MEDICINE

## 2023-10-14 PROCEDURE — 25000003 PHARM REV CODE 250: Performed by: STUDENT IN AN ORGANIZED HEALTH CARE EDUCATION/TRAINING PROGRAM

## 2023-10-14 PROCEDURE — 36415 COLL VENOUS BLD VENIPUNCTURE: CPT | Performed by: FAMILY MEDICINE

## 2023-10-14 RX ORDER — ACETAMINOPHEN 325 MG/1
650 TABLET ORAL EVERY 6 HOURS PRN
Status: DISCONTINUED | OUTPATIENT
Start: 2023-10-14 | End: 2023-10-15 | Stop reason: HOSPADM

## 2023-10-14 RX ORDER — POTASSIUM CHLORIDE 20 MEQ/1
20 TABLET, EXTENDED RELEASE ORAL ONCE
Status: COMPLETED | OUTPATIENT
Start: 2023-10-14 | End: 2023-10-14

## 2023-10-14 RX ADMIN — PIPERACILLIN AND TAZOBACTAM 4.5 G: 4; .5 INJECTION, POWDER, LYOPHILIZED, FOR SOLUTION INTRAVENOUS; PARENTERAL at 01:10

## 2023-10-14 RX ADMIN — ACETAMINOPHEN 650 MG: 325 TABLET ORAL at 04:10

## 2023-10-14 RX ADMIN — POTASSIUM CHLORIDE 20 MEQ: 1500 TABLET, EXTENDED RELEASE ORAL at 08:10

## 2023-10-14 RX ADMIN — VANCOMYCIN HYDROCHLORIDE 1000 MG: 1 INJECTION, POWDER, LYOPHILIZED, FOR SOLUTION INTRAVENOUS at 02:10

## 2023-10-14 RX ADMIN — PIPERACILLIN AND TAZOBACTAM 4.5 G: 4; .5 INJECTION, POWDER, LYOPHILIZED, FOR SOLUTION INTRAVENOUS; PARENTERAL at 08:10

## 2023-10-14 RX ADMIN — ATORVASTATIN CALCIUM 40 MG: 40 TABLET, FILM COATED ORAL at 08:10

## 2023-10-14 RX ADMIN — PIPERACILLIN AND TAZOBACTAM 4.5 G: 4; .5 INJECTION, POWDER, LYOPHILIZED, FOR SOLUTION INTRAVENOUS; PARENTERAL at 03:10

## 2023-10-14 RX ADMIN — OXYCODONE HYDROCHLORIDE 5 MG: 5 TABLET ORAL at 03:10

## 2023-10-14 NOTE — PROGRESS NOTES
Pharmacokinetic Assessment Follow Up: IV Vancomycin    Vancomycin serum concentration assessment(s):    The trough level was drawn correctly and can be used to guide therapy at this time. The measurement is above the desired definitive target range of 10 to 15 mcg/mL.    Vancomycin Regimen Plan:    Discontinue the scheduled vancomycin regimen and re-dose when the random level is less than 15 mcg/mL, next level to be drawn at 0200 on 10-15-23.    Drug levels (last 3 results):  Recent Labs   Lab Result Units 10/14/23  1422   Vancomycin-Trough ug/mL 23.2*       Pharmacy will continue to follow and monitor vancomycin.    Please contact pharmacy at extension [phone] for questions regarding this assessment.    Thank you for the consult,   Thor Jones       Patient brief summary:  Susan Pinto is a 67 y.o. female initiated on antimicrobial therapy with IV Vancomycin for treatment of skin & soft tissue infection    The patient's Vancomycin regimen was 1000 mg IV q12h    Drug Allergies:   Review of patient's allergies indicates:  No Known Allergies    Actual Body Weight:   85.6 kg    Renal Function:   Estimated Creatinine Clearance: 80.3 mL/min (based on SCr of 0.7 mg/dL).,     Dialysis Method (if applicable):  N/A    CBC (last 72 hours):  Recent Labs   Lab Result Units 10/12/23  1642 10/13/23  0437   WBC K/uL 11.97 11.38   Hemoglobin g/dL 12.8 12.5   Hematocrit % 40.1 38.0   Platelets K/uL 406 385   Gran % % 70.3 76.9*   Lymph % % 20.0 12.0*   Mono % % 7.8 7.7   Eosinophil % % 0.8 1.8   Basophil % % 0.5 0.8   Differential Method  Automated Automated       Metabolic Panel (last 72 hours):  Recent Labs   Lab Result Units 10/12/23  1642 10/13/23  0437   Sodium mmol/L 138 135*   Potassium mmol/L 3.2* 3.3*   Chloride mmol/L 99 99   CO2 mmol/L 26 25   Glucose mg/dL 125* 122*   BUN mg/dL 10 8   Creatinine mg/dL 0.9 0.7   Albumin g/dL 3.1* 2.7*   Total Bilirubin mg/dL 0.6 0.6   Alkaline Phosphatase U/L 62 53*   AST U/L 12 12    ALT U/L 7* 7*   Magnesium mg/dL  --  1.5*       Vancomycin Administrations:  vancomycin given in the last 96 hours                     vancomycin (VANCOCIN) 1,000 mg in dextrose 5 % (D5W) 250 mL IVPB (Vial-Mate) (mg) 1,000 mg New Bag 10/14/23 0204     1,000 mg New Bag 10/13/23 1520    vancomycin 1,250 mg in dextrose 5 % (D5W) 250 mL IVPB (Vial-Mate) (mg) 1,250 mg New Bag 10/12/23 2144    vancomycin (VANCOCIN) 1,000 mg in dextrose 5 % (D5W) 250 mL IVPB (Vial-Mate) (mg) 1,000 mg New Bag 10/12/23 2009                    Microbiologic Results:  Microbiology Results (last 7 days)       Procedure Component Value Units Date/Time    Culture, Anaerobe [[id number]] Collected: 10/13/23 1824    Order Status: Sent Specimen: Wound from Abdomen Updated: 10/13/23 2255    Aerobic culture [[id number]] Collected: 10/13/23 1824    Order Status: Sent Specimen: Wound from Abdomen Updated: 10/13/23 2255    AFB Culture & Smear [[id number]] Collected: 10/13/23 1824    Order Status: Sent Specimen: Wound from Abdomen Updated: 10/13/23 2255    Fungus culture [[id number]] Collected: 10/13/23 1824    Order Status: Sent Specimen: Wound from Abdomen Updated: 10/13/23 2255

## 2023-10-14 NOTE — PLAN OF CARE
Pt AAOx4. PRN Tylenol given for c/o pain. No c/o N/V. Medications administered per MAR. NS infusing. On RA. Dressing to abd intact with dried sanguineous drainage. Bed alarm set, side rails raised, and call light in reach.

## 2023-10-14 NOTE — PROGRESS NOTES
[facility] S[facility]  General Surgery  Progress Note    Subjective:     Interval History:   Feeling well  Ryann diet    Post-Op Info:  Procedure(s) (LRB):  INCISION AND DRAINAGE, ABSCESS (N/A)   1 Day Post-Op      Medications:  Continuous Infusions:   sodium chloride 0.9% 100 mL/hr at 10/14/23 0515     Scheduled Meds:   atorvastatin  40 mg Oral Daily    piperacillin-tazobactam (Zosyn) IV (PEDS and ADULTS) (extended infusion is not appropriate)  4.5 g Intravenous Q8H    vancomycin (VANCOCIN) IV (PEDS and ADULTS)  1,000 mg Intravenous Q12H     PRN Meds:acetaminophen, dextrose 10%, dextrose 10%, glucagon (human recombinant), glucose, glucose, HYDROmorphone, naloxone, ondansetron, oxyCODONE, sodium chloride 0.9%, Pharmacy to dose Vancomycin consult **AND** vancomycin - pharmacy to dose     Objective:     Vital Signs (Most Recent):  Temp: 97.6 °F (36.4 °C) (10/14/23 1115)  Pulse: 67 (10/14/23 1115)  Resp: 20 (10/14/23 1115)  BP: (!) 98/58 (10/14/23 1115)  SpO2: 95 % (10/14/23 1115) Vital Signs (24h Range):  Temp:  [97.6 °F (36.4 °C)-98.4 °F (36.9 °C)] 97.6 °F (36.4 °C)  Pulse:  [64-83] 67  Resp:  [15-33] 20  SpO2:  [90 %-99 %] 95 %  BP: ()/(44-75) 98/58       Intake/Output Summary (Last 24 hours) at 10/14/2023 1406  Last data filed at 10/14/2023 0515  Gross per 24 hour   Intake 824.9 ml   Output 0 ml   Net 824.9 ml       Physical Exam  Bandages intact    Significant Labs:  CBC:   Recent Labs   Lab 10/13/23  0437   WBC 11.38   RBC 4.54   HGB 12.5   HCT 38.0      MCV 84   MCH 27.5   MCHC 32.9     CMP:   Recent Labs   Lab 10/13/23  0437   *   CALCIUM 10.4   ALBUMIN 2.7*   PROT 7.2   *   K 3.3*   CO2 25   CL 99   BUN 8   CREATININE 0.7   ALKPHOS 53*   ALT 7*   AST 12   BILITOT 0.6       Significant Diagnostics:  I have reviewed all pertinent imaging results/findings within the past 24 hours.    Assessment/Plan:     Active Diagnoses:    Diagnosis Date Noted POA    PRINCIPAL PROBLEM:  Abdominal wall  abscess [L02.211] 10/13/2023 Unknown    Hypokalemia [E87.6] 10/13/2023 Unknown    Obesity (BMI 30.0-34.9) [E66.9] 04/15/2021 Yes    Mixed hyperlipidemia [E78.2] 05/23/2017 Yes    Benign essential HTN [I10] 02/23/2017 Yes      Problems Resolved During this Admission:     S/p I&D  Will change packing tomorrow  Likely home after that?        Dylan Javed MD  General Surgery  [facility] Surg

## 2023-10-14 NOTE — PROGRESS NOTES
[facility] Medicine  Progress Note    Patient Name: Susan Pinto  MRN: [id number]  Patient Class: IP- Inpatient   Admission Date: 10/13/2023  Length of Stay: 1 days  Attending Physician: Kit Pham MD  Primary Care Provider: Court Champion NP        Subjective:     Principal Problem:Abdominal wall abscess      HPI:  67 years old female with PMH significant for hypertension, hyperlipidemia, CVA was sent from [facility] due to abdominal wall abscess and for surgery consult.  Patient reported she went to see her PCP yesterday who found erythema and drainage from skin around PEG insertion site and advised patient to go to ER.  Patient reported her PEG tube was removed in June.  Patient denied abdominal pain nausea vomiting diarrhea fever chills cough chest pain shortness of breath or dizziness. CT A&P showed ''Gastrostomy tube tract in the left upper quadrant with associated large (15.0 x 13.3 x 9.4) multiloculated soft tissue abscess.  Components of the abscess are superficial and deep to the left rectus abdominis musculature.  Diffuse subcutaneous infectious/inflammatory fat stranding with overlying skin thickening.  No intraperitoneal communication. No enterocutaneous fistula.'' Lab workup showed WBC 11.9, creatinine 0.9, potassium 3.2 and lactate 1.3. Blood cultures obtained, patient received IV fluid, vancomycin and Zosyn in ER.  Patient is hemodynamically stable.        Overview/Hospital Course:  10/13/23: abdominal abscess with active drainage. Surgery consulted and I&D        Interval History: no acute event overnight. No leukocytosis. Hypokalemic, will replace K.     Review of Systems   Constitutional: Negative.  Negative for activity change, diaphoresis and unexpected weight change.   HENT: Negative.  Negative for congestion, ear discharge, hearing loss, rhinorrhea, sore throat and voice change.    Eyes: Negative.  Negative for pain, discharge and visual  disturbance.   Respiratory: Negative.  Negative for chest tightness, shortness of breath and wheezing.    Cardiovascular: Negative.  Negative for chest pain.   Gastrointestinal: Negative.  Negative for abdominal distention, anal bleeding, constipation and nausea.   Endocrine: Negative.  Negative for cold intolerance, polydipsia and polyuria.   Genitourinary: Negative.  Negative for decreased urine volume, difficulty urinating, dysuria, frequency, menstrual problem and vaginal pain.   Musculoskeletal: Negative.  Negative for arthralgias, gait problem and myalgias.   Skin:  Positive for color change, rash and wound. Negative for pallor.   Allergic/Immunologic: Negative.  Negative for environmental allergies and immunocompromised state.   Neurological: Negative.  Negative for dizziness, tremors, seizures, speech difficulty and headaches.   Hematological: Negative.  Negative for adenopathy. Does not bruise/bleed easily.   Psychiatric/Behavioral: Negative.  Negative for agitation, confusion, decreased concentration, hallucinations, self-injury and suicidal ideas. The patient is not nervous/anxious.      Objective:     Vital Signs (Most Recent):  Temp: 97.7 °F (36.5 °C) (10/14/23 0732)  Pulse: 64 (10/14/23 0732)  Resp: 18 (10/14/23 0732)  BP: 114/67 (10/14/23 0732)  SpO2: (!) 94 % (10/14/23 0812) Vital Signs (24h Range):  Temp:  [97.7 °F (36.5 °C)-98.4 °F (36.9 °C)] 97.7 °F (36.5 °C)  Pulse:  [64-83] 64  Resp:  [15-33] 18  SpO2:  [90 %-99 %] 94 %  BP: ()/(44-78) 114/67     Weight: 85.6 kg (188 lb 11.4 oz)  Body mass index is 32.39 kg/m².    Intake/Output Summary (Last 24 hours) at 10/14/2023 0820  Last data filed at 10/14/2023 0515  Gross per 24 hour   Intake 824.9 ml   Output 300 ml   Net 524.9 ml      Physical Exam    Vitals and nursing note reviewed.   Constitutional:       Appearance: She is well-developed.   HENT:      Head: Normocephalic and atraumatic.      Nose: Nose normal.   Eyes:      Conjunctiva/sclera:  Conjunctivae normal.   Cardiovascular:      Rate and Rhythm: Normal rate and regular rhythm.      Heart sounds: Normal heart sounds.   Pulmonary:      Effort: Pulmonary effort is normal.      Breath sounds: Normal breath sounds. No wheezing.   Abdominal:      General: Bowel sounds are normal.      Palpations: Abdomen is soft. There is no mass.      Tenderness: There is no abdominal tenderness. There is no guarding or rebound.      Comments: Abdominal abscess with active drainage and erythema s/p I&D  Musculoskeletal:         General: No tenderness. Normal range of motion.      Cervical back: Normal range of motion and neck supple.   Skin:     General: Skin is warm.      Findings: No rash.   Neurological:      Mental Status: She is alert and oriented to person, place, and time.      Cranial Nerves: No cranial nerve deficit.   Psychiatric:         Behavior: Behavior normal.         Thought Content: Thought content normal.        Significant Labs: All pertinent labs within the past 24 hours have been reviewed.  CBC:   Recent Labs   Lab 10/12/23  1642 10/13/23  0437   WBC 11.97 11.38   HGB 12.8 12.5   HCT 40.1 38.0    385     CMP:   Recent Labs   Lab 10/12/23  1642 10/13/23  0437    135*   K 3.2* 3.3*   CL 99 99   CO2 26 25   * 122*   BUN 10 8   CREATININE 0.9 0.7   CALCIUM 11.1* 10.4   PROT 8.2 7.2   ALBUMIN 3.1* 2.7*   BILITOT 0.6 0.6   ALKPHOS 62 53*   AST 12 12   ALT 7* 7*   ANIONGAP 13 11       Significant Imaging: I have reviewed all pertinent imaging results/findings within the past 24 hours.  I have reviewed and interpreted all pertinent imaging results/findings within the past 24 hours.    Assessment/Plan:      Active Diagnoses:    Diagnosis Date Noted POA    PRINCIPAL PROBLEM:  Abdominal wall abscess [L02.211] 10/13/2023 Unknown    Hypokalemia [E87.6] 10/13/2023 Unknown    Obesity (BMI 30.0-34.9) [E66.9] 04/15/2021 Yes    Mixed hyperlipidemia [E78.2] 05/23/2017 Yes    Benign essential HTN  "[I10] 02/23/2017 Yes      Problems Resolved During this Admission:     VTE Risk Mitigation (From admission, onward)           Ordered     IP VTE HIGH RISK PATIENT  Once         10/13/23 0327     Place sequential compression device  Until discontinued         10/13/23 0327                      * Abdominal wall abscess  CT A&P showed "Gastrostomy tube tract in the left upper quadrant with associated large (15.0 x 13.3 x 9.4) multiloculated soft tissue abscess.  Components of the abscess are superficial and deep to the left rectus abdominis musculature.  Diffuse subcutaneous infectious/inflammatory fat stranding with overlying skin thickening.  No intraperitoneal communication. No enterocutaneous fistula."     -blood cultures obtained   -IV fluid   -IV Vanc and zosyn  -consulted to general surgery - appreciate assistance - s/p I&D 10/13     Hypokalemia  -replace K          Obesity (BMI 30.0-34.9)  Lifestyle modifications advised     Mixed hyperlipidemia  Resume home meds once med rec is done        Benign essential HTN  Resume home meds once med rec is done  BP monitoring       Kit Pham MD  Department of Hospital Medicine   [facility]    "

## 2023-10-14 NOTE — NURSING
Report received from FRANCO Gomez. Assumed care of pt. Pt resting in bed with eyes closed. Respirations even and unlabored.

## 2023-10-15 VITALS
OXYGEN SATURATION: 95 % | WEIGHT: 200.63 LBS | SYSTOLIC BLOOD PRESSURE: 123 MMHG | TEMPERATURE: 98 F | BODY MASS INDEX: 34.25 KG/M2 | DIASTOLIC BLOOD PRESSURE: 68 MMHG | RESPIRATION RATE: 18 BRPM | HEART RATE: 63 BPM | HEIGHT: 64 IN

## 2023-10-15 PROBLEM — E87.6 HYPOKALEMIA: Status: RESOLVED | Noted: 2023-10-13 | Resolved: 2023-10-15

## 2023-10-15 LAB
ANION GAP SERPL CALC-SCNC: 4 MMOL/L (ref 8–16)
BUN SERPL-MCNC: 7 MG/DL (ref 8–23)
CALCIUM SERPL-MCNC: 9.5 MG/DL (ref 8.7–10.5)
CHLORIDE SERPL-SCNC: 107 MMOL/L (ref 95–110)
CO2 SERPL-SCNC: 27 MMOL/L (ref 23–29)
CREAT SERPL-MCNC: 0.8 MG/DL (ref 0.5–1.4)
EST. GFR  (NO RACE VARIABLE): >60 ML/MIN/1.73 M^2
GLUCOSE SERPL-MCNC: 129 MG/DL (ref 70–110)
POTASSIUM SERPL-SCNC: 3.7 MMOL/L (ref 3.5–5.1)
SODIUM SERPL-SCNC: 138 MMOL/L (ref 136–145)
VANCOMYCIN SERPL-MCNC: 15.3 UG/ML

## 2023-10-15 PROCEDURE — 80048 BASIC METABOLIC PNL TOTAL CA: CPT | Performed by: FAMILY MEDICINE

## 2023-10-15 PROCEDURE — 80202 ASSAY OF VANCOMYCIN: CPT | Performed by: FAMILY MEDICINE

## 2023-10-15 PROCEDURE — 25000003 PHARM REV CODE 250: Performed by: FAMILY MEDICINE

## 2023-10-15 PROCEDURE — 36415 COLL VENOUS BLD VENIPUNCTURE: CPT | Performed by: FAMILY MEDICINE

## 2023-10-15 PROCEDURE — 63600175 PHARM REV CODE 636 W HCPCS: Performed by: INTERNAL MEDICINE

## 2023-10-15 PROCEDURE — 63600175 PHARM REV CODE 636 W HCPCS: Performed by: FAMILY MEDICINE

## 2023-10-15 PROCEDURE — 25000003 PHARM REV CODE 250: Performed by: INTERNAL MEDICINE

## 2023-10-15 RX ORDER — CLINDAMYCIN HYDROCHLORIDE 300 MG/1
300 CAPSULE ORAL 3 TIMES DAILY
Qty: 21 CAPSULE | Refills: 0 | Status: SHIPPED | OUTPATIENT
Start: 2023-10-15 | End: 2023-10-22

## 2023-10-15 RX ORDER — POTASSIUM CHLORIDE 20 MEQ/1
20 TABLET, EXTENDED RELEASE ORAL ONCE
Status: COMPLETED | OUTPATIENT
Start: 2023-10-15 | End: 2023-10-15

## 2023-10-15 RX ADMIN — SODIUM CHLORIDE: 9 INJECTION, SOLUTION INTRAVENOUS at 01:10

## 2023-10-15 RX ADMIN — ATORVASTATIN CALCIUM 40 MG: 40 TABLET, FILM COATED ORAL at 08:10

## 2023-10-15 RX ADMIN — VANCOMYCIN HYDROCHLORIDE 1000 MG: 1 INJECTION, POWDER, LYOPHILIZED, FOR SOLUTION INTRAVENOUS at 08:10

## 2023-10-15 RX ADMIN — PIPERACILLIN AND TAZOBACTAM 4.5 G: 4; .5 INJECTION, POWDER, LYOPHILIZED, FOR SOLUTION INTRAVENOUS; PARENTERAL at 03:10

## 2023-10-15 RX ADMIN — PIPERACILLIN AND TAZOBACTAM 4.5 G: 4; .5 INJECTION, POWDER, LYOPHILIZED, FOR SOLUTION INTRAVENOUS; PARENTERAL at 12:10

## 2023-10-15 RX ADMIN — POTASSIUM CHLORIDE 20 MEQ: 1500 TABLET, EXTENDED RELEASE ORAL at 10:10

## 2023-10-15 NOTE — PLAN OF CARE
[facility]      HOME HEALTH ORDERS  FACE TO FACE ENCOUNTER    Patient Name: Susan Pinto  YOB: 1955    PCP: Court Champion NP   PCP Address: [address] [place] [address] / [facility] [place] [address]  PCP Phone Number: [phone]  PCP Fax: [phone]    Encounter Date: 10/12/23    Admit to Home Health    Diagnoses:  Active Hospital Problems    Diagnosis  POA    *Abdominal wall abscess [L02.211]  Unknown    Hypokalemia [E87.6]  Unknown    Obesity (BMI 30.0-34.9) [E66.9]  Yes    Mixed hyperlipidemia [E78.2]  Yes    Benign essential HTN [I10]  Yes      Resolved Hospital Problems   No resolved problems to display.       Follow Up Appointments:  Future Appointments   Date Time Provider Department Center   10/25/2023  3:30 PM Court Champion NP [facility] IM [place]   10/27/2023  9:40 AM Dylan Javed MD [facility] GENSUR [facility] [facility]   12/5/2023 10:30 AM Daniel Ansari MD [facility] NEURO [place] [facility]       Allergies:Review of patient's allergies indicates:  No Known Allergies    Medications: Review discharge medications with patient and family and provide education.    Current Facility-Administered Medications   Medication Dose Route Frequency Provider Last Rate Last Admin    0.9%  NaCl infusion   Intravenous Continuous Gurpreet Gutiérrez  mL/hr at 10/15/23 0545 Rate Verify at 10/15/23 0545    acetaminophen tablet 650 mg  650 mg Oral Q6H PRN Gurpreet Gutiérrez MD   650 mg at 10/14/23 0404    atorvastatin tablet 40 mg  40 mg Oral Daily Gurpreet Gutiérrez MD   40 mg at 10/15/23 0831    dextrose 10% bolus 125 mL 125 mL  12.5 g Intravenous PRN Gurpreet Gutiérrez MD        dextrose 10% bolus 250 mL 250 mL  25 g Intravenous PRN Gurpreet Gutiérrez MD        glucagon (human recombinant) injection 1 mg  1 mg Intramuscular PRN Gurpreet Gutiérrez MD        glucose chewable tablet 16 g  16 g Oral PRN Gurpreet Gutiérrez MD        glucose chewable tablet 24 g  24 g Oral PRN Gurpreet Gutiérrez MD         HYDROmorphone (PF) injection 0.2 mg  0.2 mg Intravenous Q5 Min PRN Neri Manley MD   0.2 mg at 10/13/23 1920    naloxone 0.4 mg/mL injection 0.02 mg  0.02 mg Intravenous PRN Gurpreet Gutiérrez MD        ondansetron injection 4 mg  4 mg Intravenous Daily PRN Neri Manley MD        oxyCODONE immediate release tablet 5 mg  5 mg Oral Q3H PRN Neri Manley MD   5 mg at 10/14/23 1514    piperacillin-tazobactam (ZOSYN) 4.5 g in dextrose 5 % in water (D5W) 100 mL IVPB (MB+)  4.5 g Intravenous Q8H Gurpreet Gutiérrez MD 25 mL/hr at 10/15/23 1229 4.5 g at 10/15/23 1229    sodium chloride 0.9% flush 10 mL  10 mL Intravenous Q12H PRN Gurpreet Gutiérrez MD        vancomycin (VANCOCIN) 1,000 mg in dextrose 5 % (D5W) 250 mL IVPB (Vial-Mate)  1,000 mg Intravenous Q24H Kit Pham MD   Stopped at 10/15/23 1003    vancomycin - pharmacy to dose   Intravenous pharmacy to manage frequency Gurpreet Gutiérrez MD         Current Discharge Medication List        CONTINUE these medications which have NOT CHANGED    Details   aspirin (ECOTRIN) 81 MG EC tablet Take 81 mg by mouth once daily.      atorvastatin (LIPITOR) 40 MG tablet Take 1 tablet (40 mg total) by mouth once daily.  Qty: 90 tablet, Refills: 3      clindamycin (CLEOCIN) 300 MG capsule Take 1 capsule (300 mg total) by mouth every 8 (eight) hours. for 7 days  Qty: 21 capsule, Refills: 0    Associated Diagnoses: Abscess      cyanocobalamin (VITAMIN B-12) 1000 MCG tablet Take 1,000 mcg by mouth once daily.      hydroCHLOROthiazide (HYDRODIURIL) 12.5 MG Tab TAKE 1 TABLET (12.5 MG TOTAL) BY MOUTH ONCE DAILY.  Qty: 30 tablet, Refills: 2    Comments: .  Associated Diagnoses: Edema, unspecified type      midodrine (PROAMATINE) 2.5 MG Tab Take 1 tablet (2.5 mg total) by mouth every morning.  Qty: 30 tablet, Refills: 2      sertraline (ZOLOFT) 25 MG tablet TAKE 1 TABLET (25 MG TOTAL) BY MOUTH ONCE DAILY.  Qty: 30 tablet, Refills: 2    Associated Diagnoses: Anxiety; Reactive  depression               I have seen and examined this patient within the last 30 days. My clinical findings that support the need for the home health skilled services and home bound status are the following:no   Weakness/numbness causing balance and gait disturbance due to Infection and Weakness/Debility making it taxing to leave home.     Diet:   low fat, low cholesterol    Labs:  Report Lab results to PCP.    Referrals/ Consults  Physical Therapy to evaluate and treat. Evaluate for home safety and equipment needs; Establish/upgrade home exercise program. Perform / instruct on therapeutic exercises, gait training, transfer training, and Range of Motion.  Occupational Therapy to evaluate and treat. Evaluate home environment for safety and equipment needs. Perform/Instruct on transfers, ADL training, ROM, and therapeutic exercises.   to evaluate for community resources/long-range planning.    Activities:   activity as tolerated    Nursing:   Agency to admit patient within 24 hours of hospital discharge unless specified on physician order or at patient request    SN to complete comprehensive assessment including routine vital signs. Instruct on disease process and s/s of complications to report to MD. Review/verify medication list sent home with the patient at time of discharge  and instruct patient/caregiver as needed. Frequency may be adjusted depending on start of care date.     Skilled nurse to perform up to 3 visits PRN for symptoms related to diagnosis    Notify MD if SBP > 160 or < 90; DBP > 90 or < 50; HR > 120 or < 50; Temp > 101; O2 < 88%; Other:       Ok to schedule additional visits based on staff availability and patient request on consecutive days within the home health episode.    When multiple disciplines ordered:    Start of Care occurs on Sunday - Wednesday schedule remaining discipline evaluations as ordered on separate consecutive days following the start of care.    Thursday SOC  -schedule subsequent evaluations Friday and Monday the following week.     Friday - Saturday SOC - schedule subsequent discipline evaluations on consecutive days starting Monday of the following week.    For all post-discharge communication and subsequent orders please contact patient's primary care physician. If unable to reach primary care physician or do not receive response within 30 minutes, please contact [facility] for clinical staff order clarification    Miscellaneous   Routine Skin for Bedridden Patients: Instruct patient/caregiver to apply moisture barrier cream to all skin folds and wet areas in perineal area daily and after baths and all bowel movements.    Home Health Aide:  Physical Therapy Twice weekly and Occupational Therapy Twice weekly    Wound Care Orders  yes:  Abcess s/p I&D:    Location:   abdomen wall        Irrigate with saline or wound spray     Loosely fill with iodoform gauze or nugauze daily     Cover with gauze dressing    I certify that this patient is confined to her home and needs intermittent skilled nursing care, physical therapy, and occupational therapy.

## 2023-10-15 NOTE — PLAN OF CARE
Pt AAOx4. No c/o pain, N/V. Medications administered per MAR. NS infusing. On RA. Abd dressing with large amount of moist sanguineous drainage. Dsg changed overnight. Bed alarm set, side rails raised, and call light in reach.

## 2023-10-15 NOTE — PROGRESS NOTES
Virtual Nurse:Discharge orders noted; additional clinical references attached.  and pharmacy tech notified.  Patient's discharge instruction packet given by bedside RN.    Cued into patient's room.  Permission received per patient to turn camera to view patient.  Introduced as VN that will be instructing on discharge instructions.   at bedside.  Educated patient on reason for admission; medications to hold, continue, and start, appointment to follow-up with doctor, and when to return to ED. Teach back method used. Verbalized understanding  Number given for 24/7 Nurse Line. Opportunity given for questions and questions answered.  Bedside nurse updated. Transport to [facility] requested.        10/15/23 1517   Shift   Virtual Nurse - Patient Verbalized Approval Of Camera Use;VN Rounding   Safety/Activity   Patient Rounds visualized patient

## 2023-10-15 NOTE — PROGRESS NOTES
Pharmacokinetic Assessment Follow Up: IV Vancomycin    Vancomycin serum concentration assessment(s):    The random level was drawn correctly and can be used to guide therapy at this time. The measurement is within the desired definitive target range of 10 to 15 mcg/mL.    Vancomycin Regimen Plan:    Change regimen to Vancomycin 1000 mg IV every 24 hours with next serum trough concentration measured at 10/17 prior to 3 dose on 0800    Drug levels (last 3 results):  Recent Labs   Lab Result Units 10/14/23  1422 10/15/23  0200   Vancomycin, Random ug/mL  --  15.3   Vancomycin-Trough ug/mL 23.2*  --        Pharmacy will continue to follow and monitor vancomycin.    Please contact pharmacy at extension [phone] for questions regarding this assessment.    Thank you for the consult,   Jennifer Miller       Patient brief summary:  Susan Pinto is a 67 y.o. female initiated on antimicrobial therapy with IV Vancomycin for treatment of skin & soft tissue infection    The patient's current regimen is vanco 1g q12    Drug Allergies:   Review of patient's allergies indicates:  No Known Allergies    Actual Body Weight:   91kg    Renal Function:   Estimated Creatinine Clearance: 82.9 mL/min (based on SCr of 0.7 mg/dL).,     Dialysis Method (if applicable):  N/A    CBC (last 72 hours):  Recent Labs   Lab Result Units 10/12/23  1642 10/13/23  0437   WBC K/uL 11.97 11.38   Hemoglobin g/dL 12.8 12.5   Hematocrit % 40.1 38.0   Platelets K/uL 406 385   Gran % % 70.3 76.9*   Lymph % % 20.0 12.0*   Mono % % 7.8 7.7   Eosinophil % % 0.8 1.8   Basophil % % 0.5 0.8   Differential Method  Automated Automated       Metabolic Panel (last 72 hours):  Recent Labs   Lab Result Units 10/12/23  1642 10/13/23  0437   Sodium mmol/L 138 135*   Potassium mmol/L 3.2* 3.3*   Chloride mmol/L 99 99   CO2 mmol/L 26 25   Glucose mg/dL 125* 122*   BUN mg/dL 10 8   Creatinine mg/dL 0.9 0.7   Albumin g/dL 3.1* 2.7*   Total Bilirubin mg/dL 0.6 0.6   Alkaline  Phosphatase U/L 62 53*   AST U/L 12 12   ALT U/L 7* 7*   Magnesium mg/dL  --  1.5*       Vancomycin Administrations:  vancomycin given in the last 96 hours                     vancomycin (VANCOCIN) 1,000 mg in dextrose 5 % (D5W) 250 mL IVPB (Vial-Mate) (mg) 1,000 mg New Bag 10/14/23 0204     1,000 mg New Bag 10/13/23 1520    vancomycin 1,250 mg in dextrose 5 % (D5W) 250 mL IVPB (Vial-Mate) (mg) 1,250 mg New Bag 10/12/23 2144    vancomycin (VANCOCIN) 1,000 mg in dextrose 5 % (D5W) 250 mL IVPB (Vial-Mate) (mg) 1,000 mg New Bag 10/12/23 2009                    Microbiologic Results:  Microbiology Results (last 7 days)       Procedure Component Value Units Date/Time    Culture, Anaerobe [[id number]] Collected: 10/13/23 1824    Order Status: Sent Specimen: Wound from Abdomen Updated: 10/13/23 2255    Aerobic culture [[id number]] Collected: 10/13/23 1824    Order Status: Sent Specimen: Wound from Abdomen Updated: 10/13/23 2255    AFB Culture & Smear [[id number]] Collected: 10/13/23 1824    Order Status: Sent Specimen: Wound from Abdomen Updated: 10/13/23 2255    Fungus culture [[id number]] Collected: 10/13/23 1824    Order Status: Sent Specimen: Wound from Abdomen Updated: 10/13/23 2255

## 2023-10-15 NOTE — DISCHARGE SUMMARY
[facility] Medicine  Discharge Summary      Patient Name: Susan Pinto  MRN: [id number]  Admission Date: 10/13/2023  Hospital Length of Stay: 2 days  Discharge Date and Time:  10/15/2023 1:43 PM  Attending Physician: Kit Pham MD   Discharging Provider: Kit Pham MD  Discharge Provider Team: Networked reference to record PCT   Primary Care Provider: Court Champion NP        HPI: HPI:  67 years old female with PMH significant for hypertension, hyperlipidemia, CVA was sent from [facility] due to abdominal wall abscess and for surgery consult.  Patient reported she went to see her PCP yesterday who found erythema and drainage from skin around PEG insertion site and advised patient to go to ER.  Patient reported her PEG tube was removed in June.  Patient denied abdominal pain nausea vomiting diarrhea fever chills cough chest pain shortness of breath or dizziness. CT A&P showed ''Gastrostomy tube tract in the left upper quadrant with associated large (15.0 x 13.3 x 9.4) multiloculated soft tissue abscess.  Components of the abscess are superficial and deep to the left rectus abdominis musculature.  Diffuse subcutaneous infectious/inflammatory fat stranding with overlying skin thickening.  No intraperitoneal communication. No enterocutaneous fistula.'' Lab workup showed WBC 11.9, creatinine 0.9, potassium 3.2 and lactate 1.3. Blood cultures obtained, patient received IV fluid, vancomycin and Zosyn in ER.  Patient is hemodynamically stable.        Overview/Hospital Course:  10/13/23: abdominal abscess with active drainage. Surgery consulted and I&D - had I&D on 10/13 with dressing change - ok to discharge from surgery standpoint. Will discharge home with Home health.    Procedure(s) (LRB):  INCISION AND DRAINAGE, ABSCESS (N/A)       Consults:   Consults (From admission, onward)          Status Ordering Provider     Inpatient consult to General Surgery  Once        Provider:   Dylan Javed MD    Completed ANGELICA DAVILA     Pharmacy to dose Vancomycin consult  Once        Provider:  (Not yet assigned)   [facility].    Acknowledged ANGELICA DAVILA     IP consult to case management  Once        Provider:  (Not yet assigned)    Completed ENZO RAPHAEL            Final Active Diagnoses:    Diagnosis Date Noted POA    PRINCIPAL PROBLEM:  Abdominal wall abscess [L02.211] 10/13/2023 Unknown    Obesity (BMI 30.0-34.9) [E66.9] 04/15/2021 Yes    Mixed hyperlipidemia [E78.2] 05/23/2017 Yes    Benign essential HTN [I10] 02/23/2017 Yes      Problems Resolved During this Admission:    Diagnosis Date Noted Date Resolved POA    Hypokalemia [E87.6] 10/13/2023 10/15/2023 No      Discharged Condition: fair    Disposition: Home or Self Care    Follow Up:   Follow-up Information       Court Champion, NP Follow up in 1 week(s).    Specialty: Family Medicine  Contact information:  [facility] [address]  [phone]               [facility] Follow up in 1 week(s).    Specialty: Surgery  Contact information:  [address]  [facility] [address]  [facility] [address] [phone]  Additional information:  Please park in [address] C or D and use Leyla renee. Take Medical Office Bldg elevators.                         Patient Instructions:   No discharge procedures on file.  Medications:  Reconciled Home Medications:      Medication List        CHANGE how you take these medications      * clindamycin 300 MG capsule  Commonly known as: CLEOCIN  Take 1 capsule (300 mg total) by mouth every 8 (eight) hours. for 7 days  What changed: Another medication with the same name was added. Make sure you understand how and when to take each.     * clindamycin 300 MG capsule  Commonly known as: CLEOCIN  Take 1 capsule (300 mg total) by mouth 3 (three) times daily. for 7 days  What changed: You were already taking a medication with the  "same name, and this prescription was added. Make sure you understand how and when to take each.           * This list has 2 medication(s) that are the same as other medications prescribed for you. Read the directions carefully, and ask your doctor or other care provider to review them with you.                CONTINUE taking these medications      aspirin 81 MG EC tablet  Commonly known as: ECOTRIN  Take 81 mg by mouth once daily.     atorvastatin 40 MG tablet  Commonly known as: LIPITOR  Take 1 tablet (40 mg total) by mouth once daily.     hydroCHLOROthiazide 12.5 MG Tab  Commonly known as: HYDRODIURIL  TAKE 1 TABLET (12.5 MG TOTAL) BY MOUTH ONCE DAILY.     midodrine 2.5 MG Tab  Commonly known as: PROAMATINE  Take 1 tablet (2.5 mg total) by mouth every morning.     sertraline 25 MG tablet  Commonly known as: ZOLOFT  TAKE 1 TABLET (25 MG TOTAL) BY MOUTH ONCE DAILY.     VITAMIN B-12 1000 MCG tablet  Generic drug: cyanocobalamin  Take 1,000 mcg by mouth once daily.              Significant Diagnostic Studies: Labs: CMP   Recent Labs   Lab 10/15/23  1026      K 3.7      CO2 27   *   BUN 7*   CREATININE 0.8   CALCIUM 9.5   ANIONGAP 4*    and CBC No results for input(s): "WBC", "HGB", "HCT", "PLT" in the last 48 hours.  Microbiology: Wound Culture: positive for staph    Pending Diagnostic Studies:       None          Indwelling Lines/Drains at time of discharge:   Lines/Drains/Airways       None                   Time spent on the discharge of patient: 30 minutes         Kit Pham MD  Department of Hospital Medicine  [facility] Surg      "

## 2023-10-15 NOTE — PROGRESS NOTES
"[facility] Surg  General Surgery  Progress Note    Subjective:     Interval History:   Doing well  No issues      Post-Op Info:  Procedure(s) (LRB):  INCISION AND DRAINAGE, ABSCESS (N/A)   2 Days Post-Op      Medications:  Continuous Infusions:   sodium chloride 0.9% 100 mL/hr at 10/15/23 0545     Scheduled Meds:   atorvastatin  40 mg Oral Daily    piperacillin-tazobactam (Zosyn) IV (PEDS and ADULTS) (extended infusion is not appropriate)  4.5 g Intravenous Q8H    vancomycin (VANCOCIN) IV (PEDS and ADULTS)  1,000 mg Intravenous Q24H     PRN Meds:acetaminophen, dextrose 10%, dextrose 10%, glucagon (human recombinant), glucose, glucose, HYDROmorphone, naloxone, ondansetron, oxyCODONE, sodium chloride 0.9%, Pharmacy to dose Vancomycin consult **AND** vancomycin - pharmacy to dose     Objective:     Vital Signs (Most Recent):  Temp: 97.9 °F (36.6 °C) (10/15/23 1125)  Pulse: 63 (10/15/23 1125)  Resp: 18 (10/15/23 1125)  BP: 123/68 (10/15/23 1125)  SpO2: 95 % (10/15/23 1125) Vital Signs (24h Range):  Temp:  [97 °F (36.1 °C)-98.1 °F (36.7 °C)] 97.9 °F (36.6 °C)  Pulse:  [63-74] 63  Resp:  [17-18] 18  SpO2:  [93 %-95 %] 95 %  BP: (111-135)/(55-76) 123/68       Intake/Output Summary (Last 24 hours) at 10/15/2023 1142  Last data filed at 10/15/2023 0545  Gross per 24 hour   Intake 656.91 ml   Output 900 ml   Net -243.09 ml       Physical Exam  Incision sites with mild serosang drainage    Significant Labs:  CBC: No results for input(s): "WBC", "RBC", "HGB", "HCT", "PLT", "MCV", "MCH", "MCHC" in the last 48 hours.  CMP:   Recent Labs   Lab 10/15/23  1026   *   CALCIUM 9.5      K 3.7   CO2 27      BUN 7*   CREATININE 0.8       Significant Diagnostics:  None    Assessment/Plan:     Active Diagnoses:    Diagnosis Date Noted POA    PRINCIPAL PROBLEM:  Abdominal wall abscess [L02.211] 10/13/2023 Unknown    Hypokalemia [E87.6] 10/13/2023 Unknown    Obesity (BMI 30.0-34.9) [E66.9] 04/15/2021 Yes    Mixed " hyperlipidemia [E78.2] 05/23/2017 Yes    Benign essential HTN [I10] 02/23/2017 Yes      Problems Resolved During this Admission:     S/p I&D  Packing removed and replaced  Ok from my standpoint for discharge  Wound care vs home health. Will need dressing changes for a long while Im sure. Ok to lightly pack with dry gauze and change daily.       Dylan Javed MD  General Surgery  [facility] Surg

## 2023-10-15 NOTE — PLAN OF CARE
"[facility] - Med Surg  Discharge Final Note    Primary Care Provider: Court Champion NP    Expected Discharge Date:     Potential to DC today after possible GI sign off. Family to transport home today. Orders needed at this time. CM sign off. Appt requests sent to     Final Discharge Note (most recent)       Final Note - 10/15/23 0954          Final Note    Assessment Type Final Discharge Note (P)      Anticipated Discharge Disposition Home-Health Care Svc (P)      Hospital Resources/Appts/Education Provided Appointments scheduled and added to AVS (P)         Post-Acute Status    Post-Acute Authorization Home Health (P)      Home Health Status Pending medical clearance/testing (P)    HH orders requested. Pending GI sign off for potential DC today    Discharge Delays Orders Needed (P)                    Future Appointments   Date Time Provider Department Center   10/25/2023  3:30 PM Court Champion NP [facility] IM [place]   10/27/2023  9:40 AM Dylan Javed MD [facility] GENSUR [facility]   12/5/2023 10:30 AM Daniel Ansari MD [facility] NEURO [place] [facility]     /71 (Patient Position: Lying)   Pulse 63   Temp 97.7 °F (36.5 °C) (Oral)   Resp 18   Ht 5' 4" (1.626 m)   Wt 91 kg (200 lb 9.9 oz)   SpO2 (!) 93%   BMI 34.44 kg/m²        Medication List        ASK your doctor about these medications      aspirin 81 MG EC tablet  Commonly known as: ECOTRIN     atorvastatin 40 MG tablet  Commonly known as: LIPITOR  Take 1 tablet (40 mg total) by mouth once daily.     clindamycin 300 MG capsule  Commonly known as: CLEOCIN  Take 1 capsule (300 mg total) by mouth every 8 (eight) hours. for 7 days     hydroCHLOROthiazide 12.5 MG Tab  Commonly known as: HYDRODIURIL  TAKE 1 TABLET (12.5 MG TOTAL) BY MOUTH ONCE DAILY.     midodrine 2.5 MG Tab  Commonly known as: PROAMATINE  Take 1 tablet (2.5 mg total) by mouth every morning.     sertraline 25 MG tablet  Commonly known as: ZOLOFT  TAKE 1 TABLET (25 MG " TOTAL) BY MOUTH ONCE DAILY.     VITAMIN B-12 1000 MCG tablet  Generic drug: cyanocobalamin

## 2023-10-16 LAB — BACTERIA SPEC AEROBE CULT: ABNORMAL

## 2023-10-16 PROCEDURE — G0180 MD CERTIFICATION HHA PATIENT: HCPCS | Mod: ,,, | Performed by: NURSE PRACTITIONER

## 2023-10-16 PROCEDURE — G0180 PR HOME HEALTH MD CERTIFICATION: ICD-10-PCS | Mod: ,,, | Performed by: NURSE PRACTITIONER

## 2023-10-16 NOTE — ANESTHESIA POSTPROCEDURE EVALUATION
Anesthesia Post Evaluation    Patient: Susan Pinto    Procedure(s) Performed: Procedure(s) (LRB):  INCISION AND DRAINAGE, ABSCESS (N/A)    Final Anesthesia Type: general      Patient location during evaluation: PACU  Patient participation: Yes- Able to Participate  Level of consciousness: awake and alert  Post-procedure vital signs: reviewed and stable  Pain management: adequate  Airway patency: patent    PONV status at discharge: No PONV  Anesthetic complications: no      Cardiovascular status: stable  Respiratory status: room air  Hydration status: euvolemic  Follow-up not needed.          Vitals Value Taken Time   /68 10/15/23 1125   Temp 36.6 °C (97.9 °F) 10/15/23 1125   Pulse 63 10/15/23 1125   Resp 18 10/15/23 1125   SpO2 95 % 10/15/23 1125         Event Time   Out of Recovery 19:41:49         Pain/Tino Score: No data recorded

## 2023-10-16 NOTE — ANESTHESIA POSTPROCEDURE EVALUATION
Anesthesia Post Evaluation    Patient: Susan Pinto    Procedure(s) Performed: Procedure(s) (LRB):  INCISION AND DRAINAGE, ABSCESS (N/A)    [facility] Anesthesia Post Op Evaluation      Vitals Value Taken Time   /68 10/15/23 1125   Temp 36.6 °C (97.9 °F) 10/15/23 1125   Pulse 63 10/15/23 1125   Resp 18 10/15/23 1125   SpO2 95 % 10/15/23 1125         Event Time   Out of Recovery 19:41:49         Pain/Tino Score: No data recorded

## 2023-10-17 LAB
BACTERIA BLD CULT: NORMAL
BACTERIA BLD CULT: NORMAL

## 2023-10-18 LAB — BACTERIA SPEC ANAEROBE CULT: NORMAL

## 2023-10-25 ENCOUNTER — OFFICE VISIT (OUTPATIENT)
Dept: INTERNAL MEDICINE | Facility: CLINIC | Age: 68
End: 2023-10-25
Payer: MEDICARE

## 2023-10-25 VITALS
BODY MASS INDEX: 33.29 KG/M2 | HEART RATE: 77 BPM | DIASTOLIC BLOOD PRESSURE: 80 MMHG | RESPIRATION RATE: 20 BRPM | HEIGHT: 64 IN | SYSTOLIC BLOOD PRESSURE: 128 MMHG | OXYGEN SATURATION: 96 % | WEIGHT: 195 LBS

## 2023-10-25 DIAGNOSIS — F41.9 ANXIETY: ICD-10-CM

## 2023-10-25 DIAGNOSIS — F32.9 REACTIVE DEPRESSION: ICD-10-CM

## 2023-10-25 DIAGNOSIS — R60.9 EDEMA, UNSPECIFIED TYPE: ICD-10-CM

## 2023-10-25 PROCEDURE — 99999 PR PBB SHADOW E&M-EST. PATIENT-LVL III: CPT | Mod: PBBFAC,,, | Performed by: NURSE PRACTITIONER

## 2023-10-25 PROCEDURE — 99214 PR OFFICE/OUTPT VISIT, EST, LEVL IV, 30-39 MIN: ICD-10-PCS | Mod: S$PBB,,, | Performed by: NURSE PRACTITIONER

## 2023-10-25 PROCEDURE — 99214 OFFICE O/P EST MOD 30 MIN: CPT | Mod: S$PBB,,, | Performed by: NURSE PRACTITIONER

## 2023-10-25 PROCEDURE — 99999 PR PBB SHADOW E&M-EST. PATIENT-LVL III: ICD-10-PCS | Mod: PBBFAC,,, | Performed by: NURSE PRACTITIONER

## 2023-10-25 PROCEDURE — 99213 OFFICE O/P EST LOW 20 MIN: CPT | Mod: PBBFAC,PN | Performed by: NURSE PRACTITIONER

## 2023-10-25 RX ORDER — SERTRALINE HYDROCHLORIDE 25 MG/1
25 TABLET, FILM COATED ORAL DAILY
Qty: 30 TABLET | Refills: 2 | Status: SHIPPED | OUTPATIENT
Start: 2023-10-25 | End: 2024-03-25

## 2023-10-25 RX ORDER — HYDROCHLOROTHIAZIDE 12.5 MG/1
12.5 TABLET ORAL DAILY
Qty: 30 TABLET | Refills: 2 | Status: SHIPPED | OUTPATIENT
Start: 2023-10-25 | End: 2024-03-25

## 2023-10-27 ENCOUNTER — OFFICE VISIT (OUTPATIENT)
Dept: SURGERY | Facility: CLINIC | Age: 68
End: 2023-10-27
Payer: MEDICARE

## 2023-10-27 VITALS
HEART RATE: 71 BPM | BODY MASS INDEX: 33.31 KG/M2 | DIASTOLIC BLOOD PRESSURE: 84 MMHG | WEIGHT: 195.13 LBS | SYSTOLIC BLOOD PRESSURE: 163 MMHG | HEIGHT: 64 IN

## 2023-10-27 DIAGNOSIS — L02.211 ABDOMINAL WALL ABSCESS: Primary | ICD-10-CM

## 2023-10-27 PROCEDURE — 99999 PR PBB SHADOW E&M-EST. PATIENT-LVL III: ICD-10-PCS | Mod: PBBFAC,,, | Performed by: STUDENT IN AN ORGANIZED HEALTH CARE EDUCATION/TRAINING PROGRAM

## 2023-10-27 PROCEDURE — 99024 PR POST-OP FOLLOW-UP VISIT: ICD-10-PCS | Mod: POP,,, | Performed by: STUDENT IN AN ORGANIZED HEALTH CARE EDUCATION/TRAINING PROGRAM

## 2023-10-27 PROCEDURE — 99024 POSTOP FOLLOW-UP VISIT: CPT | Mod: POP,,, | Performed by: STUDENT IN AN ORGANIZED HEALTH CARE EDUCATION/TRAINING PROGRAM

## 2023-10-27 PROCEDURE — 99213 OFFICE O/P EST LOW 20 MIN: CPT | Mod: PBBFAC,PO | Performed by: STUDENT IN AN ORGANIZED HEALTH CARE EDUCATION/TRAINING PROGRAM

## 2023-10-27 PROCEDURE — 99999 PR PBB SHADOW E&M-EST. PATIENT-LVL III: CPT | Mod: PBBFAC,,, | Performed by: STUDENT IN AN ORGANIZED HEALTH CARE EDUCATION/TRAINING PROGRAM

## 2023-10-27 NOTE — PROGRESS NOTES
S/p I&D ab wall abscess  Some drainage still, seropurulent  Pain has resovled  Doing dressing changes  Bandage on today was in place for 2 days. Moderate drainage, nothing consistent with gastric contents  No TTP  Finish abx  Ok to shower  Getting HH dressing changes 3x per week, can change in between as well  RTC in a month  Expect drainage still based on size of abscess

## 2023-10-31 NOTE — PROGRESS NOTES
Subjective:       Patient ID: Susan Pinto is a 68 y.o. female.    Chief Complaint: Follow-up (X 3  months )    Patient is known, to me and presents with   Chief Complaint   Patient presents with    Follow-up     X 3  months    .  Denies chest pain and shortness of breath.  Patient presents with post hospital follow up for abdominal wall cellulitis. She is feeling much better and cellulitis is resolving. She needs refills on zoloft. Mood is stable and no sihi noted. Also needs refills on hctz   Follow-up  Associated symptoms include weakness. Pertinent negatives include no arthralgias, chest pain, congestion, coughing, fatigue, fever, headaches, joint swelling, neck pain, numbness or rash.     Review of Systems   Constitutional:  Negative for activity change, appetite change, fatigue, fever and unexpected weight change.   HENT:  Negative for congestion, ear discharge, ear pain, hearing loss, postnasal drip and tinnitus.    Eyes:  Negative for photophobia, pain and visual disturbance.   Respiratory:  Negative for cough, shortness of breath, wheezing and stridor.    Cardiovascular:  Negative for chest pain, palpitations and leg swelling.   Gastrointestinal:  Negative for abdominal distention.   Genitourinary:  Negative for difficulty urinating, dysuria, frequency, hematuria and urgency.   Musculoskeletal:  Negative for arthralgias, back pain, gait problem, joint swelling and neck pain.   Skin:  Positive for wound. Negative for color change, pallor and rash.   Neurological:  Positive for weakness. Negative for dizziness, seizures, syncope, light-headedness, numbness and headaches.   Hematological:  Negative for adenopathy. Does not bruise/bleed easily.   Psychiatric/Behavioral:  Negative for behavioral problems, confusion, dysphoric mood, hallucinations, sleep disturbance and suicidal ideas. The patient is not nervous/anxious.        Objective:      Physical Exam  Constitutional:       General: She is not in  acute distress.     Appearance: She is well-developed.   HENT:      Head: Normocephalic and atraumatic.      Right Ear: Tympanic membrane and external ear normal.      Left Ear: Tympanic membrane and external ear normal.      Nose: Nose normal.      Mouth/Throat:      Mouth: Mucous membranes are moist.      Pharynx: No oropharyngeal exudate.   Eyes:      General: No scleral icterus.        Right eye: No discharge.         Left eye: No discharge.      Conjunctiva/sclera: Conjunctivae normal.      Pupils: Pupils are equal, round, and reactive to light.   Neck:      Thyroid: No thyromegaly.      Vascular: No JVD.   Cardiovascular:      Rate and Rhythm: Normal rate and regular rhythm.      Heart sounds: Normal heart sounds. No murmur heard.     No friction rub. No gallop.   Pulmonary:      Effort: Pulmonary effort is normal. No respiratory distress.      Breath sounds: Normal breath sounds. No stridor. No wheezing, rhonchi or rales.   Chest:      Chest wall: No tenderness.   Abdominal:      General: Bowel sounds are normal. There is no distension.      Palpations: Abdomen is soft. There is no mass.      Tenderness: There is no abdominal tenderness. There is no right CVA tenderness, left CVA tenderness, guarding or rebound.      Hernia: No hernia is present.   Musculoskeletal:         General: No swelling, tenderness, deformity or signs of injury.      Cervical back: Normal range of motion and neck supple.      Right lower leg: No edema.      Left lower leg: No edema.   Lymphadenopathy:      Cervical: No cervical adenopathy.   Skin:     General: Skin is warm and dry.      Capillary Refill: Capillary refill takes less than 2 seconds.      Coloration: Skin is not jaundiced or pale.      Findings: Wound present. No bruising, erythema, lesion or rash.             Comments: Dressing to lower abdominal region is dry and intact. No longer with erythema noted   Neurological:      General: No focal deficit present.      Mental  "Status: She is alert and oriented to person, place, and time.      Cranial Nerves: No cranial nerve deficit.      Sensory: No sensory deficit.      Motor: Weakness present. No abnormal muscle tone.      Coordination: Coordination normal.      Gait: Gait abnormal.      Deep Tendon Reflexes: Reflexes are normal and symmetric. Reflexes normal.      Comments: Still with some right sided weakness but improving   Psychiatric:         Attention and Perception: She does not perceive auditory or visual hallucinations.         Mood and Affect: Mood and affect normal. Mood is not anxious or depressed. Affect is not tearful.         Behavior: Behavior normal.         Thought Content: Thought content normal. Thought content does not include homicidal or suicidal ideation. Thought content does not include homicidal or suicidal plan.         Judgment: Judgment normal.         Assessment:       1. Anxiety    2. Reactive depression    3. Edema, unspecified type        Plan:   1. Anxiety  -     sertraline (ZOLOFT) 25 MG tablet; Take 1 tablet (25 mg total) by mouth once daily.  Dispense: 30 tablet; Refill: 2    2. Reactive depression  -     sertraline (ZOLOFT) 25 MG tablet; Take 1 tablet (25 mg total) by mouth once daily.  Dispense: 30 tablet; Refill: 2    3. Edema, unspecified type  -     hydroCHLOROthiazide (HYDRODIURIL) 12.5 MG Tab; Take 1 tablet (12.5 mg total) by mouth once daily.  Dispense: 30 tablet; Refill: 2       "This note will not be shared with the patient."  Home health SN to continue and wound care  Refills done  Rtc as scheduled   "

## 2023-11-16 LAB — FUNGUS SPEC CULT: NORMAL

## 2023-11-27 ENCOUNTER — OFFICE VISIT (OUTPATIENT)
Dept: SURGERY | Facility: CLINIC | Age: 68
End: 2023-11-27
Payer: MEDICARE

## 2023-11-27 VITALS — HEART RATE: 78 BPM | SYSTOLIC BLOOD PRESSURE: 165 MMHG | DIASTOLIC BLOOD PRESSURE: 89 MMHG

## 2023-11-27 DIAGNOSIS — L02.211 ABDOMINAL WALL ABSCESS: Primary | ICD-10-CM

## 2023-11-27 PROCEDURE — 99024 PR POST-OP FOLLOW-UP VISIT: ICD-10-PCS | Mod: POP,,, | Performed by: STUDENT IN AN ORGANIZED HEALTH CARE EDUCATION/TRAINING PROGRAM

## 2023-11-27 PROCEDURE — 99999 PR PBB SHADOW E&M-EST. PATIENT-LVL II: ICD-10-PCS | Mod: PBBFAC,,, | Performed by: STUDENT IN AN ORGANIZED HEALTH CARE EDUCATION/TRAINING PROGRAM

## 2023-11-27 PROCEDURE — 99212 OFFICE O/P EST SF 10 MIN: CPT | Mod: PBBFAC,PN | Performed by: STUDENT IN AN ORGANIZED HEALTH CARE EDUCATION/TRAINING PROGRAM

## 2023-11-27 PROCEDURE — 99024 POSTOP FOLLOW-UP VISIT: CPT | Mod: POP,,, | Performed by: STUDENT IN AN ORGANIZED HEALTH CARE EDUCATION/TRAINING PROGRAM

## 2023-11-27 PROCEDURE — 99999 PR PBB SHADOW E&M-EST. PATIENT-LVL II: CPT | Mod: PBBFAC,,, | Performed by: STUDENT IN AN ORGANIZED HEALTH CARE EDUCATION/TRAINING PROGRAM

## 2023-11-27 NOTE — PROGRESS NOTES
Feeling well  No pain  Lower incision has healed up, no drainage  Mild amount of serosanguinous drainage from left upper but no purulence  No fevers no erythema  Continue dressing changes as needed, expected to dry up soon  RTC any time if anything worsens

## 2023-12-02 LAB
ACID FAST MOD KINY STN SPEC: NORMAL
MYCOBACTERIUM SPEC QL CULT: NORMAL

## 2023-12-05 ENCOUNTER — OFFICE VISIT (OUTPATIENT)
Dept: NEUROLOGY | Facility: CLINIC | Age: 68
End: 2023-12-05
Payer: MEDICARE

## 2023-12-05 ENCOUNTER — PATIENT OUTREACH (OUTPATIENT)
Dept: ADMINISTRATIVE | Facility: HOSPITAL | Age: 68
End: 2023-12-05
Payer: MEDICARE

## 2023-12-05 VITALS
SYSTOLIC BLOOD PRESSURE: 142 MMHG | HEIGHT: 63 IN | RESPIRATION RATE: 16 BRPM | HEART RATE: 70 BPM | DIASTOLIC BLOOD PRESSURE: 92 MMHG | BODY MASS INDEX: 34.56 KG/M2

## 2023-12-05 DIAGNOSIS — I48.0 PAROXYSMAL ATRIAL FIBRILLATION: ICD-10-CM

## 2023-12-05 DIAGNOSIS — I10 PRIMARY HYPERTENSION: ICD-10-CM

## 2023-12-05 DIAGNOSIS — R25.2 SPASTICITY: ICD-10-CM

## 2023-12-05 DIAGNOSIS — I69.154 HEMIPLEGIA AND HEMIPARESIS FOLLOWING NONTRAUMATIC INTRACEREBRAL HEMORRHAGE AFFECTING LEFT NON-DOMINANT SIDE: Primary | ICD-10-CM

## 2023-12-05 DIAGNOSIS — E78.5 DYSLIPIDEMIA: ICD-10-CM

## 2023-12-05 PROCEDURE — 99999 PR STA SHADOW: CPT | Mod: PBBFAC,,, | Performed by: PSYCHIATRY & NEUROLOGY

## 2023-12-05 PROCEDURE — 99215 OFFICE O/P EST HI 40 MIN: CPT | Mod: S$PBB | Performed by: PSYCHIATRY & NEUROLOGY

## 2023-12-05 PROCEDURE — 99999 PR STA SHADOW: ICD-10-PCS | Mod: PBBFAC,,, | Performed by: PSYCHIATRY & NEUROLOGY

## 2023-12-05 PROCEDURE — 99214 OFFICE O/P EST MOD 30 MIN: CPT | Mod: PBBFAC | Performed by: PSYCHIATRY & NEUROLOGY

## 2023-12-05 PROCEDURE — 99999 PR PBB SHADOW E&M-EST. PATIENT-LVL IV: CPT | Mod: PBBFAC,,, | Performed by: PSYCHIATRY & NEUROLOGY

## 2023-12-05 NOTE — PROGRESS NOTES
"    HPI: Susan Pinto is a 68 y.o. female  here for history of ICH in 5/2023    Here for follow up    Since the last visit, she followed up in [facility] clinic and the following was recommended: "Susan Pinto is a 67 y.o. 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 Patient has been improving clinically. Initial MRI was concerning for possible underlying cavernous malformation. Updated MRI Brain stable with expected evolution, without evidence of underlying cavernoma or other apparent etiology of hemorrhage. No further imaging needed from my standpoint. I have encouraged her to continue to follow-up with cardiology and neurology. She may contact the clinic with any questions or concerns, and follow-up with Neurosurgery as needed."      Her left sided arm and left  weakness is getting better but muscles are tighter and prevents her dressing at time    Pain is resolved.     Left leg is not tight. But she still has weakness in the leg/feet    Left hand is stiff    Walker is still used / upright walker    Still in home health but will be discharged soon    HTN is mild    Midodrine is no longer needed/ has this PRN    HCTZ use continued    No Syncope    Mood  is adjusting but tolerating    She did see cardiology about her BP and DLD since the last visit  Had cardiac event monitor ordered/ never got this    Had I&D of an abdominal wall abscess in 10/2023          Review of Systems   Constitutional:  Negative for fever.   HENT:  Negative for nosebleeds.    Eyes:  Negative for double vision.   Respiratory:  Negative for hemoptysis.    Cardiovascular:  Negative for leg swelling.   Gastrointestinal:  Negative for blood in stool.   Genitourinary:  Negative for hematuria.   Musculoskeletal:  Negative for neck pain.   Skin:  Negative for rash.   Neurological:  Negative for headaches.   Psychiatric/Behavioral:  Negative for memory loss.  "         I have reviewed all of this patient's past medical and surgical histories as well as family and social histories and active allergies and medications as documented in the electronic medical record.        Exam:  Gen Appearance, well developed/nourished in no apparent distress  CV: 2+ distal pulses with no edema or swelling  Neuro:  MS: Awake, alert,  Sustains attention. Recent/remote memory intact, Language is full to spontaneous speech/repetition/naming/comprehension. Fund of Knowledge is full  CN: Optic discs are flat with normal vasculature, PERRL, Extraoccular movements and visual fields are full. Normal facial sensation and strength, Hearing symmetric, Tongue and Palate are midline and strong. Shoulder Shrug symmetric and strong.  Motor: Normal bulk, mild spasticity in the left arm, no abnormal movements but left pronator drift. 5/5 strength bilateral upper/lower extremities except 4/5 in the left arm distally and left leg distally, with 1+ reflexes and no clonus  Sensory: symmetric to light touch, pain, temp, and vibration, . Romberg not done  Cerebellar: Finger-nose,Heal-shin, Rapid alternating movements slightly dysmetric on the left  Gait: Not done, In a wheelchair today (can't walk without upright walker)    Imagin/16/2023 CT head: 1. Sagittal subcentimeter ovoid area of slightly increased density in the left side of the cervicomedullary junction is presumed to be the previously diagnosed site of the intraparenchymal hemorrhage.   2. In the dorsal right paramidline at the same level, there is a 3-4 mm linear intraparenchymal increased density focus that could be early dystrophic calcification or possibly a thrombosed vessel.   3. Diffuse ventriculomegaly may be due to central involutional changes versus other etiologies.   4. Scattered white matter microvascular ischemic changes.      2023: MRI brain; MRI brain: Evolving acute left paramedian parenchymal hemorrhage within the medulla.   "There is subtle curvilinear enhancement along the left lateral aspect of this extending to the pial surface configuration concerning for a developmental venous anomaly which raises concern for hemorrhage within a cavernous malformation.  No evidence for nodular enhancement to suggest underlying mass however component of AV shunting cannot be entirely excluded and consideration for further characterization with conventional angiography and follow-up is advised.     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  No evidence for additional hemorrhage or enhancement throughout the cervical spinal cord.       Labs:LDL invalid 2023  A1C normal      Assessment/Plan: Susan Pinto is a 68 y.o. 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 Had Clonidine PRN at d/c. However, she suffered hypotension and syncope in rehab. She is currently on HCTZ and midodrine but only needs this PRN now/ BP improving.        - has a history of new onset afib during this time but holding NOAC. Consulted cardiology per orders for afib and HTN management. Cardiac event monitor pending/ was given the number to call for this  -rehab is with home health with home PT and OT for her residual left sided weakness and numbness and she will benefit outpatient therapy when d/c'ed soon. Order written today           Mixed " hyperlipidemia  -treated by cardiology. 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

## 2023-12-05 NOTE — PROGRESS NOTES
Updates were requested from care everywhere.  Health Maintenance has been updated.  [facility] immunization registry triggered.  Immunizations were reconciled.  Per NGUYEN in basket message, pt declined flu vaccine 12/05/2023.

## 2023-12-12 ENCOUNTER — EXTERNAL HOME HEALTH (OUTPATIENT)
Dept: HOME HEALTH SERVICES | Facility: HOSPITAL | Age: 68
End: 2023-12-12
Payer: MEDICARE

## 2023-12-14 ENCOUNTER — DOCUMENT SCAN (OUTPATIENT)
Dept: HOME HEALTH SERVICES | Facility: HOSPITAL | Age: 68
End: 2023-12-14
Payer: MEDICARE

## 2023-12-21 ENCOUNTER — DOCUMENT SCAN (OUTPATIENT)
Dept: HOME HEALTH SERVICES | Facility: HOSPITAL | Age: 68
End: 2023-12-21
Payer: MEDICARE

## 2024-01-04 ENCOUNTER — PROCEDURE VISIT (OUTPATIENT)
Dept: NEUROLOGY | Facility: CLINIC | Age: 69
End: 2024-01-04
Payer: MEDICARE

## 2024-01-04 DIAGNOSIS — R25.2 SPASTICITY: Primary | ICD-10-CM

## 2024-01-04 PROCEDURE — 99999PBSHW PR PBB SHADOW TECHNICAL ONLY FILED TO HB: Mod: JZ,PBBFAC,,

## 2024-01-04 PROCEDURE — 64644 CHEMODENERV 1 EXTREM 5/> MUS: CPT | Mod: PBBFAC | Performed by: PSYCHIATRY & NEUROLOGY

## 2024-01-04 PROCEDURE — 99999 PR STA SHADOW: CPT | Mod: PBBFAC,TB,, | Performed by: PSYCHIATRY & NEUROLOGY

## 2024-01-04 RX ADMIN — ONABOTULINUMTOXINA 100 UNITS: 100 INJECTION, POWDER, LYOPHILIZED, FOR SOLUTION INTRADERMAL; INTRAMUSCULAR at 01:01

## 2024-01-04 NOTE — PROCEDURES
BOTOX PROCEDURE NOTE     Date of Procedure: 1/4/2024    Reason for Procedure: spacticity       Informed consent was obtained prior to performing this study. Two patient identifiers were confirmed with the patient prior to performing this study. A time out to determine correct patient and and agreement on procedure performed was conducted prior to the injections.     Procedure Details: After informed consent obtained the patient's head and upper neck was cleansed with alcohol rub and 100Units of Botox (diluted 1:1) was injected in the following LEFT arm muscles:        -25 units in Deltoid   -25  units in Biceps over 2 sights   -10 units in Brachial Radialis   -10  units in flexor carpi radialis   -10  units in flexor carpi ulnaris   -10 units in  abductor polisis brevis   -10 units in abductor digiti minimi   =Total : 100 units   The patient tolerated the procedure well with no more than 0.25cc of blood loss.She was observed for several minutes post injection and given a handout from [employer] regarding when and where to seek help if side effects are experienced.  RTC in 12 weeks.

## 2024-01-05 ENCOUNTER — PATIENT OUTREACH (OUTPATIENT)
Dept: ADMINISTRATIVE | Facility: HOSPITAL | Age: 69
End: 2024-01-05
Payer: MEDICARE

## 2024-01-05 NOTE — PROGRESS NOTES
L/m for scheduling mammogram and dexa scan  In home colorectal cancer screen  Blood pressure check

## 2024-01-12 ENCOUNTER — TELEPHONE (OUTPATIENT)
Dept: INTERNAL MEDICINE | Facility: CLINIC | Age: 69
End: 2024-01-12
Payer: MEDICARE

## 2024-01-12 DIAGNOSIS — I10 BENIGN ESSENTIAL HTN: Primary | ICD-10-CM

## 2024-01-12 RX ORDER — CLONIDINE HYDROCHLORIDE 0.1 MG/1
TABLET ORAL
Qty: 30 TABLET | Refills: 0 | Status: SHIPPED | OUTPATIENT
Start: 2024-01-12

## 2024-01-12 NOTE — TELEPHONE ENCOUNTER
----- Message from Alysha Fragoso MA sent at 2024 11:26 AM CST -----  Susan Pinto  MRN: [id number]  : 1955  PCP: Court Champion  Home Phone      [phone]  Work Phone      Not on file.  Mobile          [phone]      MESSAGE:     Patient is having BP issues during PT.    At rest it was 160/90  Standing for 2-5 minutes ir was 168/100    150/90 after rest and from sitting to standing 5 times it was 165/110    Please Advise [phone]

## 2024-01-12 NOTE — TELEPHONE ENCOUNTER
She only takes the midodrine as needed when pressure gets low so she hasn't been taking it but she is currently taking HCTZ daily  Please advise  Thanks!

## 2024-01-12 NOTE — TELEPHONE ENCOUNTER
I will place her on clonidine as needed for blood pressure higher than 160/90. Have her let us know on Monday how her pressure runs over the weekend.

## 2024-01-30 ENCOUNTER — PATIENT OUTREACH (OUTPATIENT)
Dept: ADMINISTRATIVE | Facility: HOSPITAL | Age: 69
End: 2024-01-30
Payer: MEDICARE

## 2024-02-06 DIAGNOSIS — Z12.11 COLON CANCER SCREENING: ICD-10-CM

## 2024-03-11 ENCOUNTER — PATIENT OUTREACH (OUTPATIENT)
Dept: ADMINISTRATIVE | Facility: HOSPITAL | Age: 69
End: 2024-03-11
Payer: MEDICARE

## 2024-03-25 DIAGNOSIS — R60.9 EDEMA, UNSPECIFIED TYPE: ICD-10-CM

## 2024-03-25 DIAGNOSIS — F32.9 REACTIVE DEPRESSION: ICD-10-CM

## 2024-03-25 DIAGNOSIS — F41.9 ANXIETY: ICD-10-CM

## 2024-03-25 RX ORDER — HYDROCHLOROTHIAZIDE 12.5 MG/1
12.5 TABLET ORAL DAILY
Qty: 30 TABLET | Refills: 2 | Status: SHIPPED | OUTPATIENT
Start: 2024-03-25 | End: 2025-03-25

## 2024-03-25 RX ORDER — SERTRALINE HYDROCHLORIDE 25 MG/1
25 TABLET, FILM COATED ORAL DAILY
Qty: 30 TABLET | Refills: 2 | Status: SHIPPED | OUTPATIENT
Start: 2024-03-25 | End: 2024-03-26

## 2024-03-26 ENCOUNTER — OFFICE VISIT (OUTPATIENT)
Dept: NEUROLOGY | Facility: CLINIC | Age: 69
End: 2024-03-26
Payer: MEDICARE

## 2024-03-26 VITALS
HEART RATE: 78 BPM | SYSTOLIC BLOOD PRESSURE: 132 MMHG | RESPIRATION RATE: 14 BRPM | BODY MASS INDEX: 33.49 KG/M2 | DIASTOLIC BLOOD PRESSURE: 88 MMHG | HEIGHT: 64 IN

## 2024-03-26 DIAGNOSIS — F43.21 ADJUSTMENT DISORDER WITH DEPRESSED MOOD: ICD-10-CM

## 2024-03-26 DIAGNOSIS — I48.0 PAROXYSMAL ATRIAL FIBRILLATION: ICD-10-CM

## 2024-03-26 DIAGNOSIS — I10 PRIMARY HYPERTENSION: ICD-10-CM

## 2024-03-26 DIAGNOSIS — I69.154 HEMIPLEGIA AND HEMIPARESIS FOLLOWING NONTRAUMATIC INTRACEREBRAL HEMORRHAGE AFFECTING LEFT NON-DOMINANT SIDE: ICD-10-CM

## 2024-03-26 DIAGNOSIS — R25.2 SPASTICITY: Primary | ICD-10-CM

## 2024-03-26 DIAGNOSIS — E78.5 DYSLIPIDEMIA: ICD-10-CM

## 2024-03-26 PROCEDURE — 99213 OFFICE O/P EST LOW 20 MIN: CPT | Mod: PBBFAC | Performed by: PSYCHIATRY & NEUROLOGY

## 2024-03-26 PROCEDURE — 99999 PR PBB SHADOW E&M-EST. PATIENT-LVL III: CPT | Mod: PBBFAC,,, | Performed by: PSYCHIATRY & NEUROLOGY

## 2024-03-26 PROCEDURE — 99214 OFFICE O/P EST MOD 30 MIN: CPT | Mod: S$PBB | Performed by: PSYCHIATRY & NEUROLOGY

## 2024-03-26 PROCEDURE — 99999 PR STA SHADOW: CPT | Mod: PBBFAC,,, | Performed by: PSYCHIATRY & NEUROLOGY

## 2024-03-26 RX ORDER — SERTRALINE HYDROCHLORIDE 50 MG/1
50 TABLET, FILM COATED ORAL DAILY
Qty: 30 TABLET | Refills: 11 | Status: SHIPPED | OUTPATIENT
Start: 2024-03-26 | End: 2025-03-26

## 2024-03-26 NOTE — PROGRESS NOTES
HPI: Susan Pinto is a 68 y.o. female  here for history of ICH in 5/2023    Here for follow up    3 months ago she received Botox injections in this clinic in the left arm (100 units).    Left arm and hand spasticity improved by 80% and just starting to increase again    Weakness continues but better with PT/OT    Still no pain but some numbness in the left arm is noted.     Left leg is still doing well.     Now transitioned to regular walker at home. Improved.     Discharged from Home health and at [facility] for [facility] outpatient      Midodrine PRN not needed    BP is normal today    HCTZ use continued    No Syncope    No low blood pressure    Mood is sometimes down with frustration over her limits. No Si/HI. Takes only 25mg Zoloft per PCP        Review of Systems   Constitutional:  Negative for fever.   HENT:  Negative for nosebleeds.    Eyes:  Negative for double vision.   Respiratory:  Negative for hemoptysis.    Cardiovascular:  Negative for leg swelling.   Gastrointestinal:  Negative for blood in stool.   Genitourinary:  Negative for hematuria.   Musculoskeletal:  Negative for neck pain.   Skin:  Negative for rash.   Neurological:  Negative for headaches.   Psychiatric/Behavioral:  Negative for memory loss.          I have reviewed all of this patient's past medical and surgical histories as well as family and social histories and active allergies and medications as documented in the electronic medical record.        Exam:  Gen Appearance, well developed/nourished in no apparent distress  CV: 2+ distal pulses with no edema or swelling  Neuro:  MS: Awake, alert,  Sustains attention. Recent/remote memory intact, Language is full to spontaneous speech/repetition/naming/comprehension. Fund of Knowledge is full  CN: Optic discs are flat with normal vasculature, PERRL, Extraoccular movements and visual fields are full. Normal facial sensation and strength, Hearing symmetric, Tongue and Palate are midline  and strong. Shoulder Shrug symmetric and strong.  Motor: Normal bulk, mild spasticity in the left arm, no abnormal movements but left pronator drift. 5/5 strength bilateral upper/lower extremities except 4/5 in the left arm distally and left leg distally, with 1+ reflexes and no clonus  Sensory: symmetric to light touch, pain, temp, and vibration, . Romberg not done  Cerebellar: Finger-nose,Heal-shin, Rapid alternating movements slightly dysmetric on the left  Gait: Not done, In a wheelchair today (can't walk without walker)    Imagin/16/2023 CT head: 1. Sagittal subcentimeter ovoid area of slightly increased density in the left side of the cervicomedullary junction is presumed to be the previously diagnosed site of the intraparenchymal hemorrhage.   2. In the dorsal right paramidline at the same level, there is a 3-4 mm linear intraparenchymal increased density focus that could be early dystrophic calcification or possibly a thrombosed vessel.   3. Diffuse ventriculomegaly may be due to central involutional changes versus other etiologies.   4. Scattered white matter microvascular ischemic changes.      2023: MRI brain; MRI brain: Evolving acute left paramedian parenchymal hemorrhage within the medulla.  There is subtle curvilinear enhancement along the left lateral aspect of this extending to the pial surface configuration concerning for a developmental venous anomaly which raises concern for hemorrhage within a cavernous malformation.  No evidence for nodular enhancement to suggest underlying mass however component of AV shunting cannot be entirely excluded and consideration for further characterization with conventional angiography and follow-up is advised.     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  No evidence for additional hemorrhage or enhancement throughout  "the cervical spinal cord.       Labs:LDL invalid 2023  A1C normal      Assessment/Plan: Susan Pinto is a 68 y.o. female with HTN and nontraumatic cervicomedullary junction ICH in 5/2023  I recommend:       * Left sided nontraumatic intracerebral hemorrhage of the brainstem  - MRI brain w/wo (5/2023): possible DVA and concern for cavernous malformation  - left medullary hemorrhage from HTN vs cavernoma   -She saw NSY in follow up who noted "pdated MRI Brain stable with expected evolution, without evidence of underlying cavernoma or other apparent etiology of hemorrhage" and follow up with NSY PRN/ no further imaging needed  - SBP goal is less than 130 now. Had Clonidine PRN at d/c. However, she suffered hypotension and syncope in rehab. She is currently on HCTZ and midodrine but no longer needs midodrine PRN now/ BP improving.        - has a history of new onset afib during this time but holding NOAC. Consulted cardiology done for afib and HTN management. Cardiac event monitor pending/ was given the number to call for this prior/ she continues to fail to follow up/ reviewed importance of this for stroke pevention. On 81mg ASA  -Completed PT/OT at home and now having this outpatient           Mixed hyperlipidemia  -treated by cardiology. 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

## 2024-03-28 ENCOUNTER — PROCEDURE VISIT (OUTPATIENT)
Dept: NEUROLOGY | Facility: CLINIC | Age: 69
End: 2024-03-28
Payer: MEDICARE

## 2024-03-28 DIAGNOSIS — I69.054 HEMIPLEGIA AND HEMIPARESIS FOLLOWING NONTRAUMATIC SUBARACHNOID HEMORRHAGE AFFECTING LEFT NON-DOMINANT SIDE: Primary | ICD-10-CM

## 2024-03-28 DIAGNOSIS — I69.154 HEMIPLEGIA AND HEMIPARESIS FOLLOWING NONTRAUMATIC INTRACEREBRAL HEMORRHAGE AFFECTING LEFT NON-DOMINANT SIDE: ICD-10-CM

## 2024-03-28 PROCEDURE — 99999 PR STA SHADOW: CPT | Mod: PBBFAC,,, | Performed by: PSYCHIATRY & NEUROLOGY

## 2024-03-28 PROCEDURE — 99999PBSHW PR PBB SHADOW TECHNICAL ONLY FILED TO HB: Mod: JZ,PBBFAC,,

## 2024-03-28 PROCEDURE — 64644 CHEMODENERV 1 EXTREM 5/> MUS: CPT | Mod: PBBFAC | Performed by: PSYCHIATRY & NEUROLOGY

## 2024-03-28 RX ADMIN — ONABOTULINUMTOXINA 100 UNITS: 100 INJECTION, POWDER, LYOPHILIZED, FOR SOLUTION INTRADERMAL; INTRAMUSCULAR at 01:03

## 2024-03-28 NOTE — PROCEDURES
BOTOX PROCEDURE NOTE     Date of Procedure: 3/28/2024    Reason for Procedure: spacticity         Informed consent was obtained prior to performing this study. Two patient identifiers were confirmed with the patient prior to performing this study. A time out to determine correct patient and and agreement on procedure performed was conducted prior to the injections.     Procedure Details: After informed consent obtained the patient's head and upper neck was cleansed with alcohol rub and 100Units of Botox (diluted 1:1) was injected in the following LEFT arm muscles:        -25 units in Deltoid   -25  units in Biceps over 2 sights   -10 units in Brachial Radialis   -10  units in flexor carpi radialis   -10  units in flexor carpi ulnaris   -10 units in  abductor polisis brevis   -10 units in abductor digiti minimi   =Total : 100 units   The patient tolerated the procedure well with no more than 0.25cc of blood loss.She was observed for several minutes post injection and given a handout from [employer] regarding when and where to seek help if side effects are experienced.  RTC in 12 weeks for more Botox    All other recs per my 3/26/2024 clinic note

## 2024-05-13 ENCOUNTER — PATIENT OUTREACH (OUTPATIENT)
Dept: ADMINISTRATIVE | Facility: HOSPITAL | Age: 69
End: 2024-05-13
Payer: MEDICARE

## 2024-06-20 DIAGNOSIS — R60.9 EDEMA, UNSPECIFIED TYPE: ICD-10-CM

## 2024-06-20 RX ORDER — HYDROCHLOROTHIAZIDE 12.5 MG/1
12.5 TABLET ORAL DAILY
Qty: 30 TABLET | Refills: 2 | Status: SHIPPED | OUTPATIENT
Start: 2024-06-20 | End: 2025-06-20

## 2024-06-27 ENCOUNTER — PROCEDURE VISIT (OUTPATIENT)
Dept: NEUROLOGY | Facility: CLINIC | Age: 69
End: 2024-06-27
Payer: MEDICARE

## 2024-06-27 DIAGNOSIS — R25.2 SPASTICITY: ICD-10-CM

## 2024-06-27 DIAGNOSIS — I69.054 HEMIPLEGIA AND HEMIPARESIS FOLLOWING NONTRAUMATIC SUBARACHNOID HEMORRHAGE AFFECTING LEFT NON-DOMINANT SIDE: Primary | ICD-10-CM

## 2024-06-27 PROCEDURE — 99999 PR STA SHADOW: CPT | Mod: PBBFAC,,, | Performed by: PSYCHIATRY & NEUROLOGY

## 2024-06-27 PROCEDURE — 99999PBSHW PR PBB SHADOW TECHNICAL ONLY FILED TO HB: Mod: JZ,PBBFAC,,

## 2024-06-27 PROCEDURE — 64644 CHEMODENERV 1 EXTREM 5/> MUS: CPT | Mod: S$PBB | Performed by: PSYCHIATRY & NEUROLOGY

## 2024-06-27 NOTE — PROCEDURES
"BOTOX PROCEDURE NOTE     Date of Procedure: 6/27/24    Reason for Procedure: spacticity         Informed consent was obtained prior to performing this study. Two patient identifiers were confirmed with the patient prior to performing this study. A time out to determine correct patient and and agreement on procedure performed was conducted prior to the injections.     Procedure Details: After informed consent obtained the patient's head and upper neck was cleansed with alcohol rub and 100Units of Botox (diluted 1:1) was injected in the following LEFT arm muscles:        -25 units in Deltoid   -25  units in Biceps over 2 sights   -10 units in Brachial Radialis   -10  units in flexor carpi radialis   -10  units in flexor carpi ulnaris   -10 units in  abductor polisis brevis   -10 units in abductor digiti minimi   =Total : 100 units   The patient tolerated the procedure well with no more than 0.25cc of blood loss.She was observed for several minutes post injection and given a handout from [employer] regarding when and where to seek help if side effects are experienced.  RTC in 12 weeks for more Botox       Also:     This patient has a history of  HTN and nontraumatic cervicomedullary junction ICH in 5/2023  I recommend:         * Left sided nontraumatic intracerebral hemorrhage of the brainstem  - MRI brain w/wo (5/2023): possible DVA and concern for cavernous malformation  - initially thought to be left medullary hemorrhage from HTN vs cavernoma   -She saw NSY in follow up who noted "updated MRI Brain stable with expected evolution, without evidence of underlying cavernoma or other apparent etiology of hemorrhage" and follow up with NSY PRN/ no further imaging needed  - SBP goal is less than 130 now.. Being managed by PCP.       - has a history of new onset afib during this time but holding NOAC. Consulted cardiology done for afib and HTN management. 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

## 2024-08-22 ENCOUNTER — PATIENT OUTREACH (OUTPATIENT)
Dept: ADMINISTRATIVE | Facility: HOSPITAL | Age: 69
End: 2024-08-22
Payer: MEDICARE

## 2024-09-18 DIAGNOSIS — R60.9 EDEMA, UNSPECIFIED TYPE: ICD-10-CM

## 2024-09-18 RX ORDER — HYDROCHLOROTHIAZIDE 12.5 MG/1
12.5 TABLET ORAL DAILY
Qty: 30 TABLET | Refills: 2 | Status: SHIPPED | OUTPATIENT
Start: 2024-09-18 | End: 2025-09-18

## 2024-09-23 ENCOUNTER — TELEPHONE (OUTPATIENT)
Dept: NEUROLOGY | Facility: CLINIC | Age: 69
End: 2024-09-23
Payer: MEDICARE

## 2024-09-23 NOTE — TELEPHONE ENCOUNTER
----- Message from Johanna Valdes sent at 2024 10:23 AM CDT -----  Contact: PATIENT  Susan Pinto  MRN: [id number]  : 1955  PCP: Court Champion  Home Phone      [phone]  Work Phone      Not on file.  Mobile          [phone]      MESSAGE: Patient would like to know if Dr. Ansari can fill out another form for her to be able to get a handicap license plate.        Phone: [phone]

## 2024-10-04 ENCOUNTER — PATIENT OUTREACH (OUTPATIENT)
Dept: ADMINISTRATIVE | Facility: HOSPITAL | Age: 69
End: 2024-10-04
Payer: MEDICARE

## 2024-10-04 NOTE — PROGRESS NOTES
Chart reviewed, immunization record updated.  No new results noted on [facility] or [email or web address] web site.  Care Everywhere updated.   Patient care coordination note  LOV with PCP 10/25/23  Left message to call clinic to schedule eAWV , mammogram ,colorectal cancer screen and dexa.

## 2024-10-23 DIAGNOSIS — I10 PRIMARY HYPERTENSION: ICD-10-CM

## 2024-11-15 NOTE — NURSING
Rn called in room for pt complaining of burry vision. Writes that she's seeing 2 clocks on the wall, new symptom in the last hour. MD Arturo at bedside to assess. Stat CT ordered.    There are no Wet Read(s) to document.

## 2024-12-06 ENCOUNTER — PATIENT MESSAGE (OUTPATIENT)
Dept: ADMINISTRATIVE | Facility: HOSPITAL | Age: 69
End: 2024-12-06
Payer: MEDICARE

## 2025-01-02 DIAGNOSIS — R60.9 EDEMA, UNSPECIFIED TYPE: ICD-10-CM

## 2025-01-02 RX ORDER — HYDROCHLOROTHIAZIDE 12.5 MG/1
12.5 TABLET ORAL
Qty: 30 TABLET | Refills: 2 | Status: SHIPPED | OUTPATIENT
Start: 2025-01-02

## 2025-01-23 ENCOUNTER — PATIENT OUTREACH (OUTPATIENT)
Dept: ADMINISTRATIVE | Facility: HOSPITAL | Age: 70
End: 2025-01-23
Payer: MEDICARE

## 2025-01-23 NOTE — PROGRESS NOTES
Left message to call and discuss scheduling pcp ,colorectal cancer screen, dexa scan and mammogram

## 2025-02-21 DIAGNOSIS — Z00.00 ENCOUNTER FOR MEDICARE ANNUAL WELLNESS EXAM: ICD-10-CM

## 2025-03-31 ENCOUNTER — PATIENT MESSAGE (OUTPATIENT)
Dept: ADMINISTRATIVE | Facility: HOSPITAL | Age: 70
End: 2025-03-31
Payer: MEDICARE

## 2025-04-28 ENCOUNTER — PATIENT OUTREACH (OUTPATIENT)
Dept: ADMINISTRATIVE | Facility: HOSPITAL | Age: 70
End: 2025-04-28
Payer: MEDICARE

## 2025-04-28 NOTE — PROGRESS NOTES
No answer left message to call clinic to discuss pcp visit, colorectal and breast cancer screen , osteoporosis screening and a blood pressure reading.  C outreach attempted

## 2025-05-12 ENCOUNTER — PATIENT MESSAGE (OUTPATIENT)
Dept: ADMINISTRATIVE | Facility: HOSPITAL | Age: 70
End: 2025-05-12
Payer: MEDICARE

## 2025-06-09 ENCOUNTER — PATIENT MESSAGE (OUTPATIENT)
Dept: ADMINISTRATIVE | Facility: HOSPITAL | Age: 70
End: 2025-06-09
Payer: MEDICARE

## (undated) DEVICE — PAD PREP CUFFED NS 24X48IN

## (undated) DEVICE — SYR IRRIGATION BULB STER 60ML

## (undated) DEVICE — TAPE MEDIPORE H CLOTH 2INX2YD

## (undated) DEVICE — YANKAUER OPEN TIP W/O VENT

## (undated) DEVICE — TOWEL OR DISP STRL BLUE 4/PK

## (undated) DEVICE — SPONGE GAUZE 16PLY 4X4

## (undated) DEVICE — BOWL STERILE LARGE 32OZ

## (undated) DEVICE — COVER OVERHEAD SURG LT BLUE

## (undated) DEVICE — SUT 2-0 12-18IN SILK

## (undated) DEVICE — PACK BASIC

## (undated) DEVICE — CONTAINER SPECIMEN OR STER 4OZ

## (undated) DEVICE — SPONGE IV DRAIN 4X4 STERILE

## (undated) DEVICE — PACK SURGERY START

## (undated) DEVICE — GAUZE SPONGE 4X4 12PLY

## (undated) DEVICE — ELECTRODE REM PLYHSV RETURN 9

## (undated) DEVICE — TRAY MINOR GEN SURG OMC

## (undated) DEVICE — SEE MEDLINE ITEM 157194

## (undated) DEVICE — GLOVE SURGICAL LATEX SZ 7

## (undated) DEVICE — SUT 2/0 30IN SILK BLK BRAI

## (undated) DEVICE — PENCIL ROCKER SWITCH 10FT CORD

## (undated) DEVICE — GOWN POLY REINF BRTH SLV XL

## (undated) DEVICE — SEE MEDLINE ITEM 154981

## (undated) DEVICE — SYR 10CC LUER LOCK

## (undated) DEVICE — TUBING SUC UNIV W/CONN 12FT

## (undated) DEVICE — LUBRICANT SURGILUBE 2 OZ

## (undated) DEVICE — SUT 3-0 12-18IN SILK

## (undated) DEVICE — SUT PROLENE 2-0 30 SH

## (undated) DEVICE — KIT PEG PULL STANDARD 20F

## (undated) DEVICE — URINARY DRAINAGE BAG

## (undated) DEVICE — NDL HYPO REG 25G X 1 1/2

## (undated) DEVICE — TRACH TUBE CUFF FLEX DISP 8.5

## (undated) DEVICE — DRAPE EENT THYROID 100X72X124

## (undated) DEVICE — PAD ABDOMINAL STERILE 5X9IN

## (undated) DEVICE — CATH SUCTION 14FR CONTROL

## (undated) DEVICE — SUT ETHILON 2-0 PSLX 30IN

## (undated) DEVICE — GOWN POLY REINF BRTH SLV LG

## (undated) DEVICE — BELLOW CANN HEMOBLAST 1.65GR

## (undated) DEVICE — HOLDER TRACH TUBE NECKBAND